# Patient Record
Sex: MALE | Race: WHITE | NOT HISPANIC OR LATINO | Employment: FULL TIME | URBAN - METROPOLITAN AREA
[De-identification: names, ages, dates, MRNs, and addresses within clinical notes are randomized per-mention and may not be internally consistent; named-entity substitution may affect disease eponyms.]

---

## 2017-02-08 ENCOUNTER — PATIENT MESSAGE (OUTPATIENT)
Dept: FAMILY MEDICINE | Facility: CLINIC | Age: 67
End: 2017-02-08

## 2017-02-08 DIAGNOSIS — Z23 NEED FOR PNEUMOCOCCAL VACCINATION: Primary | ICD-10-CM

## 2017-02-08 RX ORDER — SILDENAFIL 100 MG/1
100 TABLET, FILM COATED ORAL DAILY PRN
Qty: 5 TABLET | Refills: 2 | Status: SHIPPED | OUTPATIENT
Start: 2017-02-08 | End: 2017-09-25

## 2017-04-20 ENCOUNTER — OFFICE VISIT (OUTPATIENT)
Dept: PHYSICAL MEDICINE AND REHAB | Facility: CLINIC | Age: 67
End: 2017-04-20
Payer: MEDICARE

## 2017-04-20 VITALS
HEIGHT: 68 IN | WEIGHT: 210 LBS | SYSTOLIC BLOOD PRESSURE: 140 MMHG | DIASTOLIC BLOOD PRESSURE: 85 MMHG | BODY MASS INDEX: 31.83 KG/M2 | HEART RATE: 73 BPM

## 2017-04-20 DIAGNOSIS — M16.11 PRIMARY OSTEOARTHRITIS OF RIGHT HIP: ICD-10-CM

## 2017-04-20 DIAGNOSIS — M25.551 RIGHT HIP PAIN: Primary | ICD-10-CM

## 2017-04-20 PROCEDURE — 99499 UNLISTED E&M SERVICE: CPT | Mod: S$GLB,,, | Performed by: PHYSICAL MEDICINE & REHABILITATION

## 2017-04-20 PROCEDURE — 99999 PR PBB SHADOW E&M-EST. PATIENT-LVL II: CPT | Mod: PBBFAC,,, | Performed by: PHYSICAL MEDICINE & REHABILITATION

## 2017-04-20 PROCEDURE — 0232T NJX PLATELET PLASMA: CPT | Mod: CSM,S$GLB,, | Performed by: PHYSICAL MEDICINE & REHABILITATION

## 2017-04-20 RX ORDER — TRAMADOL HYDROCHLORIDE 50 MG/1
50 TABLET ORAL EVERY 6 HOURS PRN
Qty: 30 TABLET | Refills: 1 | Status: SHIPPED | OUTPATIENT
Start: 2017-04-20 | End: 2017-04-30

## 2017-04-21 NOTE — PROGRESS NOTES
[facility]    CHIEF COMPLAINT:   Chief Complaint   Patient presents with    Hip Pain     PRP injection to right hip     HISTORY OF PRESENT ILLNESS: Farhan Stallworth is a 67 y.o. male who presents to me in follow-up for his right hip pain.  He is undergone 2 prior injections of corticosteroid as well as right hip arthroscopy with mesenchymal stem cell injection back on 7/8/2016.  He reports definite reduction in his symptoms over the several months following this procedure.  Unfortunately, he has noted increased pain over the last few months.  He rates the pain as a 3 on a scale of 1-10 but may rise significant only with increased activity.  He locates the pain in the same spot deep within the anterior right groin region.    Review of Systems   Constitutional: Negative for fever.   HENT: Negative for drooling.    Eyes: Negative for discharge.   Respiratory: Negative for choking.    Cardiovascular: Negative for chest pain.   Genitourinary: Negative for flank pain.   Skin: Negative for wound.   Allergic/Immunologic: Negative for immunocompromised state.   Neurological: Negative for tremors and syncope.   Psychiatric/Behavioral: Negative for behavioral problems.     Past Medical History:   Past Medical History:   Diagnosis Date    Arthritis     hip    Hearing difficulty     Low serum testosterone level     Lumbar disc disease     REYES (obstructive sleep apnea)     has CPAP, does not use       Past Surgical History:   Past Surgical History:   Procedure Laterality Date    CARPAL TUNNEL RELEASE      2005, bilateral    CLAVICLE SURGERY      EPIDURAL BLOCK INJECTION  2010    KNEE ARTHROSCOPY W/ MENISCAL REPAIR      left    LUMBAR FUSION      2010    TONSILLECTOMY         Family History:   Family History   Problem Relation Age of Onset    Cancer Father     Cancer Sister     Heart disease Neg Hx     Diabetes Neg Hx        Medications:   Current Outpatient Prescriptions on File Prior to Visit  "  Medication Sig Dispense Refill    celecoxib (CELEBREX) 200 MG capsule Take 200 mg by mouth.      diphenhydramine-acetaminophen (TYLENOL PM)  mg Tab Take 1 tablet by mouth nightly as needed.      fish oil-omega-3 fatty acids 300-1,000 mg capsule Take 2 g by mouth once daily.      MULTIVITS-MINERALS/FA/LYCOPENE (MEN'S DAILY MULTIVIT-MINERAL ORAL) Take 1 tablet by mouth.      nutritional supplements Liqd Take by mouth. 1 capsule , has multiple ingredients      sildenafil (VIAGRA) 100 MG tablet Take 1 tablet (100 mg total) by mouth daily as needed for Erectile Dysfunction. 5 tablet 2    zolpidem (AMBIEN) 5 MG Tab Take one or two QHS as needed for sleep. 30 tablet 3     No current facility-administered medications on file prior to visit.      Allergies: Review of patient's allergies indicates:  No Known Allergies    Social History:   Social History     Social History    Marital status:      Spouse name: N/A    Number of children: N/A    Years of education: N/A     Social History Main Topics    Smoking status: Never Smoker    Smokeless tobacco: Never Used    Alcohol use 0.0 oz/week     0 Standard drinks or equivalent per week      Comment: 1-2 week    Drug use: No    Sexual activity: Not Asked     Other Topics Concern    None     Social History Narrative     Farhan is a retired  for [employer].  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    PHYSICAL EXAMINATION:   General    Vitals:    04/20/17 1531   BP: (!) 140/85   Pulse: 73   Weight: 95.3 kg (210 lb)   Height: 5' 8" (1.727 m)     Constitutional: Oriented to person, place, and time. No apparent distress. Appears well-developed and well-nourished.  Pleasant.  HENT:   Head: Normocephalic and atraumatic.   Eyes: Right eye exhibits no discharge. Left eye exhibits no discharge. No scleral icterus.   Pulmonary/Chest: Effort normal. No respiratory distress.   Abdominal: There is no guarding.   Neurological: " Alert and oriented to person, place, and time.   Psychiatric: Behavior is normal.   Right Hip Exam     Tenderness   The patient is experiencing tenderness in the anterior.    Range of Motion   Flexion: 120   Internal Rotation: 15 (Elicits pain in the anterior groin)   External Rotation: 60   Abduction: 40   Adduction: 25     Muscle Strength   Abduction: 5/5   Adduction: 5/5   Flexion: 5/5     Tests   TORRIE: negative    Other   Erythema: absent  Scars: absent  Sensation: normal  Pulse: present      Left Hip Exam   Left hip exam is normal.    Tenderness   The patient is experiencing no tenderness.         Range of Motion   Flexion: normal   Internal Rotation: normal   External Rotation: normal   Abduction: normal   Adduction: normal     Muscle Strength   Abduction: 5/5   Adduction: 5/5   Flexion: 5/5     Tests   TORRIE: negative    Other   Erythema: absent  Scars: absent  Sensation: normal  Pulse: present        INSPECTION: There is no swelling, ecchymoses, erythema or gross deformity.    STRENGTH: Manual muscle testing reveals 5/5 strength throughout the bilateral lower extremities.  LIGAMENTOUS LAXITY AND STABILITY: Negative TORRIE test. No pain with SI joint compression.  Positive log roll.  GAIT/DYNAMIC: Patient's gait is preserved.    Imaging  X-ray of the right hip from 4/5/2016: Advanced degenerative change and/or CPPD of the hips, right greater than left    2.  Possible right rectus femoris calcific tendinitis and possible right gluteus medius calcific tendinitis    3.  Degenerative change of the symphysis pubis    4.  Ossific density along the inferior margin of the right acetabulum, possibly a loose body or an age-indeterminate cortical avulsion fracture.     MRI of the right hip from 5/16/2016: Degenerative change including subchondral cyst right femoral head and right acetabulum, there also appears to be some labral fraying but no overt tearing.  The loose body seen on x-ray is not apparent.  There is  significant articular cartilage loss.    Data Reviewed: X-ray, MRI    Supportive Actions: Independent visualization of images or test specimens    ASSESSMENT:   1. Right hip pain    2. Primary osteoarthritis of right hip      PLAN:     1.  Mr. Stallworth did appear to have a positive response to the prior hip arthroscopy combined with intra-articular placement of mesenchymal stem cell, however unfortunately the effects have been temporary.  We discussed the likelihood of future total hip arthroplasty.  We also discussed the role of platelet rich plasma injections as an option that is less invasive than stem cell, but may give him a longer more quality pain reduction compared to corticosteroid.  He wishes to try this PRP injection into the right hip before seriously pursuing hip replacement.  See procedure note below.    2.  I recommended that he avoid aerobic or lower extremity exercise for the next 1 week.  He may golf.  We also discussed the avoidance of NSAIDs for the next 2 weeks.    3.  I prescribed tramadol 50 mg to take to 6 hours when necessary pain.  Otherwise, he may take Tylenol when necessary.    4.  Return to clinic when necessary.    Procedure: Platelet Rich Plasma Injection to the right hip     During the preprocedure period, the patient again reviewed the procedure, consent was obtained after we discussed the risks benefits and alternatives to this procedure. The patient was prepped with the usual sterile fasion and 60 ml of whole blood was withdrawn and harvested from the patient using a standard technique via antecubital access of the right arm. This blood was processed on location in an [employer] system processing machine and approximately 3 cc of PRP was prepared. 2cc of PPP was added to produce a total of 5cc. Upon completion of the blood component processing, the patient's plasma coagulant concentrate was brought into the treatment room. The patient was placed in a supine position. The  patient was prepped in the usual sterile manner. Local anesthesia was provided by ethyl chloride spray to the area in question prior to the procedure. Under ultrasound guidance, see images, the PRP was injected into the right femoral acetabular joint in an in plane distal to proximal approach using a 22-gauge 3.5 inch spinal needle. The injectate was observed to course up the femoral head neck junction, confirming intra-articular placement.    The patient tolerated the procedure well and there were no complications. A sterile dressing was applied to the area. The patient left the exam room and office in satifactory condition.      Plan: The patient was given instructions for general self care. The procedure took aproximately 40 minutes to complete including the harvesting and creation of the plasma coagulant. The patient will return for a follow up visit in 4 weeks to assess the treated area and overall health. We asked that he return prior to their scheduled visit if they note any skin color changes, excessive bleeding etc. he voiced understanding.      The above note was completed, in part, with the aid of Dragon dictation software/hardware. Translation errors may be present.

## 2017-07-18 ENCOUNTER — PATIENT MESSAGE (OUTPATIENT)
Dept: FAMILY MEDICINE | Facility: CLINIC | Age: 67
End: 2017-07-18

## 2017-09-11 ENCOUNTER — PATIENT OUTREACH (OUTPATIENT)
Dept: ADMINISTRATIVE | Facility: HOSPITAL | Age: 67
End: 2017-09-11

## 2017-09-22 ENCOUNTER — PATIENT MESSAGE (OUTPATIENT)
Dept: ADMINISTRATIVE | Facility: HOSPITAL | Age: 67
End: 2017-09-22

## 2017-09-25 ENCOUNTER — OFFICE VISIT (OUTPATIENT)
Dept: FAMILY MEDICINE | Facility: CLINIC | Age: 67
End: 2017-09-25
Payer: MEDICARE

## 2017-09-25 VITALS
TEMPERATURE: 98 F | DIASTOLIC BLOOD PRESSURE: 70 MMHG | BODY MASS INDEX: 32.46 KG/M2 | HEART RATE: 64 BPM | SYSTOLIC BLOOD PRESSURE: 132 MMHG | HEIGHT: 69 IN | WEIGHT: 219.13 LBS

## 2017-09-25 DIAGNOSIS — M48.061 LUMBAR SPINAL STENOSIS: ICD-10-CM

## 2017-09-25 DIAGNOSIS — M16.11 PRIMARY OSTEOARTHRITIS OF RIGHT HIP: Primary | ICD-10-CM

## 2017-09-25 DIAGNOSIS — G47.33 OSA (OBSTRUCTIVE SLEEP APNEA): ICD-10-CM

## 2017-09-25 PROCEDURE — 99213 OFFICE O/P EST LOW 20 MIN: CPT | Mod: S$GLB,,, | Performed by: FAMILY MEDICINE

## 2017-09-25 PROCEDURE — G0008 ADMIN INFLUENZA VIRUS VAC: HCPCS | Mod: S$GLB,,, | Performed by: FAMILY MEDICINE

## 2017-09-25 PROCEDURE — 99999 PR PBB SHADOW E&M-EST. PATIENT-LVL III: CPT | Mod: PBBFAC,,, | Performed by: FAMILY MEDICINE

## 2017-09-25 PROCEDURE — 90662 IIV NO PRSV INCREASED AG IM: CPT | Mod: S$GLB,,, | Performed by: FAMILY MEDICINE

## 2017-09-25 PROCEDURE — 3008F BODY MASS INDEX DOCD: CPT | Mod: S$GLB,,, | Performed by: FAMILY MEDICINE

## 2017-09-25 PROCEDURE — 1125F AMNT PAIN NOTED PAIN PRSNT: CPT | Mod: S$GLB,,, | Performed by: FAMILY MEDICINE

## 2017-09-25 PROCEDURE — 1159F MED LIST DOCD IN RCRD: CPT | Mod: S$GLB,,, | Performed by: FAMILY MEDICINE

## 2017-09-25 RX ORDER — SILDENAFIL CITRATE 20 MG/1
TABLET ORAL
Qty: 30 TABLET | Refills: 2 | Status: SHIPPED | OUTPATIENT
Start: 2017-09-25 | End: 2018-02-19

## 2017-09-25 NOTE — PROGRESS NOTES
Subjective:     THIS DOCUMENT WAS MADE IN PART WITH [employer] SOFTWARE.  OCCASIONALLY THIS SOFTWARE WILL MISINTERPRET WORDS OR PHRASES.     Patient ID: Farhan Stallworth is a 67 y.o. male.    Chief Complaint: Pre-op Exam (10/23 [facility] [place] with Dr. Nolberto Faria Right knee replacement)    HPI   Preop, surgery 10/23 at [facility] in , Dr. Nolberto Faria.    This is a 67-year-old male with osteoarthritis of his hip that has been worsening and failed all previous treatments leading to the need for hip replacement.  He has had no recent illnesses.  No infections.  He has had no worrisome cardiac or pulmonary symptoms.  There has been no chest pain, no shortness of breath, no syncope.  He reports no previous complications with any anesthesia or surgeries.    Active Ambulatory Problems     Diagnosis Date Noted    Low serum testosterone level 11/15/2012    REYES (obstructive sleep apnea) 11/15/2012    Osteoarthritis of right hip 07/08/2016    Lumbar spinal stenosis 12/20/2016    Lumbar degenerative disc disease 12/20/2016    Primary insomnia 12/28/2016     Resolved Ambulatory Problems     Diagnosis Date Noted    No Resolved Ambulatory Problems     Past Medical History:   Diagnosis Date    Arthritis     Hearing difficulty     Low serum testosterone level     Lumbar disc disease     REYES (obstructive sleep apnea)      Current Outpatient Prescriptions on File Prior to Visit   Medication Sig Dispense Refill    celecoxib (CELEBREX) 200 MG capsule Take 200 mg by mouth 2 (two) times daily.       fish oil-omega-3 fatty acids 300-1,000 mg capsule Take 2 g by mouth once daily.      MULTIVITS-MINERALS/FA/LYCOPENE (MEN'S DAILY MULTIVIT-MINERAL ORAL) Take 1 tablet by mouth.      diphenhydramine-acetaminophen (TYLENOL PM)  mg Tab Take 1 tablet by mouth nightly as needed.      zolpidem (AMBIEN) 5 MG Tab Take one or two QHS as needed for sleep. 30 tablet 3    [DISCONTINUED] nutritional supplements Liqd Take by  mouth. 1 capsule , has multiple ingredients      [DISCONTINUED] sildenafil (VIAGRA) 100 MG tablet Take 1 tablet (100 mg total) by mouth daily as needed for Erectile Dysfunction. 5 tablet 2     No current facility-administered medications on file prior to visit.      Review of patient's allergies indicates:  No Known Allergies  Social History   Substance Use Topics    Smoking status: Never Smoker    Smokeless tobacco: Never Used    Alcohol use 0.0 oz/week      Comment: 1-2 week     Family History   Problem Relation Age of Onset    Cancer Father     Cancer Sister     Heart disease Neg Hx     Diabetes Neg Hx          Review of Systems   Constitutional: Negative for activity change and unexpected weight change.   HENT: Positive for hearing loss. Negative for rhinorrhea and trouble swallowing.    Eyes: Negative for discharge and visual disturbance.   Respiratory: Negative for chest tightness and wheezing.    Cardiovascular: Negative for chest pain and palpitations.   Gastrointestinal: Negative for blood in stool, constipation, diarrhea and vomiting.   Endocrine: Negative for polydipsia and polyuria.   Genitourinary: Negative for difficulty urinating, hematuria and urgency.   Musculoskeletal: Positive for arthralgias. Negative for joint swelling and neck pain.   Neurological: Negative for weakness and headaches.   Psychiatric/Behavioral: Negative for confusion and dysphoric mood.       Objective:      Physical Exam   Constitutional: He is oriented to person, place, and time. He appears well-developed and well-nourished. No distress.   HENT:   Head: Normocephalic and atraumatic.   Right Ear: External ear normal.   Left Ear: External ear normal.   Nose: Nose normal.   Mouth/Throat: Oropharynx is clear and moist. No oropharyngeal exudate.   Eyes: Conjunctivae and EOM are normal. Pupils are equal, round, and reactive to light. Right eye exhibits no discharge. Left eye exhibits no discharge. No scleral icterus.   Neck:  "Normal range of motion. Neck supple. No JVD present. No thyromegaly present.   Cardiovascular: Normal rate, regular rhythm and normal heart sounds.  Exam reveals no gallop and no friction rub.    No murmur heard.  Pulmonary/Chest: Effort normal and breath sounds normal. No respiratory distress. He has no wheezes. He has no rales.   Abdominal: Soft. Bowel sounds are normal. He exhibits no distension and no mass. There is no tenderness. There is no guarding.   Lymphadenopathy:     He has no cervical adenopathy.   Neurological: He is alert and oriented to person, place, and time. No cranial nerve deficit. Coordination normal.   Skin: Skin is warm and dry. He is not diaphoretic.   Psychiatric: He has a normal mood and affect. His behavior is normal.   Vitals reviewed.      Vitals:    09/25/17 1501   BP: 132/70   Pulse: 64   Temp: 97.9 °F (36.6 °C)   Weight: 99.4 kg (219 lb 2.2 oz)   Height: 5' 9" (1.753 m)       Assessment:       1. Primary osteoarthritis of right hip    2. Lumbar spinal stenosis    3. REYES (obstructive sleep apnea)        Plan:       Farhan was seen today for pre-op exam.    Diagnoses and all orders for this visit:    Primary osteoarthritis of right hip  He has no contraindications for surgery.  He appears low risk for complication.  He does have a reported history of obstructive sleep apnea but never tolerant of CPAP.  Though in recent years he reports less snoring and no obvious apneic spells.  Would recommend at least 1 night in the hospital after surgery to monitor for any changes otherwise proceed as planned.    Lumbar spinal stenosis  This has been stable.  Although he does take Celebrex regularly for this and the hip arthritis.    REYES (obstructive sleep apnea)  As above    Other orders  -     sildenafil (REVATIO) 20 mg Tab; Take 3-5  Daily prn ED  -     Influenza - High Dose (65+) (PF) (IM)           No nsaids, no aspirin, no fishoil 7 days prior to surgery, he acknowledges understanding.  He may " take Tylenol if needed.    He also informs me that preop has been scheduled at the hospital so I'm assuming this includes lab EKG and any other test felt necessary by the surgery team.  I have asked him to make certain I get a copy sooner rather than later in case there are any issues that come up before surgery.    A copy will be sent to Dr. Nolberto Faria

## 2017-09-25 NOTE — Clinical Note
Please fax or send a copy of this note to Dr. Nolberto Faria, orthopedics in [place].  Patient did not arrive with any papers or fax number.
Patient/Caregiver provided printed discharge information.

## 2017-09-26 ENCOUNTER — TELEPHONE (OUTPATIENT)
Dept: FAMILY MEDICINE | Facility: CLINIC | Age: 67
End: 2017-09-26

## 2017-09-26 NOTE — TELEPHONE ENCOUNTER
----- Message from Asad Perez MD sent at 9/25/2017  3:49 PM CDT -----  Please fax or send a copy of this note to Dr. Nolberto Faria, orthopedics in [place].  Patient did not arrive with any papers or fax number.

## 2017-12-07 ENCOUNTER — PATIENT MESSAGE (OUTPATIENT)
Dept: FAMILY MEDICINE | Facility: CLINIC | Age: 67
End: 2017-12-07

## 2017-12-07 DIAGNOSIS — L60.0 INGROWN TOENAIL: Primary | ICD-10-CM

## 2017-12-07 NOTE — TELEPHONE ENCOUNTER
Pt has an ingrown toe nail and would like advise on how to take care of it. Please review and advise. Thank you!

## 2017-12-08 NOTE — TELEPHONE ENCOUNTER
Tried to reach pt. No answer, left msg to call back.    [facility]

## 2017-12-08 NOTE — TELEPHONE ENCOUNTER
See my chart message regarding an ingrown toenail. I entered a referral to the dietary. It is very painful and swollen then he may need an antibiotic. But I did not send this in as of yet.

## 2017-12-08 NOTE — TELEPHONE ENCOUNTER
Spoke w/ pt. Informed pt about need for appt. Pt verbalized understanding.     aptp made for 12/20/17

## 2017-12-20 ENCOUNTER — OFFICE VISIT (OUTPATIENT)
Dept: PODIATRY | Facility: CLINIC | Age: 67
End: 2017-12-20
Payer: MEDICARE

## 2017-12-20 VITALS — BODY MASS INDEX: 32.36 KG/M2 | HEIGHT: 69 IN | WEIGHT: 218.5 LBS

## 2017-12-20 DIAGNOSIS — L60.0 INGROWN NAIL OF GREAT TOE OF RIGHT FOOT: Primary | ICD-10-CM

## 2017-12-20 PROCEDURE — 99999 PR PBB SHADOW E&M-EST. PATIENT-LVL III: CPT | Mod: PBBFAC,,, | Performed by: PODIATRIST

## 2017-12-20 PROCEDURE — 99202 OFFICE O/P NEW SF 15 MIN: CPT | Mod: S$GLB,,, | Performed by: PODIATRIST

## 2017-12-20 RX ORDER — ENOXAPARIN SODIUM 100 MG/ML
INJECTION SUBCUTANEOUS
COMMUNITY
Start: 2017-10-23 | End: 2018-02-19

## 2017-12-20 RX ORDER — TRAMADOL HYDROCHLORIDE 50 MG/1
TABLET ORAL
COMMUNITY
Start: 2017-10-23 | End: 2018-02-19

## 2017-12-20 RX ORDER — MELOXICAM 7.5 MG/1
7.5 TABLET ORAL
COMMUNITY
Start: 2017-10-24 | End: 2018-02-19

## 2017-12-20 RX ORDER — ENOXAPARIN SODIUM 100 MG/ML
40 INJECTION SUBCUTANEOUS
COMMUNITY
Start: 2017-10-24 | End: 2017-12-20

## 2017-12-20 RX ORDER — AMOXICILLIN 500 MG/1
CAPSULE ORAL
COMMUNITY
Start: 2017-12-19 | End: 2017-12-20

## 2017-12-20 RX ORDER — HYDROCODONE BITARTRATE AND ACETAMINOPHEN 5; 325 MG/1; MG/1
TABLET ORAL
COMMUNITY
Start: 2017-10-23 | End: 2018-02-19

## 2017-12-20 RX ORDER — CLINDAMYCIN HYDROCHLORIDE 300 MG/1
CAPSULE ORAL
COMMUNITY
Start: 2017-11-20 | End: 2018-02-19 | Stop reason: ALTCHOICE

## 2017-12-20 RX ORDER — HYDROCODONE BITARTRATE AND ACETAMINOPHEN 7.5; 325 MG/1; MG/1
TABLET ORAL
COMMUNITY
Start: 2017-11-20 | End: 2018-02-19

## 2017-12-20 RX ORDER — CELECOXIB 200 MG/1
200 CAPSULE ORAL
COMMUNITY
End: 2017-12-20 | Stop reason: SDUPTHER

## 2017-12-20 RX ORDER — PNEUMOCOCCAL 13-VALENT CONJUGATE VACCINE 2.2; 2.2; 2.2; 2.2; 2.2; 4.4; 2.2; 2.2; 2.2; 2.2; 2.2; 2.2; 2.2 UG/.5ML; UG/.5ML; UG/.5ML; UG/.5ML; UG/.5ML; UG/.5ML; UG/.5ML; UG/.5ML; UG/.5ML; UG/.5ML; UG/.5ML; UG/.5ML; UG/.5ML
INJECTION, SUSPENSION INTRAMUSCULAR
COMMUNITY
Start: 2017-09-30 | End: 2018-02-19

## 2017-12-20 RX ORDER — GABAPENTIN 300 MG/1
300 CAPSULE ORAL
COMMUNITY
Start: 2017-10-24 | End: 2018-02-19

## 2017-12-20 NOTE — LETTER
December 21, 2017      Asad Perez MD  [address] [facility] [address] [place] [address]           [facility]  [address] [facility] [address] [place] [address]  Phone: [phone]          Patient: Farhan Stallworth   MR Number: [id number]   YOB: 1950   Date of Visit: 12/20/2017       Dear Dr. Asad Perez:    Thank you for referring Farhan Stallworth to me for evaluation. Attached you will find relevant portions of my assessment and plan of care.    If you have questions, please do not hesitate to call me. I look forward to following Farhan Stallworth along with you.    Sincerely,    Momo Javier, TALHA    Enclosure  CC:  No Recipients    If you would like to receive this communication electronically, please contact [email or web address] or [phone] to request more information on [facility] access.    For providers and/or their staff who would like to refer a patient to [facility], please contact us through our one-stop-shop provider referral line, [facility] , at [phone].    If you feel you have received this communication in error or would no longer like to receive these types of communications, please e-mail [email or web address]

## 2017-12-21 NOTE — PROGRESS NOTES
Subjective:      Patient ID: Farhan Stallworth is a 67 y.o. male.    Chief Complaint: Ingrown Toenail (c/o ingrown great toenail right foot) and Other Misc (Dr. Perez 9/25/2017)    Farhan is a 67 y.o. male who presents to the clinic complaining of painful ingrown toenail on the right foot great toe. He has had a problem with this for over a month, however had a hip replacement a couple months ago and was told to hold off on having any procedures to remove it. He was on PO antibiotics and has been soaking it. He feels the area has improved significantly over the last week. Denies f/c/n/v. There used to be redness and drainage but this has mostly resolved. No other pedal complaints. Never had an ingrown like this before.    Review of Systems   Constitution: Negative for chills and fever.   Cardiovascular: Negative for claudication and leg swelling.   Respiratory: Negative for shortness of breath.    Skin: Positive for nail changes. Negative for itching and rash.   Musculoskeletal: Positive for arthritis. Negative for muscle cramps, muscle weakness and myalgias.   Gastrointestinal: Negative for nausea and vomiting.   Neurological: Negative for focal weakness, loss of balance, numbness and paresthesias.           Objective:      Physical Exam   Constitutional: He is oriented to person, place, and time. He appears well-developed and well-nourished. No distress.   Cardiovascular:   Pulses:       Dorsalis pedis pulses are 2+ on the right side, and 2+ on the left side.        Posterior tibial pulses are 2+ on the right side, and 2+ on the left side.   < 3 sec capillary refill time to toes 1-5 bilateral. Toes and feet are warm to touch proximally with normal distal cooling b/l. There is some hair growth on the feet and toes b/l. There is mild edema b/l. No spider veins or varicosities present b/l.      Musculoskeletal:   Equinus noted b/l ankles with < 10 deg DF noted. MMT 5/5 in DF/PF/Inv/Ev resistance with no  reproduction of pain in any direction. Passive range of motion of ankle and pedal joints is painless b/l.     Neurological: He is alert and oriented to person, place, and time. He has normal strength. He displays no atrophy and no tremor. No sensory deficit. He exhibits normal muscle tone.   Negative tinel sign bilateral.   Skin: Skin is warm, dry and intact. No abrasion, no bruising, no burn, no ecchymosis, no laceration, no lesion, no petechiae and no rash noted. He is not diaphoretic. No cyanosis or erythema. No pallor. Nails show no clubbing.   Skin temperature, texture and turgor within normal limits.    Lateral hallux nail margin of right foot with ingrown nail plate. Minimal surrounding erythema and minimal edema is noted there is no granuloma formation noted. No malodor. Slight discomfort to palpation to the nail border.     Psychiatric: He has a normal mood and affect. His behavior is normal.             Assessment:       Encounter Diagnosis   Name Primary?    Ingrown nail of great toe of right foot Yes         Plan:       Farhan was seen today for ingrown toenail and other misc.    Diagnoses and all orders for this visit:    Ingrown nail of great toe of right foot      I counseled the patient on his conditions, their implications and medical management.    Discussed surgical versus conservative treatment, as it has been getting better over the last week or so he elects for conservative debridement of the nail.    Utilizing sterile toenail clippers I aggressively debrided the offending nail border approximately 3 mm from its edge and carried the nail plate incision down at an angle in order to wedge out the offending cryptotic portion of the nail plate. The offending border was then removed in toto.  No blood was drawn. Patient tolerated the procedure well and related significant relief.    Discussed that if the pain returns, or if there is increased erythema, drainage or other soi he should return right  away for a more aggressive avulsion of that portion of the nail.    Return PRN    Momo Javier DPM

## 2018-02-19 ENCOUNTER — OFFICE VISIT (OUTPATIENT)
Dept: PODIATRY | Facility: CLINIC | Age: 68
End: 2018-02-19
Payer: MEDICARE

## 2018-02-19 VITALS — HEIGHT: 69 IN | BODY MASS INDEX: 32.67 KG/M2 | WEIGHT: 220.56 LBS

## 2018-02-19 DIAGNOSIS — L60.0 INGROWN LEFT GREATER TOENAIL: Primary | ICD-10-CM

## 2018-02-19 PROCEDURE — 1125F AMNT PAIN NOTED PAIN PRSNT: CPT | Mod: S$GLB,,, | Performed by: PODIATRIST

## 2018-02-19 PROCEDURE — 1159F MED LIST DOCD IN RCRD: CPT | Mod: S$GLB,,, | Performed by: PODIATRIST

## 2018-02-19 PROCEDURE — 99213 OFFICE O/P EST LOW 20 MIN: CPT | Mod: 25,S$GLB,, | Performed by: PODIATRIST

## 2018-02-19 PROCEDURE — 11730 AVULSION NAIL PLATE SIMPLE 1: CPT | Mod: TA,S$GLB,, | Performed by: PODIATRIST

## 2018-02-19 PROCEDURE — 99999 PR PBB SHADOW E&M-EST. PATIENT-LVL III: CPT | Mod: PBBFAC,,, | Performed by: PODIATRIST

## 2018-02-19 PROCEDURE — 3008F BODY MASS INDEX DOCD: CPT | Mod: S$GLB,,, | Performed by: PODIATRIST

## 2018-02-19 RX ORDER — CEPHALEXIN 500 MG/1
500 CAPSULE ORAL 4 TIMES DAILY
Qty: 28 CAPSULE | Refills: 0 | Status: SHIPPED | OUTPATIENT
Start: 2018-02-19 | End: 2018-02-26

## 2018-02-19 NOTE — PROCEDURES
Nail Removal  Date/Time: 2/19/2018 11:35 AM  Performed by: VEL LEE  Authorized by: VEL LEE       Location:  Left foot  Location detail:  Left big toe  Anesthesia:  Digital block  Local anesthetic: lidocaine 1% without epinephrine  Anesthetic total (ml):  5  Preparation:  Skin prepped with alcohol and skin prepped with Betadine    Amount removed:  Partial  Nail removed location: medial.  Wedge excision of skin of nail fold: No    Nail bed sutured?: No    Nail matrix removed:  None  Dressing: gauze neosporin and coban.  Patient tolerance:  Patient tolerated the procedure well with no immediate complications

## 2018-02-19 NOTE — PATIENT INSTRUCTIONS
"AFTER TOENAIL PROCEDURE INSTRUCTIONS    1. Leave bandage intact until you shower (12-24 hours). If the bandage sticks as you try to remove it, soak it in warm water until it lifts off.    2. Dry foot completely after showering, and apply a small amount of triple antibiotic ointment (Neosporin works fine!) and a fabric or cloth bandaid ("plastic" bandaids tend to lift off with ointment use).  Wear open-toed shoes as needed for comfort.     3. Take Advil or Tylenol as needed for pain.     4. Your toe may drain for the next few days. Normal drainage is yellow-to-pink, and clear, much like the fluid in a blister. Watch for redness spreading up your toe into your foot, white thick drainage (pus), pain unrelieved by medication, or nausea/vomiting/fever/chills. These are signs of infection. Please call the clinic or visit your doctor.        "

## 2018-02-23 ENCOUNTER — PATIENT MESSAGE (OUTPATIENT)
Dept: PODIATRY | Facility: CLINIC | Age: 68
End: 2018-02-23

## 2018-02-23 NOTE — PROGRESS NOTES
Subjective:      Patient ID: Farhan Stallworth is a 67 y.o. male.    Chief Complaint: Ingrown Toenail (Left great) and Other Misc (PCP:  Dr Perez 9/25/17)    Farhan is a 67 y.o. male who presents to the clinic complaining of painful ingrown toenail on the left foot great toe. He has had a problem with this before and had it debrided which helped for a few weeks, however it has now returned and is bothering him again. Painful with pressure and with shoe wear. Denies f/c/n/v. No other pedal complaints.    Review of Systems   Constitution: Negative for chills and fever.   Cardiovascular: Negative for claudication and leg swelling.   Respiratory: Negative for shortness of breath.    Skin: Positive for nail changes. Negative for itching and rash.   Musculoskeletal: Positive for arthritis. Negative for muscle cramps, muscle weakness and myalgias.   Gastrointestinal: Negative for nausea and vomiting.   Neurological: Negative for focal weakness, loss of balance, numbness and paresthesias.           Objective:      Physical Exam   Constitutional: He is oriented to person, place, and time. He appears well-developed and well-nourished. No distress.   Cardiovascular:   Pulses:       Dorsalis pedis pulses are 2+ on the right side, and 2+ on the left side.        Posterior tibial pulses are 2+ on the right side, and 2+ on the left side.   < 3 sec capillary refill time to toes 1-5 bilateral. Toes and feet are warm to touch proximally with normal distal cooling b/l. There is some hair growth on the feet and toes b/l. There is mild edema b/l. No spider veins or varicosities present b/l.      Musculoskeletal:   Equinus noted b/l ankles with < 10 deg DF noted. MMT 5/5 in DF/PF/Inv/Ev resistance with no reproduction of pain in any direction. Passive range of motion of ankle and pedal joints is painless b/l.     Neurological: He is alert and oriented to person, place, and time. He has normal strength. He displays no atrophy and no  tremor. No sensory deficit. He exhibits normal muscle tone.   Negative tinel sign bilateral.   Skin: Skin is warm, dry and intact. No abrasion, no bruising, no burn, no ecchymosis, no laceration, no lesion, no petechiae and no rash noted. He is not diaphoretic. No cyanosis or erythema. No pallor. Nails show no clubbing.   Skin temperature, texture and turgor within normal limits.    Lateral hallux nail margin of left foot with ingrown nail plate. Minimal surrounding erythema and minimal edema is noted there is no granuloma formation noted. No malodor. Slight discomfort to palpation to the nail border.     Psychiatric: He has a normal mood and affect. His behavior is normal.             Assessment:       Encounter Diagnosis   Name Primary?    Ingrown left greater toenail Yes         Plan:       Farhan was seen today for ingrown toenail and other misc.    Diagnoses and all orders for this visit:    Ingrown left greater toenail  -     Nail Removal    Other orders  -     cephALEXin (KEFLEX) 500 MG capsule; Take 1 capsule (500 mg total) by mouth 4 (four) times daily.      I counseled the patient on his conditions, their implications and medical management.    Discussed the options of slant back vs permanent removal of offending corners of nail. Patient opted for non permanent removal. All alternatives, risks and complications were discussed in detail with patient regarding phenol matrixectomy. Patient elected for this procedure on the medial border of the left great toe. Informed consent was discussed in detail and signed/witnessed. Procedure note below:    Return 10 days post op    Momo Javier DPM

## 2018-05-07 ENCOUNTER — TELEPHONE (OUTPATIENT)
Dept: AUDIOLOGY | Facility: CLINIC | Age: 68
End: 2018-05-07

## 2018-05-07 NOTE — TELEPHONE ENCOUNTER
Spoke with pt regarding the  warranty on his hearing aids. Informed him that he is eligible for renewal but he declined the renewal. Informed his that if his hearing aids need repair, there would be a charge for the repair since the aids are now out of warranty. Pt acknowledged the  warranty and will call the clinic PRN.

## 2018-05-17 ENCOUNTER — PATIENT MESSAGE (OUTPATIENT)
Dept: FAMILY MEDICINE | Facility: CLINIC | Age: 68
End: 2018-05-17

## 2018-05-17 DIAGNOSIS — S46.209A INJURY OF TENDON OF BICEPS: Primary | ICD-10-CM

## 2018-05-18 NOTE — TELEPHONE ENCOUNTER
See my chart message.  I entered a referral to Orthopedics at his request.  Please help him schedule next week when he returns to [facility].

## 2018-05-21 ENCOUNTER — HOSPITAL ENCOUNTER (OUTPATIENT)
Dept: RADIOLOGY | Facility: HOSPITAL | Age: 68
Discharge: HOME | End: 2018-05-21
Attending: ORTHOPAEDIC SURGERY
Payer: MEDICARE

## 2018-05-21 ENCOUNTER — OFFICE VISIT (OUTPATIENT)
Dept: ORTHOPEDICS | Facility: CLINIC | Age: 68
End: 2018-05-21
Payer: MEDICARE

## 2018-05-21 VITALS — HEIGHT: 69 IN | WEIGHT: 220 LBS | BODY MASS INDEX: 32.58 KG/M2

## 2018-05-21 DIAGNOSIS — M79.602 LEFT ARM PAIN: ICD-10-CM

## 2018-05-21 DIAGNOSIS — M79.602 LEFT ARM PAIN: Primary | ICD-10-CM

## 2018-05-21 DIAGNOSIS — S49.92XA INJURY OF LEFT UPPER ARM, INITIAL ENCOUNTER: Primary | ICD-10-CM

## 2018-05-21 DIAGNOSIS — M65.341 TRIGGER RING FINGER OF RIGHT HAND: ICD-10-CM

## 2018-05-21 PROCEDURE — 99204 OFFICE O/P NEW MOD 45 MIN: CPT | Mod: 25,S$GLB,, | Performed by: ORTHOPAEDIC SURGERY

## 2018-05-21 PROCEDURE — 73060 X-RAY EXAM OF HUMERUS: CPT | Mod: 26,LT,, | Performed by: RADIOLOGY

## 2018-05-21 PROCEDURE — 20550 NJX 1 TENDON SHEATH/LIGAMENT: CPT | Mod: F8,S$GLB,, | Performed by: ORTHOPAEDIC SURGERY

## 2018-05-21 PROCEDURE — 73060 X-RAY EXAM OF HUMERUS: CPT | Mod: TC,PO,LT

## 2018-05-21 PROCEDURE — 99999 PR PBB SHADOW E&M-EST. PATIENT-LVL II: CPT | Mod: PBBFAC,,, | Performed by: ORTHOPAEDIC SURGERY

## 2018-05-21 NOTE — PROCEDURES
Tendon Sheath  Date/Time: 5/21/2018 11:04 AM  Performed by: DEMETRIA BA  Authorized by: DEMETRIA BA     Location:  Ring finger  Site:  R ring MCP  Medications:  20 mg triamcinolone hexacetonide 20 mg/mL

## 2018-05-21 NOTE — LETTER
May 21, 2018      Asad Perez MD  [address] [facility] [address] [place] [address]           [facility]  [address] [facility] [address] [place] [address]  Phone: [phone]          Patient: Farhan Stallworth   MR Number: [id number]   YOB: 1950   Date of Visit: 5/21/2018       Dear Dr. Asad Perez:    Thank you for referring Farhan Stallworth to me for evaluation. Attached you will find relevant portions of my assessment and plan of care.    If you have questions, please do not hesitate to call me. I look forward to following Farhan Stallworth along with you.    Sincerely,    Germain Bourgeois MD    Enclosure  CC:  No Recipients    If you would like to receive this communication electronically, please contact [email or web address] or [phone] to request more information on [facility] Link access.    For providers and/or their staff who would like to refer a patient to [facility], please contact us through our one-stop-shop provider referral line, [facility], at [phone].    If you feel you have received this communication in error or would no longer like to receive these types of communications, please e-mail [email or web address]

## 2018-05-21 NOTE — PROGRESS NOTES
A 68 years old complaining of locking, catching, and triggering of his right   fourth finger.   He has had this now for several months' time.  Also had an   injury to his biceps about nine days ago, felt a pop with retraction of the   muscle at the shoulder area.  It is not really painful for him.    PHYSICAL EXAMINATION:  Today shows a lana deformity about the shoulder.  Skin   is intact.  Compartments are soft.  Hand is functioning well.  No deficits.  He   does have locking, catching, and triggering at right fourth finger.    Shoulder x-rays show old surgery at the AC joint.  He has a scar to match that.    ASSESSMENT:   1.  Biceps rupture proximally.  Plan symptomatic care, reassurance.  2.  Right fourth trigger finger.  Plan Kenalog injection.  Follow up as needed.      PBB/HN  dd: 05/21/2018 11:03:16 (CDT)  td: 05/22/2018 01:50:44 (CDT)  Doc ID   #[id number]  Job ID #[id number]    CC:     Further History  Aching pain  Worse with activity  Relieved with rest  No other associated symptoms  No other radiation    Further Exam  Alert and oriented  Pleasant  Contralateral limb has appropriate range of motion for age and condition  Contralateral limb has appropriate strength for age and condition  Contralateral limb has appropriate stability  for age and condition  No adenopathy  Pulses are appropriate for current condition  Skin is intact        Chief Complaint    Chief Complaint   Patient presents with    Left Upper Arm - Injury, Pain       HPI  Farhan Stallworth is a 68 y.o.  male who presents with       Past Medical History  Past Medical History:   Diagnosis Date    Arthritis     hip    Hearing difficulty     Low serum testosterone level     Lumbar disc disease     REYES (obstructive sleep apnea)     has CPAP, does not use       Past Surgical History  Past Surgical History:   Procedure Laterality Date    CARPAL TUNNEL RELEASE      2005, bilateral    CLAVICLE SURGERY      EPIDURAL BLOCK INJECTION  2010    HIP  SURGERY      JOINT REPLACEMENT      KNEE ARTHROSCOPY W/ MENISCAL REPAIR      left    LUMBAR FUSION      2010    TONSILLECTOMY         Medications  Current Outpatient Prescriptions   Medication Sig    celecoxib (CELEBREX) 200 MG capsule Take 200 mg by mouth 2 (two) times daily.     fish oil-omega-3 fatty acids 300-1,000 mg capsule Take 2 g by mouth once daily.    MULTIVITS-MINERALS/FA/LYCOPENE (MEN'S DAILY MULTIVIT-MINERAL ORAL) Take 1 tablet by mouth.     No current facility-administered medications for this visit.        Allergies  Review of patient's allergies indicates:  No Known Allergies    Family History  Family History   Problem Relation Age of Onset    Cancer Father     Cancer Sister     Heart disease Neg Hx     Diabetes Neg Hx        Social History  Social History     Social History    Marital status:      Spouse name: N/A    Number of children: N/A    Years of education: N/A     Occupational History    Not on file.     Social History Main Topics    Smoking status: Never Smoker    Smokeless tobacco: Never Used    Alcohol use 0.0 oz/week      Comment: 1-2 week    Drug use: No    Sexual activity: Not on file     Other Topics Concern    Not on file     Social History Narrative    No narrative on file               Review of Systems     Constitutional: Negative    HENT: Negative  Eyes: Negative  Respiratory: Negative  Cardiovascular: Negative  Musculoskeletal: HPI  Skin: Negative  Neurological: Negative  Hematological: Negative  Endocrine: Negative                 Physical Exam    There were no vitals filed for this visit.  Body mass index is 32.49 kg/m².  Physical Examination:     General appearance -  well appearing, and in no distress  Mental status - awake  Neck - supple  Chest -  symmetric air entry  Heart - normal rate   Abdomen - soft      Assessment     1. Injury of left upper arm, initial encounter    2. Trigger ring finger of right hand          Plan

## 2018-11-27 ENCOUNTER — OFFICE VISIT (OUTPATIENT)
Dept: PODIATRY | Facility: CLINIC | Age: 68
End: 2018-11-27
Payer: MEDICARE

## 2018-11-27 VITALS
WEIGHT: 224 LBS | BODY MASS INDEX: 33.18 KG/M2 | SYSTOLIC BLOOD PRESSURE: 146 MMHG | HEIGHT: 69 IN | HEART RATE: 86 BPM | DIASTOLIC BLOOD PRESSURE: 100 MMHG

## 2018-11-27 DIAGNOSIS — L60.0 INGROWN NAIL OF GREAT TOE OF LEFT FOOT: Primary | ICD-10-CM

## 2018-11-27 PROCEDURE — 11750 EXCISION NAIL&NAIL MATRIX: CPT | Mod: TA,S$GLB,, | Performed by: PODIATRIST

## 2018-11-27 PROCEDURE — 99999 PR PBB SHADOW E&M-EST. PATIENT-LVL III: CPT | Mod: PBBFAC,,, | Performed by: PODIATRIST

## 2018-11-27 PROCEDURE — 99213 OFFICE O/P EST LOW 20 MIN: CPT | Mod: 25,S$GLB,, | Performed by: PODIATRIST

## 2018-11-27 PROCEDURE — 1101F PT FALLS ASSESS-DOCD LE1/YR: CPT | Mod: CPTII,S$GLB,, | Performed by: PODIATRIST

## 2018-11-27 NOTE — PROGRESS NOTES
Subjective:      Patient ID: Farhan Stallworth is a 68 y.o. male.    Chief Complaint: Ingrown Toenail (Left great toe, PCP--09/25/2017)    Farhan is a 68 y.o. male who presents to the clinic complaining of painful ingrown toenail on the left foot great toe. He has had a problem with this before and had it removed non permanently which helped for several months, however it has now returned and is bothering him again over the last couple of weeks. Painful with pressure and with shoe wear. Denies f/c/n/v. No other pedal complaints. Would like to now discuss more permanent procedure to remove the ingrown nail.    Review of Systems   Constitution: Negative for chills and fever.   Cardiovascular: Negative for claudication and leg swelling.   Respiratory: Negative for shortness of breath.    Skin: Positive for nail changes. Negative for itching and rash.   Musculoskeletal: Positive for arthritis. Negative for muscle cramps, muscle weakness and myalgias.   Gastrointestinal: Negative for nausea and vomiting.   Neurological: Negative for focal weakness, loss of balance, numbness and paresthesias.           Objective:      Physical Exam   Constitutional: He is oriented to person, place, and time. He appears well-developed and well-nourished. No distress.   Cardiovascular:   Pulses:       Dorsalis pedis pulses are 2+ on the right side, and 2+ on the left side.        Posterior tibial pulses are 2+ on the right side, and 2+ on the left side.   < 3 sec capillary refill time to toes 1-5 bilateral. Toes and feet are warm to touch proximally with normal distal cooling b/l. There is some hair growth on the feet and toes b/l. There is mild edema b/l. No spider veins or varicosities present b/l.      Musculoskeletal:   Equinus noted b/l ankles with < 10 deg DF noted. MMT 5/5 in DF/PF/Inv/Ev resistance with no reproduction of pain in any direction. Passive range of motion of ankle and pedal joints is painless b/l.      Neurological: He is alert and oriented to person, place, and time. He has normal strength. He displays no atrophy and no tremor. No sensory deficit. He exhibits normal muscle tone.   Negative tinel sign bilateral.   Skin: Skin is warm, dry and intact. No abrasion, no bruising, no burn, no ecchymosis, no laceration, no lesion, no petechiae and no rash noted. He is not diaphoretic. No cyanosis or erythema. No pallor. Nails show no clubbing.   Skin temperature, texture and turgor within normal limits.    Medial hallux nail margin of left foot with ingrown nail plate. Minimal surrounding erythema and minimal edema is noted there is no granuloma formation noted. No malodor. Slight discomfort to palpation to the nail border.     Psychiatric: He has a normal mood and affect. His behavior is normal.             Assessment:       Encounter Diagnosis   Name Primary?    Ingrown nail of great toe of left foot Yes         Plan:       Farhan was seen today for ingrown toenail.    Diagnoses and all orders for this visit:    Ingrown nail of great toe of left foot  -     Nail Removal      I counseled the patient on his conditions, their implications and medical management.    Discussed the options of slant back vs permanent removal of offending corners of nail. Patient opted for permanent removal. All alternatives, risks and complications were discussed in detail with patient regarding phenol matrixectomy. Patient elected for this procedure on the medial border of the left great toe. Informed consent was discussed in detail and signed/witnessed. Procedure note attached:    Written post op instructions dispensed    Return 10 days post op    Momo Javier DPM

## 2018-11-27 NOTE — PROCEDURES
Nail Removal  Date/Time: 11/27/2018 8:43 AM  Performed by: Momo Javier DPM  Authorized by: Momo Javier DPM     Consent Done?:  Yes (Written)    Location:  Left foot  Location detail:  Left big toe  Anesthesia:  Digital block  Local anesthetic: lidocaine 1% without epinephrine  Anesthetic total (ml):  5  Preparation:  Skin prepped with alcohol and skin prepped with Betadine    Amount removed:  Partial  Nail removed location: medial.  Wedge excision of skin of nail fold: No    Nail bed sutured?: No    Nail matrix removed:  Partial  Dressing: neosporin, gauze and coban.  Patient tolerance:  Patient tolerated the procedure well with no immediate complications

## 2018-12-02 ENCOUNTER — PATIENT MESSAGE (OUTPATIENT)
Dept: FAMILY MEDICINE | Facility: CLINIC | Age: 68
End: 2018-12-02

## 2018-12-03 ENCOUNTER — OFFICE VISIT (OUTPATIENT)
Dept: FAMILY MEDICINE | Facility: CLINIC | Age: 68
End: 2018-12-03
Payer: MEDICARE

## 2018-12-03 ENCOUNTER — LAB VISIT (OUTPATIENT)
Dept: LAB | Facility: HOSPITAL | Age: 68
End: 2018-12-03
Attending: INTERNAL MEDICINE
Payer: MEDICARE

## 2018-12-03 ENCOUNTER — OFFICE VISIT (OUTPATIENT)
Dept: PODIATRY | Facility: CLINIC | Age: 68
End: 2018-12-03
Payer: MEDICARE

## 2018-12-03 ENCOUNTER — OFFICE VISIT (OUTPATIENT)
Dept: CARDIOLOGY | Facility: CLINIC | Age: 68
End: 2018-12-03
Payer: MEDICARE

## 2018-12-03 VITALS
HEIGHT: 69 IN | WEIGHT: 224.44 LBS | SYSTOLIC BLOOD PRESSURE: 142 MMHG | BODY MASS INDEX: 33.24 KG/M2 | HEART RATE: 108 BPM | DIASTOLIC BLOOD PRESSURE: 90 MMHG

## 2018-12-03 VITALS
HEIGHT: 69 IN | DIASTOLIC BLOOD PRESSURE: 95 MMHG | BODY MASS INDEX: 33.18 KG/M2 | SYSTOLIC BLOOD PRESSURE: 143 MMHG | HEART RATE: 105 BPM | WEIGHT: 224 LBS

## 2018-12-03 DIAGNOSIS — R01.1 UNDIAGNOSED CARDIAC MURMURS: ICD-10-CM

## 2018-12-03 DIAGNOSIS — I48.91 ATRIAL FIBRILLATION, UNSPECIFIED TYPE: Primary | ICD-10-CM

## 2018-12-03 DIAGNOSIS — I48.20 CHRONIC ATRIAL FIBRILLATION: ICD-10-CM

## 2018-12-03 DIAGNOSIS — E66.9 OBESITY, CLASS I, BMI 30-34.9: ICD-10-CM

## 2018-12-03 DIAGNOSIS — G47.33 OBSTRUCTIVE SLEEP APNEA SYNDROME: ICD-10-CM

## 2018-12-03 DIAGNOSIS — I49.9 IRREGULAR HEART BEAT: Primary | ICD-10-CM

## 2018-12-03 DIAGNOSIS — I48.91 NEW ONSET ATRIAL FIBRILLATION: Primary | ICD-10-CM

## 2018-12-03 DIAGNOSIS — R03.0 ELEVATED BLOOD PRESSURE READING: ICD-10-CM

## 2018-12-03 DIAGNOSIS — I48.91 ATRIAL FIBRILLATION: ICD-10-CM

## 2018-12-03 DIAGNOSIS — I48.91 ATRIAL FIBRILLATION, UNSPECIFIED TYPE: ICD-10-CM

## 2018-12-03 DIAGNOSIS — I48.91 NEW ONSET ATRIAL FIBRILLATION: ICD-10-CM

## 2018-12-03 DIAGNOSIS — Z98.890 STATUS POST NAIL SURGERY: Primary | ICD-10-CM

## 2018-12-03 PROBLEM — E66.811 OBESITY, CLASS I, BMI 30-34.9: Status: ACTIVE | Noted: 2018-12-03

## 2018-12-03 LAB
ALBUMIN SERPL BCP-MCNC: 4 G/DL
ALP SERPL-CCNC: 83 U/L
ALT SERPL W/O P-5'-P-CCNC: 30 U/L
ANION GAP SERPL CALC-SCNC: 7 MMOL/L
AST SERPL-CCNC: 31 U/L
BASOPHILS # BLD AUTO: 0.01 K/UL
BASOPHILS NFR BLD: 0.2 %
BILIRUB SERPL-MCNC: 0.9 MG/DL
BUN SERPL-MCNC: 15 MG/DL
CALCIUM SERPL-MCNC: 9.7 MG/DL
CHLORIDE SERPL-SCNC: 106 MMOL/L
CO2 SERPL-SCNC: 28 MMOL/L
CREAT SERPL-MCNC: 1.1 MG/DL
DIFFERENTIAL METHOD: NORMAL
EOSINOPHIL # BLD AUTO: 0.1 K/UL
EOSINOPHIL NFR BLD: 0.9 %
ERYTHROCYTE [DISTWIDTH] IN BLOOD BY AUTOMATED COUNT: 13.2 %
EST. GFR  (AFRICAN AMERICAN): >60 ML/MIN/1.73 M^2
EST. GFR  (NON AFRICAN AMERICAN): >60 ML/MIN/1.73 M^2
GLUCOSE SERPL-MCNC: 98 MG/DL
HCT VFR BLD AUTO: 49.2 %
HGB BLD-MCNC: 16.7 G/DL
IMM GRANULOCYTES # BLD AUTO: 0.02 K/UL
IMM GRANULOCYTES NFR BLD AUTO: 0.4 %
LYMPHOCYTES # BLD AUTO: 1.1 K/UL
LYMPHOCYTES NFR BLD: 19 %
MCH RBC QN AUTO: 30.8 PG
MCHC RBC AUTO-ENTMCNC: 33.9 G/DL
MCV RBC AUTO: 91 FL
MONOCYTES # BLD AUTO: 0.7 K/UL
MONOCYTES NFR BLD: 11.5 %
NEUTROPHILS # BLD AUTO: 3.9 K/UL
NEUTROPHILS NFR BLD: 68 %
NRBC BLD-RTO: 0 /100 WBC
PLATELET # BLD AUTO: 181 K/UL
PMV BLD AUTO: 10.4 FL
POTASSIUM SERPL-SCNC: 4.7 MMOL/L
PROT SERPL-MCNC: 7.2 G/DL
RBC # BLD AUTO: 5.42 M/UL
SODIUM SERPL-SCNC: 141 MMOL/L
TSH SERPL DL<=0.005 MIU/L-ACNC: 1.11 UIU/ML
WBC # BLD AUTO: 5.67 K/UL

## 2018-12-03 PROCEDURE — 84443 ASSAY THYROID STIM HORMONE: CPT

## 2018-12-03 PROCEDURE — 99999 PR PBB SHADOW E&M-EST. PATIENT-LVL III: CPT | Mod: PBBFAC,,, | Performed by: FAMILY MEDICINE

## 2018-12-03 PROCEDURE — 99999 PR PBB SHADOW E&M-EST. PATIENT-LVL III: CPT | Mod: PBBFAC,,, | Performed by: INTERNAL MEDICINE

## 2018-12-03 PROCEDURE — 1101F PT FALLS ASSESS-DOCD LE1/YR: CPT | Mod: CPTII,S$GLB,, | Performed by: INTERNAL MEDICINE

## 2018-12-03 PROCEDURE — 85025 COMPLETE CBC W/AUTO DIFF WBC: CPT

## 2018-12-03 PROCEDURE — 1101F PT FALLS ASSESS-DOCD LE1/YR: CPT | Mod: CPTII,S$GLB,, | Performed by: FAMILY MEDICINE

## 2018-12-03 PROCEDURE — 36415 COLL VENOUS BLD VENIPUNCTURE: CPT | Mod: PO

## 2018-12-03 PROCEDURE — 99214 OFFICE O/P EST MOD 30 MIN: CPT | Mod: S$GLB,,, | Performed by: FAMILY MEDICINE

## 2018-12-03 PROCEDURE — 80053 COMPREHEN METABOLIC PANEL: CPT

## 2018-12-03 PROCEDURE — 99999 PR PBB SHADOW E&M-EST. PATIENT-LVL III: CPT | Mod: PBBFAC,,, | Performed by: PODIATRIST

## 2018-12-03 PROCEDURE — 99024 POSTOP FOLLOW-UP VISIT: CPT | Mod: S$GLB,,, | Performed by: PODIATRIST

## 2018-12-03 PROCEDURE — 93005 ELECTROCARDIOGRAM TRACING: CPT | Mod: S$GLB,,, | Performed by: FAMILY MEDICINE

## 2018-12-03 PROCEDURE — 93010 ELECTROCARDIOGRAM REPORT: CPT | Mod: S$GLB,,, | Performed by: INTERNAL MEDICINE

## 2018-12-03 PROCEDURE — 99204 OFFICE O/P NEW MOD 45 MIN: CPT | Mod: S$GLB,,, | Performed by: INTERNAL MEDICINE

## 2018-12-03 RX ORDER — GLUCOSAMINE/CHONDRO SU A 500-400 MG
1 TABLET ORAL DAILY
COMMUNITY

## 2018-12-03 RX ORDER — METOPROLOL SUCCINATE 25 MG/1
25 TABLET, EXTENDED RELEASE ORAL DAILY
Qty: 30 TABLET | Refills: 1 | Status: SHIPPED | OUTPATIENT
Start: 2018-12-03 | End: 2019-01-28 | Stop reason: SDUPTHER

## 2018-12-03 NOTE — PROGRESS NOTES
THIS DOCUMENT WAS MADE IN PART WITH VOICE RECOGNITION SOFTWARE.  OCCASIONALLY THIS SOFTWARE WILL MISINTERPRET WORDS OR PHRASES.      Farhan DESAI Basim  1950    Farhan was seen today for irregular heart beat.    Diagnoses and all orders for this visit:    Irregular heart beat  -     EKG 12-lead  -     Ambulatory referral to Cardiology    Atrial fibrillation, unspecified type  -     Ambulatory referral to Cardiology     New onset atrial fibrillation.  Essentially asymptomatic.  It appears that this began sometime in the last few weeks.  He will see Cardiology this afternoon.  I did educate him on this condition both short-term and long-term concerns.    Subjective     Chief Complaint   Patient presents with    Irregular Heart Beat     hum[employer] well check on saturday first noticed irregular HR, missed beats and elevated HR        HPI  Called urgently today because he stated over the weekend he had an in-house Medicare physical and was told he had an irregular and rapid heartbeat.  He did not go to the emergency room.  He called us this morning.    He has been feeling well.  Though he has noticed his resting heart rate is slightly higher than normal on his apple watch.  He also has noticed an increase in his peak heart rate with exercise, typically it is about 160 but recently has been 180.  No real drop often exercise tolerance though.    No chest pain.  No shortness of breath.  No palpitations.  No syncope.    HPI elements addressed above in the assessment and plan including problems, diagnosis, stability/instability,  improving/worsening, and chronicity will not be duplicated in this section. Any important additional HPI topics will be discussed here if needed.    Active Ambulatory Problems     Diagnosis Date Noted    Low serum testosterone level 11/15/2012    REYES (obstructive sleep apnea) 11/15/2012    Osteoarthritis of right hip 07/08/2016    Lumbar spinal stenosis 12/20/2016    Lumbar degenerative disc  "disease 12/20/2016    Primary insomnia 12/28/2016     Resolved Ambulatory Problems     Diagnosis Date Noted    No Resolved Ambulatory Problems     Past Medical History:   Diagnosis Date    Arthritis     Hearing difficulty     Low serum testosterone level     Lumbar disc disease     REYES (obstructive sleep apnea)          Review of Systems   Constitutional: Negative for fatigue, fever and unexpected weight change.   HENT: Negative for congestion, sinus pressure, trouble swallowing and voice change.    Eyes: Negative for visual disturbance.   Respiratory: Negative for cough, chest tightness and shortness of breath.    Cardiovascular: Negative for chest pain, palpitations and leg swelling.   Gastrointestinal: Negative for abdominal pain, blood in stool, constipation, diarrhea, nausea and vomiting.   Genitourinary: Negative for dysuria, frequency and hematuria.   Musculoskeletal: Negative for arthralgias, myalgias and neck pain.   Skin: Negative for rash and wound.   Neurological: Negative for dizziness, syncope and light-headedness.   Psychiatric/Behavioral: Negative.        Objective     Physical Exam   Constitutional: He is oriented to person, place, and time. He appears well-developed and well-nourished. No distress.   HENT:   Head: Normocephalic and atraumatic.   Eyes: No scleral icterus.   Neck: Normal range of motion. Neck supple.   Cardiovascular: Normal rate and normal heart sounds. An irregular rhythm present. Exam reveals no gallop.   No murmur heard.  Pulmonary/Chest: Effort normal. No respiratory distress.   Neurological: He is alert and oriented to person, place, and time. He has normal reflexes.   Skin: Skin is warm and dry. He is not diaphoretic.   Psychiatric: He has a normal mood and affect. His behavior is normal.   Vitals reviewed.    Vitals:    12/03/18 1007   BP: (!) 142/90   Pulse: 108   Weight: 101.8 kg (224 lb 6.9 oz)   Height: 5' 9" (1.753 m)     EKG Afib, vent rate of 88  MOST RECENT " LABS IN OUR ELECTRONIC MEDICAL RECORD:     Results for orders placed or performed in visit on 11/15/16   Comprehensive metabolic panel   Result Value Ref Range    Sodium 137 136 - 145 mmol/L    Potassium 4.2 3.5 - 5.1 mmol/L    Chloride 106 95 - 110 mmol/L    CO2 25 23 - 29 mmol/L    Glucose 104 70 - 110 mg/dL    BUN, Bld 18 8 - 23 mg/dL    Creatinine 1.0 0.5 - 1.4 mg/dL    Calcium 9.0 8.7 - 10.5 mg/dL    Total Protein 7.3 6.0 - 8.4 g/dL    Albumin 4.0 3.5 - 5.2 g/dL    Total Bilirubin 0.6 0.1 - 1.0 mg/dL    Alkaline Phosphatase 96 55 - 135 U/L    AST 26 10 - 40 U/L    ALT 22 10 - 44 U/L    Anion Gap 6 (L) 8 - 16 mmol/L    eGFR if African American >60.0 >60 mL/min/1.73 m^2    eGFR if non African American >60.0 >60 mL/min/1.73 m^2   Lipid panel   Result Value Ref Range    Cholesterol 163 120 - 199 mg/dL    Triglycerides 81 30 - 150 mg/dL    HDL 42 40 - 75 mg/dL    LDL Cholesterol 104.8 63.0 - 159.0 mg/dL    HDL/Chol Ratio 25.8 20.0 - 50.0 %    Total Cholesterol/HDL Ratio 3.9 2.0 - 5.0    Non-HDL Cholesterol 121 mg/dL   PSA, Screening   Result Value Ref Range    PSA, SCREEN 0.52 0.00 - 4.00 ng/mL   Testosterone   Result Value Ref Range    Testosterone, Total 515 195.0 - 1138.0 ng/dL   Testosterone, free   Result Value Ref Range    Testosterone, Free 6.7 pg/mL

## 2018-12-03 NOTE — PROGRESS NOTES
Subjective:    Patient ID:  Farhan Stallworth is a 68 y.o. male who presents for Atrial Fibrillation        Palpitations    This is a new (NOT FELT) problem. The current episode started more than 1 month ago. Associated symptoms include an irregular heartbeat. Pertinent negatives include no anxiety, chest pain, coughing (COLD), diaphoresis, dizziness, fever, malaise/fatigue (SLIGHT), near-syncope, numbness, shortness of breath, syncope, vomiting or weakness.   Hypertension   Pertinent negatives include no blurred vision, chest pain, malaise/fatigue (SLIGHT), orthopnea, palpitations (NOT FELT), PND or shortness of breath. Past treatments include lifestyle changes.     NEW PATIENT EVALUATION, FOUND IN TO BE IN A FIB, NOTED IRREGULAR HEART BEAT ON FIT-BIT FOR A MONTH, THEN RAS KERNS, NOTED IRREGULAR HEART BEAT, SAW PCP TODAY, A FIB, BP LOG OK, SLIGHTLY ELEVATED LATELY, RECENTLY RE-STARTED USING CPAP, ACTIVE IN GENERAL, SLIGHT FATIGUE LATELY,HR UP  ON WATCH WITH EXERCISE,  SEE ROS    Past Medical History:   Diagnosis Date    Arthritis     hip    Hearing difficulty     Low serum testosterone level     Lumbar disc disease     REYES (obstructive sleep apnea)     has CPAP, does not use     Past Surgical History:   Procedure Laterality Date    ARTHROSCOPY-HIP with debridement & bone marrow with stem cell - viscosupplementation with Eudexxa Right 7/8/2016    [facility]    BIOPSY-BONE MARROW Right 7/8/2016    [facility]    CARPAL TUNNEL RELEASE      2005, bilateral    CLAVICLE SURGERY      DEBRIDEMENT Right 7/8/2016    [facility]    EPIDURAL BLOCK INJECTION  2010    HIP SURGERY      JOINT REPLACEMENT      KNEE ARTHROSCOPY W/ MENISCAL REPAIR      left    LUMBAR FUSION      2010    TONSILLECTOMY       Family History   Problem Relation Age of Onset    Cancer Father     Cancer Sister     Heart disease  Neg Hx     Diabetes Neg Hx      Social History     Socioeconomic History    Marital status:      Spouse name: None    Number of children: None    Years of education: None    Highest education level: None   Social Needs    Financial resource strain: None    Food insecurity - worry: None    Food insecurity - inability: None    Transportation needs - medical: None    Transportation needs - non-medical: None   Occupational History    None   Tobacco Use    Smoking status: Never Smoker    Smokeless tobacco: Never Used   Substance and Sexual Activity    Alcohol use: Yes     Alcohol/week: 0.0 oz     Comment: 1-2 week    Drug use: No    Sexual activity: None   Other Topics Concern    None   Social History Narrative    None       Review of patient's allergies indicates:  No Known Allergies    Current Outpatient Medications:     glucosamine-chondroitin 500-400 mg tablet, Take 1 tablet by mouth 3 (three) times daily., Disp: , Rfl:     MULTIVITS-MINERALS/FA/LYCOPENE (MEN'S DAILY MULTIVIT-MINERAL ORAL), Take 1 tablet by mouth., Disp: , Rfl:     apixaban (ELIQUIS) 5 mg Tab, Take 1 tablet (5 mg total) by mouth 2 (two) times daily., Disp: 60 tablet, Rfl: 1    metoprolol succinate (TOPROL-XL) 25 MG 24 hr tablet, Take 1 tablet (25 mg total) by mouth once daily., Disp: 30 tablet, Rfl: 1    Review of Systems   Constitution: Negative for chills, diaphoresis, fever, weakness, malaise/fatigue (SLIGHT) and night sweats.   HENT: Negative for congestion, hearing loss and nosebleeds.    Eyes: Negative for blurred vision, double vision and visual disturbance.   Cardiovascular: Positive for irregular heartbeat. Negative for chest pain, claudication, cyanosis, dyspnea on exertion, leg swelling, near-syncope, orthopnea, palpitations (NOT FELT), paroxysmal nocturnal dyspnea and syncope.   Respiratory: Negative for cough (COLD), hemoptysis, shortness of breath and wheezing.    Endocrine: Negative for cold intolerance,  "heat intolerance and polyuria.   Hematologic/Lymphatic: Negative for adenopathy. Does not bruise/bleed easily.   Skin: Negative for color change, itching and rash.   Musculoskeletal: Negative for back pain (CHRONIC), falls and stiffness.   Gastrointestinal: Negative for abdominal pain, change in bowel habit, dysphagia, heartburn, hematemesis, jaundice, melena and vomiting.   Genitourinary: Negative for dysuria, flank pain and frequency.   Neurological: Negative for brief paralysis, dizziness, focal weakness, light-headedness, loss of balance, numbness, paresthesias, seizures, sensory change and tremors.   Psychiatric/Behavioral: Negative for altered mental status, depression, memory loss and substance abuse. The patient is not nervous/anxious.    Allergic/Immunologic: Negative for hives and persistent infections.        Objective:      Vitals:    12/03/18 1348   BP: 136/88   Pulse: 73   Weight: 102.3 kg (225 lb 8.5 oz)   Height: 5' 9" (1.753 m)   PainSc: 0-No pain     Body mass index is 33.31 kg/m².    Physical Exam   Constitutional: He is oriented to person, place, and time. He appears well-developed and well-nourished. He is active.   OVERWEIGHT   HENT:   Head: Normocephalic and atraumatic.   Mouth/Throat: Oropharynx is clear and moist and mucous membranes are normal.   Eyes: Conjunctivae and EOM are normal. Pupils are equal, round, and reactive to light.   Neck: Normal range of motion. Neck supple. Normal carotid pulses, no hepatojugular reflux and no JVD present. Carotid bruit is not present. No tracheal deviation, no edema and no erythema present. No thyromegaly present.   Cardiovascular: Normal rate and regular rhythm.  No extrasystoles are present. PMI is not displaced. Exam reveals no gallop, no distant heart sounds, no friction rub and no midsystolic click.   Murmur heard.   Systolic murmur is present with a grade of 1/6 at the lower left sternal border.  Pulses:       Carotid pulses are 2+ on the right " side, and 2+ on the left side.       Radial pulses are 2+ on the right side, and 2+ on the left side.        Femoral pulses are 2+ on the right side, and 2+ on the left side.       Popliteal pulses are 2+ on the right side, and 2+ on the left side.        Dorsalis pedis pulses are 2+ on the right side, and 2+ on the left side.        Posterior tibial pulses are 2+ on the right side, and 2+ on the left side.   Pulmonary/Chest: Effort normal and breath sounds normal. No accessory muscle usage. No tachypnea and no bradypnea. No respiratory distress.   Abdominal: Soft. Bowel sounds are normal. He exhibits no distension and no mass. There is no hepatosplenomegaly. There is no tenderness. There is no CVA tenderness.   Musculoskeletal: Normal range of motion. He exhibits no edema or deformity.   R HIP, AND LOWER BACK SCARS   Lymphadenopathy:     He has no cervical adenopathy.   Neurological: He is alert and oriented to person, place, and time. He has normal strength. He displays no tremor. No cranial nerve deficit. He exhibits normal muscle tone. Coordination normal.   Skin: Skin is warm and dry. No cyanosis or erythema. No pallor.   Psychiatric: He has a normal mood and affect. His speech is normal and behavior is normal. Judgment and thought content normal.               ..    Chemistry        Component Value Date/Time     12/03/2018 1442    K 4.7 12/03/2018 1442     12/03/2018 1442    CO2 28 12/03/2018 1442    BUN 15 12/03/2018 1442    CREATININE 1.1 12/03/2018 1442    GLU 98 12/03/2018 1442        Component Value Date/Time    CALCIUM 9.7 12/03/2018 1442    ALKPHOS 83 12/03/2018 1442    AST 31 12/03/2018 1442    ALT 30 12/03/2018 1442    BILITOT 0.9 12/03/2018 1442    ESTGFRAFRICA >60.0 12/03/2018 1442    EGFRNONAA >60.0 12/03/2018 1442            ..  Lab Results   Component Value Date    CHOL 163 11/15/2016    CHOL 167 11/13/2015    CHOL 168 12/15/2014     Lab Results   Component Value Date    HDL 42  11/15/2016    HDL 39 (L) 11/13/2015    HDL 41 12/15/2014     Lab Results   Component Value Date    LDLCALC 104.8 11/15/2016    LDLCALC 106.8 11/13/2015    LDLCALC 112.4 12/15/2014     Lab Results   Component Value Date    TRIG 81 11/15/2016    TRIG 106 11/13/2015    TRIG 73 12/15/2014     Lab Results   Component Value Date    CHOLHDL 25.8 11/15/2016    CHOLHDL 23.4 11/13/2015    CHOLHDL 24.4 12/15/2014     ..  Lab Results   Component Value Date    WBC 5.67 12/03/2018    HGB 16.7 12/03/2018    HCT 49.2 12/03/2018    MCV 91 12/03/2018     12/03/2018       Test(s) Reviewed  I have reviewed the following in detail:  [] Stress test   [] Angiography   [] Echocardiogram   [] Labs   [x] Other:       Assessment:         ICD-10-CM ICD-9-CM   1. New onset atrial fibrillation I48.91 427.31   2. Elevated blood pressure reading R03.0 796.2   3. Undiagnosed cardiac murmurs R01.1 785.2   4. Obstructive sleep apnea syndrome G47.33 327.23   5. Obesity, Class I, BMI 30-34.9 E66.9 278.00     Problem List Items Addressed This Visit        Cardiac/Vascular    New onset atrial fibrillation - Primary    Relevant Orders    Comprehensive metabolic panel (Completed)    TSH (Completed)    CBC auto differential (Completed)    Transthoracic echo (TTE) complete (Cupid Only)    Elevated blood pressure reading    Relevant Orders    Comprehensive metabolic panel (Completed)    TSH (Completed)    CBC auto differential (Completed)    Undiagnosed cardiac murmurs    Relevant Orders    Comprehensive metabolic panel (Completed)    TSH (Completed)    CBC auto differential (Completed)    Transthoracic echo (TTE) complete (Cupid Only)       Endocrine    Obesity, Class I, BMI 30-34.9       Other    Obstructive sleep apnea syndrome    Overview     Not on CPAP, intolerant         Relevant Orders    Comprehensive metabolic panel (Completed)    TSH (Completed)    CBC auto differential (Completed)           Plan:     EKG A FIB, HR IN 70'S, DC FISH OIL,  CELEBREX, START TOPROL, ELIQUIS, DAILY CPAP,ECHO, THEN  RICHARD /CARDIOVERSION,ALL OTHER CV CLINICALLY STABLE, NO ANGINA, NO HF, NO TIA, ASSESS  CLINICAL ARRHYTHMIA,CONTINUE CURRENT MEDS, EDUCATION, DIET, EXERCISE, WEIGHT LOSS      New onset atrial fibrillation  -     Comprehensive metabolic panel; Future; Expected date: 12/03/2018  -     TSH; Future; Expected date: 12/03/2018  -     CBC auto differential; Future; Expected date: 12/03/2018  -     Transthoracic echo (TTE) complete (Cupid Only); Future    Elevated blood pressure reading  -     Comprehensive metabolic panel; Future; Expected date: 12/03/2018  -     TSH; Future; Expected date: 12/03/2018  -     CBC auto differential; Future; Expected date: 12/03/2018    Undiagnosed cardiac murmurs  -     Comprehensive metabolic panel; Future; Expected date: 12/03/2018  -     TSH; Future; Expected date: 12/03/2018  -     CBC auto differential; Future; Expected date: 12/03/2018  -     Transthoracic echo (TTE) complete (Cupid Only); Future    Obstructive sleep apnea syndrome  -     Comprehensive metabolic panel; Future; Expected date: 12/03/2018  -     TSH; Future; Expected date: 12/03/2018  -     CBC auto differential; Future; Expected date: 12/03/2018    Obesity, Class I, BMI 30-34.9    Other orders  -     metoprolol succinate (TOPROL-XL) 25 MG 24 hr tablet; Take 1 tablet (25 mg total) by mouth once daily.  Dispense: 30 tablet; Refill: 1  -     apixaban (ELIQUIS) 5 mg Tab; Take 1 tablet (5 mg total) by mouth 2 (two) times daily.  Dispense: 60 tablet; Refill: 1    RTC Low level/low impact aerobic exercise 5x's/wk. Heart healthy diet and risk factor modification.    See labs and med orders.    Aerobic exercise 5x's/wk. Heart healthy diet and risk factor modification.    See labs and med orders.

## 2018-12-03 NOTE — PROGRESS NOTES
Subjective:      Patient ID: Farhan Stallworth is a 68 y.o. male.    Chief Complaint: Post-op Evaluation (1 week post phenol tx left great toe, PCP--09/25/2017)    Farhan is a 68 y.o. male who presents to the clinic complaining of painful ingrown toenail on the left foot great toe. He has had a problem with this before and had it removed non permanently which helped for several months, however it has now returned and is bothering him again over the last couple of weeks. Painful with pressure and with shoe wear. Denies f/c/n/v. No other pedal complaints. Would like to now discuss more permanent procedure to remove the ingrown nail.    12/3/18: S/p 1 week left great toe ingrown nail procedure with phenol matrixectomy.  Patient reports that he is having no pain to the area and doing well. No new pedal complaints.     Review of Systems   Constitution: Negative for chills and fever.   Cardiovascular: Negative for claudication and leg swelling.   Respiratory: Negative for shortness of breath.    Skin: Positive for nail changes. Negative for itching and rash.   Musculoskeletal: Positive for arthritis. Negative for muscle cramps, muscle weakness and myalgias.   Gastrointestinal: Negative for nausea and vomiting.   Neurological: Negative for focal weakness, loss of balance, numbness and paresthesias.           Objective:      Physical Exam   Constitutional: He is oriented to person, place, and time. He appears well-developed and well-nourished. No distress.   Cardiovascular:   Pulses:       Dorsalis pedis pulses are 2+ on the right side, and 2+ on the left side.        Posterior tibial pulses are 2+ on the right side, and 2+ on the left side.   < 3 sec capillary refill time to toes 1-5 bilateral. Toes and feet are warm to touch proximally with normal distal cooling b/l. There is some hair growth on the feet and toes b/l. There is mild edema b/l. No spider veins or varicosities present b/l.      Musculoskeletal:    Equinus noted b/l ankles with < 10 deg DF noted. MMT 5/5 in DF/PF/Inv/Ev resistance with no reproduction of pain in any direction. Passive range of motion of ankle and pedal joints is painless b/l.     Neurological: He is alert and oriented to person, place, and time. He has normal strength. He displays no atrophy and no tremor. No sensory deficit. He exhibits normal muscle tone.   Negative tinel sign bilateral.   Skin: Skin is warm, dry and intact. No abrasion, no bruising, no burn, no ecchymosis, no laceration, no lesion, no petechiae and no rash noted. He is not diaphoretic. No cyanosis or erythema. No pallor. Nails show no clubbing.   Skin temperature, texture and turgor within normal limits.    Medial hallux nail margin of left foot with ingrown nail plate removed. No erythema and minimal edema is noted there is no granuloma formation noted. No malodor or drainage. Slight discomfort to palpation to the nail border.     Psychiatric: He has a normal mood and affect. His behavior is normal.             Assessment:       Encounter Diagnosis   Name Primary?    Status post nail surgery Yes         Plan:       Farhan was seen today for post-op evaluation.    Diagnoses and all orders for this visit:    Status post nail surgery      I counseled the patient on his conditions, their implications and medical management.    Continue to cover with a band aid for another week.     Return PRN with any problems going forward    Momo Javier DPM

## 2018-12-04 ENCOUNTER — TELEPHONE (OUTPATIENT)
Dept: CARDIOLOGY | Facility: CLINIC | Age: 68
End: 2018-12-04

## 2018-12-04 VITALS
WEIGHT: 225.5 LBS | SYSTOLIC BLOOD PRESSURE: 136 MMHG | DIASTOLIC BLOOD PRESSURE: 88 MMHG | BODY MASS INDEX: 33.4 KG/M2 | HEIGHT: 69 IN | HEART RATE: 73 BPM

## 2018-12-04 NOTE — TELEPHONE ENCOUNTER
----- Message from Aracelis Menchaca sent at 12/4/2018  8:36 AM CST -----  Type: Needs Medical Advice    Who Called:  Patient    Best Call Back Number:[phone] (home)     Additional Information: Patient would like to reschedule an procedure

## 2018-12-04 NOTE — TELEPHONE ENCOUNTER
Spoke to pt. Asked to rescheduled echo appointment to this Thursday. Echo appt. Rescheduled pt. Verbally accepted appt. Pt. Also asked what time is his RICHARD procedure on 01-. Informed pt. As per procedure appointment in echo. It is for 10 a.m. And the [facility] pre-op department will call the day before. Pt. Verbalized understanding.

## 2018-12-06 ENCOUNTER — CLINICAL SUPPORT (OUTPATIENT)
Dept: CARDIOLOGY | Facility: CLINIC | Age: 68
End: 2018-12-06
Attending: INTERNAL MEDICINE
Payer: MEDICARE

## 2018-12-06 VITALS
SYSTOLIC BLOOD PRESSURE: 130 MMHG | DIASTOLIC BLOOD PRESSURE: 80 MMHG | BODY MASS INDEX: 33.4 KG/M2 | HEART RATE: 86 BPM | HEIGHT: 69 IN | WEIGHT: 225.5 LBS

## 2018-12-06 DIAGNOSIS — I48.91 NEW ONSET ATRIAL FIBRILLATION: ICD-10-CM

## 2018-12-06 DIAGNOSIS — I48.91 NEW ONSET A-FIB: Primary | ICD-10-CM

## 2018-12-06 DIAGNOSIS — R01.1 UNDIAGNOSED CARDIAC MURMURS: ICD-10-CM

## 2018-12-06 LAB
ASCENDING AORTA: 3.5 CM
AV INDEX (PROSTH): 0.92
AV MEAN GRADIENT: 1.8 MMHG
AV PEAK GRADIENT: 3.17 MMHG
BSA FOR ECHO PROCEDURE: 2.23 M2
CV ECHO LV RWT: 0.58 CM
DOP CALC AO PEAK VEL: 0.89 M/S
DOP CALC AO VTI: 14.55 CM
DOP CALCLVOT PEAK VEL VTI: 13.33 CM
ECHO LV POSTERIOR WALL: 1.19 CM (ref 0.6–1.1)
FRACTIONAL SHORTENING: 18 % (ref 28–44)
INTERVENTRICULAR SEPTUM: 1.16 CM (ref 0.6–1.1)
IVRT: 0.09 MSEC
LA MAJOR: 6.28 CM
LA MINOR: 5.28 CM
LA WIDTH: 3.27 CM
LEFT ATRIUM SIZE: 3.99 CM
LEFT ATRIUM VOLUME INDEX: 29.3 ML/M2
LEFT ATRIUM VOLUME: 63.62 CM3
LEFT INTERNAL DIMENSION IN SYSTOLE: 3.35 CM (ref 2.1–4)
LEFT VENTRICLE DIASTOLIC VOLUME INDEX: 33.8 ML/M2
LEFT VENTRICLE DIASTOLIC VOLUME: 73.48 ML
LEFT VENTRICLE MASS INDEX: 76 G/M2
LEFT VENTRICLE SYSTOLIC VOLUME INDEX: 21 ML/M2
LEFT VENTRICLE SYSTOLIC VOLUME: 45.64 ML
LEFT VENTRICULAR INTERNAL DIMENSION IN DIASTOLE: 4.08 CM (ref 3.5–6)
LEFT VENTRICULAR MASS: 165.28 G
PISA TR MAX VEL: 2.12 M/S
RA MAJOR: 6.31 CM
RA PRESSURE: 8 MMHG
RA WIDTH: 4.04 CM
RIGHT VENTRICULAR END-DIASTOLIC DIMENSION: 3.63 CM
SINUS: 2.9 CM
STJ: 2.82 CM
TR MAX PG: 17.98 MMHG
TRICUSPID ANNULAR PLANE SYSTOLIC EXCURSION: 2.04 CM
TV REST PULMONARY ARTERY PRESSURE: 25.98 MMHG

## 2018-12-06 PROCEDURE — 93306 TTE W/DOPPLER COMPLETE: CPT | Mod: S$GLB,,, | Performed by: INTERNAL MEDICINE

## 2018-12-06 PROCEDURE — 99999 PR PBB SHADOW E&M-EST. PATIENT-LVL II: CPT | Mod: PBBFAC,,,

## 2018-12-07 ENCOUNTER — TELEPHONE (OUTPATIENT)
Dept: CARDIOLOGY | Facility: CLINIC | Age: 68
End: 2018-12-07

## 2019-01-03 PROBLEM — I48.91 ATRIAL FIBRILLATION: Status: ACTIVE | Noted: 2019-01-03

## 2019-01-04 ENCOUNTER — TELEPHONE (OUTPATIENT)
Dept: CARDIOLOGY | Facility: CLINIC | Age: 69
End: 2019-01-04

## 2019-01-07 ENCOUNTER — PATIENT MESSAGE (OUTPATIENT)
Dept: CARDIOLOGY | Facility: CLINIC | Age: 69
End: 2019-01-07

## 2019-01-11 ENCOUNTER — PATIENT MESSAGE (OUTPATIENT)
Dept: CARDIOLOGY | Facility: CLINIC | Age: 69
End: 2019-01-11

## 2019-01-14 ENCOUNTER — TELEPHONE (OUTPATIENT)
Dept: CARDIOLOGY | Facility: CLINIC | Age: 69
End: 2019-01-14

## 2019-01-14 ENCOUNTER — PATIENT MESSAGE (OUTPATIENT)
Dept: ADMINISTRATIVE | Facility: OTHER | Age: 69
End: 2019-01-14

## 2019-01-14 ENCOUNTER — PATIENT MESSAGE (OUTPATIENT)
Dept: CARDIOLOGY | Facility: CLINIC | Age: 69
End: 2019-01-14

## 2019-01-14 NOTE — TELEPHONE ENCOUNTER
----- Message from Hayley Bang sent at 1/14/2019 12:37 PM CST -----  Contact: [place]/Dr Doyle's Office  [place] called to inform nurse that she never received pt's clearance for Blood Thinner.  Please fax clearance to both fax numbers provided.  Fax #[phone] or   Please call to advise  Call back   Thanks

## 2019-01-28 ENCOUNTER — PATIENT MESSAGE (OUTPATIENT)
Dept: FAMILY MEDICINE | Facility: CLINIC | Age: 69
End: 2019-01-28

## 2019-01-28 ENCOUNTER — OFFICE VISIT (OUTPATIENT)
Dept: CARDIOLOGY | Facility: CLINIC | Age: 69
End: 2019-01-28
Payer: MEDICARE

## 2019-01-28 ENCOUNTER — TELEPHONE (OUTPATIENT)
Dept: CARDIOLOGY | Facility: CLINIC | Age: 69
End: 2019-01-28

## 2019-01-28 VITALS
BODY MASS INDEX: 32.98 KG/M2 | SYSTOLIC BLOOD PRESSURE: 132 MMHG | HEART RATE: 62 BPM | HEIGHT: 69 IN | WEIGHT: 222.69 LBS | DIASTOLIC BLOOD PRESSURE: 78 MMHG

## 2019-01-28 DIAGNOSIS — Z79.01 LONG TERM (CURRENT) USE OF ANTICOAGULANTS: ICD-10-CM

## 2019-01-28 DIAGNOSIS — Z00.00 ROUTINE CHECK-UP: ICD-10-CM

## 2019-01-28 DIAGNOSIS — E66.9 OBESITY, CLASS I, BMI 30-34.9: ICD-10-CM

## 2019-01-28 DIAGNOSIS — I48.0 PAF (PAROXYSMAL ATRIAL FIBRILLATION): Primary | ICD-10-CM

## 2019-01-28 DIAGNOSIS — I10 ESSENTIAL HYPERTENSION: ICD-10-CM

## 2019-01-28 DIAGNOSIS — G47.33 OBSTRUCTIVE SLEEP APNEA SYNDROME: ICD-10-CM

## 2019-01-28 DIAGNOSIS — I34.0 NON-RHEUMATIC MITRAL REGURGITATION: ICD-10-CM

## 2019-01-28 PROCEDURE — 3078F PR MOST RECENT DIASTOLIC BLOOD PRESSURE < 80 MM HG: ICD-10-PCS | Mod: CPTII,S$GLB,, | Performed by: INTERNAL MEDICINE

## 2019-01-28 PROCEDURE — 3078F DIAST BP <80 MM HG: CPT | Mod: CPTII,S$GLB,, | Performed by: INTERNAL MEDICINE

## 2019-01-28 PROCEDURE — 99214 PR OFFICE/OUTPT VISIT, EST, LEVL IV, 30-39 MIN: ICD-10-PCS | Mod: S$GLB,,, | Performed by: INTERNAL MEDICINE

## 2019-01-28 PROCEDURE — 93000 EKG 12-LEAD: ICD-10-PCS | Mod: S$GLB,,, | Performed by: INTERNAL MEDICINE

## 2019-01-28 PROCEDURE — 93000 ELECTROCARDIOGRAM COMPLETE: CPT | Mod: S$GLB,,, | Performed by: INTERNAL MEDICINE

## 2019-01-28 PROCEDURE — 3075F PR MOST RECENT SYSTOLIC BLOOD PRESS GE 130-139MM HG: ICD-10-PCS | Mod: CPTII,S$GLB,, | Performed by: INTERNAL MEDICINE

## 2019-01-28 PROCEDURE — 1101F PT FALLS ASSESS-DOCD LE1/YR: CPT | Mod: CPTII,S$GLB,, | Performed by: INTERNAL MEDICINE

## 2019-01-28 PROCEDURE — 99214 OFFICE O/P EST MOD 30 MIN: CPT | Mod: S$GLB,,, | Performed by: INTERNAL MEDICINE

## 2019-01-28 PROCEDURE — 99999 PR PBB SHADOW E&M-EST. PATIENT-LVL III: CPT | Mod: PBBFAC,,, | Performed by: INTERNAL MEDICINE

## 2019-01-28 PROCEDURE — 99999 PR PBB SHADOW E&M-EST. PATIENT-LVL III: ICD-10-PCS | Mod: PBBFAC,,, | Performed by: INTERNAL MEDICINE

## 2019-01-28 PROCEDURE — 3075F SYST BP GE 130 - 139MM HG: CPT | Mod: CPTII,S$GLB,, | Performed by: INTERNAL MEDICINE

## 2019-01-28 PROCEDURE — 1101F PR PT FALLS ASSESS DOC 0-1 FALLS W/OUT INJ PAST YR: ICD-10-PCS | Mod: CPTII,S$GLB,, | Performed by: INTERNAL MEDICINE

## 2019-01-28 RX ORDER — METOPROLOL SUCCINATE 25 MG/1
25 TABLET, EXTENDED RELEASE ORAL DAILY
Qty: 90 TABLET | Refills: 1 | Status: SHIPPED | OUTPATIENT
Start: 2019-01-28 | End: 2019-01-28 | Stop reason: SDUPTHER

## 2019-01-28 RX ORDER — METOPROLOL SUCCINATE 25 MG/1
25 TABLET, EXTENDED RELEASE ORAL DAILY
Qty: 30 TABLET | Refills: 1 | Status: SHIPPED | OUTPATIENT
Start: 2019-01-28 | End: 2019-01-28 | Stop reason: SDUPTHER

## 2019-01-28 RX ORDER — METOPROLOL SUCCINATE 25 MG/1
25 TABLET, EXTENDED RELEASE ORAL DAILY
Qty: 30 TABLET | Refills: 1 | Status: SHIPPED | OUTPATIENT
Start: 2019-01-28 | End: 2019-07-01 | Stop reason: SDUPTHER

## 2019-01-28 NOTE — PROGRESS NOTES
Subjective:    Patient ID:  Farhan Stallworth is a 68 y.o. male who presents for Atrial Fibrillation (Conversion)        HPI    S/P RICHARD AND CARDIOVERSION, MILD/ MOD MR, EF 50%, DID WELL, USES C-PAP , BENEFITS FROM IT, NEEDS NEW MACHINE,OVERALL FEELS BETTER, SEE ROS  Past Medical History:   Diagnosis Date    Arthritis     hip    Atrial fibrillation     Hearing difficulty     Low serum testosterone level     Lumbar disc disease     REYES (obstructive sleep apnea)     has CPAP, does not use     Past Surgical History:   Procedure Laterality Date    ARTHROSCOPY-HIP with debridement & bone marrow with stem cell - viscosupplementation with Eudexxa Right 7/8/2016    Performed by [facility]el [facility] at [facility]    BIOPSY-BONE MARROW Right 7/8/2016    [facility] at [facility]    CARDIOVERSION N/A 1/3/2019    [facility]    CARPAL TUNNEL RELEASE      2005, bilateral    CLAVICLE SURGERY      DEBRIDEMENT Right 7/8/2016    [facility] at [facility]    ECHOCARDIOGRAM,TRANSESOPHAGEAL N/A 1/3/2019    [facility] at [facility]    EPIDURAL BLOCK INJECTION  2010    HIP SURGERY      JOINT REPLACEMENT Right 11/2017    hip    KNEE ARTHROSCOPY W/ MENISCAL REPAIR      left    LUMBAR FUSION      2010    TONSILLECTOMY       Family History   Problem Relation Age of Onset    Cancer Father         lung    Cancer Sister         leukemia    No Known Problems Mother     Heart disease Neg Hx     Diabetes Neg Hx      Social History     Socioeconomic History    Marital status:      Spouse name: None    Number of children: None    Years of education: None    Highest education level: None   Social Needs    Financial resource strain: None    Food insecurity - worry: None    Food insecurity - inability: None    Transportation needs - medical: None    Transportation needs - non-medical: None   Occupational History    None    Tobacco Use    Smoking status: Never Smoker    Smokeless tobacco: Never Used   Substance and Sexual Activity    Alcohol use: Yes     Alcohol/week: 0.0 oz     Comment: 1-2 week    Drug use: No    Sexual activity: None   Other Topics Concern    None   Social History Narrative    None       Review of patient's allergies indicates:  No Known Allergies    Current Outpatient Medications:     apixaban (ELIQUIS) 5 mg Tab, Take 1 tablet (5 mg total) by mouth 2 (two) times daily., Disp: 60 tablet, Rfl: 1    glucosamine-chondroitin 500-400 mg tablet, Take 1 tablet by mouth 2 (two) times daily. Triflex, Disp: , Rfl:     metoprolol succinate (TOPROL-XL) 25 MG 24 hr tablet, Take 1 tablet (25 mg total) by mouth once daily., Disp: 30 tablet, Rfl: 1    MULTIVITS-MINERALS/FA/LYCOPENE (MEN'S DAILY MULTIVIT-MINERAL ORAL), Take 1 tablet by mouth once daily. , Disp: , Rfl:     Review of Systems   Constitution: Negative for chills, diaphoresis, fever, weakness, malaise/fatigue and night sweats.   HENT: Negative for congestion and nosebleeds.    Eyes: Negative for blurred vision and visual disturbance.   Cardiovascular: Negative for chest pain, claudication, cyanosis, dyspnea on exertion, irregular heartbeat, leg swelling, near-syncope, orthopnea, palpitations (NOT FELT), paroxysmal nocturnal dyspnea and syncope.   Respiratory: Negative for cough (COLD), hemoptysis, shortness of breath and wheezing.    Endocrine: Negative for cold intolerance, heat intolerance and polyuria.   Hematologic/Lymphatic: Negative for adenopathy. Does not bruise/bleed easily.   Skin: Negative for color change, itching and rash.   Musculoskeletal: Negative for back pain (CHRONIC) and falls.   Gastrointestinal: Negative for abdominal pain, change in bowel habit, dysphagia, hematemesis, jaundice, melena and vomiting.   Genitourinary: Negative for dysuria, flank pain and frequency.   Neurological: Negative for brief paralysis, dizziness, focal weakness,  "light-headedness, loss of balance, numbness, paresthesias and tremors.   Psychiatric/Behavioral: Negative for altered mental status, depression and memory loss. The patient is not nervous/anxious (SOME).    Allergic/Immunologic: Negative for hives and persistent infections.        Objective:      Vitals:    01/28/19 1433   BP: 132/78   Pulse: 62   Weight: 101 kg (222 lb 10.6 oz)   Height: 5' 9" (1.753 m)   PainSc:   3     Body mass index is 32.88 kg/m².    Physical Exam   Constitutional: He is oriented to person, place, and time. He appears well-developed and well-nourished. He is active.   OVERWEIGHT   HENT:   Head: Normocephalic and atraumatic.   Mouth/Throat: Oropharynx is clear and moist and mucous membranes are normal.   Eyes: Conjunctivae and EOM are normal. Pupils are equal, round, and reactive to light.   Neck: Normal range of motion. Neck supple. Normal carotid pulses, no hepatojugular reflux and no JVD present. Carotid bruit is not present. No edema and no erythema present. No thyromegaly present.   Cardiovascular: Regular rhythm.  No extrasystoles are present. Bradycardia present. PMI is not displaced. Exam reveals no gallop, no distant heart sounds, no friction rub and no midsystolic click.   Murmur heard.   Systolic murmur is present with a grade of 1/6 at the lower left sternal border.  Pulses:       Carotid pulses are 2+ on the right side, and 2+ on the left side.       Radial pulses are 2+ on the right side, and 2+ on the left side.        Femoral pulses are 2+ on the right side, and 2+ on the left side.       Dorsalis pedis pulses are 2+ on the right side, and 2+ on the left side.        Posterior tibial pulses are 2+ on the right side, and 2+ on the left side.   Pulmonary/Chest: Effort normal and breath sounds normal. No accessory muscle usage. No tachypnea and no bradypnea. No respiratory distress.   Abdominal: Soft. Bowel sounds are normal. He exhibits no distension and no mass. There is no " hepatosplenomegaly. There is no tenderness. There is no CVA tenderness.   Musculoskeletal: Normal range of motion. He exhibits no edema or deformity.   R HIP, AND LOWER BACK SCARS   Lymphadenopathy:     He has no cervical adenopathy.   Neurological: He is alert and oriented to person, place, and time. He has normal strength. He displays no tremor. No cranial nerve deficit. Coordination normal.   Skin: Skin is warm and dry. No cyanosis or erythema. No pallor.   Psychiatric: He has a normal mood and affect. His speech is normal and behavior is normal.               ..    Chemistry        Component Value Date/Time     12/03/2018 1442    K 4.7 12/03/2018 1442     12/03/2018 1442    CO2 28 12/03/2018 1442    BUN 15 12/03/2018 1442    CREATININE 1.1 12/03/2018 1442    GLU 98 12/03/2018 1442        Component Value Date/Time    CALCIUM 9.7 12/03/2018 1442    ALKPHOS 83 12/03/2018 1442    AST 31 12/03/2018 1442    ALT 30 12/03/2018 1442    BILITOT 0.9 12/03/2018 1442    ESTGFRAFRICA >60.0 12/03/2018 1442    EGFRNONAA >60.0 12/03/2018 1442            ..  Lab Results   Component Value Date    CHOL 163 11/15/2016    CHOL 167 11/13/2015    CHOL 168 12/15/2014     Lab Results   Component Value Date    HDL 42 11/15/2016    HDL 39 (L) 11/13/2015    HDL 41 12/15/2014     Lab Results   Component Value Date    LDLCALC 104.8 11/15/2016    LDLCALC 106.8 11/13/2015    LDLCALC 112.4 12/15/2014     Lab Results   Component Value Date    TRIG 81 11/15/2016    TRIG 106 11/13/2015    TRIG 73 12/15/2014     Lab Results   Component Value Date    CHOLHDL 25.8 11/15/2016    CHOLHDL 23.4 11/13/2015    CHOLHDL 24.4 12/15/2014     ..  Lab Results   Component Value Date    WBC 5.67 12/03/2018    HGB 16.7 12/03/2018    HCT 49.2 12/03/2018    MCV 91 12/03/2018     12/03/2018       Test(s) Reviewed  I have reviewed the following in detail:  [] Stress test   [] Angiography   [] Echocardiogram   [] Labs   [] Other:       Assessment:          ICD-10-CM ICD-9-CM   1. PAF (paroxysmal atrial fibrillation) I48.0 427.31   2. Non-rheumatic mitral regurgitation I34.0 424.0   3. Obesity, Class I, BMI 30-34.9 E66.9 278.00   4. Essential hypertension I10 401.9   5. Long term (current) use of anticoagulants Z79.01 V58.61   6. Obstructive sleep apnea syndrome G47.33 327.23     Problem List Items Addressed This Visit        Cardiac/Vascular    PAF (paroxysmal atrial fibrillation) - Primary    Relevant Orders    Comprehensive metabolic panel    Non-rheumatic mitral regurgitation    Relevant Orders    Comprehensive metabolic panel    Essential hypertension    Relevant Orders    Comprehensive metabolic panel       Hematology    Long term (current) use of anticoagulants    Relevant Orders    Comprehensive metabolic panel    Hemoglobin       Endocrine    Obesity, Class I, BMI 30-34.9    Relevant Orders    Comprehensive metabolic panel       Other    Obstructive sleep apnea syndrome    Overview     Not on CPAP, intolerant                Plan:     EKG SB, LAE, ALL CV CLINICALLY STABLE, NO ANGINA, NO HF, NO TIA, NO CLINICAL ARRHYTHMIA,CONTINUE CURRENT MEDS, EDUCATION, DIET, EXERCISE,WEIGHT LOSS, DAILY C-PAP,  RTC IN 6 MO WITH  LABS      PAF (paroxysmal atrial fibrillation)  -     Comprehensive metabolic panel; Future; Expected date: 01/28/2019    Non-rheumatic mitral regurgitation  -     Comprehensive metabolic panel; Future; Expected date: 01/28/2019    Obesity, Class I, BMI 30-34.9  -     Comprehensive metabolic panel; Future; Expected date: 01/28/2019    Essential hypertension  -     Comprehensive metabolic panel; Future; Expected date: 01/28/2019    Long term (current) use of anticoagulants  -     Comprehensive metabolic panel; Future; Expected date: 01/28/2019  -     Hemoglobin; Future; Expected date: 01/28/2019    Obstructive sleep apnea syndrome    Other orders  -     Discontinue: metoprolol succinate (TOPROL-XL) 25 MG 24 hr tablet; Take 1 tablet (25 mg total) by  mouth once daily.  Dispense: 90 tablet; Refill: 1  -     Discontinue: apixaban (ELIQUIS) 5 mg Tab; Take 1 tablet (5 mg total) by mouth 2 (two) times daily.  Dispense: 180 tablet; Refill: 1  -     Discontinue: metoprolol succinate (TOPROL-XL) 25 MG 24 hr tablet; Take 1 tablet (25 mg total) by mouth once daily.  Dispense: 30 tablet; Refill: 1  -     Discontinue: apixaban (ELIQUIS) 5 mg Tab; Take 1 tablet (5 mg total) by mouth 2 (two) times daily.  Dispense: 60 tablet; Refill: 1  -     metoprolol succinate (TOPROL-XL) 25 MG 24 hr tablet; Take 1 tablet (25 mg total) by mouth once daily.  Dispense: 30 tablet; Refill: 1  -     apixaban (ELIQUIS) 5 mg Tab; Take 1 tablet (5 mg total) by mouth 2 (two) times daily.  Dispense: 60 tablet; Refill: 1    RTC Low level/low impact aerobic exercise 5x's/wk. Heart healthy diet and risk factor modification.    See labs and med orders.    Aerobic exercise 5x's/wk. Heart healthy diet and risk factor modification.    See labs and med orders.

## 2019-01-29 ENCOUNTER — PATIENT MESSAGE (OUTPATIENT)
Dept: FAMILY MEDICINE | Facility: CLINIC | Age: 69
End: 2019-01-29

## 2019-01-29 NOTE — TELEPHONE ENCOUNTER
See my chart message.  If we have the original sleep study results and orders, I will be happy to sign off on orders for a new prescription for CPAP.

## 2019-01-30 ENCOUNTER — PATIENT MESSAGE (OUTPATIENT)
Dept: FAMILY MEDICINE | Facility: CLINIC | Age: 69
End: 2019-01-30

## 2019-01-30 NOTE — TELEPHONE ENCOUNTER
----- Message from Lalo Mosqueda sent at 1/30/2019 10:31 AM CST -----  Type: Needs Medical Advice    Who Called:  Patient    Best Call Back Number: [phone]  Additional Information:  Caller states that he would like a callback from [facility] regarding a C-pap machine

## 2019-01-31 DIAGNOSIS — Z00.00 ROUTINE CHECK-UP: Primary | ICD-10-CM

## 2019-02-04 DIAGNOSIS — I48.91 NEW ONSET ATRIAL FIBRILLATION: Primary | ICD-10-CM

## 2019-02-06 ENCOUNTER — PATIENT MESSAGE (OUTPATIENT)
Dept: CARDIOLOGY | Facility: CLINIC | Age: 69
End: 2019-02-06

## 2019-03-19 ENCOUNTER — PATIENT MESSAGE (OUTPATIENT)
Dept: CARDIOLOGY | Facility: CLINIC | Age: 69
End: 2019-03-19

## 2019-03-21 ENCOUNTER — OFFICE VISIT (OUTPATIENT)
Dept: CARDIOLOGY | Facility: CLINIC | Age: 69
End: 2019-03-21
Payer: MEDICARE

## 2019-03-21 ENCOUNTER — HOSPITAL ENCOUNTER (OUTPATIENT)
Dept: RADIOLOGY | Facility: HOSPITAL | Age: 69
Discharge: HOME | End: 2019-03-21
Attending: INTERNAL MEDICINE
Payer: MEDICARE

## 2019-03-21 VITALS
WEIGHT: 223.13 LBS | DIASTOLIC BLOOD PRESSURE: 76 MMHG | HEIGHT: 69 IN | BODY MASS INDEX: 33.05 KG/M2 | HEART RATE: 64 BPM | SYSTOLIC BLOOD PRESSURE: 138 MMHG

## 2019-03-21 DIAGNOSIS — E78.00 HYPERCHOLESTEREMIA: ICD-10-CM

## 2019-03-21 DIAGNOSIS — R07.2 PRECORDIAL PAIN: Primary | ICD-10-CM

## 2019-03-21 DIAGNOSIS — G47.33 OBSTRUCTIVE SLEEP APNEA SYNDROME: ICD-10-CM

## 2019-03-21 DIAGNOSIS — R07.9 ACUTE CHEST PAIN: ICD-10-CM

## 2019-03-21 DIAGNOSIS — I10 ESSENTIAL HYPERTENSION: ICD-10-CM

## 2019-03-21 DIAGNOSIS — E66.9 OBESITY, CLASS I, BMI 30-34.9: ICD-10-CM

## 2019-03-21 DIAGNOSIS — I48.0 PAF (PAROXYSMAL ATRIAL FIBRILLATION): ICD-10-CM

## 2019-03-21 PROCEDURE — 75571 CT HRT W/O DYE W/CA TEST: CPT | Mod: 26,,, | Performed by: RADIOLOGY

## 2019-03-21 PROCEDURE — 75571 CT CALCIUM SCORING CARDIAC: ICD-10-PCS | Mod: 26,,, | Performed by: RADIOLOGY

## 2019-03-21 PROCEDURE — 3075F SYST BP GE 130 - 139MM HG: CPT | Mod: CPTII,S$GLB,, | Performed by: INTERNAL MEDICINE

## 2019-03-21 PROCEDURE — 3078F DIAST BP <80 MM HG: CPT | Mod: CPTII,S$GLB,, | Performed by: INTERNAL MEDICINE

## 2019-03-21 PROCEDURE — 93000 EKG 12-LEAD: ICD-10-PCS | Mod: S$GLB,,, | Performed by: INTERNAL MEDICINE

## 2019-03-21 PROCEDURE — 99999 PR PBB SHADOW E&M-EST. PATIENT-LVL IV: CPT | Mod: PBBFAC,,, | Performed by: INTERNAL MEDICINE

## 2019-03-21 PROCEDURE — 75571 CT HRT W/O DYE W/CA TEST: CPT | Mod: TC,PO

## 2019-03-21 PROCEDURE — 3075F PR MOST RECENT SYSTOLIC BLOOD PRESS GE 130-139MM HG: ICD-10-PCS | Mod: CPTII,S$GLB,, | Performed by: INTERNAL MEDICINE

## 2019-03-21 PROCEDURE — 1101F PR PT FALLS ASSESS DOC 0-1 FALLS W/OUT INJ PAST YR: ICD-10-PCS | Mod: CPTII,S$GLB,, | Performed by: INTERNAL MEDICINE

## 2019-03-21 PROCEDURE — 1101F PT FALLS ASSESS-DOCD LE1/YR: CPT | Mod: CPTII,S$GLB,, | Performed by: INTERNAL MEDICINE

## 2019-03-21 PROCEDURE — 99999 PR PBB SHADOW E&M-EST. PATIENT-LVL IV: ICD-10-PCS | Mod: PBBFAC,,, | Performed by: INTERNAL MEDICINE

## 2019-03-21 PROCEDURE — 99214 PR OFFICE/OUTPT VISIT, EST, LEVL IV, 30-39 MIN: ICD-10-PCS | Mod: S$GLB,,, | Performed by: INTERNAL MEDICINE

## 2019-03-21 PROCEDURE — 3078F PR MOST RECENT DIASTOLIC BLOOD PRESSURE < 80 MM HG: ICD-10-PCS | Mod: CPTII,S$GLB,, | Performed by: INTERNAL MEDICINE

## 2019-03-21 PROCEDURE — 93000 ELECTROCARDIOGRAM COMPLETE: CPT | Mod: S$GLB,,, | Performed by: INTERNAL MEDICINE

## 2019-03-21 PROCEDURE — 99214 OFFICE O/P EST MOD 30 MIN: CPT | Mod: S$GLB,,, | Performed by: INTERNAL MEDICINE

## 2019-03-21 RX ORDER — NITROGLYCERIN 0.4 MG/1
0.4 TABLET SUBLINGUAL EVERY 5 MIN PRN
Qty: 25 TABLET | Refills: 0 | Status: SHIPPED | OUTPATIENT
Start: 2019-03-21 | End: 2019-04-02 | Stop reason: SDUPTHER

## 2019-03-21 NOTE — PROGRESS NOTES
Subjective:    Patient ID:  Farhan Stallworth is a 68 y.o. male who presents for Atrial Fibrillation and Chest Pain        Chest Pain    This is a new problem. The current episode started 1 to 4 weeks ago. The problem occurs intermittently. The problem has been waxing and waning. The pain is present in the substernal region. The pain is at a severity of 6/10. The quality of the pain is described as dull. Radiates to: TEETH ONCE. Pertinent negatives include no abdominal pain, back pain (CHRONIC), claudication, cough (COLD), diaphoresis, dizziness, fever, hemoptysis, irregular heartbeat, malaise/fatigue, near-syncope, numbness, orthopnea, palpitations (NOT FELT), PND, shortness of breath, syncope, vomiting or weakness. The pain is aggravated by nothing. He has tried rest for the symptoms. The treatment provided moderate relief.     REQUESTED OV, HAS BEEN HAVING HEART BURN SENSATION, THEN HAD AN EPISODE WITH CP, MILD SWEATING, NO RADIATION, VS STABLE, NO TACHYCARDIA , ??/ WENT INTO [employer], ON WATCH, PVC'S NOTED ON WATCH STRIP, AFTER A SKIING TRIP AT [employer] > 92920 FEET , TOOK TUMS.SEE ROS    Past Medical History:   Diagnosis Date    Arthritis     hip    Atrial fibrillation     Hearing difficulty     Low serum testosterone level     Lumbar disc disease     REYES (obstructive sleep apnea)     has CPAP, does not use     Past Surgical History:   Procedure Laterality Date    ARTHROSCOPY-HIP with debridement & bone marrow with stem cell - viscosupplementation with Eudexxa Right 7/8/2016    Performed by [facility]el MD at [facility] OR    BIOPSY-BONE MARROW Right 7/8/2016    Performed by [facility] at [facility]    CARDIOVERSION N/A 1/3/2019    [facility]    CARPAL TUNNEL RELEASE      2005, bilateral    CLAVICLE SURGERY      DEBRIDEMENT Right 7/8/2016    [facility]formed by [facility] at [facility]    ECHOCARDIOGRAM,TRANSESOPHAGEAL N/A 1/3/2019    Performed by Nanci  [facility]KAYLA [facility] at [facility]    EPIDURAL BLOCK INJECTION  2010    HIP SURGERY      JOINT REPLACEMENT Right 11/2017    hip    KNEE ARTHROSCOPY W/ MENISCAL REPAIR      left    LUMBAR FUSION      2010    TONSILLECTOMY       Family History   Problem Relation Age of Onset    Cancer Father         lung    Cancer Sister         leukemia    No Known Problems Mother     Heart disease Neg Hx     Diabetes Neg Hx      Social History     Socioeconomic History    Marital status:      Spouse name: None    Number of children: None    Years of education: None    Highest education level: None   Social Needs    Financial resource strain: None    Food insecurity - worry: None    Food insecurity - inability: None    Transportation needs - medical: None    Transportation needs - non-medical: None   Occupational History    None   Tobacco Use    Smoking status: Never Smoker    Smokeless tobacco: Never Used   Substance and Sexual Activity    Alcohol use: Yes     Alcohol/week: 0.0 oz     Comment: 1-2 week    Drug use: No    Sexual activity: None   Other Topics Concern    None   Social History Narrative    None       Review of patient's allergies indicates:  No Known Allergies    Current Outpatient Medications:     apixaban (ELIQUIS) 5 mg Tab, Take 1 tablet (5 mg total) by mouth 2 (two) times daily., Disp: 180 tablet, Rfl: 1    glucosamine-chondroitin 500-400 mg tablet, Take 1 tablet by mouth 2 (two) times daily. Triflex, Disp: , Rfl:     metoprolol succinate (TOPROL-XL) 25 MG 24 hr tablet, Take 1 tablet (25 mg total) by mouth once daily., Disp: 30 tablet, Rfl: 1    MULTIVITS-MINERALS/FA/LYCOPENE (MEN'S DAILY MULTIVIT-MINERAL ORAL), Take 1 tablet by mouth once daily. , Disp: , Rfl:     nitroGLYCERIN (NITROSTAT) 0.4 MG SL tablet, Place 1 tablet (0.4 mg total) under the tongue every 5 (five) minutes as needed., Disp: 25 tablet, Rfl: 0    Review of Systems   Constitution: Negative for chills, diaphoresis,  "fever, weakness, malaise/fatigue and night sweats.   HENT: Negative for congestion and nosebleeds.    Eyes: Negative for blurred vision and visual disturbance.   Cardiovascular: Positive for chest pain. Negative for claudication, cyanosis, dyspnea on exertion, irregular heartbeat, leg swelling, near-syncope, orthopnea, palpitations (NOT FELT), paroxysmal nocturnal dyspnea and syncope.   Respiratory: Negative for cough (COLD), hemoptysis, shortness of breath and wheezing.    Endocrine: Negative for cold intolerance, heat intolerance and polyuria.   Hematologic/Lymphatic: Negative for adenopathy. Does not bruise/bleed easily.   Skin: Negative for color change, itching and rash.   Musculoskeletal: Negative for back pain (CHRONIC) and falls.   Gastrointestinal: Negative for abdominal pain, change in bowel habit, dysphagia, hematemesis, jaundice, melena and vomiting.   Genitourinary: Negative for dysuria, flank pain and frequency.   Neurological: Negative for brief paralysis, dizziness, focal weakness, light-headedness, loss of balance, numbness, paresthesias and tremors.   Psychiatric/Behavioral: Negative for altered mental status, depression and memory loss. The patient is not nervous/anxious (SOME).    Allergic/Immunologic: Negative for environmental allergies and persistent infections.        Objective:      Vitals:    03/21/19 1415   BP: 138/76   Pulse: 64   Weight: 101.2 kg (223 lb 1.7 oz)   Height: 5' 9" (1.753 m)   PainSc: 0-No pain     Body mass index is 32.95 kg/m².    Physical Exam   Constitutional: He is oriented to person, place, and time. He appears well-developed and well-nourished. He is active.   OVERWEIGHT   HENT:   Head: Normocephalic and atraumatic.   Mouth/Throat: Oropharynx is clear and moist and mucous membranes are normal.   Eyes: Conjunctivae and EOM are normal. Pupils are equal, round, and reactive to light.   Neck: Normal range of motion. Neck supple. Normal carotid pulses, no hepatojugular " reflux and no JVD present. Carotid bruit is not present. No edema and no erythema present. No thyromegaly present.   Cardiovascular: Regular rhythm.  No extrasystoles are present. Bradycardia present. PMI is not displaced. Exam reveals no gallop, no distant heart sounds, no friction rub and no midsystolic click.   Murmur heard.   Systolic murmur is present with a grade of 1/6 at the lower left sternal border.  Pulses:       Carotid pulses are 2+ on the right side, and 2+ on the left side.       Radial pulses are 2+ on the right side, and 2+ on the left side.        Femoral pulses are 2+ on the right side, and 2+ on the left side.       Dorsalis pedis pulses are 2+ on the right side, and 2+ on the left side.        Posterior tibial pulses are 2+ on the right side, and 2+ on the left side.   Pulmonary/Chest: Effort normal and breath sounds normal. No accessory muscle usage. No tachypnea and no bradypnea. No respiratory distress.   Abdominal: Soft. Bowel sounds are normal. He exhibits no distension and no mass. There is no hepatosplenomegaly. There is no CVA tenderness.   Musculoskeletal: Normal range of motion. He exhibits no edema or deformity.   R HIP, AND LOWER BACK SCARS   Lymphadenopathy:     He has no cervical adenopathy.   Neurological: He is alert and oriented to person, place, and time. He has normal strength. He displays no tremor. No cranial nerve deficit.   Skin: Skin is warm and dry. No cyanosis or erythema. No pallor.   Psychiatric: He has a normal mood and affect. His speech is normal and behavior is normal.               ..    Chemistry        Component Value Date/Time     12/03/2018 1442    K 4.7 12/03/2018 1442     12/03/2018 1442    CO2 28 12/03/2018 1442    BUN 15 12/03/2018 1442    CREATININE 1.1 12/03/2018 1442    GLU 98 12/03/2018 1442        Component Value Date/Time    CALCIUM 9.7 12/03/2018 1442    ALKPHOS 83 12/03/2018 1442    AST 31 12/03/2018 1442    ALT 30 12/03/2018 1442     BILITOT 0.9 12/03/2018 1442    ESTGFRAFRICA >60.0 12/03/2018 1442    EGFRNONAA >60.0 12/03/2018 1442            ..  Lab Results   Component Value Date    CHOL 163 11/15/2016    CHOL 167 11/13/2015    CHOL 168 12/15/2014     Lab Results   Component Value Date    HDL 42 11/15/2016    HDL 39 (L) 11/13/2015    HDL 41 12/15/2014     Lab Results   Component Value Date    LDLCALC 104.8 11/15/2016    LDLCALC 106.8 11/13/2015    LDLCALC 112.4 12/15/2014     Lab Results   Component Value Date    TRIG 81 11/15/2016    TRIG 106 11/13/2015    TRIG 73 12/15/2014     Lab Results   Component Value Date    CHOLHDL 25.8 11/15/2016    CHOLHDL 23.4 11/13/2015    CHOLHDL 24.4 12/15/2014     ..  Lab Results   Component Value Date    WBC 5.67 12/03/2018    HGB 16.7 12/03/2018    HCT 49.2 12/03/2018    MCV 91 12/03/2018     12/03/2018       Test(s) Reviewed  I have reviewed the following in detail:  [] Stress test   [] Angiography   [] Echocardiogram   [] Labs   [x] Other:       Assessment:         ICD-10-CM ICD-9-CM   1. Precordial pain R07.2 786.51   2. PAF (paroxysmal atrial fibrillation) I48.0 427.31   3. Essential hypertension I10 401.9   4. Obesity, Class I, BMI 30-34.9 E66.9 278.00   5. Acute chest pain R07.9 786.50   6. Obstructive sleep apnea syndrome G47.33 327.23   7. Hypercholesteremia E78.00 272.0     Problem List Items Addressed This Visit        Cardiac/Vascular    PAF (paroxysmal atrial fibrillation)    Relevant Orders    EKG 12-lead    Stress test with myocardial perfusion (Cupid Only)    Essential hypertension    Hypercholesteremia    Relevant Orders    Lipid panel       Endocrine    Obesity, Class I, BMI 30-34.9       Other    Obstructive sleep apnea syndrome    Overview     Not on CPAP, intolerant         Precordial pain - Primary    Relevant Orders    EKG 12-lead    Stress test with myocardial perfusion (Cupid Only)    Acute chest pain    Relevant Orders    CT Cardiac Scoring (Completed)           Plan:     EKG  SR, ANTERIOR T CHANGES, SLIGHTLY MORE THAN PREVIOUS EKG,PRN SL NTG, NUCLEAR STRESS SOON, CA SCORE, ALL OTHER CV CLINICALLY STABLE,  NO HF, NO TIA, STABLE CLINICAL ARRHYTHMIA,CONTINUE CURRENT MEDS, EDUCATION, DIET, EXERCISE, WEIGHT LOSS, DISCUSSED PLAN WITH PT AND WIFE      Precordial pain  -     EKG 12-lead  -     Stress test with myocardial perfusion (Cupid Only); Future    PAF (paroxysmal atrial fibrillation)  -     EKG 12-lead  -     Stress test with myocardial perfusion (Cupid Only); Future    Essential hypertension    Obesity, Class I, BMI 30-34.9    Acute chest pain  -     CT Cardiac Scoring; Future; Expected date: 03/21/2019    Obstructive sleep apnea syndrome    Hypercholesteremia  -     Lipid panel; Future; Expected date: 03/21/2019    Other orders  -     nitroGLYCERIN (NITROSTAT) 0.4 MG SL tablet; Place 1 tablet (0.4 mg total) under the tongue every 5 (five) minutes as needed.  Dispense: 25 tablet; Refill: 0    RTC Low level/low impact aerobic exercise 5x's/wk. Heart healthy diet and risk factor modification.    See labs and med orders.    Aerobic exercise 5x's/wk. Heart healthy diet and risk factor modification.    See labs and med orders.

## 2019-03-25 ENCOUNTER — PATIENT MESSAGE (OUTPATIENT)
Dept: CARDIOLOGY | Facility: CLINIC | Age: 69
End: 2019-03-25

## 2019-03-25 ENCOUNTER — TELEPHONE (OUTPATIENT)
Dept: CARDIOLOGY | Facility: CLINIC | Age: 69
End: 2019-03-25

## 2019-03-26 ENCOUNTER — LAB VISIT (OUTPATIENT)
Dept: LAB | Facility: HOSPITAL | Age: 69
End: 2019-03-26
Attending: INTERNAL MEDICINE
Payer: MEDICARE

## 2019-03-26 DIAGNOSIS — E78.00 HYPERCHOLESTEREMIA: ICD-10-CM

## 2019-03-26 LAB
CHOLEST SERPL-MCNC: 153 MG/DL (ref 120–199)
CHOLEST/HDLC SERPL: 3.9 {RATIO} (ref 2–5)
HDLC SERPL-MCNC: 39 MG/DL (ref 40–75)
HDLC SERPL: 25.5 % (ref 20–50)
LDLC SERPL CALC-MCNC: 100.2 MG/DL (ref 63–159)
NONHDLC SERPL-MCNC: 114 MG/DL
TRIGL SERPL-MCNC: 69 MG/DL (ref 30–150)

## 2019-03-26 PROCEDURE — 80061 LIPID PANEL: CPT

## 2019-03-26 PROCEDURE — 36415 COLL VENOUS BLD VENIPUNCTURE: CPT | Mod: PO

## 2019-03-31 ENCOUNTER — PATIENT MESSAGE (OUTPATIENT)
Dept: CARDIOLOGY | Facility: CLINIC | Age: 69
End: 2019-03-31

## 2019-04-01 ENCOUNTER — PATIENT MESSAGE (OUTPATIENT)
Dept: FAMILY MEDICINE | Facility: CLINIC | Age: 69
End: 2019-04-01

## 2019-04-02 ENCOUNTER — OFFICE VISIT (OUTPATIENT)
Dept: FAMILY MEDICINE | Facility: CLINIC | Age: 69
End: 2019-04-02
Payer: MEDICARE

## 2019-04-02 ENCOUNTER — TELEPHONE (OUTPATIENT)
Dept: FAMILY MEDICINE | Facility: CLINIC | Age: 69
End: 2019-04-02

## 2019-04-02 ENCOUNTER — LAB VISIT (OUTPATIENT)
Dept: LAB | Facility: HOSPITAL | Age: 69
End: 2019-04-02
Payer: MEDICARE

## 2019-04-02 ENCOUNTER — PATIENT MESSAGE (OUTPATIENT)
Dept: FAMILY MEDICINE | Facility: CLINIC | Age: 69
End: 2019-04-02

## 2019-04-02 ENCOUNTER — PATIENT MESSAGE (OUTPATIENT)
Dept: CARDIOLOGY | Facility: CLINIC | Age: 69
End: 2019-04-02

## 2019-04-02 VITALS
DIASTOLIC BLOOD PRESSURE: 72 MMHG | OXYGEN SATURATION: 98 % | TEMPERATURE: 98 F | SYSTOLIC BLOOD PRESSURE: 122 MMHG | HEIGHT: 69 IN | BODY MASS INDEX: 32.73 KG/M2 | HEART RATE: 57 BPM | WEIGHT: 221 LBS

## 2019-04-02 DIAGNOSIS — R10.13 EPIGASTRIC ABDOMINAL PAIN: Primary | ICD-10-CM

## 2019-04-02 DIAGNOSIS — G47.33 OBSTRUCTIVE SLEEP APNEA SYNDROME: ICD-10-CM

## 2019-04-02 DIAGNOSIS — K21.9 GASTROESOPHAGEAL REFLUX DISEASE, ESOPHAGITIS PRESENCE NOT SPECIFIED: ICD-10-CM

## 2019-04-02 DIAGNOSIS — R10.13 EPIGASTRIC ABDOMINAL PAIN: ICD-10-CM

## 2019-04-02 DIAGNOSIS — M25.50 POLYARTHRALGIA: ICD-10-CM

## 2019-04-02 LAB
ALBUMIN SERPL BCP-MCNC: 4.1 G/DL (ref 3.5–5.2)
ALP SERPL-CCNC: 85 U/L (ref 55–135)
ALT SERPL W/O P-5'-P-CCNC: 31 U/L (ref 10–44)
ANION GAP SERPL CALC-SCNC: 7 MMOL/L (ref 8–16)
AST SERPL-CCNC: 30 U/L (ref 10–40)
BASOPHILS # BLD AUTO: 0.02 K/UL (ref 0–0.2)
BASOPHILS NFR BLD: 0.4 % (ref 0–1.9)
BILIRUB SERPL-MCNC: 0.8 MG/DL (ref 0.1–1)
BUN SERPL-MCNC: 21 MG/DL (ref 8–23)
CALCIUM SERPL-MCNC: 9.6 MG/DL (ref 8.7–10.5)
CHLORIDE SERPL-SCNC: 106 MMOL/L (ref 95–110)
CO2 SERPL-SCNC: 26 MMOL/L (ref 23–29)
CREAT SERPL-MCNC: 1 MG/DL (ref 0.5–1.4)
CRP SERPL-MCNC: 4.3 MG/L (ref 0–8.2)
DIFFERENTIAL METHOD: ABNORMAL
EOSINOPHIL # BLD AUTO: 0 K/UL (ref 0–0.5)
EOSINOPHIL NFR BLD: 0.7 % (ref 0–8)
ERYTHROCYTE [DISTWIDTH] IN BLOOD BY AUTOMATED COUNT: 13.9 % (ref 11.5–14.5)
ERYTHROCYTE [SEDIMENTATION RATE] IN BLOOD BY WESTERGREN METHOD: 8 MM/HR (ref 0–23)
EST. GFR  (AFRICAN AMERICAN): >60 ML/MIN/1.73 M^2
EST. GFR  (NON AFRICAN AMERICAN): >60 ML/MIN/1.73 M^2
GLUCOSE SERPL-MCNC: 100 MG/DL (ref 70–110)
HCT VFR BLD AUTO: 45.7 % (ref 40–54)
HGB BLD-MCNC: 14.9 G/DL (ref 14–18)
IMM GRANULOCYTES # BLD AUTO: 0.03 K/UL (ref 0–0.04)
IMM GRANULOCYTES NFR BLD AUTO: 0.6 % (ref 0–0.5)
LIPASE SERPL-CCNC: 18 U/L (ref 4–60)
LYMPHOCYTES # BLD AUTO: 0.9 K/UL (ref 1–4.8)
LYMPHOCYTES NFR BLD: 16.7 % (ref 18–48)
MCH RBC QN AUTO: 30.6 PG (ref 27–31)
MCHC RBC AUTO-ENTMCNC: 32.6 G/DL (ref 32–36)
MCV RBC AUTO: 94 FL (ref 82–98)
MONOCYTES # BLD AUTO: 0.6 K/UL (ref 0.3–1)
MONOCYTES NFR BLD: 10.6 % (ref 4–15)
NEUTROPHILS # BLD AUTO: 3.8 K/UL (ref 1.8–7.7)
NEUTROPHILS NFR BLD: 71 % (ref 38–73)
NRBC BLD-RTO: 0 /100 WBC
PLATELET # BLD AUTO: 182 K/UL (ref 150–350)
PMV BLD AUTO: 10.6 FL (ref 9.2–12.9)
POTASSIUM SERPL-SCNC: 4.2 MMOL/L (ref 3.5–5.1)
PROT SERPL-MCNC: 7.5 G/DL (ref 6–8.4)
RBC # BLD AUTO: 4.87 M/UL (ref 4.6–6.2)
RHEUMATOID FACT SERPL-ACNC: <10 IU/ML (ref 0–15)
SODIUM SERPL-SCNC: 139 MMOL/L (ref 136–145)
WBC # BLD AUTO: 5.39 K/UL (ref 3.9–12.7)

## 2019-04-02 PROCEDURE — 86038 ANTINUCLEAR ANTIBODIES: CPT

## 2019-04-02 PROCEDURE — 3074F SYST BP LT 130 MM HG: CPT | Mod: CPTII,S$GLB,, | Performed by: NURSE PRACTITIONER

## 2019-04-02 PROCEDURE — 86140 C-REACTIVE PROTEIN: CPT

## 2019-04-02 PROCEDURE — 36415 COLL VENOUS BLD VENIPUNCTURE: CPT | Mod: PN

## 2019-04-02 PROCEDURE — 85025 COMPLETE CBC W/AUTO DIFF WBC: CPT

## 2019-04-02 PROCEDURE — 1101F PT FALLS ASSESS-DOCD LE1/YR: CPT | Mod: CPTII,S$GLB,, | Performed by: NURSE PRACTITIONER

## 2019-04-02 PROCEDURE — 3078F DIAST BP <80 MM HG: CPT | Mod: CPTII,S$GLB,, | Performed by: NURSE PRACTITIONER

## 2019-04-02 PROCEDURE — 99215 PR OFFICE/OUTPT VISIT, EST, LEVL V, 40-54 MIN: ICD-10-PCS | Mod: S$GLB,,, | Performed by: NURSE PRACTITIONER

## 2019-04-02 PROCEDURE — 3074F PR MOST RECENT SYSTOLIC BLOOD PRESSURE < 130 MM HG: ICD-10-PCS | Mod: CPTII,S$GLB,, | Performed by: NURSE PRACTITIONER

## 2019-04-02 PROCEDURE — 83690 ASSAY OF LIPASE: CPT

## 2019-04-02 PROCEDURE — 85652 RBC SED RATE AUTOMATED: CPT

## 2019-04-02 PROCEDURE — 99215 OFFICE O/P EST HI 40 MIN: CPT | Mod: S$GLB,,, | Performed by: NURSE PRACTITIONER

## 2019-04-02 PROCEDURE — 86677 HELICOBACTER PYLORI ANTIBODY: CPT

## 2019-04-02 PROCEDURE — 1101F PR PT FALLS ASSESS DOC 0-1 FALLS W/OUT INJ PAST YR: ICD-10-PCS | Mod: CPTII,S$GLB,, | Performed by: NURSE PRACTITIONER

## 2019-04-02 PROCEDURE — 99999 PR PBB SHADOW E&M-EST. PATIENT-LVL IV: ICD-10-PCS | Mod: PBBFAC,,, | Performed by: NURSE PRACTITIONER

## 2019-04-02 PROCEDURE — 3078F PR MOST RECENT DIASTOLIC BLOOD PRESSURE < 80 MM HG: ICD-10-PCS | Mod: CPTII,S$GLB,, | Performed by: NURSE PRACTITIONER

## 2019-04-02 PROCEDURE — 86431 RHEUMATOID FACTOR QUANT: CPT

## 2019-04-02 PROCEDURE — 99999 PR PBB SHADOW E&M-EST. PATIENT-LVL IV: CPT | Mod: PBBFAC,,, | Performed by: NURSE PRACTITIONER

## 2019-04-02 PROCEDURE — 80053 COMPREHEN METABOLIC PANEL: CPT

## 2019-04-02 RX ORDER — PANTOPRAZOLE SODIUM 40 MG/1
40 TABLET, DELAYED RELEASE ORAL DAILY
Qty: 30 TABLET | Refills: 0 | Status: ON HOLD | OUTPATIENT
Start: 2019-04-02 | End: 2019-04-09

## 2019-04-02 RX ORDER — NITROGLYCERIN 0.4 MG/1
0.4 TABLET SUBLINGUAL EVERY 5 MIN PRN
Qty: 25 TABLET | Refills: 0 | Status: SHIPPED | OUTPATIENT
Start: 2019-04-02 | End: 2019-11-12

## 2019-04-02 NOTE — PROGRESS NOTES
This dictation has been generated using Modal Fluency Dictation some phonetic errors may occur. Please contact author for clarification if needed.     Problem List Items Addressed This Visit     Obstructive sleep apnea syndrome    Overview     CPAP tolerated with nasal pillow. [employer] for equipment.         Current Assessment & Plan     Face to Face eval: The pt needs and is currently using CPAP with benefit.  The nasal pillow has been helpful.  He needs replacement of machine due to day-to-day usage. Pt states, the machine is over 10 years old.  Faxed orders [phone]         Relevant Orders    CPAP/BIPAP SUPPLIES    CPAP FOR HOME USE      Other Visit Diagnoses     Epigastric abdominal pain    -  Primary    Relevant Orders    CBC auto differential (Completed)    Lipase (Completed)    Comprehensive metabolic panel (Completed)    H. PYLORI ANTIBODY, IGG (Completed)    H. pylori antigen, stool (Completed)    Gastroesophageal reflux disease, esophagitis presence not specified        Polyarthralgia        Relevant Orders    CBC auto differential (Completed)    Lipase (Completed)    Comprehensive metabolic panel (Completed)    Sedimentation rate (Completed)    C-reactive protein (Completed)    Rheumatoid factor (Completed)    MIMI Screen w/Reflex (Completed)        Orders Placed This Encounter    CPAP/BIPAP SUPPLIES    CPAP FOR HOME USE    CBC auto differential    Lipase    Comprehensive metabolic panel    Sedimentation rate    C-reactive protein    Rheumatoid factor    MIMI Screen w/Reflex    H. PYLORI ANTIBODY, IGG    H. pylori antigen, stool     Epigastric abdominal pain GERD symptoms.  Start Protonix as above.  Check labs as above and evaluate white blood cell count and platelet count.  I will review the liver enzymes sugar and lipase.  Rule out any pancreatic involvement are hepatic involvement.  Rule out infectious or inflammatory process.  Also check H pylori.  Patient does need to  evaluate symptoms warrant home to determine association with meals.  Arthritic changes.  Rheumatoid factor MIMI and inflammation marker workup.  Labs from 2012 were normal  Sleep apnea using a CPAP see documentation above.  Needs prescription for new machine.  14 cm H2o    Follow up in about 2 weeks (around 4/16/2019).    ________________________________________________________________  ________________________________________________________________      Chief Complaint   Patient presents with    Gastroesophageal Reflux     History of present illness  This 69 y.o. presents today for complaint of multiple complaints.  Patient had made appointment for complaint of epigastric abdominal discomfort however wanted to discuss other issues.  Patient notes epigastric abdominal discomfort.  He has had some reflux symptoms and burning in the chest.  He did see Cardiology and as a stress test for tomorrow.  Patient does note some stool changes.  He describes stool as sticky.  He denies any black stools or diarrhea.  Denies any constipation.  He has note increase in his flatulence.  Patient denies any blood or mucus in the stool.  Patient notes that abdominal pain has a burning quality and is present frequently in the morning time.  It can persist throughout the day.  Seems to be unrelated to eating however after further fall eating makes to the symptoms somewhat.  He tried Tums with no improvement.  He also tried his nitroglycerin without any improvement.  Patient notes history of sleep apnea and prior CPAP machine failure.  Recently got the nasal pillow and notes improvement and better sleep.  He is currently using the CPAP been benefitting from use since he has started using the nasal pillow.  He needs replacement of the machine as azole machine is no longer working properly.  He needs replacement due to dated day usage.  He wants to get his supplies at [address] telephone [phone].  Patient notes arthritis.   He is unsure of his family history his father  when he was young.  Reportedly father received a medication that had given him cancer.  His father was an orphan.  He notes that his mother did not have any arthritis problems.  Patient notes multiple joints with arthritis.  He has had large joint pains and hand pains.  Patient notes arthritic changes to the fingers.  He has had some joint deformities.  He notes some knee pain. He has taking glucosamine and chondroitin.  Patient notes stiffness for about the 1st 30 min of the morning.  He was taking some other medications however due to cardiovascular history stop using those.  Celebrex was 1 the medications he was utilizing with control of symptoms and he notes that this could be associated with the gastric issues.  Cardiology had discontinued the Celebrex utilization earlier after diagnosis with cardiovascular problems and starting blood thinner.  He had new onset of atrial fibrillation.    Answers for HPI/ROS submitted by the patient on 2019   Abdominal pain  Chronicity: new  Onset: 1 to 4 weeks ago  Onset quality: undetermined  Frequency: constantly  Episode duration: 3 weeks  Pain location: epigastric region  Pain - numeric: 3/10  Pain quality: burning, cramping, dull  Radiates to: chest  anorexia: No  arthralgias: Yes  belching: No  constipation: No  diarrhea: No  dysuria: No  fever: No  flatus: Yes  frequency: No  hematochezia: No  hematuria: No  melena: No  myalgias: No  nausea: No  weight loss: No  vomiting: No  Aggravated by: being still, certain positions  Relieved by: nothing  Diagnostic workup: CT scan  Pain severity: mild  Treatments tried: nothing  abdominal surgery: No  colon cancer: No  Crohn's disease: No  gallstones: No  GERD: No  irritable bowel syndrome: No  kidney stones: No  pancreatitis: No  PUD: No  ulcerative colitis: No  UTI: No    Past medical social and family history reviewed.    Past Medical History:   Diagnosis Date    Arthritis      hip    Atrial fibrillation     Hearing difficulty     Low serum testosterone level     Lumbar disc disease     REYES (obstructive sleep apnea)     has CPAP, better relief of symptoms with nasal pillow.        Past Surgical History:   Procedure Laterality Date    ANGIOGRAM, CORONARY ARTERY N/A 4/9/2019    [facility]    ARTHROSCOPY-HIP with debridement & bone marrow with stem cell - viscosupplementation with Eudexxa Right 7/8/2016    Per[facility] by [facility]el [facility] at [facility] OR    BIOPSY-BONE MARROW Right 7/8/2016    [facility] at [facility]    CARDIOVERSION N/A 1/3/2019    [facility]    CARPAL TUNNEL RELEASE      2005, bilateral    CLAVICLE SURGERY      DEBRIDEMENT Right 7/8/2016    [facility] at [facility]    ECHOCARDIOGRAM,TRANSESOPHAGEAL N/A 1/3/2019    [facility]    EPIDURAL BLOCK INJECTION  2010    HIP SURGERY      JOINT REPLACEMENT Right 11/2017    hip    KNEE ARTHROSCOPY W/ MENISCAL REPAIR      left    Left heart cath Left 4/9/2019    [facility] at [facility]    LUMBAR FUSION      2010    Percutaneous coronary intervention N/A 4/9/2019    [facility] at [facility]    Stent, Drug Eluting, Single Vessel, Coronary  4/9/2019    [facility] at [facility]    TONSILLECTOMY      Ultrasound-coronary N/A 4/9/2019    Performed [facility] at [facility]       Family History   Problem Relation Age of Onset    Cancer Father         lung    Cancer Sister         leukemia    No Known Problems Mother     Heart disease Neg Hx     Diabetes Neg Hx        Social History     Socioeconomic History    Marital status:      Spouse name: Not on file    Number of children: Not on file    Years of education: Not on file    Highest education level: Not on file   Occupational History    Not on  file   Social Needs    Financial resource strain: Not hard at all    Food insecurity:     Worry: Never true     Inability: Never true    Transportation needs:     Medical: No     Non-medical: No   Tobacco Use    Smoking status: Never Smoker    Smokeless tobacco: Never Used   Substance and Sexual Activity    Alcohol use: Not Currently     Alcohol/week: 0.0 oz     Frequency: 2-4 times a month     Drinks per session: 1 or 2     Binge frequency: Never    Drug use: No    Sexual activity: Not on file   Lifestyle    Physical activity:     Days per week: 7 days     Minutes per session: 60 min    Stress: Only a little   Relationships    Social connections:     Talks on phone: More than three times a week     Gets together: Three times a week     Attends Holiness service: Not on file     Active member of club or organization: Yes     Attends meetings of clubs or organizations: More than 4 times per year     Relationship status:    Other Topics Concern    Not on file   Social History Narrative    Not on file       Current Outpatient Medications   Medication Sig Dispense Refill    apixaban (ELIQUIS) 5 mg Tab Take 1 tablet (5 mg total) by mouth 2 (two) times daily. 180 tablet 1    glucosamine-chondroitin 500-400 mg tablet Take 1 tablet by mouth 2 (two) times daily. Triflex      metoprolol succinate (TOPROL-XL) 25 MG 24 hr tablet Take 1 tablet (25 mg total) by mouth once daily. 30 tablet 1    MULTIVITS-MINERALS/FA/LYCOPENE (MEN'S DAILY MULTIVIT-MINERAL ORAL) Take 1 tablet by mouth once daily.       aspirin (ECOTRIN) 81 MG EC tablet Take 1 tablet (81 mg total) by mouth once daily.  0    clopidogrel (PLAVIX) 75 mg tablet Take 1 tablet (75 mg total) by mouth once daily. 30 tablet 1    nitroGLYCERIN (NITROSTAT) 0.4 MG SL tablet Place 1 tablet (0.4 mg total) under the tongue every 5 (five) minutes as needed. 25 tablet 0    nitroGLYCERIN 0.1 mg/hr TD PT24 (NITRODUR) 0.1 mg/hr PT24 Place 1 patch onto the  skin once daily. 30 patch 1    rosuvastatin (CRESTOR) 10 MG tablet Take 1 tablet (10 mg total) by mouth once daily. 30 tablet 1     Current Facility-Administered Medications   Medication Dose Route Frequency Provider Last Rate Last Dose    sodium chloride 0.9% flush 10 mL  10 mL Intravenous PRN Nanci Ahuja MD           Review of patient's allergies indicates:  No Known Allergies    Physical examination  Vitals Reviewed  Gen. Well-dressed well-nourished   Skin warm dry and intact.  No rashes noted.  HEENT.  TM intact bilateral with normal light reflex.  No mastoid tenderness during percussion.  Nares patent bilateral.  Pharynx is unremarkable.  No maxillary or frontal sinus tenderness when percussed.    Neck is supple without adenopathy  Chest.  Respirations are even unlabored.  Lungs are clear to auscultation.  Cardiac regular rate and rhythm.  No chest wall adenopathy noted.  Neuro. Awake alert oriented x4.  Normal judgment and cognition noted.  Extremities no clubbing cyanosis or edema noted. Extensive arthritic changes to the finger with have edema nodules noted.  He does have right middle finger joint deformity in the with malalignment of the right middle finger at the PIP joint.  He also has right ring finger problems but notes old fracture.  He does have localized swelling but no erythematous changes or localized warmth noted.    Call or return to clinic prn if these symptoms worsen or fail to improve as anticipated.

## 2019-04-02 NOTE — ASSESSMENT & PLAN NOTE
Face to Face eval: The pt needs and is currently using CPAP with benefit.  The nasal pillow has been helpful.  He needs replacement of machine due to day-to-day usage. Pt states, the machine is over 10 years old.  Faxed orders [phone]

## 2019-04-02 NOTE — TELEPHONE ENCOUNTER
Left message for pt on both his home and cell phone. Nahun Brandon NP is starting a referral for pt to receive a new CPAP but needs the settings with the measurements of water that is used in the machine. I will continue to try pt also will send message to his portal.

## 2019-04-03 ENCOUNTER — CLINICAL SUPPORT (OUTPATIENT)
Dept: CARDIOLOGY | Facility: CLINIC | Age: 69
End: 2019-04-03
Attending: INTERNAL MEDICINE
Payer: MEDICARE

## 2019-04-03 ENCOUNTER — PATIENT MESSAGE (OUTPATIENT)
Dept: FAMILY MEDICINE | Facility: CLINIC | Age: 69
End: 2019-04-03

## 2019-04-03 ENCOUNTER — TELEPHONE (OUTPATIENT)
Dept: CARDIOLOGY | Facility: CLINIC | Age: 69
End: 2019-04-03

## 2019-04-03 ENCOUNTER — PATIENT MESSAGE (OUTPATIENT)
Dept: CARDIOLOGY | Facility: CLINIC | Age: 69
End: 2019-04-03

## 2019-04-03 ENCOUNTER — HOSPITAL ENCOUNTER (OUTPATIENT)
Dept: RADIOLOGY | Facility: HOSPITAL | Age: 69
Discharge: HOME | End: 2019-04-03
Attending: INTERNAL MEDICINE
Payer: MEDICARE

## 2019-04-03 VITALS — HEIGHT: 69 IN | BODY MASS INDEX: 32.73 KG/M2 | WEIGHT: 221 LBS

## 2019-04-03 DIAGNOSIS — I48.0 PAF (PAROXYSMAL ATRIAL FIBRILLATION): ICD-10-CM

## 2019-04-03 DIAGNOSIS — R07.2 PRECORDIAL PAIN: ICD-10-CM

## 2019-04-03 LAB
ANA SER QL IF: NORMAL
CV STRESS BASE HR: 46 BPM
DIASTOLIC BLOOD PRESSURE: 76 MMHG
H PYLORI IGG SERPL QL IA: NEGATIVE
NUC REST EJECTION FRACTION: 46
OHS CV CPX 1 MINUTE RECOVERY HEART RATE: 97 BPM
OHS CV CPX 85 PERCENT MAX PREDICTED HEART RATE MALE: 130
OHS CV CPX ESTIMATED METS: 10
OHS CV CPX MAX PREDICTED HEART RATE: 153
OHS CV CPX PATIENT IS FEMALE: 0
OHS CV CPX PATIENT IS MALE: 1
OHS CV CPX PEAK DIASTOLIC BLOOD PRESSURE: 94 MMHG
OHS CV CPX PEAK HEAR RATE: 137 BPM
OHS CV CPX PEAK RATE PRESSURE PRODUCT: NORMAL
OHS CV CPX PEAK SYSTOLIC BLOOD PRESSURE: 158 MMHG
OHS CV CPX PERCENT MAX PREDICTED HEART RATE ACHIEVED: 90
OHS CV CPX RATE PRESSURE PRODUCT PRESENTING: 5980
STRESS ECHO POST EXERCISE DUR MIN: 6 MIN
STRESS ECHO POST EXERCISE DUR SEC: 1
SYSTOLIC BLOOD PRESSURE: 130 MMHG

## 2019-04-03 PROCEDURE — 78452 STRESS TEST WITH MYOCARDIAL PERFUSION (CUPID ONLY): ICD-10-PCS | Mod: 26,,, | Performed by: INTERNAL MEDICINE

## 2019-04-03 PROCEDURE — A9502 TC99M TETROFOSMIN: HCPCS | Mod: PO

## 2019-04-03 PROCEDURE — 93015 CV STRESS TEST SUPVJ I&R: CPT | Mod: ,,, | Performed by: INTERNAL MEDICINE

## 2019-04-03 PROCEDURE — 93015 STRESS TEST WITH MYOCARDIAL PERFUSION (CUPID ONLY): ICD-10-PCS | Mod: ,,, | Performed by: INTERNAL MEDICINE

## 2019-04-03 PROCEDURE — 99999 PR PBB SHADOW E&M-EST. PATIENT-LVL I: CPT | Mod: PBBFAC,,,

## 2019-04-03 PROCEDURE — 99999 PR PBB SHADOW E&M-EST. PATIENT-LVL I: ICD-10-PCS | Mod: PBBFAC,,,

## 2019-04-03 PROCEDURE — 78452 HT MUSCLE IMAGE SPECT MULT: CPT | Mod: 26,,, | Performed by: INTERNAL MEDICINE

## 2019-04-03 NOTE — TELEPHONE ENCOUNTER
Orders do not come up in a mychart encounter. This is a computer limitation. I have entered them in last visit note. You can find them there.

## 2019-04-04 ENCOUNTER — OFFICE VISIT (OUTPATIENT)
Dept: CARDIOLOGY | Facility: CLINIC | Age: 69
End: 2019-04-04
Payer: MEDICARE

## 2019-04-04 ENCOUNTER — TELEPHONE (OUTPATIENT)
Dept: CARDIOLOGY | Facility: CLINIC | Age: 69
End: 2019-04-04

## 2019-04-04 ENCOUNTER — PATIENT MESSAGE (OUTPATIENT)
Dept: CARDIOLOGY | Facility: CLINIC | Age: 69
End: 2019-04-04

## 2019-04-04 VITALS
SYSTOLIC BLOOD PRESSURE: 124 MMHG | BODY MASS INDEX: 32.69 KG/M2 | HEIGHT: 69 IN | DIASTOLIC BLOOD PRESSURE: 69 MMHG | HEART RATE: 50 BPM | WEIGHT: 220.69 LBS

## 2019-04-04 DIAGNOSIS — I48.0 PAF (PAROXYSMAL ATRIAL FIBRILLATION): ICD-10-CM

## 2019-04-04 DIAGNOSIS — E66.9 OBESITY, CLASS I, BMI 30-34.9: ICD-10-CM

## 2019-04-04 DIAGNOSIS — R07.2 PRECORDIAL PAIN: ICD-10-CM

## 2019-04-04 DIAGNOSIS — E78.5 DYSLIPIDEMIA (HIGH LDL; LOW HDL): ICD-10-CM

## 2019-04-04 DIAGNOSIS — Z79.01 LONG TERM (CURRENT) USE OF ANTICOAGULANTS: ICD-10-CM

## 2019-04-04 DIAGNOSIS — R94.39 ABNORMAL NUCLEAR STRESS TEST: Primary | ICD-10-CM

## 2019-04-04 PROCEDURE — 3074F PR MOST RECENT SYSTOLIC BLOOD PRESSURE < 130 MM HG: ICD-10-PCS | Mod: CPTII,S$GLB,, | Performed by: INTERNAL MEDICINE

## 2019-04-04 PROCEDURE — 3078F PR MOST RECENT DIASTOLIC BLOOD PRESSURE < 80 MM HG: ICD-10-PCS | Mod: CPTII,S$GLB,, | Performed by: INTERNAL MEDICINE

## 2019-04-04 PROCEDURE — 3074F SYST BP LT 130 MM HG: CPT | Mod: CPTII,S$GLB,, | Performed by: INTERNAL MEDICINE

## 2019-04-04 PROCEDURE — 99214 PR OFFICE/OUTPT VISIT, EST, LEVL IV, 30-39 MIN: ICD-10-PCS | Mod: S$GLB,,, | Performed by: INTERNAL MEDICINE

## 2019-04-04 PROCEDURE — 99999 PR PBB SHADOW E&M-EST. PATIENT-LVL III: CPT | Mod: PBBFAC,,, | Performed by: INTERNAL MEDICINE

## 2019-04-04 PROCEDURE — 1101F PT FALLS ASSESS-DOCD LE1/YR: CPT | Mod: CPTII,S$GLB,, | Performed by: INTERNAL MEDICINE

## 2019-04-04 PROCEDURE — 3078F DIAST BP <80 MM HG: CPT | Mod: CPTII,S$GLB,, | Performed by: INTERNAL MEDICINE

## 2019-04-04 PROCEDURE — 1101F PR PT FALLS ASSESS DOC 0-1 FALLS W/OUT INJ PAST YR: ICD-10-PCS | Mod: CPTII,S$GLB,, | Performed by: INTERNAL MEDICINE

## 2019-04-04 PROCEDURE — 99214 OFFICE O/P EST MOD 30 MIN: CPT | Mod: S$GLB,,, | Performed by: INTERNAL MEDICINE

## 2019-04-04 PROCEDURE — 99999 PR PBB SHADOW E&M-EST. PATIENT-LVL III: ICD-10-PCS | Mod: PBBFAC,,, | Performed by: INTERNAL MEDICINE

## 2019-04-04 RX ORDER — CLOPIDOGREL BISULFATE 75 MG/1
75 TABLET ORAL DAILY
Qty: 30 TABLET | Refills: 1 | Status: SHIPPED | OUTPATIENT
Start: 2019-04-04 | End: 2019-04-29 | Stop reason: SDUPTHER

## 2019-04-04 RX ORDER — SODIUM CHLORIDE 0.9 % (FLUSH) 0.9 %
10 SYRINGE (ML) INJECTION
Status: DISCONTINUED | OUTPATIENT
Start: 2019-04-04 | End: 2020-04-06

## 2019-04-04 RX ORDER — SODIUM CHLORIDE 9 MG/ML
INJECTION, SOLUTION INTRAVENOUS ONCE
Status: CANCELLED | OUTPATIENT
Start: 2019-04-04 | End: 2019-04-04

## 2019-04-04 RX ORDER — ASPIRIN 81 MG/1
81 TABLET ORAL DAILY
Refills: 0 | COMMUNITY
Start: 2019-04-04 | End: 2019-11-12

## 2019-04-04 RX ORDER — ROSUVASTATIN CALCIUM 10 MG/1
10 TABLET, COATED ORAL DAILY
Qty: 30 TABLET | Refills: 1 | Status: SHIPPED | OUTPATIENT
Start: 2019-04-04 | End: 2019-04-29 | Stop reason: SDUPTHER

## 2019-04-04 RX ORDER — ASPIRIN 81 MG/1
81 TABLET ORAL DAILY
COMMUNITY
End: 2019-04-05 | Stop reason: SDUPTHER

## 2019-04-04 RX ORDER — NITROGLYCERIN 20 MG/1
1 PATCH TRANSDERMAL DAILY
Qty: 30 PATCH | Refills: 1 | Status: SHIPPED | OUTPATIENT
Start: 2019-04-04 | End: 2019-05-02 | Stop reason: SDUPTHER

## 2019-04-04 NOTE — PROGRESS NOTES
Subjective:    Patient ID:  Farhan Stallworth is a 69 y.o. male who presents for Chest Pain (Stress)        HPI    MARKEDLY ABNORMAL STRESS, SX RELIEVED WITH NTG AND PROTONIX, REPORTS HEART BURN SX,SAME AT THE END OF STRESS TEST/EXERCISE, SEE ROS  Past Medical History:   Diagnosis Date    Arthritis     hip    Atrial fibrillation     Hearing difficulty     Low serum testosterone level     Lumbar disc disease     REYES (obstructive sleep apnea)     has CPAP, better relief of symptoms with nasal pillow.      Past Surgical History:   Procedure Laterality Date    ARTHROSCOPY-HIP with debridement & bone marrow with stem cell - viscosupplementation with Eudexxa Right 7/8/2016    [facility]    BIOPSY-BONE MARROW Right 7/8/2016    [facility] at [facility]    CARDIOVERSION N/A 1/3/2019    [facility]    CARPAL TUNNEL RELEASE      2005, bilateral    CLAVICLE SURGERY      DEBRIDEMENT Right 7/8/2016    [facility] at [facility]    ECHOCARDIOGRAM,TRANSESOPHAGEAL N/A 1/3/2019    [facility] at [facility]    EPIDURAL BLOCK INJECTION  2010    HIP SURGERY      JOINT REPLACEMENT Right 11/2017    hip    KNEE ARTHROSCOPY W/ MENISCAL REPAIR      left    LUMBAR FUSION      2010    TONSILLECTOMY       Family History   Problem Relation Age of Onset    Cancer Father         lung    Cancer Sister         leukemia    No Known Problems Mother     Heart disease Neg Hx     Diabetes Neg Hx      Social History     Socioeconomic History    Marital status:      Spouse name: Not on file    Number of children: Not on file    Years of education: Not on file    Highest education level: Not on file   Occupational History    Not on file   Social Needs    Financial resource strain: Not hard at all    Food insecurity:     Worry: Never true     Inability: Never true    Transportation needs:      Medical: No     Non-medical: No   Tobacco Use    Smoking status: Never Smoker    Smokeless tobacco: Never Used   Substance and Sexual Activity    Alcohol use: Yes     Alcohol/week: 0.0 oz     Frequency: 2-4 times a month     Drinks per session: 1 or 2     Binge frequency: Never     Comment: 1-2 week    Drug use: No    Sexual activity: Not on file   Lifestyle    Physical activity:     Days per week: 7 days     Minutes per session: 60 min    Stress: Only a little   Relationships    Social connections:     Talks on phone: More than three times a week     Gets together: Three times a week     Attends Faith service: Not on file     Active member of club or organization: Yes     Attends meetings of clubs or organizations: More than 4 times per year     Relationship status:    Other Topics Concern    Not on file   Social History Narrative    Not on file       Review of patient's allergies indicates:  No Known Allergies    Current Outpatient Medications:     apixaban (ELIQUIS) 5 mg Tab, Take 1 tablet (5 mg total) by mouth 2 (two) times daily., Disp: 180 tablet, Rfl: 1    glucosamine-chondroitin 500-400 mg tablet, Take 1 tablet by mouth 2 (two) times daily. Triflex, Disp: , Rfl:     metoprolol succinate (TOPROL-XL) 25 MG 24 hr tablet, Take 1 tablet (25 mg total) by mouth once daily., Disp: 30 tablet, Rfl: 1    MULTIVITS-MINERALS/FA/LYCOPENE (MEN'S DAILY MULTIVIT-MINERAL ORAL), Take 1 tablet by mouth once daily. , Disp: , Rfl:     nitroGLYCERIN (NITROSTAT) 0.4 MG SL tablet, Place 1 tablet (0.4 mg total) under the tongue every 5 (five) minutes as needed., Disp: 25 tablet, Rfl: 0    pantoprazole (PROTONIX) 40 MG tablet, Take 1 tablet (40 mg total) by mouth once daily., Disp: 30 tablet, Rfl: 0    aspirin (ECOTRIN) 81 MG EC tablet, Take 1 tablet (81 mg total) by mouth once daily., Disp: , Rfl: 0    clopidogrel (PLAVIX) 75 mg tablet, Take 1 tablet (75 mg total) by mouth once daily., Disp: 30 tablet,  "Rfl: 1    nitroGLYCERIN 0.1 mg/hr TD PT24 (NITRODUR) 0.1 mg/hr PT24, Place 1 patch onto the skin once daily., Disp: 30 patch, Rfl: 1    rosuvastatin (CRESTOR) 10 MG tablet, Take 1 tablet (10 mg total) by mouth once daily., Disp: 30 tablet, Rfl: 1    Current Facility-Administered Medications:     sodium chloride 0.9% flush 10 mL, 10 mL, Intravenous, PRN, Nanci Ahuja MD    Review of Systems   Constitution: Negative for chills, diaphoresis, fever, malaise/fatigue and night sweats.   HENT: Negative for congestion and nosebleeds.    Eyes: Negative for blurred vision and visual disturbance.   Cardiovascular: Positive for chest pain. Negative for claudication, cyanosis, dyspnea on exertion (MILD), irregular heartbeat, leg swelling, near-syncope, orthopnea, palpitations (NOT FELT), paroxysmal nocturnal dyspnea and syncope.   Respiratory: Negative for cough, hemoptysis, shortness of breath and wheezing.    Endocrine: Negative for cold intolerance, heat intolerance and polyuria.   Hematologic/Lymphatic: Negative for adenopathy. Does not bruise/bleed easily.   Skin: Negative for color change, itching and rash.   Musculoskeletal: Negative for back pain (CHRONIC) and falls.   Gastrointestinal: Negative for abdominal pain, change in bowel habit, dysphagia, hematemesis, jaundice, melena and vomiting.   Genitourinary: Negative for dysuria, flank pain and frequency.   Neurological: Negative for brief paralysis, dizziness, focal weakness, light-headedness, loss of balance, numbness, paresthesias, tremors and weakness.   Psychiatric/Behavioral: Negative for altered mental status, depression and memory loss. The patient is not nervous/anxious (SOME).    Allergic/Immunologic: Negative for environmental allergies and persistent infections.        Objective:      Vitals:    04/04/19 1058   BP: 124/69   Pulse: (!) 50   Weight: 100.1 kg (220 lb 10.9 oz)   Height: 5' 9" (1.753 m)   PainSc:   2     Body mass index is 32.59 " kg/m².    Physical Exam   Constitutional: He is oriented to person, place, and time. He appears well-developed and well-nourished. He is active.   OVERWEIGHT   HENT:   Head: Normocephalic and atraumatic.   Mouth/Throat: Oropharynx is clear and moist and mucous membranes are normal.   Eyes: Pupils are equal, round, and reactive to light. Conjunctivae and EOM are normal.   Neck: Normal range of motion. Neck supple. Normal carotid pulses, no hepatojugular reflux and no JVD present. Carotid bruit is not present. No edema and no erythema present. No thyromegaly present.   Cardiovascular: Regular rhythm.  No extrasystoles are present. Bradycardia present. PMI is not displaced. Exam reveals no gallop, no distant heart sounds, no friction rub and no midsystolic click.   Murmur heard.   Systolic murmur is present with a grade of 1/6 at the lower left sternal border.  Pulses:       Carotid pulses are 2+ on the right side, and 2+ on the left side.       Radial pulses are 2+ on the right side, and 2+ on the left side.        Femoral pulses are 2+ on the right side, and 2+ on the left side.       Dorsalis pedis pulses are 2+ on the right side, and 2+ on the left side.        Posterior tibial pulses are 2+ on the right side, and 2+ on the left side.   Pulmonary/Chest: Effort normal and breath sounds normal. No accessory muscle usage. No tachypnea and no bradypnea. No respiratory distress.   Abdominal: Soft. Bowel sounds are normal. He exhibits no distension and no mass. There is no hepatosplenomegaly. There is no CVA tenderness.   Musculoskeletal: Normal range of motion. He exhibits no edema or deformity.   R HIP, AND LOWER BACK SCARS   Lymphadenopathy:     He has no cervical adenopathy.   Neurological: He is alert and oriented to person, place, and time. He has normal strength. He displays no tremor. No cranial nerve deficit.   Skin: Skin is warm and dry. No cyanosis or erythema. No pallor.   Psychiatric: He has a normal mood  and affect. His speech is normal and behavior is normal.               ..    Chemistry        Component Value Date/Time     04/02/2019 0936    K 4.2 04/02/2019 0936     04/02/2019 0936    CO2 26 04/02/2019 0936    BUN 21 04/02/2019 0936    CREATININE 1.0 04/02/2019 0936     04/02/2019 0936        Component Value Date/Time    CALCIUM 9.6 04/02/2019 0936    ALKPHOS 85 04/02/2019 0936    AST 30 04/02/2019 0936    ALT 31 04/02/2019 0936    BILITOT 0.8 04/02/2019 0936    ESTGFRAFRICA >60.0 04/02/2019 0936    EGFRNONAA >60.0 04/02/2019 0936            ..  Lab Results   Component Value Date    CHOL 153 03/26/2019    CHOL 163 11/15/2016    CHOL 167 11/13/2015     Lab Results   Component Value Date    HDL 39 (L) 03/26/2019    HDL 42 11/15/2016    HDL 39 (L) 11/13/2015     Lab Results   Component Value Date    LDLCALC 100.2 03/26/2019    LDLCALC 104.8 11/15/2016    LDLCALC 106.8 11/13/2015     Lab Results   Component Value Date    TRIG 69 03/26/2019    TRIG 81 11/15/2016    TRIG 106 11/13/2015     Lab Results   Component Value Date    CHOLHDL 25.5 03/26/2019    CHOLHDL 25.8 11/15/2016    CHOLHDL 23.4 11/13/2015     ..  Lab Results   Component Value Date    WBC 5.39 04/02/2019    HGB 14.9 04/02/2019    HCT 45.7 04/02/2019    MCV 94 04/02/2019     04/02/2019       Test(s) Reviewed  I have reviewed the following in detail:  [x] Stress test   [] Angiography   [] Echocardiogram   [x] Labs   [] Other:       Assessment:         ICD-10-CM ICD-9-CM   1. Abnormal nuclear stress test R94.39 794.39   2. Precordial pain R07.2 786.51   3. Dyslipidemia (high LDL; low HDL) E78.5 272.4   4. PAF (paroxysmal atrial fibrillation) I48.0 427.31   5. Long term (current) use of anticoagulants Z79.01 V58.61     Problem List Items Addressed This Visit        Cardiac/Vascular    PAF (paroxysmal atrial fibrillation)    Abnormal nuclear stress test - Primary    Relevant Orders    Case Request-Cath Lab: Left heart cath  (Completed)    Full code    Dyslipidemia (high LDL; low HDL)       Hematology    Long term (current) use of anticoagulants       Other    Precordial pain    Relevant Orders    Full code           Plan:     SX OF ANGINA, ADD ASA, NITRODUR, CRESTOR AND PLAVIX, [facility] EARLY NEXT WEEK, ER IF ANY SX, HOLD ELIQUIS AFTER AM Sunday, LIMIT ACTIVITY, RISKS/ ALTERNATIVES EXPLAINED, CONSENT, DISCUSSED PLAN WITH PT AND WIFE, NO TIA, NO CLINICAL ARRHYTHMIA, NO CHF, WEIGHT LOSS      Abnormal nuclear stress test  -     Case Request-Cath Lab: Left heart cath; Standing  -     Full code; Standing    Precordial pain  -     Full code; Standing    Dyslipidemia (high LDL; low HDL)    PAF (paroxysmal atrial fibrillation)    Long term (current) use of anticoagulants    Other orders  -     sodium chloride 0.9% flush 10 mL  -     nitroGLYCERIN 0.1 mg/hr TD PT24 (NITRODUR) 0.1 mg/hr PT24; Place 1 patch onto the skin once daily.  Dispense: 30 patch; Refill: 1  -     aspirin (ECOTRIN) 81 MG EC tablet; Take 1 tablet (81 mg total) by mouth once daily.; Refill: 0  -     rosuvastatin (CRESTOR) 10 MG tablet; Take 1 tablet (10 mg total) by mouth once daily.  Dispense: 30 tablet; Refill: 1  -     clopidogrel (PLAVIX) 75 mg tablet; Take 1 tablet (75 mg total) by mouth once daily.  Dispense: 30 tablet; Refill: 1    RTC Low level/low impact aerobic exercise 5x's/wk. Heart healthy diet and risk factor modification.    See labs and med orders.    Aerobic exercise 5x's/wk. Heart healthy diet and risk factor modification.    See labs and med orders.

## 2019-04-04 NOTE — TELEPHONE ENCOUNTER
----- Message from Azalea Bowens sent at 4/3/2019  4:22 PM CDT -----  Contact: Patient  Type:  Patient Returning Call    Who Called:  Patient  Who Left Message for Patient:  Dr. Ahuja  Does the patient know what this is regarding?:  yes  Best Call Back Number:  [phone]    Additional Information:  Requesting a call back to confirm additional issues. Thank you!

## 2019-04-04 NOTE — PATIENT INSTRUCTIONS
Angiogram    Arrive for procedure at: [facility] on 4/9 for your 7am procedure.    You will receive a phone call from [facility] Pre-Op Department with further instructions prior to your scheduled procedure.    Notify the nurse if you are ALLERGIC TO IODINE.    FASTING: You MAY NOT have anything to eat or drink AFTER MIDNIGHT the day before your procedure. If your procedure is scheduled in the afternoon, you may have a LIGHT BREAKFAST 6-8 hours prior to your procedure.  For example: Two slices of toast; black coffee or black tea.    MEDICATIONS: You may take your regular morning medications with water. If there are any medications that you should not take, you will be instructed to hold them for that morning.    ? CARDIOLOGY PRE-PROCEDURE MEDICATION ORDERS:  ** Please hold any medications that are checked below:    HOLD   # OF DAYS TO HOLD  ? Coumadin   Consult with Coumadin Clinic   ? Xarelto    _DAY BEFORE & DAY OF_  ? Pradaxa  _ DAY BEFORE & DAY OF _  ? Eliquis   _ DAY BEFORE & DAY OF _  ? Metformin    Day before procedure & morning of procedure  ? Short acting insulin   Morning of procedure    CONTINUE the Following Medications   ? Plavix      ? Effient     ? Aspirin    WHAT TO EXPECT:    How long will the procedure take?  The procedure will take an average of 1 - 2 hours to perform.  After the procedure, you will need to lay flat for around 4 - 6 hours to minimize bleeding from the puncture site. If the wrist is accessed you will need to keep your arm still as instructed by the nurse.    When can I go home?  You may be able to be discharged home that same afternoon if there were no complications.  If you have one of the following: balloon; stent; pacemaker or defibrillator procedures, you may spend one night for observation.  Your doctor will determine your discharge based upon your progress.  The results of your procedure will be discussed with you before you are discharged.  Any further testing  or procedures will be scheduled for you either before you leave or you will be instructed to call for a future appointment.      TRANSPORTATION:  PLEASE ARRANGE TO HAVE SOMEONE DRIVE YOU HOME FOLLOWING YOUR PROCEDURE, YOU WILL NOT BE ALLOWED TO DRIVE.

## 2019-04-06 ENCOUNTER — PATIENT MESSAGE (OUTPATIENT)
Dept: FAMILY MEDICINE | Facility: CLINIC | Age: 69
End: 2019-04-06

## 2019-04-08 ENCOUNTER — PATIENT MESSAGE (OUTPATIENT)
Dept: FAMILY MEDICINE | Facility: CLINIC | Age: 69
End: 2019-04-08

## 2019-04-09 ENCOUNTER — PATIENT MESSAGE (OUTPATIENT)
Dept: FAMILY MEDICINE | Facility: CLINIC | Age: 69
End: 2019-04-09

## 2019-04-10 ENCOUNTER — PATIENT MESSAGE (OUTPATIENT)
Dept: FAMILY MEDICINE | Facility: CLINIC | Age: 69
End: 2019-04-10

## 2019-04-13 ENCOUNTER — PATIENT MESSAGE (OUTPATIENT)
Dept: FAMILY MEDICINE | Facility: CLINIC | Age: 69
End: 2019-04-13

## 2019-04-14 ENCOUNTER — PATIENT MESSAGE (OUTPATIENT)
Dept: CARDIOLOGY | Facility: CLINIC | Age: 69
End: 2019-04-14

## 2019-04-15 ENCOUNTER — TELEPHONE (OUTPATIENT)
Dept: CARDIOLOGY | Facility: CLINIC | Age: 69
End: 2019-04-15

## 2019-04-17 ENCOUNTER — PATIENT MESSAGE (OUTPATIENT)
Dept: CARDIOLOGY | Facility: CLINIC | Age: 69
End: 2019-04-17

## 2019-04-18 ENCOUNTER — TELEPHONE (OUTPATIENT)
Dept: CARDIOLOGY | Facility: CLINIC | Age: 69
End: 2019-04-18

## 2019-04-25 ENCOUNTER — TELEPHONE (OUTPATIENT)
Dept: FAMILY MEDICINE | Facility: CLINIC | Age: 69
End: 2019-04-25

## 2019-04-25 NOTE — TELEPHONE ENCOUNTER
----- Message from Leila Sanabria sent at 4/25/2019  8:07 AM CDT -----  Contact: Camille davila/ [facility]  Type: Needs Medical Advice    Who Called:  Summer  Best Call Back Number: [phone]  Additional Information: Pls call Summer regarding a fax that was sent over twice. Fax is a physician's order. Pls call to adv

## 2019-04-29 ENCOUNTER — OFFICE VISIT (OUTPATIENT)
Dept: CARDIOLOGY | Facility: CLINIC | Age: 69
End: 2019-04-29
Payer: MEDICARE

## 2019-04-29 VITALS
SYSTOLIC BLOOD PRESSURE: 134 MMHG | WEIGHT: 220.69 LBS | HEIGHT: 69 IN | DIASTOLIC BLOOD PRESSURE: 80 MMHG | HEART RATE: 57 BPM | BODY MASS INDEX: 32.69 KG/M2

## 2019-04-29 DIAGNOSIS — I25.10 CORONARY ARTERY DISEASE INVOLVING NATIVE CORONARY ARTERY OF NATIVE HEART WITHOUT ANGINA PECTORIS: Primary | ICD-10-CM

## 2019-04-29 DIAGNOSIS — I10 ESSENTIAL HYPERTENSION: ICD-10-CM

## 2019-04-29 DIAGNOSIS — E66.9 OBESITY, CLASS I, BMI 30-34.9: ICD-10-CM

## 2019-04-29 DIAGNOSIS — Z79.02 LONG TERM (CURRENT) USE OF ANTITHROMBOTICS/ANTIPLATELETS: ICD-10-CM

## 2019-04-29 DIAGNOSIS — I48.0 PAF (PAROXYSMAL ATRIAL FIBRILLATION): ICD-10-CM

## 2019-04-29 DIAGNOSIS — Z95.5 STENTED CORONARY ARTERY: ICD-10-CM

## 2019-04-29 PROCEDURE — 3079F PR MOST RECENT DIASTOLIC BLOOD PRESSURE 80-89 MM HG: ICD-10-PCS | Mod: CPTII,S$GLB,, | Performed by: INTERNAL MEDICINE

## 2019-04-29 PROCEDURE — 1101F PT FALLS ASSESS-DOCD LE1/YR: CPT | Mod: CPTII,S$GLB,, | Performed by: INTERNAL MEDICINE

## 2019-04-29 PROCEDURE — 3075F PR MOST RECENT SYSTOLIC BLOOD PRESS GE 130-139MM HG: ICD-10-PCS | Mod: CPTII,S$GLB,, | Performed by: INTERNAL MEDICINE

## 2019-04-29 PROCEDURE — 99214 PR OFFICE/OUTPT VISIT, EST, LEVL IV, 30-39 MIN: ICD-10-PCS | Mod: S$GLB,,, | Performed by: INTERNAL MEDICINE

## 2019-04-29 PROCEDURE — 93000 ELECTROCARDIOGRAM COMPLETE: CPT | Mod: S$GLB,,, | Performed by: INTERNAL MEDICINE

## 2019-04-29 PROCEDURE — 3075F SYST BP GE 130 - 139MM HG: CPT | Mod: CPTII,S$GLB,, | Performed by: INTERNAL MEDICINE

## 2019-04-29 PROCEDURE — 3079F DIAST BP 80-89 MM HG: CPT | Mod: CPTII,S$GLB,, | Performed by: INTERNAL MEDICINE

## 2019-04-29 PROCEDURE — 99999 PR PBB SHADOW E&M-EST. PATIENT-LVL III: ICD-10-PCS | Mod: PBBFAC,,, | Performed by: INTERNAL MEDICINE

## 2019-04-29 PROCEDURE — 1101F PR PT FALLS ASSESS DOC 0-1 FALLS W/OUT INJ PAST YR: ICD-10-PCS | Mod: CPTII,S$GLB,, | Performed by: INTERNAL MEDICINE

## 2019-04-29 PROCEDURE — 99214 OFFICE O/P EST MOD 30 MIN: CPT | Mod: S$GLB,,, | Performed by: INTERNAL MEDICINE

## 2019-04-29 PROCEDURE — 99999 PR PBB SHADOW E&M-EST. PATIENT-LVL III: CPT | Mod: PBBFAC,,, | Performed by: INTERNAL MEDICINE

## 2019-04-29 PROCEDURE — 93000 EKG 12-LEAD: ICD-10-PCS | Mod: S$GLB,,, | Performed by: INTERNAL MEDICINE

## 2019-04-29 RX ORDER — ROSUVASTATIN CALCIUM 10 MG/1
10 TABLET, COATED ORAL DAILY
Qty: 30 TABLET | Refills: 0 | Status: SHIPPED | OUTPATIENT
Start: 2019-04-29 | End: 2019-04-29 | Stop reason: SDUPTHER

## 2019-04-29 RX ORDER — CLOPIDOGREL BISULFATE 75 MG/1
75 TABLET ORAL DAILY
Qty: 90 TABLET | Refills: 1 | Status: SHIPPED | OUTPATIENT
Start: 2019-04-29 | End: 2019-08-02 | Stop reason: SDUPTHER

## 2019-04-29 RX ORDER — CLOPIDOGREL BISULFATE 75 MG/1
75 TABLET ORAL DAILY
Qty: 30 TABLET | Refills: 0 | Status: SHIPPED | OUTPATIENT
Start: 2019-04-29 | End: 2019-04-29 | Stop reason: SDUPTHER

## 2019-04-29 RX ORDER — ROSUVASTATIN CALCIUM 10 MG/1
10 TABLET, COATED ORAL DAILY
Qty: 90 TABLET | Refills: 1 | Status: SHIPPED | OUTPATIENT
Start: 2019-04-29 | End: 2019-04-29 | Stop reason: SDUPTHER

## 2019-04-29 RX ORDER — ROSUVASTATIN CALCIUM 10 MG/1
10 TABLET, COATED ORAL DAILY
Qty: 90 TABLET | Refills: 1 | Status: SHIPPED | OUTPATIENT
Start: 2019-04-29 | End: 2019-05-13 | Stop reason: ALTCHOICE

## 2019-04-29 RX ORDER — CLOPIDOGREL BISULFATE 75 MG/1
75 TABLET ORAL DAILY
Qty: 90 TABLET | Refills: 1 | Status: SHIPPED | OUTPATIENT
Start: 2019-04-29 | End: 2019-04-29 | Stop reason: SDUPTHER

## 2019-04-29 NOTE — PROGRESS NOTES
Subjective:    Patient ID:  Farhan Stallworth is a 69 y.o. male who presents for Coronary Artery Disease ([facility] with stents); Hyperlipidemia; Hypertension; and Atrial Fibrillation        HPI  S/P LHC, SEVERE LAD DISEASE, HAD PCI/ KAEL, HAD TOOTH ABCESS, TX WITH ABX, HAS BEEN FEELING BETTER, NO CP, NO SOB, USES CPAP DAILY AND BENEFITS FROM IT, SEE ROS    Past Medical History:   Diagnosis Date    Arthritis     hip    Atrial fibrillation     Hearing difficulty     Low serum testosterone level     Lumbar disc disease     REYES (obstructive sleep apnea)     has CPAP, better relief of symptoms with nasal pillow.      Past Surgical History:   Procedure Laterality Date    ANGIOGRAM, CORONARY ARTERY N/A 4/9/2019    Performed by [facility] at [facility]    ARTHROSCOPY-HIP with debridement & bone marrow with stem cell - viscosupplementation with Eudexxa Right 7/8/2016    [facility]    BIOPSY-BONE MARROW Right 7/8/2016    [facility]    CARDIOVERSION N/A 1/3/2019    [facility]    CARPAL TUNNEL RELEASE      2005, bilateral    CLAVICLE SURGERY      DEBRIDEMENT Right 7/8/2016    [facility]    ECHOCARDIOGRAM,TRANSESOPHAGEAL N/A 1/3/2019    [facility]    EPIDURAL BLOCK INJECTION  2010    HIP SURGERY      JOINT REPLACEMENT Right 11/2017    hip    KNEE ARTHROSCOPY W/ MENISCAL REPAIR      left    Left heart cath Left 4/9/2019    Per[facility] at [facility]    LUMBAR FUSION      2010    Percutaneous coronary intervention N/A 4/9/2019    Per[facility] at [facility]    Stent, Drug Eluting, Single Vessel, Coronary  4/9/2019    Per[facility] at [facility]    TONSILLECTOMY      Ultrasound-coronary N/A 4/9/2019    Performed by [facility] at [facility]     Family History   Problem Relation Age of  Onset    Cancer Father         lung    Cancer Sister         leukemia    No Known Problems Mother     Heart disease Neg Hx     Diabetes Neg Hx      Social History     Socioeconomic History    Marital status:      Spouse name: Not on file    Number of children: Not on file    Years of education: Not on file    Highest education level: Not on file   Occupational History    Not on file   Social Needs    Financial resource strain: Not hard at all    Food insecurity:     Worry: Never true     Inability: Never true    Transportation needs:     Medical: No     Non-medical: No   Tobacco Use    Smoking status: Never Smoker    Smokeless tobacco: Never Used   Substance and Sexual Activity    Alcohol use: Not Currently     Alcohol/week: 0.0 oz     Frequency: 2-4 times a month     Drinks per session: 1 or 2     Binge frequency: Never    Drug use: No    Sexual activity: Not on file   Lifestyle    Physical activity:     Days per week: 7 days     Minutes per session: 60 min    Stress: Only a little   Relationships    Social connections:     Talks on phone: More than three times a week     Gets together: Three times a week     Attends Anabaptism service: Not on file     Active member of club or organization: Yes     Attends meetings of clubs or organizations: More than 4 times per year     Relationship status:    Other Topics Concern    Not on file   Social History Narrative    Not on file       Review of patient's allergies indicates:  No Known Allergies    Current Outpatient Medications:     apixaban (ELIQUIS) 5 mg Tab, Take 1 tablet (5 mg total) by mouth 2 (two) times daily., Disp: 180 tablet, Rfl: 1    aspirin (ECOTRIN) 81 MG EC tablet, Take 1 tablet (81 mg total) by mouth once daily., Disp: , Rfl: 0    clopidogrel (PLAVIX) 75 mg tablet, Take 1 tablet (75 mg total) by mouth once daily., Disp: 90 tablet, Rfl: 1    glucosamine-chondroitin 500-400 mg tablet, Take 1 tablet by mouth 2 (two)  times daily. Triflex, Disp: , Rfl:     metoprolol succinate (TOPROL-XL) 25 MG 24 hr tablet, Take 1 tablet (25 mg total) by mouth once daily., Disp: 30 tablet, Rfl: 1    MULTIVITS-MINERALS/FA/LYCOPENE (MEN'S DAILY MULTIVIT-MINERAL ORAL), Take 1 tablet by mouth once daily. , Disp: , Rfl:     nitroGLYCERIN (NITROSTAT) 0.4 MG SL tablet, Place 1 tablet (0.4 mg total) under the tongue every 5 (five) minutes as needed., Disp: 25 tablet, Rfl: 0    nitroGLYCERIN 0.1 mg/hr TD PT24 (NITRODUR) 0.1 mg/hr PT24, Place 1 patch onto the skin once daily., Disp: 30 patch, Rfl: 1    rosuvastatin (CRESTOR) 10 MG tablet, Take 1 tablet (10 mg total) by mouth once daily., Disp: 90 tablet, Rfl: 1    Current Facility-Administered Medications:     sodium chloride 0.9% flush 10 mL, 10 mL, Intravenous, PRN, Nanci Ahuja MD    Review of Systems   Constitution: Negative for chills, diaphoresis, fever, malaise/fatigue and night sweats.   HENT: Negative for congestion and nosebleeds.    Eyes: Negative for blurred vision and visual disturbance.   Cardiovascular: Negative for chest pain, claudication, cyanosis, dyspnea on exertion (MILD), irregular heartbeat, leg swelling, near-syncope, orthopnea, palpitations (NOT FELT), paroxysmal nocturnal dyspnea and syncope.   Respiratory: Negative for cough, hemoptysis, shortness of breath and wheezing.    Endocrine: Negative for cold intolerance, heat intolerance and polyuria.   Hematologic/Lymphatic: Negative for adenopathy. Does not bruise/bleed easily (SOME).   Skin: Negative for color change, itching and rash.   Musculoskeletal: Negative for back pain (CHRONIC) and falls.   Gastrointestinal: Negative for abdominal pain, change in bowel habit, dysphagia, hematemesis, jaundice, melena and vomiting.   Genitourinary: Negative for dysuria, flank pain and frequency.   Neurological: Negative for brief paralysis, dizziness, focal weakness, light-headedness, loss of balance, numbness, tremors and weakness.  "  Psychiatric/Behavioral: Negative for altered mental status, depression and memory loss. The patient is not nervous/anxious.    Allergic/Immunologic: Negative for environmental allergies and persistent infections.        Objective:      Vitals:    04/29/19 1306   BP: 134/80   Pulse: (!) 57   Weight: 100.1 kg (220 lb 10.9 oz)   Height: 5' 9" (1.753 m)   PainSc:   2     Body mass index is 32.59 kg/m².    Physical Exam   Constitutional: He is oriented to person, place, and time. He appears well-developed and well-nourished. He is active.   OVERWEIGHT   HENT:   Head: Normocephalic and atraumatic.   Mouth/Throat: Oropharynx is clear and moist and mucous membranes are normal.   Eyes: Pupils are equal, round, and reactive to light. Conjunctivae and EOM are normal.   Neck: Normal range of motion. Neck supple. Normal carotid pulses, no hepatojugular reflux and no JVD present. Carotid bruit is not present. No edema and no erythema present. No thyromegaly present.   Cardiovascular: Regular rhythm.  No extrasystoles are present. Bradycardia present. PMI is not displaced. Exam reveals no gallop, no distant heart sounds, no friction rub and no midsystolic click.   Murmur heard.   Systolic murmur is present with a grade of 1/6 at the lower left sternal border.  Pulses:       Carotid pulses are 2+ on the right side, and 2+ on the left side.       Radial pulses are 2+ on the right side, and 2+ on the left side.        Femoral pulses are 2+ on the right side, and 2+ on the left side.       Dorsalis pedis pulses are 2+ on the right side, and 2+ on the left side.        Posterior tibial pulses are 2+ on the right side, and 2+ on the left side.   Pulmonary/Chest: Effort normal and breath sounds normal. No accessory muscle usage. No tachypnea and no bradypnea. No respiratory distress.   Abdominal: Soft. Bowel sounds are normal. He exhibits no distension and no mass. There is no hepatosplenomegaly. There is no CVA tenderness. "   Musculoskeletal: Normal range of motion. He exhibits no edema or deformity.   R HIP, AND LOWER BACK SCARS   Lymphadenopathy:     He has no cervical adenopathy.   Neurological: He is alert and oriented to person, place, and time. He has normal strength. He displays no tremor. No cranial nerve deficit.   Skin: Skin is warm and dry. No cyanosis or erythema. No pallor.   Psychiatric: He has a normal mood and affect. His speech is normal and behavior is normal.               ..    Chemistry        Component Value Date/Time     04/02/2019 0936    K 4.2 04/02/2019 0936     04/02/2019 0936    CO2 26 04/02/2019 0936    BUN 21 04/02/2019 0936    CREATININE 1.0 04/02/2019 0936     04/02/2019 0936        Component Value Date/Time    CALCIUM 9.6 04/02/2019 0936    ALKPHOS 85 04/02/2019 0936    AST 30 04/02/2019 0936    ALT 31 04/02/2019 0936    BILITOT 0.8 04/02/2019 0936    ESTGFRAFRICA >60.0 04/02/2019 0936    EGFRNONAA >60.0 04/02/2019 0936            ..  Lab Results   Component Value Date    CHOL 153 03/26/2019    CHOL 163 11/15/2016    CHOL 167 11/13/2015     Lab Results   Component Value Date    HDL 39 (L) 03/26/2019    HDL 42 11/15/2016    HDL 39 (L) 11/13/2015     Lab Results   Component Value Date    LDLCALC 100.2 03/26/2019    LDLCALC 104.8 11/15/2016    LDLCALC 106.8 11/13/2015     Lab Results   Component Value Date    TRIG 69 03/26/2019    TRIG 81 11/15/2016    TRIG 106 11/13/2015     Lab Results   Component Value Date    CHOLHDL 25.5 03/26/2019    CHOLHDL 25.8 11/15/2016    CHOLHDL 23.4 11/13/2015     ..  Lab Results   Component Value Date    WBC 5.39 04/02/2019    HGB 14.9 04/02/2019    HCT 45.7 04/02/2019    MCV 94 04/02/2019     04/02/2019       Test(s) Reviewed  I have reviewed the following in detail:  [] Stress test   [x] Angiography   [] Echocardiogram   [] Labs   [x] Other:       Assessment:         ICD-10-CM ICD-9-CM   1. Coronary artery disease involving native coronary artery  of native heart without angina pectoris I25.10 414.01   2. Long term (current) use of antithrombotics/antiplatelets Z79.02 V58.63   3. Essential hypertension I10 401.9   4. PAF (paroxysmal atrial fibrillation) I48.0 427.31   5. Obesity, Class I, BMI 30-34.9 E66.9 278.00   6. Stented coronary artery Z95.5 V45.82     Problem List Items Addressed This Visit        Cardiac/Vascular    PAF (paroxysmal atrial fibrillation)    Essential hypertension    Abnormal nuclear stress test - Primary    Stented coronary artery       Hematology    Long term (current) use of antithrombotics/antiplatelets       Endocrine    Obesity, Class I, BMI 30-34.9           Plan:         EKG SB AT 56, LAE, DECREASE ELIQUIS TO 2.5 BID, OK FOR  DENTAL PROCEDURE , ALL CV CLINICALLY STABLE, NO ANGINA, NO HF, NO TIA, NO CLINICAL ARRHYTHMIA,CONTINUE CURRENT MEDS, EDUCATION, DIET, EXERCISE, WEIGHT LOSS  Coronary artery disease involving native coronary artery of native heart without angina pectoris  -     SCHEDULED EKG 12-LEAD (to Muse); Future    Long term (current) use of antithrombotics/antiplatelets    Essential hypertension    PAF (paroxysmal atrial fibrillation)    Obesity, Class I, BMI 30-34.9    Stented coronary artery    Other orders  -     Discontinue: clopidogrel (PLAVIX) 75 mg tablet; Take 1 tablet (75 mg total) by mouth once daily.  Dispense: 30 tablet; Refill: 0  -     Discontinue: clopidogrel (PLAVIX) 75 mg tablet; Take 1 tablet (75 mg total) by mouth once daily.  Dispense: 90 tablet; Refill: 1  -     Discontinue: rosuvastatin (CRESTOR) 10 MG tablet; Take 1 tablet (10 mg total) by mouth once daily.  Dispense: 90 tablet; Refill: 1  -     Discontinue: rosuvastatin (CRESTOR) 10 MG tablet; Take 1 tablet (10 mg total) by mouth once daily.  Dispense: 30 tablet; Refill: 0  -     clopidogrel (PLAVIX) 75 mg tablet; Take 1 tablet (75 mg total) by mouth once daily.  Dispense: 90 tablet; Refill: 1  -     rosuvastatin (CRESTOR) 10 MG tablet; Take 1  tablet (10 mg total) by mouth once daily.  Dispense: 90 tablet; Refill: 1    RTC Low level/low impact aerobic exercise 5x's/wk. Heart healthy diet and risk factor modification.    See labs and med orders.    Aerobic exercise 5x's/wk. Heart healthy diet and risk factor modification.    See labs and med orders.

## 2019-05-02 ENCOUNTER — OFFICE VISIT (OUTPATIENT)
Dept: PHYSICAL MEDICINE AND REHAB | Facility: CLINIC | Age: 69
End: 2019-05-02
Payer: MEDICARE

## 2019-05-02 VITALS — BODY MASS INDEX: 32.58 KG/M2 | HEIGHT: 69 IN | WEIGHT: 220 LBS

## 2019-05-02 DIAGNOSIS — R07.9 ACUTE CHEST PAIN: Primary | ICD-10-CM

## 2019-05-02 DIAGNOSIS — M25.562 ACUTE PAIN OF LEFT KNEE: ICD-10-CM

## 2019-05-02 DIAGNOSIS — S76.312A LEFT HAMSTRING STRAIN, INITIAL ENCOUNTER: Primary | ICD-10-CM

## 2019-05-02 PROCEDURE — 99213 OFFICE O/P EST LOW 20 MIN: CPT | Mod: S$GLB,,, | Performed by: PHYSICAL MEDICINE & REHABILITATION

## 2019-05-02 PROCEDURE — 99999 PR PBB SHADOW E&M-EST. PATIENT-LVL III: CPT | Mod: PBBFAC,,, | Performed by: PHYSICAL MEDICINE & REHABILITATION

## 2019-05-02 PROCEDURE — 1101F PR PT FALLS ASSESS DOC 0-1 FALLS W/OUT INJ PAST YR: ICD-10-PCS | Mod: CPTII,S$GLB,, | Performed by: PHYSICAL MEDICINE & REHABILITATION

## 2019-05-02 PROCEDURE — 1101F PT FALLS ASSESS-DOCD LE1/YR: CPT | Mod: CPTII,S$GLB,, | Performed by: PHYSICAL MEDICINE & REHABILITATION

## 2019-05-02 PROCEDURE — 99213 PR OFFICE/OUTPT VISIT, EST, LEVL III, 20-29 MIN: ICD-10-PCS | Mod: S$GLB,,, | Performed by: PHYSICAL MEDICINE & REHABILITATION

## 2019-05-02 PROCEDURE — 99999 PR PBB SHADOW E&M-EST. PATIENT-LVL III: ICD-10-PCS | Mod: PBBFAC,,, | Performed by: PHYSICAL MEDICINE & REHABILITATION

## 2019-05-02 RX ORDER — AMOXICILLIN 500 MG/1
TABLET, FILM COATED ORAL
Refills: 0 | COMMUNITY
Start: 2019-04-29 | End: 2019-08-02

## 2019-05-02 RX ORDER — NITROGLYCERIN 20 MG/1
1 PATCH TRANSDERMAL DAILY
Qty: 30 PATCH | Refills: 1 | Status: SHIPPED | OUTPATIENT
Start: 2019-05-02 | End: 2019-08-02

## 2019-05-02 NOTE — PROGRESS NOTES
[facility]    CHIEF COMPLAINT:   Chief Complaint   Patient presents with    Hip Pain     left hamstring pain     HISTORY OF PRESENT ILLNESS: Farhan Stallworth is a 69 y.o. male who presents to me in follow up for evaluation of new hamstring pain.    LCV was 4/20/2017 and at that time he received a PRP injection of the right hip. He is now s/p R JEFFERY in 11/2017, but does note that he has been having some issues with the hip.    His primary complaint today is left hamstring pain began about 3 weeks ago in the absence of trauma. He subsequently felt a knot at the back/inside part of the thigh. Pain worsened initially then has improved some with heat and ice. He takes tylenol PRN with some relief. Never had similar to this in the past. Denies symptoms in the right leg. He describes pain along the medial hamstring insertion. Pain today is 2/10. Pain is worse with prolonged sitting and transfers. He recently couldn't finish a round of golf due to pain. He does 40-60 minutes of aerobic activity daily and is still able to do some lower extremity resistance exercises in the gym.     He also has medial left knee pain that he feels is recurrent MCL injury. He has never had knee injections. Has not had PT for either issue. Denies popping, clicking and grinding of the knee. Some swelling. No pain in the right knee.    He also has bilateral hand pain in the palmar surface. He feels he has decreased  strength. Denies numbness or tingling in the fingers, denies pain waking him up at night. He had bilateral carpal tunnel releases about 6 years ago. He is left handed. He has had the right fourth digit injected for trigger finger. This finger still ocassionally locks on him. He denies neck pain radiating to BUE.    Review of Systems   Constitutional: Negative for fever.   HENT: Negative for drooling.    Eyes: Negative for discharge.   Respiratory: Negative for choking.    Cardiovascular: Negative for chest  pain.   Genitourinary: Negative for flank pain.   Skin: Negative for wound.   Allergic/Immunologic: Negative for immunocompromised state.   Neurological: Negative for tremors and syncope.   Psychiatric/Behavioral: Negative for behavioral problems.     Past Medical History:   Past Medical History:   Diagnosis Date    Arthritis     hip    Atrial fibrillation     Hearing difficulty     Low serum testosterone level     Lumbar disc disease     REYES (obstructive sleep apnea)     has CPAP, better relief of symptoms with nasal pillow.        Past Surgical History:   Past Surgical History:   Procedure Laterality Date    ANGIOGRAM, CORONARY ARTERY N/A 4/9/2019    Performed by [facility]huja [facility] at [facility]    ARTHROSCOPY-HIP with debridement & bone marrow with stem cell - viscosupplementation with Eudexxa Right 7/8/2016    [facility]    BIOPSY-BONE MARROW Right 7/8/2016    [facility]    CARDIOVERSION N/A 1/3/2019    [facility]    CARPAL TUNNEL RELEASE      2005, bilateral    CLAVICLE SURGERY      DEBRIDEMENT Right 7/8/2016    [facility]    ECHOCARDIOGRAM,TRANSESOPHAGEAL N/A 1/3/2019    [facility]    EPIDURAL BLOCK INJECTION  2010    HIP SURGERY      JOINT REPLACEMENT Right 11/2017    hip    KNEE ARTHROSCOPY W/ MENISCAL REPAIR      left    Left heart cath Left 4/9/2019    [facility] at [facility]    LUMBAR FUSION      2010    Percutaneous coronary intervention N/A 4/9/2019    Per[facility] at [facility]    Stent, Drug Eluting, Single Vessel, Coronary  4/9/2019    Per[facility] at [facility]    TONSILLECTOMY      Ultrasound-coronary N/A 4/9/2019    Performed by [facility]huja [facility] at [facility]       Family History:   Family History   Problem Relation Age of Onset    Cancer Father          lung    Cancer Sister         leukemia    No Known Problems Mother     Heart disease Neg Hx     Diabetes Neg Hx        Medications:   Current Outpatient Medications on File Prior to Visit   Medication Sig Dispense Refill    amoxicillin (AMOXIL) 500 MG Tab TK 1 TABLET PO Q 8 HOURS UNTIL GONE  0    apixaban (ELIQUIS) 5 mg Tab Take 1 tablet (5 mg total) by mouth 2 (two) times daily. 180 tablet 1    aspirin (ECOTRIN) 81 MG EC tablet Take 1 tablet (81 mg total) by mouth once daily.  0    clopidogrel (PLAVIX) 75 mg tablet Take 1 tablet (75 mg total) by mouth once daily. 90 tablet 1    glucosamine-chondroitin 500-400 mg tablet Take 1 tablet by mouth 2 (two) times daily. Triflex      metoprolol succinate (TOPROL-XL) 25 MG 24 hr tablet Take 1 tablet (25 mg total) by mouth once daily. 30 tablet 1    MULTIVITS-MINERALS/FA/LYCOPENE (MEN'S DAILY MULTIVIT-MINERAL ORAL) Take 1 tablet by mouth once daily.       nitroGLYCERIN (NITROSTAT) 0.4 MG SL tablet Place 1 tablet (0.4 mg total) under the tongue every 5 (five) minutes as needed. 25 tablet 0    nitroGLYCERIN 0.1 mg/hr TD PT24 (NITRODUR) 0.1 mg/hr PT24 Place 1 patch onto the skin once daily. 30 patch 1    rosuvastatin (CRESTOR) 10 MG tablet Take 1 tablet (10 mg total) by mouth once daily. 90 tablet 1     Current Facility-Administered Medications on File Prior to Visit   Medication Dose Route Frequency Provider Last Rate Last Dose    sodium chloride 0.9% flush 10 mL  10 mL Intravenous PRN Nanci Ahuja MD           Allergies: Review of patient's allergies indicates:  No Known Allergies    Social History:   Social History     Socioeconomic History    Marital status:      Spouse name: Not on file    Number of children: Not on file    Years of education: Not on file    Highest education level: Not on file   Occupational History    Not on file   Social Needs    Financial resource strain: Not hard at all    Food insecurity:     Worry: Never true      "Inability: Never true    Transportation needs:     Medical: No     Non-medical: No   Tobacco Use    Smoking status: Never Smoker    Smokeless tobacco: Never Used   Substance and Sexual Activity    Alcohol use: Not Currently     Alcohol/week: 0.0 oz     Frequency: 2-4 times a month     Drinks per session: 1 or 2     Binge frequency: Never    Drug use: No    Sexual activity: Not on file   Lifestyle    Physical activity:     Days per week: 7 days     Minutes per session: 60 min    Stress: Only a little   Relationships    Social connections:     Talks on phone: More than three times a week     Gets together: Three times a week     Attends [employer] service: Not on file     Active member of club or organization: Yes     Attends meetings of clubs or organizations: More than 4 times per year     Relationship status:    Other Topics Concern    Not on file   Social History Narrative    Not on file     PHYSICAL EXAMINATION:   General    Vitals:    05/02/19 1358   Weight: 99.8 kg (220 lb)   Height: 5' 9" (1.753 m)     Constitutional: Oriented to person, place, and time. No apparent distress. Appears well-developed and well-nourished. Pleasant.  HENT:   Head: Normocephalic and atraumatic.   Eyes: Right eye exhibits no discharge. Left eye exhibits no discharge. No scleral icterus.   Pulmonary/Chest: Effort normal. No respiratory distress.   Abdominal: There is no guarding.   Neurological: Alert and oriented to person, place, and time.   Psychiatric: Behavior is normal.   Right Knee Exam     Muscle Strength   The patient has normal right knee strength.    Tenderness   The patient is experiencing no tenderness.     Range of Motion   Extension: 0   Flexion: 130     Tests   Ramana:  Medial - negative Lateral - negative  Varus: negative Valgus: negative  Lachman:  Anterior - negative      Patellar apprehension: negative    Other   Erythema: absent  Scars: absent  Sensation: normal  Pulse: present  Swelling: " none  Effusion: no effusion present      Left Knee Exam     Muscle Strength   The patient has normal left knee strength.    Tenderness   The patient is experiencing tenderness in the medial hamstring.    Range of Motion   Extension: 0   Flexion: 90 (limited by posteromedial hamstring pain)     Tests   Varus: negative Valgus: negative  Lachman:  Anterior - negative      Patellar apprehension: negative    Other   Erythema: absent  Scars: absent  Sensation: normal  Pulse: present  Swelling: none  Effusion: no effusion present    Comments:  Ramana's test limited by reduced knee flexion secondary to pain.  No pain with resisted knee flexion      Right Hand Exam     Tenderness   The patient is experiencing tenderness in the weinstein area.    Range of Motion   The patient has normal right wrist ROM.     Muscle Strength   The patient has normal right wrist strength.    Tests   Phalens Sign: negative  Tinel's sign (median nerve): negative    Other   Erythema: absent  Scars: present  Sensation: normal  Pulse: present    Comments:  Flexor tendon nodule at 4th digit      Left Hand Exam     Tenderness   The patient is experiencing tenderness in the weinstein area.     Range of Motion   The patient has normal left wrist ROM.    Muscle Strength   The patient has normal left wrist strength.    Tests   Phalens Sign: negative  Tinel's sign (median nerve): negative    Other   Erythema: absent  Scars: present  Sensation: normal  Pulse: present        INSPECTION: There is no swelling, ecchymoses, erythema or gross deformity of the knee/thigh.  GAIT/DYNAMIC: mildly antalgic with first few steps from seated position      ASSESSMENT:   1. Arthralgia of hip, unspecified laterality      PLAN:     1. Limited U/S examination of the left knee today in clinic was unrevealing for effusion, MCL injury, Baker's cyst, pes anserine bursitis, or evidence of tendinitis in the hamstring insertions or gastrocnemius origins.  We discussed that his location  of pain would typically indicate medial hamstring injury, however resisted flexion of the knee fails to elicit any pain.  There is the possibility that he may have some intra-articular pathology of the knee such as meniscal injury, however he has been engaging in several forms of lower extremity aerobic and resistance exercises without much limitation.  He also does not appear to have any intra-articular effusion.    2.  We discussed performing an MRI of the area, however given his improvement over the past few days, conservative observation is reasonable at this time. Patient instructed to contact clinic Monday. If pain persists or worsens, consider MRI of left knee and/or distal left thigh.    3. Discussed options of repeat trigger finger injections or percutaneous A1 pulley release under U/S guidance.     4. Patient to follow up by phone on Monday. RTC if symptoms persist or worsen.    The above note was completed, in part, with the aid of Dragon dictation software/hardware. Translation errors may be present.

## 2019-05-06 ENCOUNTER — PATIENT MESSAGE (OUTPATIENT)
Dept: PHYSICAL MEDICINE AND REHAB | Facility: CLINIC | Age: 69
End: 2019-05-06

## 2019-05-06 RX ORDER — NITROGLYCERIN 20 MG/1
1 PATCH TRANSDERMAL DAILY
Qty: 30 PATCH | Refills: 1 | Status: SHIPPED | OUTPATIENT
Start: 2019-05-06 | End: 2019-05-27 | Stop reason: CLARIF

## 2019-05-13 ENCOUNTER — PATIENT MESSAGE (OUTPATIENT)
Dept: CARDIOLOGY | Facility: CLINIC | Age: 69
End: 2019-05-13

## 2019-05-13 RX ORDER — ATORVASTATIN CALCIUM 20 MG/1
20 TABLET, FILM COATED ORAL DAILY
Qty: 30 TABLET | Refills: 0 | Status: SHIPPED | OUTPATIENT
Start: 2019-05-13 | End: 2019-05-28 | Stop reason: SDUPTHER

## 2019-05-13 RX ORDER — ATORVASTATIN CALCIUM 20 MG/1
20 TABLET, FILM COATED ORAL DAILY
Qty: 90 TABLET | Refills: 0 | Status: SHIPPED | OUTPATIENT
Start: 2019-05-13 | End: 2019-05-13

## 2019-05-13 NOTE — TELEPHONE ENCOUNTER
----- Message from Nanci Ahuja MD sent at 5/13/2019  1:06 PM CDT -----  Ok to cahange crestor to lipitor 20

## 2019-05-14 DIAGNOSIS — Z12.11 COLON CANCER SCREENING: ICD-10-CM

## 2019-05-20 ENCOUNTER — PATIENT MESSAGE (OUTPATIENT)
Dept: PHYSICAL MEDICINE AND REHAB | Facility: CLINIC | Age: 69
End: 2019-05-20

## 2019-05-20 DIAGNOSIS — G89.29 CHRONIC PAIN OF LEFT KNEE: Primary | ICD-10-CM

## 2019-05-20 DIAGNOSIS — M25.562 CHRONIC PAIN OF LEFT KNEE: Primary | ICD-10-CM

## 2019-05-24 ENCOUNTER — PATIENT MESSAGE (OUTPATIENT)
Dept: CARDIOLOGY | Facility: CLINIC | Age: 69
End: 2019-05-24

## 2019-05-27 ENCOUNTER — PATIENT MESSAGE (OUTPATIENT)
Dept: CARDIOLOGY | Facility: CLINIC | Age: 69
End: 2019-05-27

## 2019-05-27 DIAGNOSIS — R07.2 PRECORDIAL PAIN: Primary | ICD-10-CM

## 2019-05-27 RX ORDER — ISOSORBIDE MONONITRATE 30 MG/1
30 TABLET, EXTENDED RELEASE ORAL DAILY
Qty: 90 TABLET | Refills: 1 | Status: SHIPPED | OUTPATIENT
Start: 2019-05-27 | End: 2019-12-03 | Stop reason: SDUPTHER

## 2019-05-27 NOTE — TELEPHONE ENCOUNTER
Rx of Imdur 30mg #90    Pt would like for it to be sent to mail order pharmacy. Tried calling today and they were closed. He states he has enough nitro patches to last until he gets back in town.

## 2019-05-28 RX ORDER — ATORVASTATIN CALCIUM 20 MG/1
20 TABLET, FILM COATED ORAL DAILY
Qty: 90 TABLET | Refills: 0 | Status: SHIPPED | OUTPATIENT
Start: 2019-05-28 | End: 2019-06-05 | Stop reason: SDUPTHER

## 2019-05-29 ENCOUNTER — PATIENT MESSAGE (OUTPATIENT)
Dept: CARDIOLOGY | Facility: CLINIC | Age: 69
End: 2019-05-29

## 2019-06-04 ENCOUNTER — PATIENT MESSAGE (OUTPATIENT)
Dept: CARDIOLOGY | Facility: CLINIC | Age: 69
End: 2019-06-04

## 2019-06-04 ENCOUNTER — PATIENT MESSAGE (OUTPATIENT)
Dept: PHYSICAL MEDICINE AND REHAB | Facility: CLINIC | Age: 69
End: 2019-06-04

## 2019-06-05 RX ORDER — ATORVASTATIN CALCIUM 20 MG/1
20 TABLET, FILM COATED ORAL DAILY
Qty: 90 TABLET | Refills: 1 | Status: SHIPPED | OUTPATIENT
Start: 2019-06-05 | End: 2019-06-13 | Stop reason: SDUPTHER

## 2019-06-07 ENCOUNTER — PATIENT MESSAGE (OUTPATIENT)
Dept: PHYSICAL MEDICINE AND REHAB | Facility: CLINIC | Age: 69
End: 2019-06-07

## 2019-06-07 ENCOUNTER — PATIENT MESSAGE (OUTPATIENT)
Dept: CARDIOLOGY | Facility: CLINIC | Age: 69
End: 2019-06-07

## 2019-06-07 NOTE — TELEPHONE ENCOUNTER
Please advise: patient stated he had an MRI done last Monday and will be needing a TKA. He is wondering you opinion on how soon he can have this procedure, given his heart condition

## 2019-06-13 ENCOUNTER — PATIENT MESSAGE (OUTPATIENT)
Dept: CARDIOLOGY | Facility: CLINIC | Age: 69
End: 2019-06-13

## 2019-06-13 ENCOUNTER — OFFICE VISIT (OUTPATIENT)
Dept: PHYSICAL MEDICINE AND REHAB | Facility: CLINIC | Age: 69
End: 2019-06-13
Payer: MEDICARE

## 2019-06-13 VITALS
HEART RATE: 62 BPM | WEIGHT: 219.81 LBS | DIASTOLIC BLOOD PRESSURE: 63 MMHG | HEIGHT: 69 IN | SYSTOLIC BLOOD PRESSURE: 116 MMHG | BODY MASS INDEX: 32.56 KG/M2

## 2019-06-13 DIAGNOSIS — M17.12 PRIMARY OSTEOARTHRITIS OF LEFT KNEE: Primary | ICD-10-CM

## 2019-06-13 DIAGNOSIS — E78.5 DYSLIPIDEMIA (HIGH LDL; LOW HDL): Primary | ICD-10-CM

## 2019-06-13 PROCEDURE — 20611 DRAIN/INJ JOINT/BURSA W/US: CPT | Mod: LT,S$GLB,, | Performed by: PHYSICAL MEDICINE & REHABILITATION

## 2019-06-13 PROCEDURE — 20611 LARGE JOINT ASPIRATION/INJECTION: L KNEE: ICD-10-PCS | Mod: LT,S$GLB,, | Performed by: PHYSICAL MEDICINE & REHABILITATION

## 2019-06-13 PROCEDURE — 3078F PR MOST RECENT DIASTOLIC BLOOD PRESSURE < 80 MM HG: ICD-10-PCS | Mod: CPTII,S$GLB,, | Performed by: PHYSICAL MEDICINE & REHABILITATION

## 2019-06-13 PROCEDURE — 99212 OFFICE O/P EST SF 10 MIN: CPT | Mod: 25,S$GLB,, | Performed by: PHYSICAL MEDICINE & REHABILITATION

## 2019-06-13 PROCEDURE — 3074F PR MOST RECENT SYSTOLIC BLOOD PRESSURE < 130 MM HG: ICD-10-PCS | Mod: CPTII,S$GLB,, | Performed by: PHYSICAL MEDICINE & REHABILITATION

## 2019-06-13 PROCEDURE — 99999 PR PBB SHADOW E&M-EST. PATIENT-LVL III: CPT | Mod: PBBFAC,,, | Performed by: PHYSICAL MEDICINE & REHABILITATION

## 2019-06-13 PROCEDURE — 99999 PR PBB SHADOW E&M-EST. PATIENT-LVL III: ICD-10-PCS | Mod: PBBFAC,,, | Performed by: PHYSICAL MEDICINE & REHABILITATION

## 2019-06-13 PROCEDURE — 3078F DIAST BP <80 MM HG: CPT | Mod: CPTII,S$GLB,, | Performed by: PHYSICAL MEDICINE & REHABILITATION

## 2019-06-13 PROCEDURE — 1101F PR PT FALLS ASSESS DOC 0-1 FALLS W/OUT INJ PAST YR: ICD-10-PCS | Mod: CPTII,S$GLB,, | Performed by: PHYSICAL MEDICINE & REHABILITATION

## 2019-06-13 PROCEDURE — 99212 PR OFFICE/OUTPT VISIT, EST, LEVL II, 10-19 MIN: ICD-10-PCS | Mod: 25,S$GLB,, | Performed by: PHYSICAL MEDICINE & REHABILITATION

## 2019-06-13 PROCEDURE — 3074F SYST BP LT 130 MM HG: CPT | Mod: CPTII,S$GLB,, | Performed by: PHYSICAL MEDICINE & REHABILITATION

## 2019-06-13 PROCEDURE — 1101F PT FALLS ASSESS-DOCD LE1/YR: CPT | Mod: CPTII,S$GLB,, | Performed by: PHYSICAL MEDICINE & REHABILITATION

## 2019-06-13 RX ORDER — ACETAMINOPHEN 500 MG
TABLET ORAL
COMMUNITY
Start: 2019-05-04

## 2019-06-13 RX ORDER — ATORVASTATIN CALCIUM 20 MG/1
20 TABLET, FILM COATED ORAL DAILY
Qty: 14 TABLET | Refills: 1 | Status: SHIPPED | OUTPATIENT
Start: 2019-06-13 | End: 2019-06-25 | Stop reason: SDUPTHER

## 2019-06-13 RX ORDER — DEXAMETHASONE SODIUM PHOSPHATE 4 MG/ML
4 INJECTION, SOLUTION INTRA-ARTICULAR; INTRALESIONAL; INTRAMUSCULAR; INTRAVENOUS; SOFT TISSUE
Status: DISCONTINUED | OUTPATIENT
Start: 2019-06-13 | End: 2019-06-14 | Stop reason: HOSPADM

## 2019-06-13 RX ADMIN — DEXAMETHASONE SODIUM PHOSPHATE 4 MG: 4 INJECTION, SOLUTION INTRA-ARTICULAR; INTRALESIONAL; INTRAMUSCULAR; INTRAVENOUS; SOFT TISSUE at 10:06

## 2019-06-13 NOTE — TELEPHONE ENCOUNTER
I called the Pharmacy at [facility] to DC the Generic Crestor and add the new rx for Atorvastatin 20 mg. I called the patient to let him know this was the hold up. He wanted a 14 day supply sent to [facility]. I told him I would.

## 2019-06-14 ENCOUNTER — PATIENT MESSAGE (OUTPATIENT)
Dept: PHYSICAL MEDICINE AND REHAB | Facility: CLINIC | Age: 69
End: 2019-06-14

## 2019-06-14 DIAGNOSIS — M17.12 OSTEOARTHRITIS OF LEFT KNEE, UNSPECIFIED OSTEOARTHRITIS TYPE: Primary | ICD-10-CM

## 2019-06-14 NOTE — PROCEDURES
Large Joint Aspiration/Injection: L knee  Date/Time: 6/13/2019 10:10 PM  Performed by: Jayy Driscoll MD  Authorized by: Jayy Driscoll MD     Consent Done?:  Yes (Verbal)  Indications:  Pain  Procedure site marked: Yes    Timeout: Prior to procedure the correct patient, procedure, and site was verified      Location:  Knee  Site:  L knee  Prep: Patient was prepped and draped in usual sterile fashion    Ultrasonic Guidance for needle placement: Yes  Images are saved and documented.  Needle size:  18 G  Approach: Needle in plane, lateral to medial.  Medications:  4 mg dexamethasone 4 mg/mL  Aspirate amount (ml):  12  Aspirate:  Serous  Patient tolerance:  Patient tolerated the procedure well with no immediate complications    Additional Comments: Ultrasound guidance was used for correct needle placement, the images were saved will be uploaded to EMR.

## 2019-06-14 NOTE — PROGRESS NOTES
[facility]    CHIEF COMPLAINT:   Chief Complaint   Patient presents with    Knee Pain     HISTORY OF PRESENT ILLNESS: Farhan Stallworth is a 69 y.o. male who presents to me in follow up in regards to his left knee pain.  We recently obtained an MRI of the left knee which did show significant intra-articular pathology including osteoarthritis, meniscal tearing, and a PCL cyst.  He presents today to discuss these results and further treatment.  He notes that overall he has made some improvement over the past couple of weeks.  He feels left knee pain is overall reduce, but he continues to have limited range of motion past 90° of flexion of the left knee.  He feels there is some swelling about the knee.  He reports a fall a few days after his last clinic appointment injuring the left ankle.  He feels the left ankle is actually more painful than the knee.  He has pain at the ankle with both rest and ambulation.  He denies any significant swelling however of the left ankle.  He locates the pain to the lateral aspect of the left ankle/foot.    Review of Systems   Constitutional: Negative for fever.   HENT: Negative for drooling.    Eyes: Negative for discharge.   Respiratory: Negative for choking.    Cardiovascular: Negative for chest pain.   Genitourinary: Negative for flank pain.   Skin: Negative for wound.   Allergic/Immunologic: Negative for immunocompromised state.   Neurological: Negative for tremors and syncope.   Psychiatric/Behavioral: Negative for behavioral problems.     Past Medical History:   Past Medical History:   Diagnosis Date    Arthritis     hip    Atrial fibrillation     Hearing difficulty     Low serum testosterone level     Lumbar disc disease     REYES (obstructive sleep apnea)     has CPAP, better relief of symptoms with nasal pillow.        Past Surgical History:   Past Surgical History:   Procedure Laterality Date    ANGIOGRAM, CORONARY ARTERY N/A 4/9/2019    Performed by  [facility]ja [facility] at [facility]    ARTHROSCOPY-HIP with debridement & bone marrow with stem cell - viscosupplementation with Eudexxa Right 7/8/2016    Performed by [facility] at [facility] OR    BIOPSY-BONE MARROW Right 7/8/2016    [facility] at [facility] OR    CARDIOVERSION N/A 1/3/2019    [facility] at [facility]    CARPAL TUNNEL RELEASE      2005, bilateral    CLAVICLE SURGERY      DEBRIDEMENT Right 7/8/2016    Per[facility] at [facility] OR    ECHOCARDIOGRAM,TRANSESOPHAGEAL N/A 1/3/2019    [facility] at [facility]    EPIDURAL BLOCK INJECTION  2010    HIP SURGERY      JOINT REPLACEMENT Right 11/2017    hip    KNEE ARTHROSCOPY W/ MENISCAL REPAIR      left    Left heart cath Left 4/9/2019    Performed [facility] at [facility]    LUMBAR FUSION      2010    Percutaneous coronary intervention N/A 4/9/2019    Performed by [facility] at [facility]    Stent, Drug Eluting, Single Vessel, Coronary  4/9/2019    Performed by [facility] at [facility]    TONSILLECTOMY      Ultrasound-coronary N/A 4/9/2019    Performed by [facility]huja [facility] at [facility]       Family History:   Family History   Problem Relation Age of Onset    Cancer Father         lung    Cancer Sister         leukemia    No Known Problems Mother     Heart disease Neg Hx     Diabetes Neg Hx        Medications:   Current Outpatient Medications on File Prior to Visit   Medication Sig Dispense Refill    acetaminophen (TYLENOL) 500 MG tablet       apixaban (ELIQUIS) 5 mg Tab Take 1 tablet (5 mg total) by mouth 2 (two) times daily. 180 tablet 1    aspirin (ECOTRIN) 81 MG EC tablet Take 1 tablet (81 mg total) by mouth once daily.  0    clopidogrel (PLAVIX) 75 mg tablet Take 1 tablet (75 mg total) by mouth once daily. 90 tablet 1    glucosamine-chondroitin 500-400 mg tablet Take 1 tablet by mouth 2 (two) times daily. Triflex      isosorbide  mononitrate (IMDUR) 30 MG 24 hr tablet Take 1 tablet (30 mg total) by mouth once daily. 90 tablet 1    metoprolol succinate (TOPROL-XL) 25 MG 24 hr tablet Take 1 tablet (25 mg total) by mouth once daily. 30 tablet 1    MULTIVITS-MINERALS/FA/LYCOPENE (MEN'S DAILY MULTIVIT-MINERAL ORAL) Take 1 tablet by mouth once daily.       nitroGLYCERIN (NITROSTAT) 0.4 MG SL tablet Place 1 tablet (0.4 mg total) under the tongue every 5 (five) minutes as needed. 25 tablet 0    nitroGLYCERIN 0.1 mg/hr TD PT24 (NITRODUR) 0.1 mg/hr PT24 Place 1 patch onto the skin once daily. 30 patch 1    amoxicillin (AMOXIL) 500 MG Tab TK 1 TABLET PO Q 8 HOURS UNTIL GONE  0    [DISCONTINUED] atorvastatin (LIPITOR) 20 MG tablet Take 1 tablet (20 mg total) by mouth once daily. 90 tablet 1     Current Facility-Administered Medications on File Prior to Visit   Medication Dose Route Frequency Provider Last Rate Last Dose    sodium chloride 0.9% flush 10 mL  10 mL Intravenous PRN Nanci Ahuja MD           Allergies: Review of patient's allergies indicates:  No Known Allergies    Social History:   Social History     Socioeconomic History    Marital status:      Spouse name: Not on file    Number of children: Not on file    Years of education: Not on file    Highest education level: Not on file   Occupational History    Not on file   Social Needs    Financial resource strain: Not hard at all    Food insecurity:     Worry: Never true     Inability: Never true    Transportation needs:     Medical: No     Non-medical: No   Tobacco Use    Smoking status: Never Smoker    Smokeless tobacco: Never Used   Substance and Sexual Activity    Alcohol use: Not Currently     Alcohol/week: 0.0 oz     Frequency: 2-4 times a month     Drinks per session: 1 or 2     Binge frequency: Never    Drug use: No    Sexual activity: Not on file   Lifestyle    Physical activity:     Days per week: 7 days     Minutes per session: 60 min    Stress: Only a  "little   Relationships    Social connections:     Talks on phone: More than three times a week     Gets together: Three times a week     Attends Worship service: Not on file     Active member of club or organization: Yes     Attends meetings of clubs or organizations: More than 4 times per year     Relationship status:    Other Topics Concern    Not on file   Social History Narrative    Not on file     PHYSICAL EXAMINATION:   General    Vitals:    06/13/19 1329   BP: 116/63   Pulse: 62   Weight: 99.7 kg (219 lb 12.8 oz)   Height: 5' 9" (1.753 m)     Constitutional: Oriented to person, place, and time. No apparent distress. Appears well-developed and well-nourished. Pleasant.  HENT:   Head: Normocephalic and atraumatic.   Eyes: Right eye exhibits no discharge. Left eye exhibits no discharge. No scleral icterus.   Pulmonary/Chest: Effort normal. No respiratory distress.   Abdominal: There is no guarding.   Neurological: Alert and oriented to person, place, and time.   Psychiatric: Behavior is normal.   Left Ankle Exam     Tenderness   Left ankle tenderness location: Tender over the distal attachment sites of the peroneus longus and brevis tendons.   Swelling: none    Range of Motion   Dorsiflexion: 25   Plantar flexion: 40   Eversion: 25   Inversion: 45     Muscle Strength   Left ankle normal muscle strength: There is some pain with resisted eversion.  Dorsiflexion:  5/5   Plantar flexion:  5/5   Anterior tibial:  5/5   Peroneal muscle:  5/5    Other   Erythema: absent  Scars: absent  Sensation: normal  Pulse: present      Right Knee Exam     Muscle Strength   The patient has normal right knee strength.    Tenderness   The patient is experiencing no tenderness.     Range of Motion   Extension: 0   Flexion: 130     Tests   Ramana:  Medial - negative Lateral - negative  Varus: negative Valgus: negative  Lachman:  Anterior - negative      Patellar apprehension: negative    Other   Erythema: absent  Scars: " absent  Sensation: normal  Pulse: present  Swelling: none  Effusion: no effusion present      Left Knee Exam     Muscle Strength   The patient has normal left knee strength.    Tenderness   The patient is experiencing tenderness in the medial joint line.    Range of Motion   Extension: 0   Flexion: 90 (Terminal flexion limited by pain)     Tests   Ramana:  Medial - positive   Varus: negative Valgus: negative  Lachman:  Anterior - negative      Patellar apprehension: negative    Other   Erythema: absent  Scars: present  Sensation: normal  Pulse: present  Swelling: mild  Effusion: effusion present    Comments:  Ramana's test limited by reduced knee flexion secondary to pain.  No pain with resisted knee flexion      Right Hand Exam     Tenderness   The patient is experiencing tenderness in the weinstein area.    Range of Motion   The patient has normal right wrist ROM.     Muscle Strength   The patient has normal right wrist strength.    Tests   Phalens Sign: negative  Tinel's sign (median nerve): negative    Other   Erythema: absent  Scars: present  Sensation: normal  Pulse: present    Comments:  Flexor tendon nodule at 4th digit      Left Hand Exam     Tenderness   The patient is experiencing tenderness in the weinstein area.     Range of Motion   The patient has normal left wrist ROM.    Muscle Strength   The patient has normal left wrist strength.    Tests   Phalens Sign: negative  Tinel's sign (median nerve): negative    Other   Erythema: absent  Scars: present  Sensation: normal  Pulse: present        INSPECTION: There is no ecchymoses, erythema or gross deformity of the left knee/thigh.  GAIT/DYNAMIC:  Positive antalgic gait.    MRI of the left knee from 06/03/2019: 1. Complex tearing of the posterior horn and body of the medial meniscus with significant maceration and volume loss of the medial meniscal body.  2. Tricompartmental osteoarthritis which is most severe in the medial tibiofemoral compartment where  there is extensive full-thickness cartilage loss.  3. Moderate to large joint effusion.  Suspected ganglion cyst along the posterior aspect of the ACL and PCL.  4. Mild patellar tendinopathy.    ASSESSMENT:   1. Primary osteoarthritis of left knee      PLAN:     1.  His pain has evolved from last visit from the posterior/distal thigh area to more typical medial and anterior knee pain.  Diagnostic ultrasound exam today did reveal the presence of an intra-articular effusion.  We reviewed his MRI which showed several findings.  We discussed that it is not clear what structures causing the reduced knee flexion, either the PCL cyst or the meniscal tearing.  He does report a history of chronic left knee pain over the years.  He underwent prior arthroscopic surgery with microfracture years ago.  We discussed that it may be best to move straight to total knee arthroplasty considering level of osteoarthritis present.  The he recently had cardiac stents placed in April 2019.  He was told by his cardiologist that any other surgeries would increase the risk of adverse events for 6 months following stent placement.  He is open to knee replacement, however he apparently will not be able to get this until the fall.  As such, we discussed conservative treatments such as injection of hyaluronic acid in corticosteroid.  After discussion wished to proceed with ultrasound-guided aspiration of the left knee effusion and intra-articular injection of corticosteroid.  See separate procedure note.    2.  In terms of the left ankle, he appears to have strained the peroneal tendons.  Brief diagnostic ultrasound exam today failed to show any significant pathology of the lateral ankle/foot.  He reports overall he is improving, therefore we will monitor this for now.    3.  He is to contact our office in the next 2-3 weeks to update us on his progress.  If he does not improved from today's injection, could consider injection of hyaluronic  acid.    The above note was completed, in part, with the aid of Dragon dictation software/hardware. Translation errors may be present.

## 2019-06-16 ENCOUNTER — LAB VISIT (OUTPATIENT)
Dept: LAB | Facility: HOSPITAL | Age: 69
End: 2019-06-16
Attending: FAMILY MEDICINE
Payer: MEDICARE

## 2019-06-16 DIAGNOSIS — Z12.11 COLON CANCER SCREENING: ICD-10-CM

## 2019-06-16 PROCEDURE — 82274 ASSAY TEST FOR BLOOD FECAL: CPT

## 2019-06-19 LAB — HEMOCCULT STL QL IA: NEGATIVE

## 2019-06-24 ENCOUNTER — PATIENT MESSAGE (OUTPATIENT)
Dept: CARDIOLOGY | Facility: CLINIC | Age: 69
End: 2019-06-24

## 2019-06-24 DIAGNOSIS — E78.5 DYSLIPIDEMIA (HIGH LDL; LOW HDL): ICD-10-CM

## 2019-06-25 ENCOUNTER — PATIENT MESSAGE (OUTPATIENT)
Dept: CARDIOLOGY | Facility: CLINIC | Age: 69
End: 2019-06-25

## 2019-06-25 RX ORDER — ATORVASTATIN CALCIUM 20 MG/1
20 TABLET, FILM COATED ORAL DAILY
Qty: 90 TABLET | Refills: 1 | Status: SHIPPED | OUTPATIENT
Start: 2019-06-25 | End: 2019-11-12

## 2019-06-26 ENCOUNTER — PATIENT MESSAGE (OUTPATIENT)
Dept: PHYSICAL MEDICINE AND REHAB | Facility: CLINIC | Age: 69
End: 2019-06-26

## 2019-07-01 RX ORDER — METOPROLOL SUCCINATE 25 MG/1
TABLET, EXTENDED RELEASE ORAL
Qty: 90 TABLET | Refills: 1 | Status: SHIPPED | OUTPATIENT
Start: 2019-07-01 | End: 2020-01-13 | Stop reason: SDUPTHER

## 2019-07-11 ENCOUNTER — OFFICE VISIT (OUTPATIENT)
Dept: PHYSICAL MEDICINE AND REHAB | Facility: CLINIC | Age: 69
End: 2019-07-11
Payer: MEDICARE

## 2019-07-11 VITALS — BODY MASS INDEX: 32.44 KG/M2 | WEIGHT: 219 LBS | HEIGHT: 69 IN

## 2019-07-11 DIAGNOSIS — G89.29 CHRONIC PAIN OF LEFT KNEE: Primary | ICD-10-CM

## 2019-07-11 DIAGNOSIS — M17.12 PRIMARY OSTEOARTHRITIS OF LEFT KNEE: ICD-10-CM

## 2019-07-11 DIAGNOSIS — M25.562 CHRONIC PAIN OF LEFT KNEE: Primary | ICD-10-CM

## 2019-07-11 PROCEDURE — 20611 LARGE JOINT ASPIRATION/INJECTION: L KNEE: ICD-10-PCS | Mod: LT,S$GLB,, | Performed by: PHYSICAL MEDICINE & REHABILITATION

## 2019-07-11 PROCEDURE — 99211 OFF/OP EST MAY X REQ PHY/QHP: CPT | Mod: 25,S$GLB,, | Performed by: PHYSICAL MEDICINE & REHABILITATION

## 2019-07-11 PROCEDURE — 99999 PR PBB SHADOW E&M-EST. PATIENT-LVL III: ICD-10-PCS | Mod: PBBFAC,,, | Performed by: PHYSICAL MEDICINE & REHABILITATION

## 2019-07-11 PROCEDURE — 20611 DRAIN/INJ JOINT/BURSA W/US: CPT | Mod: LT,S$GLB,, | Performed by: PHYSICAL MEDICINE & REHABILITATION

## 2019-07-11 PROCEDURE — 99211 PR OFFICE/OUTPT VISIT, EST, LEVL I: ICD-10-PCS | Mod: 25,S$GLB,, | Performed by: PHYSICAL MEDICINE & REHABILITATION

## 2019-07-11 PROCEDURE — 99999 PR PBB SHADOW E&M-EST. PATIENT-LVL III: CPT | Mod: PBBFAC,,, | Performed by: PHYSICAL MEDICINE & REHABILITATION

## 2019-07-11 NOTE — PROGRESS NOTES
[facility]    CHIEF COMPLAINT:   Chief Complaint   Patient presents with    Injections     left knee euflexxa 1/3     HISTORY OF PRESENT ILLNESS: Farhan Stallworth is a 69 y.o. male who presents to me in follow up in regards to his left knee pain.  We have discussed performing injection of hyaluronic acid to the left knee presents today for the 1st in a series of 3 left knee Euflexxa injections.  He reports overall the knee is still very painful and limiting to his activities.  He rates his pain as a 2 on a scale of 1-10 but may arise significantly with increased use.  He has increased symptoms with prolonged standing or walking.  He does note mild swelling.  He denies any significant locking, clicking, or popping.  At his last visit we performed injection of corticosteroid which helped immensely.  He feels this is somewhat worn off but is still producing some pain relieving effects.  He still limited in terms of flexing the knee secondary to pain.      Review of Systems   Constitutional: Negative for fever.   HENT: Negative for drooling.    Eyes: Negative for discharge.   Respiratory: Negative for choking.    Cardiovascular: Negative for chest pain.   Genitourinary: Negative for flank pain.   Skin: Negative for wound.   Allergic/Immunologic: Negative for immunocompromised state.   Neurological: Negative for tremors and syncope.   Psychiatric/Behavioral: Negative for behavioral problems.     Past Medical History:   Past Medical History:   Diagnosis Date    Arthritis     hip    Atrial fibrillation     Hearing difficulty     Low serum testosterone level     Lumbar disc disease     REYES (obstructive sleep apnea)     has CPAP, better relief of symptoms with nasal pillow.        Past Surgical History:   Past Surgical History:   Procedure Laterality Date    ANGIOGRAM, CORONARY ARTERY N/A 4/9/2019    Performed [facility] CATH    ARTHROSCOPY-HIP with debridement & bone marrow  with stem cell - viscosupplementation with Eudexxa Right 7/8/2016    [facility]formed [facility]el [facility] at [facility] OR    BIOPSY-BONE MARROW Right 7/8/2016    [facility] at [facility]    CARDIOVERSION N/A 1/3/2019    [facility]    CARPAL TUNNEL RELEASE      2005, bilateral    CLAVICLE SURGERY      DEBRIDEMENT Right 7/8/2016    [facility] at [facility]    ECHOCARDIOGRAM,TRANSESOPHAGEAL N/A 1/3/2019    [facility]    EPIDURAL BLOCK INJECTION  2010    HIP SURGERY      JOINT REPLACEMENT Right 11/2017    hip    KNEE ARTHROSCOPY W/ MENISCAL REPAIR      left    Left heart cath Left 4/9/2019    [facility]    LUMBAR FUSION      2010    Percutaneous coronary intervention N/A 4/9/2019    Per[facility] at [facility]    Stent, Drug Eluting, Single Vessel, Coronary  4/9/2019    Per[facility] at [facility]    TONSILLECTOMY      Ultrasound-coronary N/A 4/9/2019    Performed by [facility]huja MD at [facility]       Family History:   Family History   Problem Relation Age of Onset    Cancer Father         lung    Cancer Sister         leukemia    No Known Problems Mother     Heart disease Neg Hx     Diabetes Neg Hx        Medications:   Current Outpatient Medications on File Prior to Visit   Medication Sig Dispense Refill    acetaminophen (TYLENOL) 500 MG tablet       amoxicillin (AMOXIL) 500 MG Tab TK 1 TABLET PO Q 8 HOURS UNTIL GONE  0    apixaban (ELIQUIS) 5 mg Tab Take 1 tablet (5 mg total) by mouth 2 (two) times daily. 180 tablet 1    aspirin (ECOTRIN) 81 MG EC tablet Take 1 tablet (81 mg total) by mouth once daily.  0    atorvastatin (LIPITOR) 20 MG tablet Take 1 tablet (20 mg total) by mouth once daily. 90 tablet 1    atorvastatin (LIPITOR) 20 MG tablet Take 1 tablet (20 mg total) by mouth once daily. 7 tablet 0    clopidogrel  (PLAVIX) 75 mg tablet Take 1 tablet (75 mg total) by mouth once daily. 90 tablet 1    glucosamine-chondroitin 500-400 mg tablet Take 1 tablet by mouth 2 (two) times daily. Triflex      isosorbide mononitrate (IMDUR) 30 MG 24 hr tablet Take 1 tablet (30 mg total) by mouth once daily. 90 tablet 1    metoprolol succinate (TOPROL-XL) 25 MG 24 hr tablet TAKE 1 TABLET EVERY DAY 90 tablet 1    MULTIVITS-MINERALS/FA/LYCOPENE (MEN'S DAILY MULTIVIT-MINERAL ORAL) Take 1 tablet by mouth once daily.       nitroGLYCERIN (NITROSTAT) 0.4 MG SL tablet Place 1 tablet (0.4 mg total) under the tongue every 5 (five) minutes as needed. 25 tablet 0    nitroGLYCERIN 0.1 mg/hr TD PT24 (NITRODUR) 0.1 mg/hr PT24 Place 1 patch onto the skin once daily. 30 patch 1     Current Facility-Administered Medications on File Prior to Visit   Medication Dose Route Frequency Provider Last Rate Last Dose    sodium chloride 0.9% flush 10 mL  10 mL Intravenous PRN Nanci Ahuja MD           Allergies: Review of patient's allergies indicates:  No Known Allergies    Social History:   Social History     Socioeconomic History    Marital status:      Spouse name: Not on file    Number of children: Not on file    Years of education: Not on file    Highest education level: Not on file   Occupational History    Not on file   Social Needs    Financial resource strain: Not hard at all    Food insecurity:     Worry: Never true     Inability: Never true    Transportation needs:     Medical: No     Non-medical: No   Tobacco Use    Smoking status: Never Smoker    Smokeless tobacco: Never Used   Substance and Sexual Activity    Alcohol use: Not Currently     Alcohol/week: 0.0 oz     Frequency: 2-4 times a month     Drinks per session: 1 or 2     Binge frequency: Never    Drug use: No    Sexual activity: Not on file   Lifestyle    Physical activity:     Days per week: 7 days     Minutes per session: 60 min    Stress: Only a little  "  Relationships    Social connections:     Talks on phone: More than three times a week     Gets together: Three times a week     Attends Islam service: Not on file     Active member of club or organization: Yes     Attends meetings of clubs or organizations: More than 4 times per year     Relationship status:    Other Topics Concern    Not on file   Social History Narrative    Not on file     PHYSICAL EXAMINATION:   General    Vitals:    07/11/19 1330   Weight: 99.3 kg (219 lb)   Height: 5' 9" (1.753 m)     Constitutional: Oriented to person, place, and time. No apparent distress. Pleasant.  HENT:   Head: Normocephalic and atraumatic.   Eyes: Right eye exhibits no discharge. Left eye exhibits no discharge. No scleral icterus.   Pulmonary/Chest: Effort normal. No respiratory distress.   Abdominal: There is no guarding.   Neurological: Alert and oriented to person, place, and time.   Psychiatric: Behavior is normal.   Right Knee Exam     Tenderness   The patient is experiencing no tenderness.     Range of Motion   Extension: 0   Flexion: 100     Tests   Varus: negative Valgus: negative  Lachman:  Anterior - negative    Posterior - negative  Patellar apprehension: negative    Other   Erythema: absent  Sensation: normal  Pulse: present  Swelling: none  Effusion: no effusion present        INSPECTION: There is no ecchymoses, erythema or gross deformity of the left knee.  GAIT/DYNAMIC:  Positive antalgic gait.    MRI of the left knee from 06/03/2019: 1. Complex tearing of the posterior horn and body of the medial meniscus with significant maceration and volume loss of the medial meniscal body.  2. Tricompartmental osteoarthritis which is most severe in the medial tibiofemoral compartment where there is extensive full-thickness cartilage loss.  3. Moderate to large joint effusion.  Suspected ganglion cyst along the posterior aspect of the ACL and PCL.  4. Mild patellar tendinopathy.    ASSESSMENT:   1. " Chronic pain of left knee    2. Primary osteoarthritis of left knee      PLAN:     1.  We proceeded today with ultrasound-guided injection of Euflexxa to the left knee, see separate procedure note.    2.  Encouraged him to avoid any strenuous lower extremity exercise for the next 2 days.  He may then resume low-impact activities such as cycling, elliptical machine, and as swimming.    3.  Return to clinic in 1 week for the 2nd in a series of 3 left knee Euflexxa injections.    The above note was completed, in part, with the aid of Dragon dictation software/hardware. Translation errors may be present.

## 2019-07-11 NOTE — PROCEDURES
Large Joint Aspiration/Injection: L knee  Date/Time: 7/11/2019 2:10 PM  Performed by: Jayy Driscoll MD  Authorized by: Jayy Driscoll MD     Consent Done?:  Yes (Verbal)  Indications:  Pain  Procedure site marked: Yes    Timeout: Prior to procedure the correct patient, procedure, and site was verified      Location:  Knee  Site:  L knee  Prep: Patient was prepped and draped in usual sterile fashion    Ultrasonic Guidance for needle placement: Yes  Images are saved and documented.  Needle size:  22 G  Approach: Needle in plane, lateral to medial.  Medications:  20 mg sodium hyaluronate (EUFLEXXA) 10 mg/mL(mw 2.4 -3.6 million)  Patient tolerance:  Patient tolerated the procedure well with no immediate complications    Additional Comments: Ultrasound guidance was used for correct needle placement, the images were saved will be uploaded to EMR.

## 2019-07-18 ENCOUNTER — TELEPHONE (OUTPATIENT)
Dept: FAMILY MEDICINE | Facility: CLINIC | Age: 69
End: 2019-07-18

## 2019-07-18 ENCOUNTER — OFFICE VISIT (OUTPATIENT)
Dept: PHYSICAL MEDICINE AND REHAB | Facility: CLINIC | Age: 69
End: 2019-07-18
Payer: MEDICARE

## 2019-07-18 DIAGNOSIS — G89.29 CHRONIC PAIN OF LEFT KNEE: Primary | ICD-10-CM

## 2019-07-18 DIAGNOSIS — M25.562 CHRONIC PAIN OF LEFT KNEE: Primary | ICD-10-CM

## 2019-07-18 DIAGNOSIS — M17.12 PRIMARY OSTEOARTHRITIS OF LEFT KNEE: ICD-10-CM

## 2019-07-18 PROCEDURE — 99999 PR PBB SHADOW E&M-EST. PATIENT-LVL II: ICD-10-PCS | Mod: PBBFAC,,, | Performed by: PHYSICAL MEDICINE & REHABILITATION

## 2019-07-18 PROCEDURE — 20611 LARGE JOINT ASPIRATION/INJECTION: L KNEE: ICD-10-PCS | Mod: LT,S$GLB,, | Performed by: PHYSICAL MEDICINE & REHABILITATION

## 2019-07-18 PROCEDURE — 99499 UNLISTED E&M SERVICE: CPT | Mod: S$GLB,,, | Performed by: PHYSICAL MEDICINE & REHABILITATION

## 2019-07-18 PROCEDURE — 20611 DRAIN/INJ JOINT/BURSA W/US: CPT | Mod: LT,S$GLB,, | Performed by: PHYSICAL MEDICINE & REHABILITATION

## 2019-07-18 PROCEDURE — 99999 PR PBB SHADOW E&M-EST. PATIENT-LVL II: CPT | Mod: PBBFAC,,, | Performed by: PHYSICAL MEDICINE & REHABILITATION

## 2019-07-18 PROCEDURE — 99499 NO LOS: ICD-10-PCS | Mod: S$GLB,,, | Performed by: PHYSICAL MEDICINE & REHABILITATION

## 2019-07-18 NOTE — PROCEDURES
Large Joint Aspiration/Injection: L knee  Date/Time: 7/18/2019 2:07 PM  Performed by: Jayy Driscoll MD  Authorized by: Jayy Driscoll MD     Consent Done?:  Yes (Verbal)  Indications:  Pain and joint swelling  Procedure site marked: Yes    Timeout: Prior to procedure the correct patient, procedure, and site was verified      Location:  Knee  Site:  L knee  Prep: Patient was prepped and draped in usual sterile fashion    Ultrasonic Guidance for needle placement: Yes  Images are saved and documented.  Needle size:  18 G  Approach: Needle in plane, lateral to medial.  Medications:  20 mg sodium hyaluronate (EUFLEXXA) 10 mg/mL(mw 2.4 -3.6 million)  Aspirate amount (ml):  11  Aspirate:  Serous  Patient tolerance:  Patient tolerated the procedure well with no immediate complications    Additional Comments: Ultrasound guidance was used for correct needle placement, the images were saved will be uploaded to EMR.

## 2019-07-18 NOTE — TELEPHONE ENCOUNTER
----- Message from Miki Chen sent at 7/17/2019  1:24 PM CDT -----  Contact: [facility]  Type: Needs Medical Advice    Who Called:  Lizet  [place] Call Back Number: [phone]  Additional Information: Caller has faxed orders over to the office and have yet to receive it. Please include NPI, sign, and date. Their fax number is [phone]. Thanks!

## 2019-07-18 NOTE — PROGRESS NOTES
[facility]    CHIEF COMPLAINT:   Chief Complaint   Patient presents with    Injections     euflexxa 2/3 to eft knee     HISTORY OF PRESENT ILLNESS: Farhan Stallworth is a 69 y.o. male who presents to me in follow up in regards to his left knee pain.  At his last visit we performed the 1st in a series of 3 left knee Euflexxa injections.  He reports no significant change in his condition since last visit.    Previous HPI  He reports overall the knee is still very painful and limiting to his activities.  He rates his pain as a 2 on a scale of 1-10 but may arise significantly with increased use.  He has increased symptoms with prolonged standing or walking.  He does note mild swelling.  He denies any significant locking, clicking, or popping.  At his last visit we performed injection of corticosteroid which helped immensely.  He feels this is somewhat worn off but is still producing some pain relieving effects.  He still limited in terms of flexing the knee secondary to pain.      Review of Systems   Constitutional: Negative for fever.   HENT: Negative for drooling.    Eyes: Negative for discharge.   Respiratory: Negative for choking.    Cardiovascular: Negative for chest pain.   Genitourinary: Negative for flank pain.   Skin: Negative for wound.   Allergic/Immunologic: Negative for immunocompromised state.   Neurological: Negative for tremors and syncope.   Psychiatric/Behavioral: Negative for behavioral problems.     Past Medical History:   Past Medical History:   Diagnosis Date    Arthritis     hip    Atrial fibrillation     Hearing difficulty     Low serum testosterone level     Lumbar disc disease     REYES (obstructive sleep apnea)     has CPAP, better relief of symptoms with nasal pillow.        Past Surgical History:   Past Surgical History:   Procedure Laterality Date    ANGIOGRAM, CORONARY ARTERY N/A 4/9/2019    [facility] [place] [facility]    ARTHROSCOPY-HIP with  debridement & bone marrow with stem cell - viscosupplementation with Eudexxa Right 7/8/2016    [facility]    BIOPSY-BONE MARROW Right 7/8/2016    [facility]    CARDIOVERSION N/A 1/3/2019    [facility]    CARPAL TUNNEL RELEASE      2005, bilateral    CLAVICLE SURGERY      DEBRIDEMENT Right 7/8/2016    [facility] OR    ECHOCARDIOGRAM,TRANSESOPHAGEAL N/A 1/3/2019    [facility]    EPIDURAL BLOCK INJECTION  2010    HIP SURGERY      JOINT REPLACEMENT Right 11/2017    hip    KNEE ARTHROSCOPY W/ MENISCAL REPAIR      left    Left heart cath Left 4/9/2019    [facility]    LUMBAR FUSION      2010    Percutaneous coronary intervention N/A 4/9/2019    [facility]    Stent, Drug Eluting, Single Vessel, Coronary  4/9/2019    [facility]    TONSILLECTOMY      Ultrasound-coronary N/A 4/9/2019    [facility] at [facility]       Family History:   Family History   Problem Relation Age of Onset    Cancer Father         lung    Cancer Sister         leukemia    No Known Problems Mother     Heart disease Neg Hx     Diabetes Neg Hx        Medications:   Current Outpatient Medications on File Prior to Visit   Medication Sig Dispense Refill    acetaminophen (TYLENOL) 500 MG tablet       amoxicillin (AMOXIL) 500 MG Tab TK 1 TABLET PO Q 8 HOURS UNTIL GONE  0    apixaban (ELIQUIS) 5 mg Tab Take 1 tablet (5 mg total) by mouth 2 (two) times daily. 180 tablet 1    aspirin (ECOTRIN) 81 MG EC tablet Take 1 tablet (81 mg total) by mouth once daily.  0    atorvastatin (LIPITOR) 20 MG tablet Take 1 tablet (20 mg total) by mouth once daily. 90 tablet 1    atorvastatin (LIPITOR) 20 MG tablet Take 1 tablet (20 mg total) by mouth once daily. 7  tablet 0    clopidogrel (PLAVIX) 75 mg tablet Take 1 tablet (75 mg total) by mouth once daily. 90 tablet 1    glucosamine-chondroitin 500-400 mg tablet Take 1 tablet by mouth 2 (two) times daily. Triflex      isosorbide mononitrate (IMDUR) 30 MG 24 hr tablet Take 1 tablet (30 mg total) by mouth once daily. 90 tablet 1    metoprolol succinate (TOPROL-XL) 25 MG 24 hr tablet TAKE 1 TABLET EVERY DAY 90 tablet 1    MULTIVITS-MINERALS/FA/LYCOPENE (MEN'S DAILY MULTIVIT-MINERAL ORAL) Take 1 tablet by mouth once daily.       nitroGLYCERIN (NITROSTAT) 0.4 MG SL tablet Place 1 tablet (0.4 mg total) under the tongue every 5 (five) minutes as needed. 25 tablet 0    nitroGLYCERIN 0.1 mg/hr TD PT24 (NITRODUR) 0.1 mg/hr PT24 Place 1 patch onto the skin once daily. 30 patch 1     Current Facility-Administered Medications on File Prior to Visit   Medication Dose Route Frequency Provider Last Rate Last Dose    sodium chloride 0.9% flush 10 mL  10 mL Intravenous PRN Nanci Ahuja MD           Allergies: Review of patient's allergies indicates:  No Known Allergies    Social History:   Social History     Socioeconomic History    Marital status:      Spouse name: Not on file    Number of children: Not on file    Years of education: Not on file    Highest education level: Not on file   Occupational History    Not on file   Social Needs    Financial resource strain: Not hard at all    Food insecurity:     Worry: Never true     Inability: Never true    Transportation needs:     Medical: No     Non-medical: No   Tobacco Use    Smoking status: Never Smoker    Smokeless tobacco: Never Used   Substance and Sexual Activity    Alcohol use: Not Currently     Alcohol/week: 0.0 oz     Frequency: 2-4 times a month     Drinks per session: 1 or 2     Binge frequency: Never    Drug use: No    Sexual activity: Not on file   Lifestyle    Physical activity:     Days per week: 7 days     Minutes per session: 60 min    Stress:  Only a little   Relationships    Social connections:     Talks on phone: More than three times a week     Gets together: Three times a week     Attends Protestant service: Not on file     Active member of club or organization: Yes     Attends meetings of clubs or organizations: More than 4 times per year     Relationship status:    Other Topics Concern    Not on file   Social History Narrative    Not on file     PHYSICAL EXAMINATION:   General  VSS  Constitutional: Oriented to person, place, and time. No apparent distress. Pleasant.  HENT:   Head: Normocephalic and atraumatic.   Eyes: Right eye exhibits no discharge. Left eye exhibits no discharge. No scleral icterus.   Pulmonary/Chest: Effort normal. No respiratory distress.   Abdominal: There is no guarding.   Neurological: Alert and oriented to person, place, and time.   Psychiatric: Behavior is normal.   Left Knee Exam     Tenderness   The patient is experiencing no tenderness.     Range of Motion   Extension: 0   Flexion: 80     Tests   Varus: negative Valgus: negative  Lachman:  Anterior - negative    Posterior - negative  Patellar apprehension: negative    Other   Erythema: absent  Sensation: normal  Pulse: present  Swelling: mild  Effusion: effusion present        INSPECTION: There is no ecchymoses, erythema or gross deformity of the left knee.  GAIT/DYNAMIC:  Positive antalgic gait.    MRI of the left knee from 06/03/2019: 1. Complex tearing of the posterior horn and body of the medial meniscus with significant maceration and volume loss of the medial meniscal body.  2. Tricompartmental osteoarthritis which is most severe in the medial tibiofemoral compartment where there is extensive full-thickness cartilage loss.  3. Moderate to large joint effusion.  Suspected ganglion cyst along the posterior aspect of the ACL and PCL.  4. Mild patellar tendinopathy.    ASSESSMENT:   1. Chronic pain of left knee    2. Primary osteoarthritis of left knee       PLAN:     1.  We proceeded today with ultrasound-guided injection of Euflexxa to the left knee, 2/3, see separate procedure note.    2.  Encouraged him to avoid any strenuous lower extremity exercise for the next 2 days.  He may then resume low-impact activities such as cycling, elliptical machine, and as swimming.    3.  Return to clinic in 1 week for the 3rd in a series of 3 left knee Euflexxa injections.    The above note was completed, in part, with the aid of Dragon dictation software/hardware. Translation errors may be present.

## 2019-07-24 ENCOUNTER — TELEPHONE (OUTPATIENT)
Dept: FAMILY MEDICINE | Facility: CLINIC | Age: 69
End: 2019-07-24

## 2019-07-24 NOTE — TELEPHONE ENCOUNTER
----- Message from Alexandr Tam sent at 7/24/2019 10:17 AM CDT -----  Contact: Juan Ramon with [employer]  Type: Needs Medical Advice    Who Called:  Juan Ramon with [employer]  Best Call Back Number: [phone] Fax: [phone]  Additional Information: Requesting medical records be resent for patient due to fax line issues that have been resolved. Please advise when sent

## 2019-07-24 NOTE — TELEPHONE ENCOUNTER
Spoke with Juan Ramon from "[place] [facility] and told him this patient is not Dr. López's. Patient see Dr. Lechuga, so number given to him to call Dr. Narayan office. Juan Ramon verbalized understanding

## 2019-07-25 ENCOUNTER — OFFICE VISIT (OUTPATIENT)
Dept: PHYSICAL MEDICINE AND REHAB | Facility: CLINIC | Age: 69
End: 2019-07-25
Payer: MEDICARE

## 2019-07-25 DIAGNOSIS — M17.12 PRIMARY OSTEOARTHRITIS OF LEFT KNEE: ICD-10-CM

## 2019-07-25 DIAGNOSIS — M25.562 CHRONIC PAIN OF LEFT KNEE: Primary | ICD-10-CM

## 2019-07-25 DIAGNOSIS — G89.29 CHRONIC PAIN OF LEFT KNEE: Primary | ICD-10-CM

## 2019-07-25 PROCEDURE — 20611 LARGE JOINT ASPIRATION/INJECTION: L KNEE: ICD-10-PCS | Mod: LT,S$GLB,, | Performed by: PHYSICAL MEDICINE & REHABILITATION

## 2019-07-25 PROCEDURE — 99999 PR PBB SHADOW E&M-EST. PATIENT-LVL II: CPT | Mod: PBBFAC,,, | Performed by: PHYSICAL MEDICINE & REHABILITATION

## 2019-07-25 PROCEDURE — 99499 NO LOS: ICD-10-PCS | Mod: S$GLB,,, | Performed by: PHYSICAL MEDICINE & REHABILITATION

## 2019-07-25 PROCEDURE — 99499 UNLISTED E&M SERVICE: CPT | Mod: S$GLB,,, | Performed by: PHYSICAL MEDICINE & REHABILITATION

## 2019-07-25 PROCEDURE — 20611 DRAIN/INJ JOINT/BURSA W/US: CPT | Mod: LT,S$GLB,, | Performed by: PHYSICAL MEDICINE & REHABILITATION

## 2019-07-25 PROCEDURE — 99999 PR PBB SHADOW E&M-EST. PATIENT-LVL II: ICD-10-PCS | Mod: PBBFAC,,, | Performed by: PHYSICAL MEDICINE & REHABILITATION

## 2019-07-25 NOTE — PROCEDURES
Large Joint Aspiration/Injection: L knee  Date/Time: 7/25/2019 3:29 PM  Performed by: Jayy Driscoll MD  Authorized by: Jayy Driscoll MD     Consent Done?:  Yes (Verbal)  Indications:  Pain  Procedure site marked: Yes    Timeout: Prior to procedure the correct patient, procedure, and site was verified      Location:  Knee  Site:  L knee  Prep: Patient was prepped and draped in usual sterile fashion    Ultrasonic Guidance for needle placement: Yes  Images are saved and documented.  Needle size:  18 G  Approach: Needle in plane, lateral to medial.  Medications:  20 mg sodium hyaluronate (EUFLEXXA) 10 mg/mL(mw 2.4 -3.6 million)  Aspirate amount (ml):  18  Aspirate:  Serous  Patient tolerance:  Patient tolerated the procedure well with no immediate complications    Additional Comments: Ultrasound guidance was used for correct needle placement, the images were saved will be uploaded to EMR.

## 2019-07-25 NOTE — PROGRESS NOTES
[facility]    CHIEF COMPLAINT:   Chief Complaint   Patient presents with    Injections     euflexxa 3/3 left knee     HISTORY OF PRESENT ILLNESS: Farhan Stallworth is a 69 y.o. male who presents to me in follow up in regards to his left knee pain.  At his last visit we performed the 2nd in a series of 3 left knee Euflexxa injections.  He reports no significant change in his condition since last visit.    Review of Systems   Constitutional: Negative for fever.   HENT: Negative for drooling.    Eyes: Negative for discharge.   Respiratory: Negative for choking.    Cardiovascular: Negative for chest pain.   Genitourinary: Negative for flank pain.   Skin: Negative for wound.   Allergic/Immunologic: Negative for immunocompromised state.   Neurological: Negative for tremors and syncope.   Psychiatric/Behavioral: Negative for behavioral problems.     Past Medical History:   Past Medical History:   Diagnosis Date    Arthritis     hip    Atrial fibrillation     Hearing difficulty     Low serum testosterone level     Lumbar disc disease     REYES (obstructive sleep apnea)     has CPAP, better relief of symptoms with nasal pillow.        Past Surgical History:   Past Surgical History:   Procedure Laterality Date    ANGIOGRAM, CORONARY ARTERY N/A 4/9/2019    Performed by [facility]i Ahuja MD at [facility] CATH    ARTHROSCOPY-HIP with debridement & bone marrow with stem cell - viscosupplementation with Eudexxa Right 7/8/2016    [facility] OR    BIOPSY-BONE MARROW Right 7/8/2016    [facility] at [facility] OR    CARDIOVERSION N/A 1/3/2019    [facility]    CARPAL TUNNEL RELEASE      2005, bilateral    CLAVICLE SURGERY      DEBRIDEMENT Right 7/8/2016    [facility] at [facility] OR    ECHOCARDIOGRAM,TRANSESOPHAGEAL N/A 1/3/2019    [facility] at [facility]    EPIDURAL BLOCK INJECTION  2010     HIP SURGERY      JOINT REPLACEMENT Right 11/2017    hip    KNEE ARTHROSCOPY W/ MENISCAL REPAIR      left    Left heart cath Left 4/9/2019    [facility]    LUMBAR FUSION      2010    Percutaneous coronary intervention N/A 4/9/2019    [facility]    Stent, Drug Eluting, Single Vessel, Coronary  4/9/2019    [facility]    TONSILLECTOMY      Ultrasound-coronary N/A 4/9/2019    Per[facility] at [facility]       Family History:   Family History   Problem Relation Age of Onset    Cancer Father         lung    Cancer Sister         leukemia    No Known Problems Mother     Heart disease Neg Hx     Diabetes Neg Hx        Medications:   Current Outpatient Medications on File Prior to Visit   Medication Sig Dispense Refill    acetaminophen (TYLENOL) 500 MG tablet       amoxicillin (AMOXIL) 500 MG Tab TK 1 TABLET PO Q 8 HOURS UNTIL GONE  0    apixaban (ELIQUIS) 5 mg Tab Take 1 tablet (5 mg total) by mouth 2 (two) times daily. 180 tablet 1    aspirin (ECOTRIN) 81 MG EC tablet Take 1 tablet (81 mg total) by mouth once daily.  0    atorvastatin (LIPITOR) 20 MG tablet Take 1 tablet (20 mg total) by mouth once daily. 90 tablet 1    atorvastatin (LIPITOR) 20 MG tablet Take 1 tablet (20 mg total) by mouth once daily. 7 tablet 0    clopidogrel (PLAVIX) 75 mg tablet Take 1 tablet (75 mg total) by mouth once daily. 90 tablet 1    glucosamine-chondroitin 500-400 mg tablet Take 1 tablet by mouth 2 (two) times daily. Triflex      isosorbide mononitrate (IMDUR) 30 MG 24 hr tablet Take 1 tablet (30 mg total) by mouth once daily. 90 tablet 1    metoprolol succinate (TOPROL-XL) 25 MG 24 hr tablet TAKE 1 TABLET EVERY DAY 90 tablet 1    MULTIVITS-MINERALS/FA/LYCOPENE (MEN'S DAILY MULTIVIT-MINERAL ORAL) Take 1 tablet by mouth once daily.       nitroGLYCERIN (NITROSTAT) 0.4 MG SL tablet Place 1 tablet (0.4 mg  total) under the tongue every 5 (five) minutes as needed. 25 tablet 0    nitroGLYCERIN 0.1 mg/hr TD PT24 (NITRODUR) 0.1 mg/hr PT24 Place 1 patch onto the skin once daily. 30 patch 1     Current Facility-Administered Medications on File Prior to Visit   Medication Dose Route Frequency Provider Last Rate Last Dose    sodium chloride 0.9% flush 10 mL  10 mL Intravenous PRN Nanci Ahuja MD           Allergies: Review of patient's allergies indicates:  No Known Allergies    Social History:   Social History     Socioeconomic History    Marital status:      Spouse name: Not on file    Number of children: Not on file    Years of education: Not on file    Highest education level: Not on file   Occupational History    Not on file   Social Needs    Financial resource strain: Not hard at all    Food insecurity:     Worry: Never true     Inability: Never true    Transportation needs:     Medical: No     Non-medical: No   Tobacco Use    Smoking status: Never Smoker    Smokeless tobacco: Never Used   Substance and Sexual Activity    Alcohol use: Not Currently     Alcohol/week: 0.0 oz     Frequency: 2-4 times a month     Drinks per session: 1 or 2     Binge frequency: Never    Drug use: No    Sexual activity: Not on file   Lifestyle    Physical activity:     Days per week: 7 days     Minutes per session: 60 min    Stress: Only a little   Relationships    Social connections:     Talks on phone: More than three times a week     Gets together: Three times a week     Attends Orthodox service: Not on file     Active member of club or organization: Yes     Attends meetings of clubs or organizations: More than 4 times per year     Relationship status:    Other Topics Concern    Not on file   Social History Narrative    Not on file     PHYSICAL EXAMINATION:   General  VSS  Constitutional: Oriented to person, place, and time. No apparent distress. Pleasant.  HENT:   Head: Normocephalic and atraumatic.    Eyes: Right eye exhibits no discharge. Left eye exhibits no discharge. No scleral icterus.   Pulmonary/Chest: Effort normal. No respiratory distress.   Abdominal: There is no guarding.   Neurological: Alert and oriented to person, place, and time.   Psychiatric: Behavior is normal.   Left Knee Exam     Tenderness   The patient is experiencing no tenderness.     Range of Motion   Extension: 0   Flexion: 80     Tests   Varus: negative Valgus: negative  Lachman:  Anterior - negative    Posterior - negative  Patellar apprehension: negative    Other   Erythema: absent  Sensation: normal  Pulse: present  Swelling: mild  Effusion: effusion present        INSPECTION: There is no ecchymoses, erythema or gross deformity of the left knee.  GAIT/DYNAMIC:  Positive antalgic gait.    MRI of the left knee from 06/03/2019: 1. Complex tearing of the posterior horn and body of the medial meniscus with significant maceration and volume loss of the medial meniscal body.  2. Tricompartmental osteoarthritis which is most severe in the medial tibiofemoral compartment where there is extensive full-thickness cartilage loss.  3. Moderate to large joint effusion.  Suspected ganglion cyst along the posterior aspect of the ACL and PCL.  4. Mild patellar tendinopathy.    ASSESSMENT:   1. Chronic pain of left knee    2. Primary osteoarthritis of left knee      PLAN:     1.  We proceeded today with ultrasound-guided injection of Euflexxa to the left knee, 3/3, see separate procedure note.    2.  He may resume low-impact activities such as cycling, elliptical machine, and as swimming.    3.  Return to clinic prn.    The above note was completed, in part, with the aid of Dragon dictation software/hardware. Translation errors may be present.

## 2019-07-26 ENCOUNTER — LAB VISIT (OUTPATIENT)
Dept: LAB | Facility: HOSPITAL | Age: 69
End: 2019-07-26
Attending: INTERNAL MEDICINE
Payer: MEDICARE

## 2019-07-26 DIAGNOSIS — I34.0 NON-RHEUMATIC MITRAL REGURGITATION: ICD-10-CM

## 2019-07-26 DIAGNOSIS — E66.9 OBESITY, CLASS I, BMI 30-34.9: ICD-10-CM

## 2019-07-26 DIAGNOSIS — Z79.01 LONG TERM (CURRENT) USE OF ANTICOAGULANTS: ICD-10-CM

## 2019-07-26 DIAGNOSIS — I10 ESSENTIAL HYPERTENSION: ICD-10-CM

## 2019-07-26 DIAGNOSIS — I48.0 PAF (PAROXYSMAL ATRIAL FIBRILLATION): ICD-10-CM

## 2019-07-26 LAB
ALBUMIN SERPL BCP-MCNC: 3.8 G/DL (ref 3.5–5.2)
ALP SERPL-CCNC: 87 U/L (ref 55–135)
ALT SERPL W/O P-5'-P-CCNC: 27 U/L (ref 10–44)
ANION GAP SERPL CALC-SCNC: 8 MMOL/L (ref 8–16)
AST SERPL-CCNC: 28 U/L (ref 10–40)
BILIRUB SERPL-MCNC: 0.5 MG/DL (ref 0.1–1)
BUN SERPL-MCNC: 18 MG/DL (ref 8–23)
CALCIUM SERPL-MCNC: 9.4 MG/DL (ref 8.7–10.5)
CHLORIDE SERPL-SCNC: 104 MMOL/L (ref 95–110)
CO2 SERPL-SCNC: 25 MMOL/L (ref 23–29)
CREAT SERPL-MCNC: 1.1 MG/DL (ref 0.5–1.4)
EST. GFR  (AFRICAN AMERICAN): >60 ML/MIN/1.73 M^2
EST. GFR  (NON AFRICAN AMERICAN): >60 ML/MIN/1.73 M^2
GLUCOSE SERPL-MCNC: 100 MG/DL (ref 70–110)
HGB BLD-MCNC: 14.2 G/DL (ref 14–18)
POTASSIUM SERPL-SCNC: 4.2 MMOL/L (ref 3.5–5.1)
PROT SERPL-MCNC: 7 G/DL (ref 6–8.4)
SODIUM SERPL-SCNC: 137 MMOL/L (ref 136–145)

## 2019-07-26 PROCEDURE — 36415 COLL VENOUS BLD VENIPUNCTURE: CPT | Mod: PO

## 2019-07-26 PROCEDURE — 85018 HEMOGLOBIN: CPT

## 2019-07-26 PROCEDURE — 80053 COMPREHEN METABOLIC PANEL: CPT

## 2019-07-28 ENCOUNTER — PATIENT MESSAGE (OUTPATIENT)
Dept: CARDIOLOGY | Facility: CLINIC | Age: 69
End: 2019-07-28

## 2019-07-29 ENCOUNTER — PATIENT MESSAGE (OUTPATIENT)
Dept: CARDIOLOGY | Facility: CLINIC | Age: 69
End: 2019-07-29

## 2019-07-29 DIAGNOSIS — E78.00 HYPERCHOLESTEREMIA: Primary | ICD-10-CM

## 2019-07-29 NOTE — TELEPHONE ENCOUNTER
Please advise: pt only had cmp and hemoglobin lab orders. Do you want a lipid panel or anything else before appt this friday?

## 2019-07-30 ENCOUNTER — LAB VISIT (OUTPATIENT)
Dept: LAB | Facility: HOSPITAL | Age: 69
End: 2019-07-30
Attending: INTERNAL MEDICINE
Payer: MEDICARE

## 2019-07-30 DIAGNOSIS — E78.00 HYPERCHOLESTEREMIA: ICD-10-CM

## 2019-07-30 LAB
CHOLEST SERPL-MCNC: 107 MG/DL (ref 120–199)
CHOLEST/HDLC SERPL: 2.8 {RATIO} (ref 2–5)
HDLC SERPL-MCNC: 38 MG/DL (ref 40–75)
HDLC SERPL: 35.5 % (ref 20–50)
LDLC SERPL CALC-MCNC: 54.4 MG/DL (ref 63–159)
NONHDLC SERPL-MCNC: 69 MG/DL
TRIGL SERPL-MCNC: 73 MG/DL (ref 30–150)

## 2019-07-30 PROCEDURE — 80061 LIPID PANEL: CPT

## 2019-07-30 PROCEDURE — 36415 COLL VENOUS BLD VENIPUNCTURE: CPT | Mod: PO

## 2019-08-02 ENCOUNTER — OFFICE VISIT (OUTPATIENT)
Dept: CARDIOLOGY | Facility: CLINIC | Age: 69
End: 2019-08-02
Payer: MEDICARE

## 2019-08-02 VITALS
BODY MASS INDEX: 33.68 KG/M2 | HEART RATE: 59 BPM | SYSTOLIC BLOOD PRESSURE: 132 MMHG | HEIGHT: 68 IN | DIASTOLIC BLOOD PRESSURE: 75 MMHG | WEIGHT: 222.25 LBS

## 2019-08-02 DIAGNOSIS — Z79.02 LONG TERM (CURRENT) USE OF ANTITHROMBOTICS/ANTIPLATELETS: ICD-10-CM

## 2019-08-02 DIAGNOSIS — Z95.5 STENTED CORONARY ARTERY: ICD-10-CM

## 2019-08-02 DIAGNOSIS — E78.5 DYSLIPIDEMIA (HIGH LDL; LOW HDL): ICD-10-CM

## 2019-08-02 DIAGNOSIS — I10 ESSENTIAL HYPERTENSION: ICD-10-CM

## 2019-08-02 DIAGNOSIS — Z79.01 LONG TERM (CURRENT) USE OF ANTICOAGULANTS: ICD-10-CM

## 2019-08-02 DIAGNOSIS — I48.0 PAF (PAROXYSMAL ATRIAL FIBRILLATION): Primary | ICD-10-CM

## 2019-08-02 DIAGNOSIS — E66.9 OBESITY, CLASS I, BMI 30-34.9: ICD-10-CM

## 2019-08-02 PROCEDURE — 3075F PR MOST RECENT SYSTOLIC BLOOD PRESS GE 130-139MM HG: ICD-10-PCS | Mod: CPTII,S$GLB,, | Performed by: INTERNAL MEDICINE

## 2019-08-02 PROCEDURE — 1101F PR PT FALLS ASSESS DOC 0-1 FALLS W/OUT INJ PAST YR: ICD-10-PCS | Mod: CPTII,S$GLB,, | Performed by: INTERNAL MEDICINE

## 2019-08-02 PROCEDURE — 99214 PR OFFICE/OUTPT VISIT, EST, LEVL IV, 30-39 MIN: ICD-10-PCS | Mod: S$GLB,,, | Performed by: INTERNAL MEDICINE

## 2019-08-02 PROCEDURE — 1101F PT FALLS ASSESS-DOCD LE1/YR: CPT | Mod: CPTII,S$GLB,, | Performed by: INTERNAL MEDICINE

## 2019-08-02 PROCEDURE — 3078F PR MOST RECENT DIASTOLIC BLOOD PRESSURE < 80 MM HG: ICD-10-PCS | Mod: CPTII,S$GLB,, | Performed by: INTERNAL MEDICINE

## 2019-08-02 PROCEDURE — 99999 PR PBB SHADOW E&M-EST. PATIENT-LVL III: ICD-10-PCS | Mod: PBBFAC,,, | Performed by: INTERNAL MEDICINE

## 2019-08-02 PROCEDURE — 3078F DIAST BP <80 MM HG: CPT | Mod: CPTII,S$GLB,, | Performed by: INTERNAL MEDICINE

## 2019-08-02 PROCEDURE — 99214 OFFICE O/P EST MOD 30 MIN: CPT | Mod: S$GLB,,, | Performed by: INTERNAL MEDICINE

## 2019-08-02 PROCEDURE — 99999 PR PBB SHADOW E&M-EST. PATIENT-LVL III: CPT | Mod: PBBFAC,,, | Performed by: INTERNAL MEDICINE

## 2019-08-02 PROCEDURE — 3075F SYST BP GE 130 - 139MM HG: CPT | Mod: CPTII,S$GLB,, | Performed by: INTERNAL MEDICINE

## 2019-08-02 RX ORDER — CLOPIDOGREL BISULFATE 75 MG/1
75 TABLET ORAL DAILY
Qty: 90 TABLET | Refills: 1 | Status: SHIPPED | OUTPATIENT
Start: 2019-08-02 | End: 2020-01-06

## 2019-08-02 NOTE — PROGRESS NOTES
Subjective:    Patient ID:  Farhan Stallworth is a 69 y.o. male who presents for Coronary Artery Disease (Labs); Hypertension; and Hyperlipidemia        HPI  DISCUSSED LABS AND GOALS, LD 54, HDL 38, CMP/HB OK, MIGHT NEED TOOTH EXTRACTION HOWEVER THE THIS IS NOT BOTHERING HIM AT THIS POINT AFTER HE GOT ANTIBIOTIC BEFORE, ALSO WILL NEED TKR DOWN THE LINE, SEE ROS    Past Medical History:   Diagnosis Date    Arthritis     hip    Atrial fibrillation     Hearing difficulty     Low serum testosterone level     Lumbar disc disease     REYES (obstructive sleep apnea)     has CPAP, better relief of symptoms with nasal pillow.      Past Surgical History:   Procedure Laterality Date    ANGIOGRAM, CORONARY ARTERY N/A 4/9/2019    Performed by [facility]huja [facility] at [facility]    ARTHROSCOPY-HIP with debridement & bone marrow with stem cell - viscosupplementation with Eudexxa Right 7/8/2016    Performed by [facility]o [facility]el [facility] at [facility]    BIOPSY-BONE MARROW Right 7/8/2016    [facility] at [facility]    CARDIOVERSION N/A 1/3/2019    [facility]    CARPAL TUNNEL RELEASE      2005, bilateral    CLAVICLE SURGERY      DEBRIDEMENT Right 7/8/2016    [facility] at [facility]    ECHOCARDIOGRAM,TRANSESOPHAGEAL N/A 1/3/2019    [facility] at [facility]    EPIDURAL BLOCK INJECTION  2010    HIP SURGERY      JOINT REPLACEMENT Right 11/2017    hip    KNEE ARTHROSCOPY W/ MENISCAL REPAIR      left    Left heart cath Left 4/9/2019    [facility] at [facility]    LUMBAR FUSION      2010    Percutaneous coronary intervention N/A 4/9/2019    [facility] at [facility]    Stent, Drug Eluting, Single Vessel, Coronary  4/9/2019    [facility] at [facility]    TONSILLECTOMY      Ultrasound-coronary N/A 4/9/2019    Performed by [facility]huja [facility] at [facility]     Family History   Problem  Relation Age of Onset    Cancer Father         lung    Cancer Sister         leukemia    No Known Problems Mother     Heart disease Neg Hx     Diabetes Neg Hx      Social History     Socioeconomic History    Marital status:      Spouse name: Not on file    Number of children: Not on file    Years of education: Not on file    Highest education level: Not on file   Occupational History    Not on file   Social Needs    Financial resource strain: Not hard at all    Food insecurity:     Worry: Never true     Inability: Never true    Transportation needs:     Medical: No     Non-medical: No   Tobacco Use    Smoking status: Never Smoker    Smokeless tobacco: Never Used   Substance and Sexual Activity    Alcohol use: Not Currently     Alcohol/week: 0.0 oz     Frequency: 2-4 times a month     Drinks per session: 1 or 2     Binge frequency: Never    Drug use: No    Sexual activity: Not on file   Lifestyle    Physical activity:     Days per week: 7 days     Minutes per session: 60 min    Stress: Only a little   Relationships    Social connections:     Talks on phone: More than three times a week     Gets together: Three times a week     Attends [employer] service: Not on file     Active member of club or organization: Yes     Attends meetings of clubs or organizations: More than 4 times per year     Relationship status:    Other Topics Concern    Not on file   Social History Narrative    Not on file       Review of patient's allergies indicates:  No Known Allergies    Current Outpatient Medications:     acetaminophen (TYLENOL) 500 MG tablet, , Disp: , Rfl:     apixaban (ELIQUIS) 5 mg Tab, Take 1 tablet (5 mg total) by mouth 2 (two) times daily. (Patient taking differently: Take 2.5 mg by mouth 2 (two) times daily. ), Disp: 180 tablet, Rfl: 1    aspirin (ECOTRIN) 81 MG EC tablet, Take 1 tablet (81 mg total) by mouth once daily., Disp: , Rfl: 0    atorvastatin (LIPITOR) 20 MG tablet, Take  1 tablet (20 mg total) by mouth once daily., Disp: 90 tablet, Rfl: 1    atorvastatin (LIPITOR) 20 MG tablet, Take 1 tablet (20 mg total) by mouth once daily., Disp: 7 tablet, Rfl: 0    clopidogrel (PLAVIX) 75 mg tablet, Take 1 tablet (75 mg total) by mouth once daily., Disp: 90 tablet, Rfl: 1    glucosamine-chondroitin 500-400 mg tablet, Take 1 tablet by mouth 2 (two) times daily. Triflex, Disp: , Rfl:     metoprolol succinate (TOPROL-XL) 25 MG 24 hr tablet, TAKE 1 TABLET EVERY DAY, Disp: 90 tablet, Rfl: 1    MULTIVITS-MINERALS/FA/LYCOPENE (MEN'S DAILY MULTIVIT-MINERAL ORAL), Take 1 tablet by mouth once daily. , Disp: , Rfl:     nitroGLYCERIN (NITROSTAT) 0.4 MG SL tablet, Place 1 tablet (0.4 mg total) under the tongue every 5 (five) minutes as needed., Disp: 25 tablet, Rfl: 0    isosorbide mononitrate (IMDUR) 30 MG 24 hr tablet, Take 1 tablet (30 mg total) by mouth once daily., Disp: 90 tablet, Rfl: 1    Current Facility-Administered Medications:     sodium chloride 0.9% flush 10 mL, 10 mL, Intravenous, PRN, Nanci Ahuja MD    Review of Systems   Constitution: Negative for chills, diaphoresis, fever, malaise/fatigue and night sweats.   HENT: Negative for congestion and nosebleeds.    Eyes: Negative for blurred vision and visual disturbance.   Cardiovascular: Negative for chest pain, claudication, cyanosis, dyspnea on exertion, irregular heartbeat, leg swelling, near-syncope, orthopnea, palpitations, paroxysmal nocturnal dyspnea and syncope.   Respiratory: Negative for cough, hemoptysis, shortness of breath and wheezing.    Endocrine: Negative for cold intolerance, heat intolerance and polyuria.   Hematologic/Lymphatic: Negative for adenopathy. Does not bruise/bleed easily (SOME).   Skin: Negative for color change, itching and rash.   Musculoskeletal: Negative for back pain (BETTER), falls and joint pain (KNEES).   Gastrointestinal: Negative for abdominal pain, change in bowel habit, dysphagia, hematemesis,  "jaundice, melena and vomiting.   Genitourinary: Negative for dysuria, flank pain and frequency.   Neurological: Negative for brief paralysis, dizziness, focal weakness, light-headedness, loss of balance, numbness, tremors and weakness.   Psychiatric/Behavioral: Negative for altered mental status, depression and memory loss. The patient is not nervous/anxious.    Allergic/Immunologic: Negative for environmental allergies and persistent infections.        Objective:      Vitals:    08/02/19 0827   BP: 132/75   Pulse: (!) 59   Weight: 100.8 kg (222 lb 3.6 oz)   Height: 5' 8" (1.727 m)   PainSc: 0-No pain     Body mass index is 33.79 kg/m².    Physical Exam   Constitutional: He is oriented to person, place, and time. He appears well-developed and well-nourished. He is active.   OVERWEIGHT   HENT:   Head: Normocephalic and atraumatic.   Mouth/Throat: Oropharynx is clear and moist and mucous membranes are normal.   Eyes: Pupils are equal, round, and reactive to light. Conjunctivae and EOM are normal.   Neck: Normal range of motion. Neck supple. Normal carotid pulses, no hepatojugular reflux and no JVD present. Carotid bruit is not present. No edema and no erythema present. No thyromegaly present.   Cardiovascular: Regular rhythm.  No extrasystoles are present. Bradycardia present. PMI is not displaced. Exam reveals no gallop, no distant heart sounds, no friction rub and no midsystolic click.   Murmur heard.   Systolic murmur is present with a grade of 1/6 at the lower left sternal border.  Pulses:       Carotid pulses are 2+ on the right side, and 2+ on the left side.       Radial pulses are 2+ on the right side, and 2+ on the left side.        Femoral pulses are 2+ on the right side, and 2+ on the left side.       Dorsalis pedis pulses are 2+ on the right side, and 2+ on the left side.        Posterior tibial pulses are 2+ on the right side, and 2+ on the left side.   Pulmonary/Chest: Effort normal and breath sounds " normal. No accessory muscle usage. No tachypnea and no bradypnea. No respiratory distress.   Abdominal: Soft. Bowel sounds are normal. He exhibits no distension and no mass. There is no hepatosplenomegaly. There is no CVA tenderness.   Musculoskeletal: Normal range of motion. He exhibits no edema or deformity.   R HIP, AND LOWER BACK SCARS   Lymphadenopathy:     He has no cervical adenopathy.   Neurological: He is alert and oriented to person, place, and time. He has normal strength. He displays no tremor. No cranial nerve deficit.   Skin: Skin is warm and dry. No cyanosis or erythema. No pallor.   Psychiatric: He has a normal mood and affect. His speech is normal and behavior is normal.               ..    Chemistry        Component Value Date/Time     07/26/2019 0755    K 4.2 07/26/2019 0755     07/26/2019 0755    CO2 25 07/26/2019 0755    BUN 18 07/26/2019 0755    CREATININE 1.1 07/26/2019 0755     07/26/2019 0755        Component Value Date/Time    CALCIUM 9.4 07/26/2019 0755    ALKPHOS 87 07/26/2019 0755    AST 28 07/26/2019 0755    ALT 27 07/26/2019 0755    BILITOT 0.5 07/26/2019 0755    ESTGFRAFRICA >60.0 07/26/2019 0755    EGFRNONAA >60.0 07/26/2019 0755            ..  Lab Results   Component Value Date    CHOL 107 (L) 07/30/2019    CHOL 153 03/26/2019    CHOL 163 11/15/2016     Lab Results   Component Value Date    HDL 38 (L) 07/30/2019    HDL 39 (L) 03/26/2019    HDL 42 11/15/2016     Lab Results   Component Value Date    LDLCALC 54.4 (L) 07/30/2019    LDLCALC 100.2 03/26/2019    LDLCALC 104.8 11/15/2016     Lab Results   Component Value Date    TRIG 73 07/30/2019    TRIG 69 03/26/2019    TRIG 81 11/15/2016     Lab Results   Component Value Date    CHOLHDL 35.5 07/30/2019    CHOLHDL 25.5 03/26/2019    CHOLHDL 25.8 11/15/2016     ..  Lab Results   Component Value Date    WBC 5.39 04/02/2019    HGB 14.2 07/26/2019    HCT 45.7 04/02/2019    MCV 94 04/02/2019     04/02/2019        Test(s) Reviewed  I have reviewed the following in detail:  [] Stress test   [] Angiography   [] Echocardiogram   [] Labs   [] Other:       Assessment:         ICD-10-CM ICD-9-CM   1. PAF (paroxysmal atrial fibrillation) I48.0 427.31   2. Long term (current) use of anticoagulants Z79.01 V58.61   3. Essential hypertension I10 401.9   4. Dyslipidemia (high LDL; low HDL) E78.5 272.4   5. Long term (current) use of antithrombotics/antiplatelets Z79.02 V58.63   6. Obesity, Class I, BMI 30-34.9 E66.9 278.00   7. Stented coronary artery Z95.5 V45.82     Problem List Items Addressed This Visit        Cardiac/Vascular    PAF (paroxysmal atrial fibrillation) - Primary    Essential hypertension    Dyslipidemia (high LDL; low HDL)    Stented coronary artery       Hematology    Long term (current) use of anticoagulants    Long term (current) use of antithrombotics/antiplatelets       Endocrine    Obesity, Class I, BMI 30-34.9           Plan:         DECREASE ASA TO QOD OR TWICE PER WEEK, WEIGHT LOSS, ALL CV CLINICALLY STABLE, NO ANGINA, NO HF, NO TIA, NO CLINICAL ARRHYTHMIA,CONTINUE CURRENT MEDS, EDUCATION, DIET, EXERCISE, ACCEPTABLE RISK AFTER 6 MO FROM PCI, IF SURGERY FOR THE KNEE IS RELATIVELY URGENT, HOWEVER THAT DOES NOT APPEAR TO BE THE CASE, DISCUSSED AT LENGTH WITH THE PATIENT AND HIS WIFE THAT THE RISK GOES DOWN AFTER 6 MONTHS HOWEVER DID BEST TO WAIT TILL AFTER 1 YEAR FROM PCI, RTC IN 6  MO WITH LABS  PAF (paroxysmal atrial fibrillation)    Long term (current) use of anticoagulants    Essential hypertension    Dyslipidemia (high LDL; low HDL)    Long term (current) use of antithrombotics/antiplatelets    Obesity, Class I, BMI 30-34.9    Stented coronary artery    RTC Low level/low impact aerobic exercise 5x's/wk. Heart healthy diet and risk factor modification.    See labs and med orders.    Aerobic exercise 5x's/wk. Heart healthy diet and risk factor modification.    See labs and med orders.

## 2019-08-20 ENCOUNTER — TELEPHONE (OUTPATIENT)
Dept: CARDIOLOGY | Facility: CLINIC | Age: 69
End: 2019-08-20

## 2019-08-20 NOTE — TELEPHONE ENCOUNTER
----- Message from Ebony Dawkins RT sent at 8/20/2019  2:40 PM CDT -----  Contact: N[facility]hat,,[phone] Dr. Mehul Noland,,[phone] Dr. Mehul Harris, requesting to pt's PTT and INR for the Sx clearance please fax to , thanks.

## 2019-09-09 ENCOUNTER — TELEPHONE (OUTPATIENT)
Dept: PHYSICAL MEDICINE AND REHAB | Facility: CLINIC | Age: 69
End: 2019-09-09

## 2019-09-09 NOTE — TELEPHONE ENCOUNTER
----- Message from Jayy Driscoll MD sent at 9/9/2019  8:09 AM CDT -----  Please help work him in next available. He has a neck issue, but he's VIP.

## 2019-09-16 ENCOUNTER — TELEPHONE (OUTPATIENT)
Dept: CARDIOLOGY | Facility: CLINIC | Age: 69
End: 2019-09-16

## 2019-09-16 NOTE — TELEPHONE ENCOUNTER
----- Message from Chloe Coats sent at 9/16/2019  9:11 AM CDT -----  Contact: Ms Bobo/   [facility] /[place] [phone] ext [phone]  Type:  Ms Bobo states patient still experiencing joint pain as side affect of taking medication  Atorvastatin    Name of Caller:Ms Bobo  When is the first available appointment?  Symptoms:  Best Call Back Number:[phone]  Ext   Additional Information:

## 2019-09-16 NOTE — TELEPHONE ENCOUNTER
Spoke to patient and advised to add co-q-10 to medication per Dr. Ahuja. Patient verbalized understanding.

## 2019-09-25 ENCOUNTER — OFFICE VISIT (OUTPATIENT)
Dept: PHYSICAL MEDICINE AND REHAB | Facility: CLINIC | Age: 69
End: 2019-09-25
Payer: MEDICARE

## 2019-09-25 VITALS
WEIGHT: 222.25 LBS | DIASTOLIC BLOOD PRESSURE: 74 MMHG | SYSTOLIC BLOOD PRESSURE: 132 MMHG | BODY MASS INDEX: 33.68 KG/M2 | HEIGHT: 68 IN | HEART RATE: 48 BPM

## 2019-09-25 DIAGNOSIS — G89.29 CHRONIC PAIN OF LEFT KNEE: Primary | ICD-10-CM

## 2019-09-25 DIAGNOSIS — M25.562 CHRONIC PAIN OF LEFT KNEE: Primary | ICD-10-CM

## 2019-09-25 DIAGNOSIS — M65.30 TRIGGER FINGER, UNSPECIFIED FINGER, UNSPECIFIED LATERALITY: ICD-10-CM

## 2019-09-25 DIAGNOSIS — M17.12 PRIMARY OSTEOARTHRITIS OF LEFT KNEE: ICD-10-CM

## 2019-09-25 PROCEDURE — 1101F PT FALLS ASSESS-DOCD LE1/YR: CPT | Mod: CPTII,S$GLB,, | Performed by: PHYSICAL MEDICINE & REHABILITATION

## 2019-09-25 PROCEDURE — 76942 ECHO GUIDE FOR BIOPSY: CPT | Mod: S$GLB,,, | Performed by: PHYSICAL MEDICINE & REHABILITATION

## 2019-09-25 PROCEDURE — 20550 NJX 1 TENDON SHEATH/LIGAMENT: CPT | Mod: 50,S$GLB,, | Performed by: PHYSICAL MEDICINE & REHABILITATION

## 2019-09-25 PROCEDURE — 99213 PR OFFICE/OUTPT VISIT, EST, LEVL III, 20-29 MIN: ICD-10-PCS | Mod: 25,S$GLB,, | Performed by: PHYSICAL MEDICINE & REHABILITATION

## 2019-09-25 PROCEDURE — 76942: ICD-10-PCS | Mod: S$GLB,,, | Performed by: PHYSICAL MEDICINE & REHABILITATION

## 2019-09-25 PROCEDURE — 99213 OFFICE O/P EST LOW 20 MIN: CPT | Mod: 25,S$GLB,, | Performed by: PHYSICAL MEDICINE & REHABILITATION

## 2019-09-25 PROCEDURE — 3078F PR MOST RECENT DIASTOLIC BLOOD PRESSURE < 80 MM HG: ICD-10-PCS | Mod: CPTII,S$GLB,, | Performed by: PHYSICAL MEDICINE & REHABILITATION

## 2019-09-25 PROCEDURE — 99999 PR PBB SHADOW E&M-EST. PATIENT-LVL III: CPT | Mod: PBBFAC,,, | Performed by: PHYSICAL MEDICINE & REHABILITATION

## 2019-09-25 PROCEDURE — 20550: ICD-10-PCS | Mod: 50,S$GLB,, | Performed by: PHYSICAL MEDICINE & REHABILITATION

## 2019-09-25 PROCEDURE — 3078F DIAST BP <80 MM HG: CPT | Mod: CPTII,S$GLB,, | Performed by: PHYSICAL MEDICINE & REHABILITATION

## 2019-09-25 PROCEDURE — 99999 PR PBB SHADOW E&M-EST. PATIENT-LVL III: ICD-10-PCS | Mod: PBBFAC,,, | Performed by: PHYSICAL MEDICINE & REHABILITATION

## 2019-09-25 PROCEDURE — 1101F PR PT FALLS ASSESS DOC 0-1 FALLS W/OUT INJ PAST YR: ICD-10-PCS | Mod: CPTII,S$GLB,, | Performed by: PHYSICAL MEDICINE & REHABILITATION

## 2019-09-25 PROCEDURE — 3075F SYST BP GE 130 - 139MM HG: CPT | Mod: CPTII,S$GLB,, | Performed by: PHYSICAL MEDICINE & REHABILITATION

## 2019-09-25 PROCEDURE — 3075F PR MOST RECENT SYSTOLIC BLOOD PRESS GE 130-139MM HG: ICD-10-PCS | Mod: CPTII,S$GLB,, | Performed by: PHYSICAL MEDICINE & REHABILITATION

## 2019-09-25 RX ORDER — BETAMETHASONE SODIUM PHOSPHATE AND BETAMETHASONE ACETATE 3; 3 MG/ML; MG/ML
3 INJECTION, SUSPENSION INTRA-ARTICULAR; INTRALESIONAL; INTRAMUSCULAR; SOFT TISSUE
Status: DISCONTINUED | OUTPATIENT
Start: 2019-09-25 | End: 2019-09-25 | Stop reason: HOSPADM

## 2019-09-25 RX ADMIN — BETAMETHASONE SODIUM PHOSPHATE AND BETAMETHASONE ACETATE 3 MG: 3; 3 INJECTION, SUSPENSION INTRA-ARTICULAR; INTRALESIONAL; INTRAMUSCULAR; SOFT TISSUE at 01:09

## 2019-09-25 NOTE — PROCEDURES
Tendon Sheath: L ring MCP, R ring MCP  Date/Time: 9/25/2019 1:00 PM  Performed by: Jayy Driscoll MD  Authorized by: Jayy Driscoll MD     Consent Done?:  Yes (Verbal)  Timeout: prior to procedure the correct patient, procedure, and site was verified    Indications:  Pain  Site marked: the procedure site was marked    Timeout: prior to procedure the correct patient, procedure, and site was verified    Location:  Ring finger  Site:  L ring MCP and R ring MCP  Prep: patient was prepped and draped in usual sterile fashion    Ultrasonic guidance for needle placement?: Yes    Needle size:  27 G  Approach:  Volar (Needle in plane, distal to proximal)  Medications:  3 mg betamethasone acetate-betamethasone sodium phosphate 6 mg/mL; 3 mg betamethasone acetate-betamethasone sodium phosphate 6 mg/mL  Patient tolerance:  Patient tolerated the procedure well with no immediate complications   Ultrasound guidance was used for correct needle placement, the images were saved will be uploaded to EMR.

## 2019-09-25 NOTE — PROGRESS NOTES
[facility]    CHIEF COMPLAINT:   Chief Complaint   Patient presents with    Follow-up     Neck pain follow up     HISTORY OF PRESENT ILLNESS: Farhan Stallworth is a 69 y.o. male who presents to me in follow up in regards to multiple musculoskeletal complaints.  I last saw him in July of this year we we completed a series of Euflexxa injections for the left knee.  He feels he has noticed overall reduction in his pain following those injections.  He is able to function well over the past several weeks but does note limited range of motion in terms of left knee flexion.  He notes difficulty with navigating stairs, having to swing his leg laterally to prevent full flexion.  However, he has been able to golf and enjoyed other weight training exercises over the past few weeks.  He denies any significant swelling of the left knee.  He feels he has good strength in the left knee.  He understands he will eventually need a knee replaced, but cannot do so until cardiology clearance in the spring.    He also reports the acute onset of left-sided neck pain about 3 weeks ago.  He was lifting some weights above his head and noticed a twinge of pain.  In the ensuing days he reported significant stiffness of the neck in terms of looking in all directions.  He denies any radiation of pain into the left upper extremity.  He denied any numbness or tingling of the left upper extremity.  He feels over the past week his pain is significantly improved.  He notes very minimal soreness at the present time.    He also reports pain over the palmar aspect of multiple fingers in both hands.  He feels the symptoms increase with heavy gripping as well as using his golf clubs.  He notes occasional triggering of the fingers.  He feels the symptoms are most prominent in the ring finger both hands.  He had prior injection of the right ring finger in May of 2018 which helped significantly.    Review of Systems   Constitutional:  Negative for fever.   HENT: Negative for drooling.    Eyes: Negative for discharge.   Respiratory: Negative for choking.    Cardiovascular: Negative for chest pain.   Genitourinary: Negative for flank pain.   Skin: Negative for wound.   Allergic/Immunologic: Negative for immunocompromised state.   Neurological: Negative for tremors and syncope.   Psychiatric/Behavioral: Negative for behavioral problems.     Past Medical History:   Past Medical History:   Diagnosis Date    Arthritis     hip    Atrial fibrillation     Hearing difficulty     Low serum testosterone level     Lumbar disc disease     REYES (obstructive sleep apnea)     has CPAP, better relief of symptoms with nasal pillow.        Past Surgical History:   Past Surgical History:   Procedure Laterality Date    CARDIOVERSION N/A 1/3/2019    Procedure: CARDIOVERSION;  Surgeon: Nanci Ahuja MD;  Location: [facility];  Service: Cardiology;  Laterality: N/A;    CARPAL TUNNEL RELEASE      2005, bilateral    CLAVICLE SURGERY      CORONARY ANGIOGRAPHY N/A 4/9/2019    Procedure: ANGIOGRAM, CORONARY ARTERY;  Surgeon: Nanci Ahuja MD;  Location: [facility] CATH;  Service: Cardiology;  Laterality: N/A;    EPIDURAL BLOCK INJECTION  2010    HIP SURGERY      JOINT REPLACEMENT Right 11/2017    hip    KNEE ARTHROSCOPY W/ MENISCAL REPAIR      left    LEFT HEART CATHETERIZATION Left 4/9/2019    Procedure: Left heart cath;  Surgeon: Nanci Ahuja MD;  Location: [facility] CATH;  Service: Cardiology;  Laterality: Left;    LUMBAR FUSION      2010    TONSILLECTOMY         Family History:   Family History   Problem Relation Age of Onset    Cancer Father         lung    Cancer Sister         leukemia    No Known Problems Mother     Heart disease Neg Hx     Diabetes Neg Hx        Medications:   Current Outpatient Medications on File Prior to Visit   Medication Sig Dispense Refill    acetaminophen (TYLENOL) 500 MG tablet       apixaban (ELIQUIS) 5 mg Tab Take 1 tablet (5  mg total) by mouth 2 (two) times daily. (Patient taking differently: Take 2.5 mg by mouth 2 (two) times daily. ) 180 tablet 1    aspirin (ECOTRIN) 81 MG EC tablet Take 1 tablet (81 mg total) by mouth once daily.  0    atorvastatin (LIPITOR) 20 MG tablet Take 1 tablet (20 mg total) by mouth once daily. 90 tablet 1    atorvastatin (LIPITOR) 20 MG tablet Take 1 tablet (20 mg total) by mouth once daily. 7 tablet 0    clopidogrel (PLAVIX) 75 mg tablet Take 1 tablet (75 mg total) by mouth once daily. 90 tablet 1    glucosamine-chondroitin 500-400 mg tablet Take 1 tablet by mouth 2 (two) times daily. Triflex      isosorbide mononitrate (IMDUR) 30 MG 24 hr tablet Take 1 tablet (30 mg total) by mouth once daily. 90 tablet 1    metoprolol succinate (TOPROL-XL) 25 MG 24 hr tablet TAKE 1 TABLET EVERY DAY 90 tablet 1    MULTIVITS-MINERALS/FA/LYCOPENE (MEN'S DAILY MULTIVIT-MINERAL ORAL) Take 1 tablet by mouth once daily.       nitroGLYCERIN (NITROSTAT) 0.4 MG SL tablet Place 1 tablet (0.4 mg total) under the tongue every 5 (five) minutes as needed. 25 tablet 0     Current Facility-Administered Medications on File Prior to Visit   Medication Dose Route Frequency Provider Last Rate Last Dose    sodium chloride 0.9% flush 10 mL  10 mL Intravenous PRN Nanci Ahuja MD           Allergies: Review of patient's allergies indicates:  No Known Allergies    Social History:   Social History     Socioeconomic History    Marital status:      Spouse name: Not on file    Number of children: Not on file    Years of education: Not on file    Highest education level: Not on file   Occupational History    Not on file   Social Needs    Financial resource strain: Not hard at all    Food insecurity:     Worry: Never true     Inability: Never true    Transportation needs:     Medical: No     Non-medical: No   Tobacco Use    Smoking status: Never Smoker    Smokeless tobacco: Never Used   Substance and Sexual Activity     "Alcohol use: Not Currently     Alcohol/week: 0.0 standard drinks     Frequency: 2-4 times a month     Drinks per session: 1 or 2     Binge frequency: Never    Drug use: No    Sexual activity: Not on file   Lifestyle    Physical activity:     Days per week: 7 days     Minutes per session: 60 min    Stress: Only a little   Relationships    Social connections:     Talks on phone: More than three times a week     Gets together: Three times a week     Attends Holiness service: Not on file     Active member of club or organization: Yes     Attends meetings of clubs or organizations: More than 4 times per year     Relationship status:    Other Topics Concern    Not on file   Social History Narrative    Not on file     PHYSICAL EXAMINATION:   General  /74   Pulse (!) 48   Ht 5' 8" (1.727 m)   Wt 100.8 kg (222 lb 3.6 oz)   BMI 33.79 kg/m²   Constitutional: Oriented to person, place, and time. No apparent distress. Pleasant.  HENT:   Head: Normocephalic and atraumatic.   Eyes: Right eye exhibits no discharge. Left eye exhibits no discharge. No scleral icterus.   Pulmonary/Chest: Effort normal. No respiratory distress.   Abdominal: There is no guarding.   Neurological: Alert and oriented to person, place, and time.   Psychiatric: Behavior is normal.   Left Knee Exam     Tenderness   The patient is experiencing no tenderness.     Range of Motion   Extension: 0   Flexion: 90     Tests   Varus: negative Valgus: negative  Lachman:  Anterior - negative    Posterior - negative  Patellar apprehension: negative    Other   Erythema: absent  Swelling: none      Right Hand Exam     Tenderness   Right hand tenderness location: Tender over the volar MCP.    Range of Motion   Wrist   Extension: 45   Flexion: 80   Pronation: normal   Supination: normal     Muscle Strength   Wrist extension: 5/5   Wrist flexion: 5/5   : 5/5     Other   Erythema: absent  Scars: absent  Sensation: normal  Pulse: present    Comments: "  No overt triggering      Left Hand Exam     Tenderness   Left hand tenderness location: Tender over the volar MCP.     Range of Motion   Wrist   Extension: 45   Flexion: 80   Pronation: normal   Supination: normal     Muscle Strength   Wrist extension: 5/5   Wrist flexion: 5/5   :  5/5     Other   Erythema: absent  Scars: absent  Sensation: normal  Pulse: present    Comments:  No overt triggering observed        INSPECTION: There is no ecchymoses, erythema or gross deformity of the hands.    MRI of the left knee from 06/03/2019: 1. Complex tearing of the posterior horn and body of the medial meniscus with significant maceration and volume loss of the medial meniscal body.  2. Tricompartmental osteoarthritis which is most severe in the medial tibiofemoral compartment where there is extensive full-thickness cartilage loss.  3. Moderate to large joint effusion.  Suspected ganglion cyst along the posterior aspect of the ACL and PCL.  4. Mild patellar tendinopathy.    ASSESSMENT:   1. Chronic pain of left knee    2. Primary osteoarthritis of left knee    3. Trigger finger, unspecified finger, unspecified laterality      PLAN:     1.  In terms of his left knee, he appears to be functioning well considering his significant intra-articular derangement.  He is plane to have knee arthroplasty in the spring once he is cleared by Cardiology.  In the meantime, he is interested in potential pain relieving modalities.  We discussed injection of corticosteroid versus genicular radiofrequency ablation.  Her after discussion he is more interested in the radiofrequency ablation.  He wishes to time this later in the year to perhaps provide better coverage for possible trips he may take this winter.    2.  In terms of his neck pain, this appeared to have been an acute cervical/trapezial strain.  He notes significant spontaneous improvement over the past few weeks.  No further treatment is necessary at this time.    3.  In terms of  his hands, his symptoms are suggestive of trigger finger, worse on the 4th digit of each hand.  He responded well to prior injection of corticosteroid last year.  He wishes to repeat this.  See separate procedure note for ultrasound-guided injection of corticosteroid to the 4th A1 pulley of both hands.  I encouraged him to avoid heavy lifting/gripping for the next several days.    4.  Return to clinic in the next 1-2 months for left knee genicular diagnostic block.    The above note was completed, in part, with the aid of Dragon dictation software/hardware. Translation errors may be present.

## 2019-10-07 ENCOUNTER — PATIENT MESSAGE (OUTPATIENT)
Dept: CARDIOLOGY | Facility: CLINIC | Age: 69
End: 2019-10-07

## 2019-10-08 ENCOUNTER — PATIENT MESSAGE (OUTPATIENT)
Dept: CARDIOLOGY | Facility: CLINIC | Age: 69
End: 2019-10-08

## 2019-10-09 ENCOUNTER — PATIENT MESSAGE (OUTPATIENT)
Dept: CARDIOLOGY | Facility: CLINIC | Age: 69
End: 2019-10-09

## 2019-10-09 NOTE — TELEPHONE ENCOUNTER
Request for Cardiac Clearance    Farhan Stallworth  is having an apicpectomy and needs clearance.     Please advise on any medication holds. Pt taking eliquis, asa, plavix    Please indicate if patient is cleared from a Cardiac standpoint.

## 2019-10-21 ENCOUNTER — PATIENT MESSAGE (OUTPATIENT)
Dept: CARDIOLOGY | Facility: CLINIC | Age: 69
End: 2019-10-21

## 2019-10-21 DIAGNOSIS — I48.91 NEW ONSET ATRIAL FIBRILLATION: ICD-10-CM

## 2019-11-06 ENCOUNTER — PATIENT MESSAGE (OUTPATIENT)
Dept: PHYSICAL MEDICINE AND REHAB | Facility: CLINIC | Age: 69
End: 2019-11-06

## 2019-11-12 ENCOUNTER — OFFICE VISIT (OUTPATIENT)
Dept: PHYSICAL MEDICINE AND REHAB | Facility: CLINIC | Age: 69
End: 2019-11-12
Payer: MEDICARE

## 2019-11-12 VITALS — BODY MASS INDEX: 33.65 KG/M2 | HEIGHT: 68 IN | WEIGHT: 222 LBS

## 2019-11-12 DIAGNOSIS — G89.29 CHRONIC PAIN OF LEFT KNEE: Primary | ICD-10-CM

## 2019-11-12 DIAGNOSIS — M17.12 PRIMARY OSTEOARTHRITIS OF LEFT KNEE: ICD-10-CM

## 2019-11-12 DIAGNOSIS — M65.30 TRIGGER FINGER, UNSPECIFIED FINGER, UNSPECIFIED LATERALITY: ICD-10-CM

## 2019-11-12 DIAGNOSIS — M25.562 CHRONIC PAIN OF LEFT KNEE: Primary | ICD-10-CM

## 2019-11-12 PROCEDURE — 64450 NJX AA&/STRD OTHER PN/BRANCH: CPT | Mod: LT,S$GLB,, | Performed by: PHYSICAL MEDICINE & REHABILITATION

## 2019-11-12 PROCEDURE — 1101F PR PT FALLS ASSESS DOC 0-1 FALLS W/OUT INJ PAST YR: ICD-10-PCS | Mod: CPTII,S$GLB,, | Performed by: PHYSICAL MEDICINE & REHABILITATION

## 2019-11-12 PROCEDURE — 99999 PR PBB SHADOW E&M-EST. PATIENT-LVL III: CPT | Mod: PBBFAC,,, | Performed by: PHYSICAL MEDICINE & REHABILITATION

## 2019-11-12 PROCEDURE — 20550 NJX 1 TENDON SHEATH/LIGAMENT: CPT | Mod: 50,59,S$GLB, | Performed by: PHYSICAL MEDICINE & REHABILITATION

## 2019-11-12 PROCEDURE — 99212 PR OFFICE/OUTPT VISIT, EST, LEVL II, 10-19 MIN: ICD-10-PCS | Mod: 25,S$GLB,, | Performed by: PHYSICAL MEDICINE & REHABILITATION

## 2019-11-12 PROCEDURE — 76942: ICD-10-PCS | Mod: S$GLB,,, | Performed by: PHYSICAL MEDICINE & REHABILITATION

## 2019-11-12 PROCEDURE — 20550: ICD-10-PCS | Mod: 50,59,S$GLB, | Performed by: PHYSICAL MEDICINE & REHABILITATION

## 2019-11-12 PROCEDURE — 1101F PT FALLS ASSESS-DOCD LE1/YR: CPT | Mod: CPTII,S$GLB,, | Performed by: PHYSICAL MEDICINE & REHABILITATION

## 2019-11-12 PROCEDURE — 76942 ECHO GUIDE FOR BIOPSY: CPT | Mod: S$GLB,,, | Performed by: PHYSICAL MEDICINE & REHABILITATION

## 2019-11-12 PROCEDURE — 99212 OFFICE O/P EST SF 10 MIN: CPT | Mod: 25,S$GLB,, | Performed by: PHYSICAL MEDICINE & REHABILITATION

## 2019-11-12 PROCEDURE — 64450 PR NERVE BLOCK INJ, ANES/STEROID, OTHER PERIPHERAL: ICD-10-PCS | Mod: LT,S$GLB,, | Performed by: PHYSICAL MEDICINE & REHABILITATION

## 2019-11-12 PROCEDURE — 99999 PR PBB SHADOW E&M-EST. PATIENT-LVL III: ICD-10-PCS | Mod: PBBFAC,,, | Performed by: PHYSICAL MEDICINE & REHABILITATION

## 2019-11-12 RX ORDER — ASPIRIN 81 MG/1
TABLET ORAL DAILY
COMMUNITY
Start: 2019-04-04 | End: 2022-07-05

## 2019-11-12 RX ORDER — NITROGLYCERIN 0.4 MG/1
TABLET SUBLINGUAL
COMMUNITY
Start: 2019-04-02 | End: 2020-07-27

## 2019-11-12 RX ORDER — BETAMETHASONE SODIUM PHOSPHATE AND BETAMETHASONE ACETATE 3; 3 MG/ML; MG/ML
3 INJECTION, SUSPENSION INTRA-ARTICULAR; INTRALESIONAL; INTRAMUSCULAR; SOFT TISSUE
Status: DISCONTINUED | OUTPATIENT
Start: 2019-11-12 | End: 2019-11-12 | Stop reason: HOSPADM

## 2019-11-12 RX ORDER — ROSUVASTATIN CALCIUM 10 MG/1
TABLET, COATED ORAL
Status: ON HOLD | COMMUNITY
Start: 2019-04-29 | End: 2019-12-06

## 2019-11-12 RX ORDER — EPINEPHRINE 0.22MG
100 AEROSOL WITH ADAPTER (ML) INHALATION DAILY
COMMUNITY
Start: 2019-11-04

## 2019-11-12 RX ADMIN — BETAMETHASONE SODIUM PHOSPHATE AND BETAMETHASONE ACETATE 3 MG: 3; 3 INJECTION, SUSPENSION INTRA-ARTICULAR; INTRALESIONAL; INTRAMUSCULAR; SOFT TISSUE at 09:11

## 2019-11-12 NOTE — PROGRESS NOTES
[facility]    CHIEF COMPLAINT:   Chief Complaint   Patient presents with    Knee Pain     left knee block     HISTORY OF PRESENT ILLNESS: Farhan Stallworth is a 69 y.o. male who presents to me in follow up in regards to his left knee pain.  At his last visit we discussed performing genicular diagnostic block, and if successful, proceeding with the radiofrequency ablation.  He presents today for the dx block.  He reports intermittent pain with the left knee.  He continues to have reduced knee flexion on the left.  Since last visit, he has consult it with an outside orthopedist specializing in knee arthroplasty.  He continues to plan to move forward with that surgery in the spring of 2020.  He is looking for some pain relief until then.    At his last visit we also performed trigger finger injections of the 4th digit of each hand.  He notes excellent reduction in his pain following those injections.  He is reporting increasing pain that is similar over the palmar aspect of the 2nd digit of both hands, however he denies any overt triggering.    Review of Systems   Constitutional: Negative for fever.   HENT: Negative for drooling.    Eyes: Negative for discharge.   Respiratory: Negative for choking.    Cardiovascular: Negative for chest pain.   Genitourinary: Negative for flank pain.   Skin: Negative for wound.   Allergic/Immunologic: Negative for immunocompromised state.   Neurological: Negative for tremors and syncope.   Psychiatric/Behavioral: Negative for behavioral problems.     Past Medical History:   Past Medical History:   Diagnosis Date    Arthritis     hip    Atrial fibrillation     Hearing difficulty     Low serum testosterone level     Lumbar disc disease     REYES (obstructive sleep apnea)     has CPAP, better relief of symptoms with nasal pillow.        Past Surgical History:   Past Surgical History:   Procedure Laterality Date    CARDIOVERSION N/A 1/3/2019    Procedure:  CARDIOVERSION;  Surgeon: Nanci Ahuja MD;  Location: [facility];  Service: Cardiology;  Laterality: N/A;    CARPAL TUNNEL RELEASE      2005, bilateral    CLAVICLE SURGERY      CORONARY ANGIOGRAPHY N/A 4/9/2019    Procedure: ANGIOGRAM, CORONARY ARTERY;  Surgeon: Nanci Ahuja MD;  Location: [facility] CATH;  Service: Cardiology;  Laterality: N/A;    EPIDURAL BLOCK INJECTION  2010    HIP SURGERY      JOINT REPLACEMENT Right 11/2017    hip    KNEE ARTHROSCOPY W/ MENISCAL REPAIR      left    LEFT HEART CATHETERIZATION Left 4/9/2019    Procedure: Left heart cath;  Surgeon: Nanci Ahuja MD;  Location: [facility] CATH;  Service: Cardiology;  Laterality: Left;    LUMBAR FUSION      2010    TONSILLECTOMY         Family History:   Family History   Problem Relation Age of Onset    Cancer Father         lung    Cancer Sister         leukemia    No Known Problems Mother     Heart disease Neg Hx     Diabetes Neg Hx        Medications:   Current Outpatient Medications on File Prior to Visit   Medication Sig Dispense Refill    acetaminophen (TYLENOL) 500 MG tablet       apixaban (ELIQUIS) 5 mg Tab Take 1 tablet (5 mg total) by mouth 2 (two) times daily. 10 tablet 0    aspirin (ECOTRIN) 81 MG EC tablet       atorvastatin (LIPITOR) 20 MG tablet Take 1 tablet (20 mg total) by mouth once daily. 7 tablet 0    clopidogrel (PLAVIX) 75 mg tablet Take 1 tablet (75 mg total) by mouth once daily. 90 tablet 1    coenzyme Q10 100 mg/5 mL Syrp       glucosamine-chondroitin 500-400 mg tablet Take 1 tablet by mouth 2 (two) times daily. Triflex      isosorbide mononitrate (IMDUR) 30 MG 24 hr tablet Take 1 tablet (30 mg total) by mouth once daily. 90 tablet 1    lidocain-me.salicyl-caps-menth 0.5-20-0.035-5 % PtMd       metoprolol succinate (TOPROL-XL) 25 MG 24 hr tablet TAKE 1 TABLET EVERY DAY 90 tablet 1    MULTIVITS-MINERALS/FA/LYCOPENE (MEN'S DAILY MULTIVIT-MINERAL ORAL) Take 1 tablet by mouth once daily.       nitroGLYCERIN  (NITROSTAT) 0.4 MG SL tablet PLACE 1 T UNT Q 5 MIN PRN      rosuvastatin (CRESTOR) 10 MG tablet       [DISCONTINUED] aspirin (ECOTRIN) 81 MG EC tablet Take 1 tablet (81 mg total) by mouth once daily.  0    [DISCONTINUED] atorvastatin (LIPITOR) 20 MG tablet Take 1 tablet (20 mg total) by mouth once daily. 90 tablet 1    [DISCONTINUED] nitroGLYCERIN (NITROSTAT) 0.4 MG SL tablet Place 1 tablet (0.4 mg total) under the tongue every 5 (five) minutes as needed. 25 tablet 0     Current Facility-Administered Medications on File Prior to Visit   Medication Dose Route Frequency Provider Last Rate Last Dose    sodium chloride 0.9% flush 10 mL  10 mL Intravenous PRN Nanci Ahuja MD           Allergies: Review of patient's allergies indicates:  No Known Allergies    Social History:   Social History     Socioeconomic History    Marital status:      Spouse name: Not on file    Number of children: Not on file    Years of education: Not on file    Highest education level: Not on file   Occupational History    Not on file   Social Needs    Financial resource strain: Not hard at all    Food insecurity:     Worry: Never true     Inability: Never true    Transportation needs:     Medical: No     Non-medical: No   Tobacco Use    Smoking status: Never Smoker    Smokeless tobacco: Never Used   Substance and Sexual Activity    Alcohol use: Not Currently     Alcohol/week: 0.0 standard drinks     Frequency: 2-4 times a month     Drinks per session: 1 or 2     Binge frequency: Never    Drug use: No    Sexual activity: Not on file   Lifestyle    Physical activity:     Days per week: 7 days     Minutes per session: 60 min    Stress: Only a little   Relationships    Social connections:     Talks on phone: More than three times a week     Gets together: Three times a week     Attends Zoroastrianism service: Not on file     Active member of club or organization: Yes     Attends meetings of clubs or organizations: More than  "4 times per year     Relationship status:    Other Topics Concern    Not on file   Social History Narrative    Not on file     PHYSICAL EXAMINATION:   General  Ht 5' 8" (1.727 m)   Wt 100.7 kg (222 lb)   BMI 33.75 kg/m²   Constitutional: Oriented to person, place, and time. No apparent distress. Pleasant.  HENT:   Head: Normocephalic and atraumatic.   Eyes: Right eye exhibits no discharge. Left eye exhibits no discharge. No scleral icterus.   Pulmonary/Chest: Effort normal. No respiratory distress.   Abdominal: There is no guarding.   Neurological: Alert and oriented to person, place, and time.   Psychiatric: Behavior is normal.   Left Knee Exam     Tenderness   The patient is experiencing no tenderness.     Range of Motion   Extension: 0   Left knee flexion: 95.     Tests   Varus: negative Valgus: negative  Lachman:  Anterior - negative    Posterior - negative  Patellar apprehension: negative    Other   Erythema: absent  Sensation: normal  Pulse: present  Swelling: mild  Effusion: effusion present      Right Hand Exam     Tenderness   Right hand tenderness location: Tender over the volar 2nd MCP.    Range of Motion   Wrist   Extension: 45   Flexion: 80   Pronation: normal   Supination: normal   Hand   MP Index: 90   PIP Index: 90   DIP Index: 90     Muscle Strength   Wrist extension: 5/5   Wrist flexion: 5/5   : 5/5     Other   Erythema: absent  Scars: absent  Sensation: normal  Pulse: present    Comments:  No overt triggering      Left Hand Exam     Tenderness   Left hand tenderness location: Tender over the volar 2nd MCP.     Range of Motion   Wrist   Extension: 45   Flexion: 80   Pronation: normal   Supination: normal   Hand   MP Index: 90   PIP Index: 90   DIP Index: 90     Muscle Strength   Wrist extension: 5/5   Wrist flexion: 5/5   :  5/5     Other   Erythema: absent  Scars: absent  Sensation: normal  Pulse: present    Comments:  No overt triggering observed        INSPECTION: There is no " ecchymoses, erythema or gross deformity of the hands or left knee.    MRI of the left knee from 06/03/2019: 1. Complex tearing of the posterior horn and body of the medial meniscus with significant maceration and volume loss of the medial meniscal body.  2. Tricompartmental osteoarthritis which is most severe in the medial tibiofemoral compartment where there is extensive full-thickness cartilage loss.  3. Moderate to large joint effusion.  Suspected ganglion cyst along the posterior aspect of the ACL and PCL.  4. Mild patellar tendinopathy.    ASSESSMENT:   1. Chronic pain of left knee    2. Primary osteoarthritis of left knee    3. Trigger finger, unspecified finger, unspecified laterality      PLAN:     1.  In terms of his left knee, he appears to be functioning well considering his significant intra-articular derangement.  He is plane to have knee arthroplasty in the spring once he is cleared by Cardiology.  In the meantime, he is interested in potential pain relieving modalities.  We discussed injection of corticosteroid versus genicular radiofrequency ablation.  After discussion he is more interested in the radiofrequency ablation.  He wishes to move for with this today.  See procedure note below.    2. In terms of his hands, his symptoms are suggestive of trigger finger, now of the 2nd digits.  He responded well to similar injections at the 4th digits in wishes to repeat these today on the 2nd digits.  See separate procedure note for ultrasound-guided injection of corticosteroid to the A1 pulley of both 2nd digits.    3.  We will plan to contact him by phone tomorrow to assess his response to today's diagnostic block procedure.    Procedure Note     Pre-op Diagnosis:  Chronic pain of the left knee     Post-op Diagnosis: Post-Op Diagnosis Codes:     * Chronic pain of the left knee     Procedure(s) (LRB):  1. Diagnostic block of the genicular branches to the left knee x 4 nerve branches:  A. Inferomedial  genicular nerve  B. Superolateral genicular nerve  C. Superomedial genicular nerve   D. Suprapatellar branch of the femoral nerve  2.  Ultrasound guidance.     Anesthesia: None     Procedure in Detail/Findings:     Description of Procedure:     A 69 y.o.  male  with chronic left knee pain presents for an elective genicular nerve branch block x4 for the left knee. We discussed risks, benefits, and alternatives. he has failed conservation care. We are going to see if a water-cooled radiofrequency ablation procedure to the genicular nerve branches would benefit his knee pain. Patient's verbal consent was obtained. he was brought to the clinic room, placed in supine position. left knee was prepped in the usual sterile fashion. Ultrasound was used to gage the target locations on the skin in the AP plane.  Color Doppler function was used to locate the genicular arteries, which run adjacent to each genicular nerve branch.  A 25-gauge needle was introduced down to the proximal medial tibial metaphysis under ultrasound guidance using an in plane, anterior to posterior approach.  The needle was advanced to approximately 50% depth of the tibia.  Here, 1 cc of 0.25% Marcaine was injected, targeting the inferomedial genicular nerve branch, and the needle was removed.  A similar needle was then inserted at the lateral aspect of the distal femur.  The needle was advanced to approximately 50% of the depth of the femur.  1 cc of 0.25% Marcaine was also injected in this location, targeting the superolateral genicular nerve branch.  A third needle was guided to the medial aspect of the distal femur. 1 cc of 0.25% Marcaine was injected in this location, targeting the superior medial genicular nerve branch. A fourth needle, was inserted in an in plane, lateral to medial approach to the location of the suprapatellar nerve branch, 3 fingerbreadths proximal to the superior pole of the patella.  The needle was advanced to the midline of the  femur using ultrasound guidance. 1 cc of 0.25% Marcaine was injected in this final location, targeting the suprapatellar branch off the femoral nerve. All 4 needles being placed near or around the genicular nerve origin prior to entering the left knee joint. The needles were then removed and a sterile dressing was applied.  A total of 4 genicular nerve branches were injected today.  There were no complications. Neurovascular exam was performed and revealed no injury. We discussed post-procedure protocol including pain diary recording and voiced understanding.      The above note was completed, in part, with the aid of Dragon dictation software/hardware. Translation errors may be present.

## 2019-11-12 NOTE — PROCEDURES
Tendon Sheath: R index MCP, L index MCP  Date/Time: 11/12/2019 9:00 AM  Performed by: Jayy Driscoll MD  Authorized by: Jayy Driscoll MD     Consent Done?:  Yes (Verbal)  Timeout: prior to procedure the correct patient, procedure, and site was verified    Indications:  Pain  Site marked: the procedure site was marked    Timeout: prior to procedure the correct patient, procedure, and site was verified    Location:  Index finger  Site:  R index MCP and L index MCP  Prep: patient was prepped and draped in usual sterile fashion    Ultrasonic guidance for needle placement?: Yes    Needle size:  27 G  Approach:  Volar (Needle in plane, distal to proximal)  Medications:  3 mg betamethasone acetate-betamethasone sodium phosphate 6 mg/mL; 3 mg betamethasone acetate-betamethasone sodium phosphate 6 mg/mL  Patient tolerance:  Patient tolerated the procedure well with no immediate complications   Ultrasound guidance was used for correct needle placement, the images were saved will be uploaded to EMR.

## 2019-11-13 ENCOUNTER — PATIENT MESSAGE (OUTPATIENT)
Dept: PHYSICAL MEDICINE AND REHAB | Facility: CLINIC | Age: 69
End: 2019-11-13

## 2019-11-13 ENCOUNTER — TELEPHONE (OUTPATIENT)
Dept: PHYSICAL MEDICINE AND REHAB | Facility: CLINIC | Age: 69
End: 2019-11-13

## 2019-11-13 DIAGNOSIS — G89.29 CHRONIC PAIN OF LEFT KNEE: Primary | ICD-10-CM

## 2019-11-13 DIAGNOSIS — M25.562 CHRONIC PAIN OF LEFT KNEE: Primary | ICD-10-CM

## 2019-11-13 RX ORDER — MIDAZOLAM HYDROCHLORIDE 1 MG/ML
2 INJECTION INTRAMUSCULAR; INTRAVENOUS ONCE AS NEEDED
Status: CANCELLED | OUTPATIENT
Start: 2019-11-13 | End: 2031-04-11

## 2019-11-13 RX ORDER — SODIUM CHLORIDE, SODIUM LACTATE, POTASSIUM CHLORIDE, CALCIUM CHLORIDE 600; 310; 30; 20 MG/100ML; MG/100ML; MG/100ML; MG/100ML
INJECTION, SOLUTION INTRAVENOUS CONTINUOUS
Status: CANCELLED | OUTPATIENT
Start: 2019-11-13

## 2019-11-13 RX ORDER — LIDOCAINE HYDROCHLORIDE 10 MG/ML
1 INJECTION, SOLUTION EPIDURAL; INFILTRATION; INTRACAUDAL; PERINEURAL ONCE
Status: CANCELLED | OUTPATIENT
Start: 2019-11-13 | End: 2019-11-13

## 2019-11-13 NOTE — TELEPHONE ENCOUNTER
----- Message from Solo Cabrera sent at 11/13/2019  4:28 PM CST -----  Contact: pt  Type:  Patient Returning Call    Who Called:  pt  Who Left Message for Patient:  Mary  Does the patient know what this is regarding?:  scheduling procedure  Best Call Back Number:  [phone]  Additional Information:

## 2019-11-13 NOTE — TELEPHONE ENCOUNTER
----- Message from Louann García sent at 11/13/2019  4:24 PM CST -----  Contact: Patient  Type:  Patient Returning Call    Who Called:  Patient  Who Left Message for Patient: Mary  Best Call Back Number:    Additional Information:  Call to pod, no answer.

## 2019-11-15 ENCOUNTER — TELEPHONE (OUTPATIENT)
Dept: PHYSICAL MEDICINE AND REHAB | Facility: CLINIC | Age: 69
End: 2019-11-15

## 2019-11-15 NOTE — TELEPHONE ENCOUNTER
----- Message from Ebony Logan sent at 11/15/2019  9:05 AM CST -----  Contact: self  Type:  Patient Returning Call    Who Called:  Patient   Who Left Message for Patient:  Mary   Does the patient know what this is regarding?:    Best Call Back Number:  [phone]    Additional Information:

## 2019-11-18 ENCOUNTER — TELEPHONE (OUTPATIENT)
Dept: PHYSICAL MEDICINE AND REHAB | Facility: CLINIC | Age: 69
End: 2019-11-18

## 2019-11-18 NOTE — TELEPHONE ENCOUNTER
Spoke with patient to review pre op questions. Also patient will come in next week to sign consent for procedure.

## 2019-11-19 ENCOUNTER — TELEPHONE (OUTPATIENT)
Dept: PHYSICAL MEDICINE AND REHAB | Facility: CLINIC | Age: 69
End: 2019-11-19

## 2019-11-19 ENCOUNTER — PATIENT MESSAGE (OUTPATIENT)
Dept: SURGERY | Facility: HOSPITAL | Age: 69
End: 2019-11-19

## 2019-11-19 ENCOUNTER — HOSPITAL ENCOUNTER (OUTPATIENT)
Dept: RADIOLOGY | Facility: HOSPITAL | Age: 69
Discharge: HOME | End: 2019-11-19
Attending: ORTHOPAEDIC SURGERY
Payer: MEDICARE

## 2019-11-19 DIAGNOSIS — M17.12 DEGENERATIVE ARTHRITIS OF LEFT KNEE: ICD-10-CM

## 2019-11-19 PROCEDURE — 73700 CT LOWER EXTREMITY W/O DYE: CPT | Mod: TC,PO,LT

## 2019-11-19 PROCEDURE — 73700 CT KNEE WITHOUT CONTRAST LEFT: ICD-10-PCS | Mod: 26,LT,, | Performed by: RADIOLOGY

## 2019-11-19 PROCEDURE — 73700 CT LOWER EXTREMITY W/O DYE: CPT | Mod: 26,LT,, | Performed by: RADIOLOGY

## 2019-11-19 NOTE — TELEPHONE ENCOUNTER
----- Message from Jannie Culp sent at 11/19/2019 11:35 AM CST -----  Contact: self  Type: Needs Medical Advice    Who Called:  Patient  Best Call Back Number: [phone] (home)   Additional Information: patient would like a nurse to call him about some paper work

## 2019-11-25 ENCOUNTER — PATIENT MESSAGE (OUTPATIENT)
Dept: SURGERY | Facility: HOSPITAL | Age: 69
End: 2019-11-25

## 2019-11-26 NOTE — TELEPHONE ENCOUNTER
L/m for patient to send days available and we can schedule an appointment in clinic for both fingers,

## 2019-11-27 ENCOUNTER — PATIENT MESSAGE (OUTPATIENT)
Dept: SURGERY | Facility: HOSPITAL | Age: 69
End: 2019-11-27

## 2019-11-28 ENCOUNTER — PATIENT MESSAGE (OUTPATIENT)
Dept: SURGERY | Facility: HOSPITAL | Age: 69
End: 2019-11-28

## 2019-12-03 ENCOUNTER — OFFICE VISIT (OUTPATIENT)
Dept: PHYSICAL MEDICINE AND REHAB | Facility: CLINIC | Age: 69
End: 2019-12-03
Payer: MEDICARE

## 2019-12-03 VITALS — HEIGHT: 68 IN | BODY MASS INDEX: 33.65 KG/M2 | WEIGHT: 222 LBS

## 2019-12-03 DIAGNOSIS — R07.2 PRECORDIAL PAIN: ICD-10-CM

## 2019-12-03 DIAGNOSIS — M65.342 ACQUIRED TRIGGER FINGER OF BOTH RING FINGERS: Primary | ICD-10-CM

## 2019-12-03 DIAGNOSIS — M65.341 ACQUIRED TRIGGER FINGER OF BOTH RING FINGERS: Primary | ICD-10-CM

## 2019-12-03 PROCEDURE — 1159F MED LIST DOCD IN RCRD: CPT | Mod: S$GLB,,, | Performed by: PHYSICAL MEDICINE & REHABILITATION

## 2019-12-03 PROCEDURE — 1101F PT FALLS ASSESS-DOCD LE1/YR: CPT | Mod: CPTII,S$GLB,, | Performed by: PHYSICAL MEDICINE & REHABILITATION

## 2019-12-03 PROCEDURE — 26055 INCISE FINGER TENDON SHEATH: CPT | Mod: F2,F7,S$GLB, | Performed by: PHYSICAL MEDICINE & REHABILITATION

## 2019-12-03 PROCEDURE — 1101F PR PT FALLS ASSESS DOC 0-1 FALLS W/OUT INJ PAST YR: ICD-10-PCS | Mod: CPTII,S$GLB,, | Performed by: PHYSICAL MEDICINE & REHABILITATION

## 2019-12-03 PROCEDURE — 99212 PR OFFICE/OUTPT VISIT, EST, LEVL II, 10-19 MIN: ICD-10-PCS | Mod: 25,S$GLB,, | Performed by: PHYSICAL MEDICINE & REHABILITATION

## 2019-12-03 PROCEDURE — 1125F AMNT PAIN NOTED PAIN PRSNT: CPT | Mod: S$GLB,,, | Performed by: PHYSICAL MEDICINE & REHABILITATION

## 2019-12-03 PROCEDURE — 99999 PR PBB SHADOW E&M-EST. PATIENT-LVL III: CPT | Mod: PBBFAC,,, | Performed by: PHYSICAL MEDICINE & REHABILITATION

## 2019-12-03 PROCEDURE — 76942 ECHO GUIDE FOR BIOPSY: CPT | Mod: 26,,, | Performed by: PHYSICAL MEDICINE & REHABILITATION

## 2019-12-03 PROCEDURE — 26055 PR INCISE FINGER TENDON SHEATH: ICD-10-PCS | Mod: F2,F7,S$GLB, | Performed by: PHYSICAL MEDICINE & REHABILITATION

## 2019-12-03 PROCEDURE — 99212 OFFICE O/P EST SF 10 MIN: CPT | Mod: 25,S$GLB,, | Performed by: PHYSICAL MEDICINE & REHABILITATION

## 2019-12-03 PROCEDURE — 1125F PR PAIN SEVERITY QUANTIFIED, PAIN PRESENT: ICD-10-PCS | Mod: S$GLB,,, | Performed by: PHYSICAL MEDICINE & REHABILITATION

## 2019-12-03 PROCEDURE — 76942 PR U/S GUIDANCE FOR NEEDLE GUIDANCE: ICD-10-PCS | Mod: 26,,, | Performed by: PHYSICAL MEDICINE & REHABILITATION

## 2019-12-03 PROCEDURE — 1159F PR MEDICATION LIST DOCUMENTED IN MEDICAL RECORD: ICD-10-PCS | Mod: S$GLB,,, | Performed by: PHYSICAL MEDICINE & REHABILITATION

## 2019-12-03 PROCEDURE — 99999 PR PBB SHADOW E&M-EST. PATIENT-LVL III: ICD-10-PCS | Mod: PBBFAC,,, | Performed by: PHYSICAL MEDICINE & REHABILITATION

## 2019-12-03 RX ORDER — ISOSORBIDE MONONITRATE 30 MG/1
30 TABLET, EXTENDED RELEASE ORAL DAILY
Qty: 90 TABLET | Refills: 0 | Status: SHIPPED | OUTPATIENT
Start: 2019-12-03 | End: 2020-02-11

## 2019-12-04 NOTE — H&P (VIEW-ONLY)
[facility]    CHIEF COMPLAINT:   Chief Complaint   Patient presents with    Hand Pain     bilateral injections     HISTORY OF PRESENT ILLNESS: Farhan Stallworth is a 69 y.o. male who presents to me in follow-up for evaluation and treatment for recurrence bilateral ring finger triggering.  I have seen in for trigger finger of the index and ring fingers in the past, providing injection of corticosteroid.  He feels these injections routinely provide relief temporarily but his symptoms seemed to reoccur.  He notes frequent triggering of the ring finger of both hands.  This is painful when it occurs.  He notes tenderness over the palmar aspect of the 4th digit on both sides.  He presents today for potential percutaneous trigger finger release.    Review of Systems   Constitutional: Negative for fever.   HENT: Negative for drooling.    Eyes: Negative for discharge.   Respiratory: Negative for choking.    Cardiovascular: Negative for chest pain.   Genitourinary: Negative for flank pain.   Skin: Negative for wound.   Allergic/Immunologic: Negative for immunocompromised state.   Neurological: Negative for tremors and syncope.   Psychiatric/Behavioral: Negative for behavioral problems.     Past Medical History:   Past Medical History:   Diagnosis Date    Arthritis     hip    Atrial fibrillation     Hearing difficulty     Low serum testosterone level     Lumbar disc disease     REYES (obstructive sleep apnea)     has CPAP, better relief of symptoms with nasal pillow.        Past Surgical History:   Past Surgical History:   Procedure Laterality Date    CARDIOVERSION N/A 1/3/2019    Procedure: CARDIOVERSION;  Surgeon: Nanci Ahuja MD;  [facility];  Service: Cardiology;  Laterality: N/A;    CARPAL TUNNEL RELEASE      2005, bilateral    CLAVICLE SURGERY      CORONARY ANGIOGRAPHY N/A 4/9/2019    Procedure: ANGIOGRAM, CORONARY ARTERY;  Surgeon: Nanci Ahuja MD;  Location: [facility];   Service: Cardiology;  Laterality: N/A;    EPIDURAL BLOCK INJECTION  2010    HIP SURGERY      JOINT REPLACEMENT Right 11/2017    hip    KNEE ARTHROSCOPY W/ MENISCAL REPAIR      left    LEFT HEART CATHETERIZATION Left 4/9/2019    Procedure: Left heart cath;  Surgeon: Nanci Ahuja MD;  [facility] C[facility];  Service: Cardiology;  Laterality: Left;    LUMBAR FUSION      2010    TONSILLECTOMY         Family History:   Family History   Problem Relation Age of Onset    Cancer Father         lung    Cancer Sister         leukemia    No Known Problems Mother     Heart disease Neg Hx     Diabetes Neg Hx        Medications:   Current Outpatient Medications on File Prior to Visit   Medication Sig Dispense Refill    acetaminophen (TYLENOL) 500 MG tablet       apixaban (ELIQUIS) 5 mg Tab Take 1 tablet (5 mg total) by mouth 2 (two) times daily. 10 tablet 0    aspirin (ECOTRIN) 81 MG EC tablet       atorvastatin (LIPITOR) 20 MG tablet Take 1 tablet (20 mg total) by mouth once daily. 7 tablet 0    clopidogrel (PLAVIX) 75 mg tablet Take 1 tablet (75 mg total) by mouth once daily. 90 tablet 1    coenzyme Q10 100 mg/5 mL Syrp       glucosamine-chondroitin 500-400 mg tablet Take 1 tablet by mouth 2 (two) times daily. Triflex      lidocain-me.salicyl-caps-menth 0.5-20-0.035-5 % PtMd       metoprolol succinate (TOPROL-XL) 25 MG 24 hr tablet TAKE 1 TABLET EVERY DAY 90 tablet 1    MULTIVITS-MINERALS/FA/LYCOPENE (MEN'S DAILY MULTIVIT-MINERAL ORAL) Take 1 tablet by mouth once daily.       nitroGLYCERIN (NITROSTAT) 0.4 MG SL tablet PLACE 1 T UNT Q 5 MIN PRN      rosuvastatin (CRESTOR) 10 MG tablet       [DISCONTINUED] isosorbide mononitrate (IMDUR) 30 MG 24 hr tablet Take 1 tablet (30 mg total) by mouth once daily. 90 tablet 1     Current Facility-Administered Medications on File Prior to Visit   Medication Dose Route Frequency Provider Last Rate Last Dose    sodium chloride 0.9% flush 10 mL  10 mL Intravenous PRN  "Nanci Ahuja MD           Allergies: Review of patient's allergies indicates:  No Known Allergies    Social History:   Social History     Socioeconomic History    Marital status:      Spouse name: Not on file    Number of children: Not on file    Years of education: Not on file    Highest education level: Not on file   Occupational History    Not on file   Social Needs    Financial resource strain: Not hard at all    Food insecurity:     Worry: Never true     Inability: Never true    Transportation needs:     Medical: No     Non-medical: No   Tobacco Use    Smoking status: Never Smoker    Smokeless tobacco: Never Used   Substance and Sexual Activity    Alcohol use: Not Currently     Alcohol/week: 0.0 standard drinks     Frequency: 2-4 times a month     Drinks per session: 1 or 2     Binge frequency: Never    Drug use: No    Sexual activity: Not on file   Lifestyle    Physical activity:     Days per week: 7 days     Minutes per session: 60 min    Stress: Only a little   Relationships    Social connections:     Talks on phone: More than three times a week     Gets together: Three times a week     Attends Jainism service: Not on file     Active member of club or organization: Yes     Attends meetings of clubs or organizations: More than 4 times per year     Relationship status:    Other Topics Concern    Not on file   Social History Narrative    Not on file     PHYSICAL EXAMINATION:   General    Vitals:    12/03/19 0825   Weight: 100.7 kg (222 lb)   Height: 5' 8" (1.727 m)     Constitutional: Oriented to person, place, and time. No apparent distress. Pleasant.  HENT:   Head: Normocephalic and atraumatic.   Eyes: Right eye exhibits no discharge. Left eye exhibits no discharge. No scleral icterus.   Pulmonary/Chest: Effort normal. No respiratory distress.   Abdominal: There is no guarding.   Neurological: Alert and oriented to person, place, and time.   Psychiatric: Behavior is normal. "   Right Hand Exam     Tenderness   Right hand tenderness location: Tender over the volar aspect of the 4th MCP.    Range of Motion   Wrist   Extension: 45   Flexion: 80   Pronation: normal   Supination: normal   Hand   MP Rin   PIP Rin   DIP Rin     Muscle Strength   Wrist extension: 5/5   Wrist flexion: 5/5   : 5/5     Other   Erythema: absent  Scars: absent  Sensation: normal  Pulse: present    Comments:  Palpable nodule just proximal to the 4th volar MCP      Left Hand Exam     Tenderness   Left hand tenderness location: Tender over the volar aspect of the 4th MCP.     Range of Motion   Wrist   Extension: 45   Flexion: 80   Pronation: normal   Supination: normal   Hand   MP Rin   PIP Rin   DIP Rin     Muscle Strength   Wrist extension: 5/5   Wrist flexion: 5/5   :  5/5     Other   Erythema: absent  Scars: absent  Sensation: normal  Pulse: present    Comments:  Palpable nodule just proximal to the 4th volar MCP        INSPECTION: There is no swelling, ecchymoses, erythema or gross deformity about the hands.    ASSESSMENT:   1. Acquired trigger finger of both ring fingers      PLAN:     1. Time was spent reviewing the above diagnosis in depth with Farhan bennett, including acute management and rehabilitation.     2.  He has signs and symptoms consistent with bilateral ring trigger fingers.  He has responded well to prior injections of corticosteroid, however effects of in temporary.  He wishes to proceed with more aggressive interventions in the hopes of permanent symptom resolution.  We discussed ultrasound-guided percutaneous trigger finger release.  We discussed that this procedures not guarantee complete are permanent symptom resolution over the need for further interventions.  We discussed potential risks such as infection.  We discussed that the hands may be sore for the next several days followed by gradual improvement.  He voiced understanding wished to proceed, see  separate procedure note.  I recommended he avoid lifting more than 15 lb with either hand over the next 1 week.  He should then gradually increase physical activity as tolerated.    3.  He plans to return in a few days for the genicular nerve radiofrequency ablation.  We will follow-up with him in the next several weeks in regards to both his knee and his hands.    Ultrasound-guided percutaneous right and left 4th A1 pulley release  Performed by: Jayy Driscoll MD  Authorized by: Jayy Driscoll MD      Consent Done?:  Yes (Verbal)  Indications:  Pain  Site marked: The procedure site was marked    Timeout: Prior to procedure the correct patient, procedure, and site was verified       Location: Right and left 4th A1 pulley.  Prep: Patient was prepped and draped in usual sterile fashion    Ultrasonic Guidance for needle placement: Yes  Images are saved and documented.  Needle size:  18 G (Filter needle)  Approach: Needle in plane, distal to proximal.  Medications:   1% lidocaine for local anesthesia  Patient tolerance:  Patient tolerated the procedure well with no immediate complications      Additional Comments: Ultrasound guidance was used for correct needle placement, the images were saved will be uploaded to EMR. The filter needle was guided under ultrasound guidance to the right and left 4th A1 pullies.  Care was taken not to advance the tip of the filter needle into the underlying finger flexor tendons.  The filter needle tip was used to fenestrate the A1 pulley.  This was done until a loss of resistance was felt.  The needle was then removed and the area was cleaned and bandage applied.    The above note was completed, in part, with the aid of Dragon dictation software/hardware. Translation errors may be present.

## 2019-12-04 NOTE — PROGRESS NOTES
[facility]    CHIEF COMPLAINT:   Chief Complaint   Patient presents with    Hand Pain     bilateral injections     HISTORY OF PRESENT ILLNESS: Farhan Stallworth is a 69 y.o. male who presents to me in follow-up for evaluation and treatment for recurrence bilateral ring finger triggering.  I have seen in for trigger finger of the index and ring fingers in the past, providing injection of corticosteroid.  He feels these injections routinely provide relief temporarily but his symptoms seemed to reoccur.  He notes frequent triggering of the ring finger of both hands.  This is painful when it occurs.  He notes tenderness over the palmar aspect of the 4th digit on both sides.  He presents today for potential percutaneous trigger finger release.    Review of Systems   Constitutional: Negative for fever.   HENT: Negative for drooling.    Eyes: Negative for discharge.   Respiratory: Negative for choking.    Cardiovascular: Negative for chest pain.   Genitourinary: Negative for flank pain.   Skin: Negative for wound.   Allergic/Immunologic: Negative for immunocompromised state.   Neurological: Negative for tremors and syncope.   Psychiatric/Behavioral: Negative for behavioral problems.     Past Medical History:   Past Medical History:   Diagnosis Date    Arthritis     hip    Atrial fibrillation     Hearing difficulty     Low serum testosterone level     Lumbar disc disease     REYES (obstructive sleep apnea)     has CPAP, better relief of symptoms with nasal pillow.        Past Surgical History:   Past Surgical History:   Procedure Laterality Date    CARDIOVERSION N/A 1/3/2019    Procedure: CARDIOVERSION;  Surgeon: Nanci Ahuja MD;  Location: [facility];  Service: Cardiology;  Laterality: N/A;    CARPAL TUNNEL RELEASE      2005, bilateral    CLAVICLE SURGERY      CORONARY ANGIOGRAPHY N/A 4/9/2019    Procedure: ANGIOGRAM, CORONARY ARTERY;  Surgeon: Nanci Ahuja MD;  Location: [facility];   Service: Cardiology;  Laterality: N/A;    EPIDURAL BLOCK INJECTION  2010    HIP SURGERY      JOINT REPLACEMENT Right 11/2017    hip    KNEE ARTHROSCOPY W/ MENISCAL REPAIR      left    LEFT HEART CATHETERIZATION Left 4/9/2019    Procedure: Left heart cath;  Surgeon: Nanci Ahuja MD;  Location[facility] C[facility];  Service: Cardiology;  Laterality: Left;    LUMBAR FUSION      2010    TONSILLECTOMY         Family History:   Family History   Problem Relation Age of Onset    Cancer Father         lung    Cancer Sister         leukemia    No Known Problems Mother     Heart disease Neg Hx     Diabetes Neg Hx        Medications:   Current Outpatient Medications on File Prior to Visit   Medication Sig Dispense Refill    acetaminophen (TYLENOL) 500 MG tablet       apixaban (ELIQUIS) 5 mg Tab Take 1 tablet (5 mg total) by mouth 2 (two) times daily. 10 tablet 0    aspirin (ECOTRIN) 81 MG EC tablet       atorvastatin (LIPITOR) 20 MG tablet Take 1 tablet (20 mg total) by mouth once daily. 7 tablet 0    clopidogrel (PLAVIX) 75 mg tablet Take 1 tablet (75 mg total) by mouth once daily. 90 tablet 1    coenzyme Q10 100 mg/5 mL Syrp       glucosamine-chondroitin 500-400 mg tablet Take 1 tablet by mouth 2 (two) times daily. Triflex      lidocain-me.salicyl-caps-menth 0.5-20-0.035-5 % PtMd       metoprolol succinate (TOPROL-XL) 25 MG 24 hr tablet TAKE 1 TABLET EVERY DAY 90 tablet 1    MULTIVITS-MINERALS/FA/LYCOPENE (MEN'S DAILY MULTIVIT-MINERAL ORAL) Take 1 tablet by mouth once daily.       nitroGLYCERIN (NITROSTAT) 0.4 MG SL tablet PLACE 1 T UNT Q 5 MIN PRN      rosuvastatin (CRESTOR) 10 MG tablet       [DISCONTINUED] isosorbide mononitrate (IMDUR) 30 MG 24 hr tablet Take 1 tablet (30 mg total) by mouth once daily. 90 tablet 1     Current Facility-Administered Medications on File Prior to Visit   Medication Dose Route Frequency Provider Last Rate Last Dose    sodium chloride 0.9% flush 10 mL  10 mL Intravenous PRN  "Nanci Ahuja MD           Allergies: Review of patient's allergies indicates:  No Known Allergies    Social History:   Social History     Socioeconomic History    Marital status:      Spouse name: Not on file    Number of children: Not on file    Years of education: Not on file    Highest education level: Not on file   Occupational History    Not on file   Social Needs    Financial resource strain: Not hard at all    Food insecurity:     Worry: Never true     Inability: Never true    Transportation needs:     Medical: No     Non-medical: No   Tobacco Use    Smoking status: Never Smoker    Smokeless tobacco: Never Used   Substance and Sexual Activity    Alcohol use: Not Currently     Alcohol/week: 0.0 standard drinks     Frequency: 2-4 times a month     Drinks per session: 1 or 2     Binge frequency: Never    Drug use: No    Sexual activity: Not on file   Lifestyle    Physical activity:     Days per week: 7 days     Minutes per session: 60 min    Stress: Only a little   Relationships    Social connections:     Talks on phone: More than three times a week     Gets together: Three times a week     Attends Restorationism service: Not on file     Active member of club or organization: Yes     Attends meetings of clubs or organizations: More than 4 times per year     Relationship status:    Other Topics Concern    Not on file   Social History Narrative    Not on file     PHYSICAL EXAMINATION:   General    Vitals:    12/03/19 0825   Weight: 100.7 kg (222 lb)   Height: 5' 8" (1.727 m)     Constitutional: Oriented to person, place, and time. No apparent distress. Pleasant.  HENT:   Head: Normocephalic and atraumatic.   Eyes: Right eye exhibits no discharge. Left eye exhibits no discharge. No scleral icterus.   Pulmonary/Chest: Effort normal. No respiratory distress.   Abdominal: There is no guarding.   Neurological: Alert and oriented to person, place, and time.   Psychiatric: Behavior is normal. "   Right Hand Exam     Tenderness   Right hand tenderness location: Tender over the volar aspect of the 4th MCP.    Range of Motion   Wrist   Extension: 45   Flexion: 80   Pronation: normal   Supination: normal   Hand   MP Rin   PIP Rin   DIP Rin     Muscle Strength   Wrist extension: 5/5   Wrist flexion: 5/5   : 5/5     Other   Erythema: absent  Scars: absent  Sensation: normal  Pulse: present    Comments:  Palpable nodule just proximal to the 4th volar MCP      Left Hand Exam     Tenderness   Left hand tenderness location: Tender over the volar aspect of the 4th MCP.     Range of Motion   Wrist   Extension: 45   Flexion: 80   Pronation: normal   Supination: normal   Hand   MP Rin   PIP Rin   DIP Rin     Muscle Strength   Wrist extension: 5/5   Wrist flexion: 5/5   :  5/5     Other   Erythema: absent  Scars: absent  Sensation: normal  Pulse: present    Comments:  Palpable nodule just proximal to the 4th volar MCP        INSPECTION: There is no swelling, ecchymoses, erythema or gross deformity about the hands.    ASSESSMENT:   1. Acquired trigger finger of both ring fingers      PLAN:     1. Time was spent reviewing the above diagnosis in depth with Farhan bennett, including acute management and rehabilitation.     2.  He has signs and symptoms consistent with bilateral ring trigger fingers.  He has responded well to prior injections of corticosteroid, however effects of in temporary.  He wishes to proceed with more aggressive interventions in the hopes of permanent symptom resolution.  We discussed ultrasound-guided percutaneous trigger finger release.  We discussed that this procedures not guarantee complete are permanent symptom resolution over the need for further interventions.  We discussed potential risks such as infection.  We discussed that the hands may be sore for the next several days followed by gradual improvement.  He voiced understanding wished to proceed, see  separate procedure note.  I recommended he avoid lifting more than 15 lb with either hand over the next 1 week.  He should then gradually increase physical activity as tolerated.    3.  He plans to return in a few days for the genicular nerve radiofrequency ablation.  We will follow-up with him in the next several weeks in regards to both his knee and his hands.    Ultrasound-guided percutaneous right and left 4th A1 pulley release  Performed by: Jayy Driscoll MD  Authorized by: Jayy Driscoll MD      Consent Done?:  Yes (Verbal)  Indications:  Pain  Site marked: The procedure site was marked    Timeout: Prior to procedure the correct patient, procedure, and site was verified       Location: Right and left 4th A1 pulley.  Prep: Patient was prepped and draped in usual sterile fashion    Ultrasonic Guidance for needle placement: Yes  Images are saved and documented.  Needle size:  18 G (Filter needle)  Approach: Needle in plane, distal to proximal.  Medications:   1% lidocaine for local anesthesia  Patient tolerance:  Patient tolerated the procedure well with no immediate complications      Additional Comments: Ultrasound guidance was used for correct needle placement, the images were saved will be uploaded to EMR. The filter needle was guided under ultrasound guidance to the right and left 4th A1 pullies.  Care was taken not to advance the tip of the filter needle into the underlying finger flexor tendons.  The filter needle tip was used to fenestrate the A1 pulley.  This was done until a loss of resistance was felt.  The needle was then removed and the area was cleaned and bandage applied.    The above note was completed, in part, with the aid of Dragon dictation software/hardware. Translation errors may be present.

## 2019-12-05 ENCOUNTER — PATIENT MESSAGE (OUTPATIENT)
Dept: CARDIOLOGY | Facility: CLINIC | Age: 69
End: 2019-12-05

## 2019-12-05 ENCOUNTER — TELEPHONE (OUTPATIENT)
Dept: CARDIOLOGY | Facility: CLINIC | Age: 69
End: 2019-12-05

## 2019-12-05 DIAGNOSIS — M25.562 LEFT KNEE PAIN: Primary | ICD-10-CM

## 2019-12-05 NOTE — TELEPHONE ENCOUNTER
----- Message from Hayley Bagn sent at 12/5/2019  4:21 PM CST -----  Contact: pt  ..Type: Needs Medical Advice    Who Called:  Pt  Best Call Back Number: .[phone]  Additional Information: Pt requesting to speak with a nurse regarding his procedure  Please call pt to discuss  Thanks

## 2019-12-06 ENCOUNTER — HOSPITAL ENCOUNTER (OUTPATIENT)
Dept: RADIOLOGY | Facility: HOSPITAL | Age: 69
Discharge: HOME | End: 2019-12-06
Attending: PHYSICAL MEDICINE & REHABILITATION | Admitting: PHYSICAL MEDICINE & REHABILITATION
Payer: MEDICARE

## 2019-12-06 ENCOUNTER — HOSPITAL ENCOUNTER (OUTPATIENT)
Facility: HOSPITAL | Age: 69
Discharge: HOME | End: 2019-12-06
Attending: PHYSICAL MEDICINE & REHABILITATION | Admitting: PHYSICAL MEDICINE & REHABILITATION
Payer: MEDICARE

## 2019-12-06 VITALS
RESPIRATION RATE: 16 BRPM | DIASTOLIC BLOOD PRESSURE: 67 MMHG | TEMPERATURE: 98 F | HEIGHT: 68 IN | WEIGHT: 210 LBS | BODY MASS INDEX: 31.83 KG/M2 | OXYGEN SATURATION: 98 % | SYSTOLIC BLOOD PRESSURE: 124 MMHG | HEART RATE: 66 BPM

## 2019-12-06 DIAGNOSIS — M25.562 CHRONIC PAIN OF LEFT KNEE: ICD-10-CM

## 2019-12-06 DIAGNOSIS — M25.562 LEFT KNEE PAIN: ICD-10-CM

## 2019-12-06 DIAGNOSIS — G89.29 CHRONIC PAIN OF LEFT KNEE: ICD-10-CM

## 2019-12-06 PROCEDURE — 64640 INJECTION TREATMENT OF NERVE: CPT | Mod: PO | Performed by: PHYSICAL MEDICINE & REHABILITATION

## 2019-12-06 PROCEDURE — 99152 PR MOD CONSCIOUS SEDATION, SAME PHYS, 5+ YRS, FIRST 15 MIN: ICD-10-PCS | Mod: ,,, | Performed by: PHYSICAL MEDICINE & REHABILITATION

## 2019-12-06 PROCEDURE — 63600175 PHARM REV CODE 636 W HCPCS: Mod: PO | Performed by: PHYSICAL MEDICINE & REHABILITATION

## 2019-12-06 PROCEDURE — 64640 INJECTION TREATMENT OF NERVE: CPT | Mod: LT,,, | Performed by: PHYSICAL MEDICINE & REHABILITATION

## 2019-12-06 PROCEDURE — 76000 FLUOROSCOPY <1 HR PHYS/QHP: CPT | Mod: TC,PO

## 2019-12-06 PROCEDURE — 25000003 PHARM REV CODE 250: Mod: PO | Performed by: PHYSICAL MEDICINE & REHABILITATION

## 2019-12-06 PROCEDURE — 99152 MOD SED SAME PHYS/QHP 5/>YRS: CPT | Mod: ,,, | Performed by: PHYSICAL MEDICINE & REHABILITATION

## 2019-12-06 PROCEDURE — 64640 PR DESTRUCT BY NEURO AGENT; OTHER PERIPH NERVE: ICD-10-PCS | Mod: LT,,, | Performed by: PHYSICAL MEDICINE & REHABILITATION

## 2019-12-06 RX ORDER — FENTANYL CITRATE 50 UG/ML
INJECTION, SOLUTION INTRAMUSCULAR; INTRAVENOUS
Status: DISCONTINUED | OUTPATIENT
Start: 2019-12-06 | End: 2019-12-06 | Stop reason: HOSPADM

## 2019-12-06 RX ORDER — MIDAZOLAM HYDROCHLORIDE 1 MG/ML
2 INJECTION INTRAMUSCULAR; INTRAVENOUS ONCE AS NEEDED
Status: COMPLETED | OUTPATIENT
Start: 2019-12-06 | End: 2019-12-06

## 2019-12-06 RX ORDER — LIDOCAINE HYDROCHLORIDE 10 MG/ML
INJECTION, SOLUTION EPIDURAL; INFILTRATION; INTRACAUDAL; PERINEURAL
Status: DISCONTINUED | OUTPATIENT
Start: 2019-12-06 | End: 2019-12-06 | Stop reason: HOSPADM

## 2019-12-06 RX ORDER — LIDOCAINE HYDROCHLORIDE 20 MG/ML
INJECTION, SOLUTION EPIDURAL; INFILTRATION; INTRACAUDAL; PERINEURAL
Status: DISCONTINUED | OUTPATIENT
Start: 2019-12-06 | End: 2019-12-06 | Stop reason: HOSPADM

## 2019-12-06 RX ORDER — LIDOCAINE HYDROCHLORIDE 10 MG/ML
1 INJECTION, SOLUTION EPIDURAL; INFILTRATION; INTRACAUDAL; PERINEURAL ONCE
Status: DISCONTINUED | OUTPATIENT
Start: 2019-12-06 | End: 2019-12-06 | Stop reason: HOSPADM

## 2019-12-06 RX ORDER — SODIUM CHLORIDE, SODIUM LACTATE, POTASSIUM CHLORIDE, CALCIUM CHLORIDE 600; 310; 30; 20 MG/100ML; MG/100ML; MG/100ML; MG/100ML
INJECTION, SOLUTION INTRAVENOUS CONTINUOUS
Status: DISCONTINUED | OUTPATIENT
Start: 2019-12-06 | End: 2019-12-06 | Stop reason: HOSPADM

## 2019-12-06 RX ADMIN — SODIUM CHLORIDE, SODIUM LACTATE, POTASSIUM CHLORIDE, AND CALCIUM CHLORIDE: .6; .31; .03; .02 INJECTION, SOLUTION INTRAVENOUS at 09:12

## 2019-12-06 NOTE — INTERVAL H&P NOTE
The patient has been examined and the H&P has been reviewed:    I concur with the findings and no changes have occurred since H&P was written. Malampatti score II, ASA 2    Anesthesia/Surgery risks, benefits and alternative options discussed and understood by patient/family.          There are no hospital problems to display for this patient.

## 2019-12-06 NOTE — OP NOTE
Operative Note       Surgery Date: 12/6/2019     Surgeon(s) and Role:     * Jayy Driscoll MD - Primary    Pre-op Diagnosis:  Chronic pain of left knee [M25.562, G89.29]    Post-op Diagnosis: Post-Op Diagnosis Codes:     * Chronic pain of left knee [M25.562, G89.29]    Procedure:  1. Left knee genicular nerve radiofrequency ablation x4 nerve branches.  A. Inferomedial genicular nerve  B. Superolateral genicular nerve  C. Superomedial genicular nerve   D. Suprapatellar branch of the femoral nerve  2.  Fluoroscopic guidance.    Anesthesia: RN IV Sedation    Description of Procedure:    Farhan Stallworth is a 69 y.o. male with chronic left knee pain who presents for an elective radiofrequency genicular nerve ablation of a total of 4 individual genicular branches of the left knee. He did get significant pain relief (>75%) from the diagnostic block of the genicular nerves and has elected to undergo the RF procedure in the hopes of longer term pain relief. We discussed risks, benefits, and alternatives. Patient's verbal and written consent was obtained. He was brought to the fluoro scopic suite, placed in supine position. The left knee was prepped and draped in the usual sterile fashion. 1% lidocaine was used to anesthetize the overlying subcutaneous cutaneous tissues at each of the 4 sites using a 27-gauge 1.5 inch needle on a 10 cc syringe. Using AP and lateral views, a 17-gauge introducer needle was guided under intermittent fluoroscopic imaging first to the distal metaphysis of the medial aspect of the femur. A similar needle was placed at the lateral aspect of the distal femur. A third introducer needle was guided to the proximal aspect of the tibial metaphysis medially. Finally, the fourth needle was introduced to the distal femur, two finger breadths proximal to the superior border of the patella. All 4 needles being placed near or around each genicular nerve origin prior to entering the knee joint. 1 cc of  2% lidocaine was then injected into the introducer needles and stylets were replaced. Next, the radio frequency probe was placed into the introducer needle and radiofrequency lesioning was performed at 84°C for 2 minutes, 30 seconds. Next, the probe was removed and placed in the second genicular location and radiofrequency lesioning was performed here. Finally, the third and fourth radio frequency lesioning was performed at the last two branches. The needles were then re-styletted and removed. A total of 4 nerve branches were lesioned today: Inferomedial genicular nerve, Superolateral genicular nerve, Superomedial genicular nerve, and Suprapatellar branch of the femoral nerve. Band-Aids were applied to the puncture sites after applying antibiotic ointment. The patient was transported to the postoperative recovery area in good condition. Approximately 20 minutes after the procedure, motor strength examination revealed no weakness. The patient reported no paresthesias in the affected extremity. Patient was discharged in good condition and will follow-up in 2 weeks in the office.     Estimated Blood Loss: Minimal    Attestation:  I performed the procedure.           Discharge Note    Admit Date: 12/6/2019    Attending Physician: Jayy Driscoll MD     Discharge Physician: Jayy Driscoll MD    Final Diagnosis: Post-Op Diagnosis Codes:     * Chronic pain of left knee [M25.562, G89.29]    Disposition: Home or Self Care, pt discharged in good condition and will f/u in clinic in 2 weeks.    Patient Instructions:   Current Discharge Medication List      CONTINUE these medications which have NOT CHANGED    Details   acetaminophen (TYLENOL) 500 MG tablet       apixaban (ELIQUIS) 5 mg Tab Take 1 tablet (5 mg total) by mouth 2 (two) times daily.  Qty: 10 tablet, Refills: 0    Associated Diagnoses: New onset atrial fibrillation      aspirin (ECOTRIN) 81 MG EC tablet       atorvastatin (LIPITOR) 20 MG tablet Take 1  tablet (20 mg total) by mouth once daily.  Qty: 7 tablet, Refills: 0      clopidogrel (PLAVIX) 75 mg tablet Take 1 tablet (75 mg total) by mouth once daily.  Qty: 90 tablet, Refills: 1      coenzyme Q10 100 mg/5 mL Syrp       glucosamine-chondroitin 500-400 mg tablet Take 1 tablet by mouth 2 (two) times daily. Triflex      isosorbide mononitrate (IMDUR) 30 MG 24 hr tablet TAKE 1 TABLET (30 MG TOTAL) BY MOUTH ONCE DAILY.  Qty: 90 tablet, Refills: 0    Associated Diagnoses: Precordial pain      metoprolol succinate (TOPROL-XL) 25 MG 24 hr tablet TAKE 1 TABLET EVERY DAY  Qty: 90 tablet, Refills: 1      MULTIVITS-MINERALS/FA/LYCOPENE (MEN'S DAILY MULTIVIT-MINERAL ORAL) Take 1 tablet by mouth once daily.       lidocain-me.salicyl-caps-menth 0.5-20-0.035-5 % PtMd       nitroGLYCERIN (NITROSTAT) 0.4 MG SL tablet PLACE 1 T UNT Q 5 MIN PRN         STOP taking these medications       rosuvastatin (CRESTOR) 10 MG tablet Comments:   Reason for Stopping:               Discharge Procedure Orders (must include Diet, Follow-up, Activity)   Discharge Procedure Orders (must include Diet, Follow-up, Activity)   Diet general     Ice to affected area     Call MD for:  temperature >100.4     Call MD for:  persistent nausea and vomiting     Call MD for:  severe uncontrolled pain     Call MD for:  difficulty breathing, headache or visual disturbances     Call MD for:  redness, tenderness, or signs of infection (pain, swelling, redness, odor or green/yellow discharge around incision site)     Call MD for:  hives     Call MD for:  persistent dizziness or light-headedness     Call MD for:  extreme fatigue     No dressing needed     Shower on day dressing removed (No bath)        Discharge Date: 12/06/2019

## 2019-12-06 NOTE — DISCHARGE INSTRUCTIONS
Recovery After Procedural Sedation (Adult)  You have been given medicine by vein to make you sleep during your surgery. This may have included both a pain medicine and sleeping medicine. Most of the effects have worn off. But you may still have some drowsiness for the next 6 to 8 hours.  Home care  Follow these guidelines when you get home:  · For the next 8 hours, you should be watched by a responsible adult. This person should make sure your condition is not getting worse.  · Don't drink any alcohol for the next 24 hours.  · Don't drive, operate dangerous machinery, or make important business or personal decisions during the next 24 hours.  Note: Your healthcare provider may tell you not to take any medicine by mouth for pain or sleep in the next 4 hours. These medicines may react with the medicines you were given in the hospital. This could cause a much stronger response than usual.  Follow-up care  Follow up with your healthcare provider if you are not alert and back to your usual level of activity within 12 hours.  When to seek medical advice  Call your healthcare provider right away if any of these occur:  · Drowsiness gets worse  · Weakness or dizziness gets worse  · Repeated vomiting  · You can't be awakened   Date Last Reviewed: 10/18/2016  © 6620-5526 [employer]. [address] [place] [address], [place] [address]. All rights reserved. This information is not intended as a substitute for professional medical care. Always follow your healthcare professional's instructions.            PAIN MANAGEMENT    Home care instructions   Apply ice pack to the injection site for 20 minute prior for the first 24 hours for soreness/discomfort at injection site   DO NOT USE HEAT FOR 24 HOURS   Keep site clean and dry for 24 hours, remove bandaid when desired   Do not drive until tomorrow  Take care when walking after a lumbar injection     STEROIDS OR RADIOFREQUENCY    May take 10-14 days for full effects  Avoid  strenuous exercises for 2 days            Resume Aspirin, Plavix, or Coumadin the day after the procedure unless other wise instructed  Resume home medication as prescribed today      CALL PHYSICIAN FOR:   Severe increase in your usual pain or appearance of new pain   Prolonged or increasing weakness or numbness in the legs or arms   Fever greater then 100 degrees F..   Drainage from the incision site, redness, active bleeding or increased swelling at the injection site   Headache that increases when your head is upright and decreases when you lie flat    FOR EMERGENCIES:   Go directly to Emergency Department for Shortness of breath, chest pain, or problems breathing

## 2019-12-06 NOTE — PLAN OF CARE
bandaids x 4 to left knee with minimal bleeding noted, spoke with Dr. Casarez and can continue blood thinners tomorrow.

## 2019-12-06 NOTE — PLAN OF CARE
Getting up to dress with spouse at side. Middle bandaid oozing.. 2x2 gauze with bandaid applied and one to left of kneecap applied and wrapped with coban, instructed to remove coban when arrive home. Verbalized understanding.

## 2019-12-16 RX ORDER — ATORVASTATIN CALCIUM 20 MG/1
TABLET, FILM COATED ORAL
Qty: 90 TABLET | Refills: 0 | Status: SHIPPED | OUTPATIENT
Start: 2019-12-16 | End: 2020-03-19

## 2019-12-19 ENCOUNTER — PATIENT MESSAGE (OUTPATIENT)
Dept: PHYSICAL MEDICINE AND REHAB | Facility: CLINIC | Age: 69
End: 2019-12-19

## 2019-12-19 RX ORDER — METHYLPREDNISOLONE 4 MG/1
TABLET ORAL
Qty: 1 PACKAGE | Refills: 0 | Status: SHIPPED | OUTPATIENT
Start: 2019-12-19 | End: 2020-01-09

## 2019-12-23 ENCOUNTER — PATIENT MESSAGE (OUTPATIENT)
Dept: PHYSICAL MEDICINE AND REHAB | Facility: CLINIC | Age: 69
End: 2019-12-23

## 2020-01-06 RX ORDER — CLOPIDOGREL BISULFATE 75 MG/1
TABLET ORAL
Qty: 90 TABLET | Refills: 1 | Status: SHIPPED | OUTPATIENT
Start: 2020-01-06 | End: 2020-03-30 | Stop reason: ALTCHOICE

## 2020-01-08 ENCOUNTER — PATIENT MESSAGE (OUTPATIENT)
Dept: FAMILY MEDICINE | Facility: CLINIC | Age: 70
End: 2020-01-08

## 2020-01-08 ENCOUNTER — TELEPHONE (OUTPATIENT)
Dept: CARDIOLOGY | Facility: CLINIC | Age: 70
End: 2020-01-08

## 2020-01-08 ENCOUNTER — PATIENT MESSAGE (OUTPATIENT)
Dept: CARDIOLOGY | Facility: CLINIC | Age: 70
End: 2020-01-08

## 2020-01-08 NOTE — TELEPHONE ENCOUNTER
Spoke to Maria De Jesus, she stated the patient was informed to call for an appt for pre-op clearance.

## 2020-01-08 NOTE — TELEPHONE ENCOUNTER
Request for Cardiac Clearance    Farhan Stallworth  is having left knee replacement on 1/30 and needs clearance.     Please advise on any medication holds. Pt taking plavix, asa, eliquis    Please indicate if patient is cleared from a Cardiac standpoint.

## 2020-01-08 NOTE — TELEPHONE ENCOUNTER
----- Message from Solo Cabrera sent at 1/8/2020 12:14 PM CST -----  Contact: Pina with [facility]  Type:  Sooner Apoointment Request    Caller is requesting a sooner appointment.  Caller declined first available appointment listed below.  Caller will not accept being placed on the waitlist and is requesting a message be sent to doctor.    Name of Caller:  Pina  When is the first available appointment?  02/06/2020   Additional Information:  Pt needs an appointment for surgical clearance. Please call Maria De Jesus the joint coordinator for [facility] to schedule appointment. Pt is out of town till 01/15/2020 and is in town until 01/21/2020.    Best Call Back Number:  [phone]

## 2020-01-09 ENCOUNTER — PATIENT MESSAGE (OUTPATIENT)
Dept: CARDIOLOGY | Facility: CLINIC | Age: 70
End: 2020-01-09

## 2020-01-10 ENCOUNTER — TELEPHONE (OUTPATIENT)
Dept: CARDIOLOGY | Facility: CLINIC | Age: 70
End: 2020-01-10

## 2020-01-10 ENCOUNTER — PATIENT MESSAGE (OUTPATIENT)
Dept: CARDIOLOGY | Facility: CLINIC | Age: 70
End: 2020-01-10

## 2020-01-10 NOTE — TELEPHONE ENCOUNTER
----- Message from Camila Reyes sent at 1/10/2020  8:45 AM CST -----  Who Called:  Maria De Jesus with [facility] Pre [facility]  Best Call Back Number: [phone]  Additional Information: requesting a call back from the nurse in regards to the patient

## 2020-01-13 RX ORDER — METOPROLOL SUCCINATE 25 MG/1
25 TABLET, EXTENDED RELEASE ORAL DAILY
Qty: 90 TABLET | Refills: 1 | Status: SHIPPED | OUTPATIENT
Start: 2020-01-13 | End: 2020-01-14 | Stop reason: SDUPTHER

## 2020-01-14 RX ORDER — METOPROLOL SUCCINATE 25 MG/1
25 TABLET, EXTENDED RELEASE ORAL DAILY
Qty: 90 TABLET | Refills: 1 | Status: SHIPPED | OUTPATIENT
Start: 2020-01-14 | End: 2020-02-07 | Stop reason: SDUPTHER

## 2020-01-16 ENCOUNTER — OFFICE VISIT (OUTPATIENT)
Dept: FAMILY MEDICINE | Facility: CLINIC | Age: 70
End: 2020-01-16
Payer: MEDICARE

## 2020-01-16 VITALS
DIASTOLIC BLOOD PRESSURE: 78 MMHG | TEMPERATURE: 98 F | SYSTOLIC BLOOD PRESSURE: 138 MMHG | RESPIRATION RATE: 16 BRPM | BODY MASS INDEX: 33.03 KG/M2 | WEIGHT: 217.94 LBS | HEART RATE: 50 BPM | HEIGHT: 68 IN

## 2020-01-16 DIAGNOSIS — Z01.810 PREOP CARDIOVASCULAR EXAM: Primary | ICD-10-CM

## 2020-01-16 DIAGNOSIS — Z95.5 STENTED CORONARY ARTERY: ICD-10-CM

## 2020-01-16 DIAGNOSIS — I10 ESSENTIAL HYPERTENSION: ICD-10-CM

## 2020-01-16 DIAGNOSIS — I48.91 ATRIAL FIBRILLATION, UNSPECIFIED TYPE: ICD-10-CM

## 2020-01-16 DIAGNOSIS — E78.5 DYSLIPIDEMIA (HIGH LDL; LOW HDL): ICD-10-CM

## 2020-01-16 DIAGNOSIS — R93.89 ABNORMAL CXR: ICD-10-CM

## 2020-01-16 PROCEDURE — 1125F PR PAIN SEVERITY QUANTIFIED, PAIN PRESENT: ICD-10-PCS | Mod: S$GLB,,, | Performed by: FAMILY MEDICINE

## 2020-01-16 PROCEDURE — 1101F PR PT FALLS ASSESS DOC 0-1 FALLS W/OUT INJ PAST YR: ICD-10-PCS | Mod: CPTII,S$GLB,, | Performed by: FAMILY MEDICINE

## 2020-01-16 PROCEDURE — 3075F PR MOST RECENT SYSTOLIC BLOOD PRESS GE 130-139MM HG: ICD-10-PCS | Mod: CPTII,S$GLB,, | Performed by: FAMILY MEDICINE

## 2020-01-16 PROCEDURE — 99999 PR PBB SHADOW E&M-EST. PATIENT-LVL IV: ICD-10-PCS | Mod: PBBFAC,,, | Performed by: FAMILY MEDICINE

## 2020-01-16 PROCEDURE — 1125F AMNT PAIN NOTED PAIN PRSNT: CPT | Mod: S$GLB,,, | Performed by: FAMILY MEDICINE

## 2020-01-16 PROCEDURE — 3075F SYST BP GE 130 - 139MM HG: CPT | Mod: CPTII,S$GLB,, | Performed by: FAMILY MEDICINE

## 2020-01-16 PROCEDURE — 3078F PR MOST RECENT DIASTOLIC BLOOD PRESSURE < 80 MM HG: ICD-10-PCS | Mod: CPTII,S$GLB,, | Performed by: FAMILY MEDICINE

## 2020-01-16 PROCEDURE — 1101F PT FALLS ASSESS-DOCD LE1/YR: CPT | Mod: CPTII,S$GLB,, | Performed by: FAMILY MEDICINE

## 2020-01-16 PROCEDURE — 1159F MED LIST DOCD IN RCRD: CPT | Mod: S$GLB,,, | Performed by: FAMILY MEDICINE

## 2020-01-16 PROCEDURE — 3078F DIAST BP <80 MM HG: CPT | Mod: CPTII,S$GLB,, | Performed by: FAMILY MEDICINE

## 2020-01-16 PROCEDURE — 99214 PR OFFICE/OUTPT VISIT, EST, LEVL IV, 30-39 MIN: ICD-10-PCS | Mod: S$GLB,,, | Performed by: FAMILY MEDICINE

## 2020-01-16 PROCEDURE — 1159F PR MEDICATION LIST DOCUMENTED IN MEDICAL RECORD: ICD-10-PCS | Mod: S$GLB,,, | Performed by: FAMILY MEDICINE

## 2020-01-16 PROCEDURE — 99999 PR PBB SHADOW E&M-EST. PATIENT-LVL IV: CPT | Mod: PBBFAC,,, | Performed by: FAMILY MEDICINE

## 2020-01-16 PROCEDURE — 99214 OFFICE O/P EST MOD 30 MIN: CPT | Mod: S$GLB,,, | Performed by: FAMILY MEDICINE

## 2020-01-16 NOTE — PROGRESS NOTES
THIS DOCUMENT WAS MADE IN PART WITH VOICE RECOGNITION SOFTWARE.  OCCASIONALLY THIS SOFTWARE WILL MISINTERPRET WORDS OR PHRASES.      Farhan DESAI Basim  1950    Farhan was seen today for pre-op exam.    Diagnoses and all orders for this visit:    Preop cardiovascular exam  -     X-Ray C the hest PA And Lateral; Future    I have reviewed the lab work ordered at [facility].  Everything there is within a satisfactory range to proceed with surgery.  The chest x-ray shows some blunting of the left costophrenic angle.  His exam today is normal except for some pain on the left chest wall.  He reports a contusion here last week during a skiing accident.  As a precaution we will repeat a chest x-ray.  I suspect this is some atelectasis.  We need to make certain there is no progression here.    If the chest x-ray is stable or better he may proceed with surgery as planned from my perspective.    Please note that any preop instructions regarding his coronary artery disease, atrial fibrillation, and anticoagulation must come directly from cardiology.    ADDENDUM:  DATE JANUARY 20, 2020  REPEAT CHEST X-RAY SUGGEST SOME SMALL ATELECTASIS VERSUS SCARRING IN THE LEFT LUNG BASE.  NOTHING APPEARS WORRISOME.  HE MAY PROCEED WITH SURGERY AS PLANNED.  ONCE AGAIN, ANY INSTRUCTIONS REGARDING HIS CARDIAC MEDICATION AND CONDITIONS SHOULD BE ADDRESSED BY HIS CARDIOLOGIST.    Abnormal CXR  -     X-Ray Chest PA And Lateral; Future    Essential hypertension  Stable    Dyslipidemia (high LDL; low HDL)  Stable    Stented coronary artery  Atrial fibrillation, unspecified type  As stated above, cardiology will issue recommendations regarding surgery and these conditions along with the Plavix and Eliquis    Repeat CXR Monday  If the chest x-ray is not clear he may have to  a copy of the previous images from the hospital in [place]      Subjective     Chief Complaint   Patient presents with    Pre-op Exam     pt reports  he is having sx on 01/30/2020 with Dr. Boateng, Left knee placement       HPI  Surgery , left TKR  Karl Boateng('), in [place]  preop labs and xray reviewed    Blunting left side  Reports fell skiing last week, contusion left chest      Active Ambulatory Problems     Diagnosis Date Noted    Low serum testosterone level 11/15/2012    Obstructive sleep apnea syndrome 11/15/2012    Osteoarthritis of right hip 07/08/2016    Lumbar spinal stenosis 12/20/2016    Lumbar degenerative disc disease 12/20/2016    Primary insomnia 12/28/2016    New onset atrial fibrillation 12/03/2018    Elevated blood pressure reading 12/03/2018    Undiagnosed cardiac murmurs 12/03/2018    Obesity, Class I, BMI 30-34.9 12/03/2018    PAF (paroxysmal atrial fibrillation) 01/03/2019    Non-rheumatic mitral regurgitation 01/28/2019    Essential hypertension 01/28/2019    Long term (current) use of anticoagulants 01/28/2019    Precordial pain 03/21/2019    Acute chest pain 03/21/2019    Hypercholesteremia 03/21/2019    Abnormal nuclear stress test 04/04/2019    Dyslipidemia (high LDL; low HDL) 04/04/2019    Long term (current) use of antithrombotics/antiplatelets 04/29/2019    Stented coronary artery 04/29/2019     Resolved Ambulatory Problems     Diagnosis Date Noted    No Resolved Ambulatory Problems     Past Medical History:   Diagnosis Date    Arthritis     Atrial fibrillation     Coronary artery disease     Hearing difficulty     Hypertension     Lumbar disc disease     REYES (obstructive sleep apnea)     REYES (obstructive sleep apnea)      Current Outpatient Medications on File Prior to Visit   Medication Sig Dispense Refill    acetaminophen (TYLENOL) 500 MG tablet       apixaban (ELIQUIS) 5 mg Tab Take 1 tablet (5 mg total) by mouth 2 (two) times daily. 10 tablet 0    aspirin (ECOTRIN) 81 MG EC tablet       atorvastatin (LIPITOR) 20 MG tablet TAKE 1 TABLET EVERY DAY 90 tablet 0    clopidogrel (PLAVIX) 75 mg tablet  TAKE 1 TABLET EVERY DAY 90 tablet 1    coenzyme Q10 100 mg/5 mL Syrp       glucosamine-chondroitin 500-400 mg tablet Take 1 tablet by mouth 2 (two) times daily. Triflex      isosorbide mononitrate (IMDUR) 30 MG 24 hr tablet TAKE 1 TABLET (30 MG TOTAL) BY MOUTH ONCE DAILY. 90 tablet 0    metoprolol succinate (TOPROL-XL) 25 MG 24 hr tablet Take 1 tablet (25 mg total) by mouth once daily. 90 tablet 1    MULTIVITS-MINERALS/FA/LYCOPENE (MEN'S DAILY MULTIVIT-MINERAL ORAL) Take 1 tablet by mouth once daily.       nitroGLYCERIN (NITROSTAT) 0.4 MG SL tablet PLACE 1 T UNT Q 5 MIN PRN      [DISCONTINUED] lidocain-me.salicyl-caps-menth 0.5-20-0.035-5 % PtMd        Current Facility-Administered Medications on File Prior to Visit   Medication Dose Route Frequency Provider Last Rate Last Dose    sodium chloride 0.9% flush 10 mL  10 mL Intravenous PRN Nanci Ahuja MD         Review of patient's allergies indicates:  No Known Allergies  Social History     Tobacco Use    Smoking status: Never Smoker    Smokeless tobacco: Never Used   Substance Use Topics    Alcohol use: Yes     Alcohol/week: 0.0 standard drinks     Frequency: 2-4 times a month     Drinks per session: 1 or 2     Binge frequency: Never     Comment: occasionally    Drug use: No     Family History   Problem Relation Age of Onset    Cancer Father         lung    Cancer Sister         leukemia    No Known Problems Mother     Heart disease Neg Hx     Diabetes Neg Hx          Review of Systems   Constitutional: Negative for activity change and unexpected weight change.   HENT: Negative for hearing loss, rhinorrhea and trouble swallowing.    Eyes: Negative for discharge and visual disturbance.   Respiratory: Negative for chest tightness and wheezing.    Cardiovascular: Negative for chest pain and palpitations.   Gastrointestinal: Negative for blood in stool, constipation, diarrhea and vomiting.   Genitourinary: Negative for difficulty urinating and  hematuria.   Musculoskeletal: Positive for arthralgias. Negative for neck pain.   Neurological: Negative for weakness and headaches.   Psychiatric/Behavioral: Negative for confusion and dysphoric mood.       Objective     Physical Exam   Constitutional: He is oriented to person, place, and time. He appears well-developed and well-nourished. No distress.   HENT:   Head: Normocephalic and atraumatic.   Right Ear: Tympanic membrane, external ear and ear canal normal.   Left Ear: Tympanic membrane, external ear and ear canal normal.   Nose: Nose normal.   Mouth/Throat: Oropharynx is clear and moist.   Eyes: Pupils are equal, round, and reactive to light. Conjunctivae and EOM are normal. Right eye exhibits no discharge. Left eye exhibits no discharge. No scleral icterus.   Neck: Normal range of motion. Neck supple. No JVD present. No tracheal deviation present. No thyromegaly present.   Cardiovascular: Normal rate, regular rhythm, normal heart sounds and intact distal pulses. Exam reveals no gallop and no friction rub.   No murmur heard.  Pulmonary/Chest: Effort normal and breath sounds normal. No stridor. No respiratory distress. He has no wheezes. He has no rales. He exhibits tenderness.       Abdominal: Soft. Bowel sounds are normal. He exhibits no distension and no mass. There is no tenderness. There is no rebound and no guarding.   Lymphadenopathy:     He has no cervical adenopathy.   Neurological: He is alert and oriented to person, place, and time. He has normal reflexes. No cranial nerve deficit. He exhibits normal muscle tone.   Skin: Skin is warm and dry. No rash noted. He is not diaphoretic.   Psychiatric: He has a normal mood and affect. His behavior is normal. Judgment and thought content normal.   Vitals reviewed.    Vitals:    01/16/20 1546   BP: 138/78   BP Location: Left arm   Patient Position: Sitting   BP Method: Medium (Manual)   Pulse: (!) 50   Resp: 16   Temp: 97.8 °F (36.6 °C)   TempSrc: Oral  "  Weight: 98.9 kg (217 lb 14.8 oz)   Height: 5' 8" (1.727 m)       MOST RECENT LABS IN OUR ELECTRONIC MEDICAL RECORD:     Results for orders placed or performed in visit on 07/30/19   Lipid panel   Result Value Ref Range    Cholesterol 107 (L) 120 - 199 mg/dL    Triglycerides 73 30 - 150 mg/dL    HDL 38 (L) 40 - 75 mg/dL    LDL Cholesterol 54.4 (L) 63.0 - 159.0 mg/dL    Hdl/Cholesterol Ratio 35.5 20.0 - 50.0 %    Total Cholesterol/HDL Ratio 2.8 2.0 - 5.0    Non-HDL Cholesterol 69 mg/dL         "

## 2020-01-20 ENCOUNTER — HOSPITAL ENCOUNTER (OUTPATIENT)
Dept: RADIOLOGY | Facility: HOSPITAL | Age: 70
Discharge: HOME | End: 2020-01-20
Attending: FAMILY MEDICINE
Payer: MEDICARE

## 2020-01-20 ENCOUNTER — PATIENT MESSAGE (OUTPATIENT)
Dept: CARDIOLOGY | Facility: CLINIC | Age: 70
End: 2020-01-20

## 2020-01-20 ENCOUNTER — PATIENT MESSAGE (OUTPATIENT)
Dept: FAMILY MEDICINE | Facility: CLINIC | Age: 70
End: 2020-01-20

## 2020-01-20 DIAGNOSIS — R93.89 ABNORMAL CXR: ICD-10-CM

## 2020-01-20 DIAGNOSIS — Z01.810 PREOP CARDIOVASCULAR EXAM: ICD-10-CM

## 2020-01-20 PROCEDURE — 71046 XR CHEST PA AND LATERAL: ICD-10-PCS | Mod: 26,,, | Performed by: RADIOLOGY

## 2020-01-20 PROCEDURE — 71046 X-RAY EXAM CHEST 2 VIEWS: CPT | Mod: 26,,, | Performed by: RADIOLOGY

## 2020-01-20 PROCEDURE — 71046 X-RAY EXAM CHEST 2 VIEWS: CPT | Mod: TC,FY,PO

## 2020-01-20 NOTE — TELEPHONE ENCOUNTER
Please advise: pt asking if he should use the same holding regimen for upcoming surgery: stop Plavix 7 days prior to surgery, stop Eliquis 3 days prior to surgery and baby aspirin 3 days prior to surgery

## 2020-02-07 ENCOUNTER — OFFICE VISIT (OUTPATIENT)
Dept: CARDIOLOGY | Facility: CLINIC | Age: 70
End: 2020-02-07
Payer: MEDICARE

## 2020-02-07 VITALS
WEIGHT: 216.5 LBS | HEART RATE: 97 BPM | HEIGHT: 68 IN | DIASTOLIC BLOOD PRESSURE: 75 MMHG | BODY MASS INDEX: 32.81 KG/M2 | SYSTOLIC BLOOD PRESSURE: 129 MMHG

## 2020-02-07 DIAGNOSIS — E78.00 HYPERCHOLESTEREMIA: ICD-10-CM

## 2020-02-07 DIAGNOSIS — I10 ESSENTIAL HYPERTENSION: ICD-10-CM

## 2020-02-07 DIAGNOSIS — E66.9 OBESITY, CLASS I, BMI 30-34.9: ICD-10-CM

## 2020-02-07 DIAGNOSIS — I25.10 CORONARY ARTERY DISEASE INVOLVING NATIVE CORONARY ARTERY OF NATIVE HEART WITHOUT ANGINA PECTORIS: Primary | ICD-10-CM

## 2020-02-07 DIAGNOSIS — Z79.01 LONG TERM (CURRENT) USE OF ANTICOAGULANTS: ICD-10-CM

## 2020-02-07 DIAGNOSIS — I48.0 PAF (PAROXYSMAL ATRIAL FIBRILLATION): ICD-10-CM

## 2020-02-07 PROCEDURE — 99999 PR PBB SHADOW E&M-EST. PATIENT-LVL IV: ICD-10-PCS | Mod: PBBFAC,,, | Performed by: INTERNAL MEDICINE

## 2020-02-07 PROCEDURE — 3078F PR MOST RECENT DIASTOLIC BLOOD PRESSURE < 80 MM HG: ICD-10-PCS | Mod: CPTII,S$GLB,, | Performed by: INTERNAL MEDICINE

## 2020-02-07 PROCEDURE — 99214 PR OFFICE/OUTPT VISIT, EST, LEVL IV, 30-39 MIN: ICD-10-PCS | Mod: S$GLB,,, | Performed by: INTERNAL MEDICINE

## 2020-02-07 PROCEDURE — 99214 OFFICE O/P EST MOD 30 MIN: CPT | Mod: S$GLB,,, | Performed by: INTERNAL MEDICINE

## 2020-02-07 PROCEDURE — 3074F PR MOST RECENT SYSTOLIC BLOOD PRESSURE < 130 MM HG: ICD-10-PCS | Mod: CPTII,S$GLB,, | Performed by: INTERNAL MEDICINE

## 2020-02-07 PROCEDURE — 3078F DIAST BP <80 MM HG: CPT | Mod: CPTII,S$GLB,, | Performed by: INTERNAL MEDICINE

## 2020-02-07 PROCEDURE — 1101F PT FALLS ASSESS-DOCD LE1/YR: CPT | Mod: CPTII,S$GLB,, | Performed by: INTERNAL MEDICINE

## 2020-02-07 PROCEDURE — 1125F AMNT PAIN NOTED PAIN PRSNT: CPT | Mod: S$GLB,,, | Performed by: INTERNAL MEDICINE

## 2020-02-07 PROCEDURE — 99999 PR PBB SHADOW E&M-EST. PATIENT-LVL IV: CPT | Mod: PBBFAC,,, | Performed by: INTERNAL MEDICINE

## 2020-02-07 PROCEDURE — 3074F SYST BP LT 130 MM HG: CPT | Mod: CPTII,S$GLB,, | Performed by: INTERNAL MEDICINE

## 2020-02-07 PROCEDURE — 1101F PR PT FALLS ASSESS DOC 0-1 FALLS W/OUT INJ PAST YR: ICD-10-PCS | Mod: CPTII,S$GLB,, | Performed by: INTERNAL MEDICINE

## 2020-02-07 PROCEDURE — 1159F MED LIST DOCD IN RCRD: CPT | Mod: S$GLB,,, | Performed by: INTERNAL MEDICINE

## 2020-02-07 PROCEDURE — 1125F PR PAIN SEVERITY QUANTIFIED, PAIN PRESENT: ICD-10-PCS | Mod: S$GLB,,, | Performed by: INTERNAL MEDICINE

## 2020-02-07 PROCEDURE — 1159F PR MEDICATION LIST DOCUMENTED IN MEDICAL RECORD: ICD-10-PCS | Mod: S$GLB,,, | Performed by: INTERNAL MEDICINE

## 2020-02-07 RX ORDER — OXYCODONE AND ACETAMINOPHEN 10; 325 MG/1; MG/1
1 TABLET ORAL EVERY 8 HOURS PRN
COMMUNITY
Start: 2020-01-31 | End: 2020-02-07

## 2020-02-07 RX ORDER — METOPROLOL SUCCINATE 25 MG/1
25 TABLET, EXTENDED RELEASE ORAL DAILY
Qty: 90 TABLET | Refills: 1 | Status: SHIPPED | OUTPATIENT
Start: 2020-02-07 | End: 2020-09-18

## 2020-02-08 ENCOUNTER — LAB VISIT (OUTPATIENT)
Dept: LAB | Facility: HOSPITAL | Age: 70
End: 2020-02-08
Attending: INTERNAL MEDICINE
Payer: MEDICARE

## 2020-02-08 DIAGNOSIS — I10 ESSENTIAL HYPERTENSION: ICD-10-CM

## 2020-02-08 DIAGNOSIS — I25.10 CORONARY ARTERY DISEASE INVOLVING NATIVE CORONARY ARTERY OF NATIVE HEART WITHOUT ANGINA PECTORIS: ICD-10-CM

## 2020-02-08 DIAGNOSIS — Z79.01 LONG TERM (CURRENT) USE OF ANTICOAGULANTS: ICD-10-CM

## 2020-02-08 DIAGNOSIS — I48.0 PAF (PAROXYSMAL ATRIAL FIBRILLATION): ICD-10-CM

## 2020-02-08 DIAGNOSIS — E78.00 HYPERCHOLESTEREMIA: ICD-10-CM

## 2020-02-08 LAB
ALBUMIN SERPL BCP-MCNC: 3.6 G/DL (ref 3.5–5.2)
ALP SERPL-CCNC: 95 U/L (ref 55–135)
ALT SERPL W/O P-5'-P-CCNC: 31 U/L (ref 10–44)
ANION GAP SERPL CALC-SCNC: 9 MMOL/L (ref 8–16)
AST SERPL-CCNC: 33 U/L (ref 10–40)
BILIRUB SERPL-MCNC: 1.9 MG/DL (ref 0.1–1)
BUN SERPL-MCNC: 20 MG/DL (ref 8–23)
CALCIUM SERPL-MCNC: 9.6 MG/DL (ref 8.7–10.5)
CHLORIDE SERPL-SCNC: 104 MMOL/L (ref 95–110)
CHOLEST SERPL-MCNC: 87 MG/DL (ref 120–199)
CHOLEST/HDLC SERPL: 3.1 {RATIO} (ref 2–5)
CO2 SERPL-SCNC: 26 MMOL/L (ref 23–29)
CREAT SERPL-MCNC: 1 MG/DL (ref 0.5–1.4)
EST. GFR  (AFRICAN AMERICAN): >60 ML/MIN/1.73 M^2
EST. GFR  (NON AFRICAN AMERICAN): >60 ML/MIN/1.73 M^2
GLUCOSE SERPL-MCNC: 113 MG/DL (ref 70–110)
HDLC SERPL-MCNC: 28 MG/DL (ref 40–75)
HDLC SERPL: 32.2 % (ref 20–50)
HGB BLD-MCNC: 9.1 G/DL (ref 14–18)
LDLC SERPL CALC-MCNC: 46.4 MG/DL (ref 63–159)
NONHDLC SERPL-MCNC: 59 MG/DL
POTASSIUM SERPL-SCNC: 4.2 MMOL/L (ref 3.5–5.1)
PROT SERPL-MCNC: 7.2 G/DL (ref 6–8.4)
SODIUM SERPL-SCNC: 139 MMOL/L (ref 136–145)
TRIGL SERPL-MCNC: 63 MG/DL (ref 30–150)

## 2020-02-08 PROCEDURE — 80061 LIPID PANEL: CPT

## 2020-02-08 PROCEDURE — 36415 COLL VENOUS BLD VENIPUNCTURE: CPT | Mod: PO

## 2020-02-08 PROCEDURE — 80053 COMPREHEN METABOLIC PANEL: CPT

## 2020-02-08 PROCEDURE — 85018 HEMOGLOBIN: CPT

## 2020-02-10 ENCOUNTER — PATIENT MESSAGE (OUTPATIENT)
Dept: CARDIOLOGY | Facility: CLINIC | Age: 70
End: 2020-02-10

## 2020-02-10 DIAGNOSIS — R07.2 PRECORDIAL PAIN: ICD-10-CM

## 2020-02-11 RX ORDER — ISOSORBIDE MONONITRATE 30 MG/1
30 TABLET, EXTENDED RELEASE ORAL DAILY
Qty: 90 TABLET | Refills: 0 | Status: SHIPPED | OUTPATIENT
Start: 2020-02-11 | End: 2020-06-09 | Stop reason: SDUPTHER

## 2020-03-03 ENCOUNTER — PATIENT MESSAGE (OUTPATIENT)
Dept: CARDIOLOGY | Facility: CLINIC | Age: 70
End: 2020-03-03

## 2020-03-03 ENCOUNTER — PATIENT MESSAGE (OUTPATIENT)
Dept: FAMILY MEDICINE | Facility: CLINIC | Age: 70
End: 2020-03-03

## 2020-03-03 DIAGNOSIS — D50.0 ANEMIA, BLOOD LOSS: Primary | ICD-10-CM

## 2020-03-03 NOTE — TELEPHONE ENCOUNTER
I do not see a current CBC in the system, but repeat CBC and iron studies in a few weeks, see me after

## 2020-03-03 NOTE — TELEPHONE ENCOUNTER
Please review and advise if pt should f/u w/ Dr Smith or would you like to order labs at this time?

## 2020-03-05 ENCOUNTER — PATIENT MESSAGE (OUTPATIENT)
Dept: CARDIOLOGY | Facility: CLINIC | Age: 70
End: 2020-03-05

## 2020-03-19 RX ORDER — ATORVASTATIN CALCIUM 20 MG/1
TABLET, FILM COATED ORAL
Qty: 90 TABLET | Refills: 0 | Status: SHIPPED | OUTPATIENT
Start: 2020-03-19 | End: 2020-06-22

## 2020-03-25 ENCOUNTER — TELEPHONE (OUTPATIENT)
Dept: CARDIOLOGY | Facility: CLINIC | Age: 70
End: 2020-03-25

## 2020-03-27 ENCOUNTER — PATIENT MESSAGE (OUTPATIENT)
Dept: CARDIOLOGY | Facility: CLINIC | Age: 70
End: 2020-03-27

## 2020-03-30 ENCOUNTER — PATIENT MESSAGE (OUTPATIENT)
Dept: CARDIOLOGY | Facility: CLINIC | Age: 70
End: 2020-03-30

## 2020-03-30 NOTE — TELEPHONE ENCOUNTER
Request for Cardiac Clearance    Farhan Stallworth  is having epidural injection and needs clearance.     Please advise on any medication holds. Pt taking asa, plavix, eliquis    Please indicate if patient is cleared from a Cardiac standpoint.

## 2020-04-06 ENCOUNTER — OFFICE VISIT (OUTPATIENT)
Dept: CARDIOLOGY | Facility: CLINIC | Age: 70
End: 2020-04-06
Payer: MEDICARE

## 2020-04-06 VITALS
SYSTOLIC BLOOD PRESSURE: 119 MMHG | HEART RATE: 60 BPM | DIASTOLIC BLOOD PRESSURE: 70 MMHG | BODY MASS INDEX: 31.07 KG/M2 | HEIGHT: 68 IN | WEIGHT: 205 LBS

## 2020-04-06 DIAGNOSIS — Z01.810 ENCOUNTER FOR PRE-OPERATIVE CARDIOVASCULAR CLEARANCE: ICD-10-CM

## 2020-04-06 DIAGNOSIS — I48.0 PAROXYSMAL ATRIAL FIBRILLATION: ICD-10-CM

## 2020-04-06 DIAGNOSIS — Z79.02 LONG TERM (CURRENT) USE OF ANTITHROMBOTICS/ANTIPLATELETS: ICD-10-CM

## 2020-04-06 DIAGNOSIS — E66.9 OBESITY, CLASS I, BMI 30-34.9: ICD-10-CM

## 2020-04-06 DIAGNOSIS — I25.10 CORONARY ARTERY DISEASE INVOLVING NATIVE CORONARY ARTERY OF NATIVE HEART WITHOUT ANGINA PECTORIS: Primary | ICD-10-CM

## 2020-04-06 PROCEDURE — 3074F SYST BP LT 130 MM HG: CPT | Mod: CPTII,95,, | Performed by: INTERNAL MEDICINE

## 2020-04-06 PROCEDURE — 99214 PR OFFICE/OUTPT VISIT, EST, LEVL IV, 30-39 MIN: ICD-10-PCS | Mod: 95,,, | Performed by: INTERNAL MEDICINE

## 2020-04-06 PROCEDURE — 1159F PR MEDICATION LIST DOCUMENTED IN MEDICAL RECORD: ICD-10-PCS | Mod: 95,,, | Performed by: INTERNAL MEDICINE

## 2020-04-06 PROCEDURE — 3078F PR MOST RECENT DIASTOLIC BLOOD PRESSURE < 80 MM HG: ICD-10-PCS | Mod: CPTII,95,, | Performed by: INTERNAL MEDICINE

## 2020-04-06 PROCEDURE — 3078F DIAST BP <80 MM HG: CPT | Mod: CPTII,95,, | Performed by: INTERNAL MEDICINE

## 2020-04-06 PROCEDURE — 1159F MED LIST DOCD IN RCRD: CPT | Mod: 95,,, | Performed by: INTERNAL MEDICINE

## 2020-04-06 PROCEDURE — 3074F PR MOST RECENT SYSTOLIC BLOOD PRESSURE < 130 MM HG: ICD-10-PCS | Mod: CPTII,95,, | Performed by: INTERNAL MEDICINE

## 2020-04-06 PROCEDURE — 1101F PT FALLS ASSESS-DOCD LE1/YR: CPT | Mod: CPTII,95,, | Performed by: INTERNAL MEDICINE

## 2020-04-06 PROCEDURE — 1101F PR PT FALLS ASSESS DOC 0-1 FALLS W/OUT INJ PAST YR: ICD-10-PCS | Mod: CPTII,95,, | Performed by: INTERNAL MEDICINE

## 2020-04-06 PROCEDURE — 99214 OFFICE O/P EST MOD 30 MIN: CPT | Mod: 95,,, | Performed by: INTERNAL MEDICINE

## 2020-04-06 RX ORDER — CLOPIDOGREL BISULFATE 75 MG/1
75 TABLET ORAL DAILY
COMMUNITY
End: 2020-04-06 | Stop reason: ALTCHOICE

## 2020-04-06 NOTE — PROGRESS NOTES
Subjective:    Patient ID:  Farhan Stallworth is a 70 y.o. male who presents for Coronary Artery Disease; Hypertension; and Hyperlipidemia        HPI  The patient location is:  home  The chief complaint leading to consultation is:  CF 80 follow-up, preop clearance, anticoagulation  Visit type: Virtual visit with synchronous audio and video  Total time spent with patient:  2230 min  Each patient to whom he or she provides medical services by telemedicine is:  (1) informed of the relationship between the physician and patient and the respective role of any other health care provider with respect to management of the patient; and (2) notified that he or she may decline to receive medical services by telemedicine and may withdraw from such care at any time.    Notes:   DISCUSSED LABS AND GOALS LDL 48, HEMOGLOBIN 9.1, THAT WAS SHORTLY AFTER HIS KNEE SURGERY DURING WHICH HE HAD SIGNIFICANT BLEEDING, STILL WITH BACK PAIN LESS KNEE PAIN LESS SWELLING, NEEDS EPIDURAL FOR THE PAIN, NO CHEST PAIN NO SHORTNESS OF BREATH NO TIA TYPE SYMPTOMS NO NEAR-SYNCOPE, SEE REVIEW OF SYSTEMS  Past Medical History:   Diagnosis Date    Arthritis     hip    Atrial fibrillation     Coronary artery disease     cardiac stents    Hearing difficulty     Hypertension     Low serum testosterone level     Lumbar disc disease     REYES (obstructive sleep apnea)     has CPAP, better relief of symptoms with nasal pillow.     REYES (obstructive sleep apnea)      Past Surgical History:   Procedure Laterality Date    CARDIOVERSION N/A 1/3/2019    Procedure: CARDIOVERSION;  Surgeon: Nanci Ahuja MD;  Location[facility];  Service: Cardiology;  Laterality: N/A;    CARPAL TUNNEL RELEASE      2005, bilateral    CLAVICLE SURGERY      CORONARY ANGIOGRAPHY N/A 4/9/2019    Procedure: ANGIOGRAM, CORONARY ARTERY;  Surgeon: Nanci Ahuja MD;  Location: [facility];  Service: Cardiology;  Laterality: N/A;    EPIDURAL BLOCK INJECTION  2010    HIP SURGERY       INTERNAL NEUROLYSIS USING OPERATING MICROSCOPE Left 12/6/2019    Procedure: Cooled radiofrequency ablation of the genicular nerve branches to the left knee;  Surgeon: Jayy Driscoll MD;  Location: [facility] OR;  Service: Orthopedics;  Laterality: Left;    JOINT REPLACEMENT Right 11/2017    hip    KNEE ARTHROSCOPY W/ MENISCAL REPAIR      left    LEFT HEART CATHETERIZATION Left 4/9/2019    Procedure: Left heart cath;  Surgeon: Nanci Ahuja MD;  Location: [facility] CATH;  Service: Cardiology;  Laterality: Left;    LUMBAR FUSION      2010    TONSILLECTOMY       Family History   Problem Relation Age of Onset    Cancer Father         lung    Cancer Sister         leukemia    No Known Problems Mother     Heart disease Neg Hx     Diabetes Neg Hx      Social History     Socioeconomic History    Marital status:      Spouse name: Not on file    Number of children: Not on file    Years of education: Not on file    Highest education level: Not on file   Occupational History    Not on file   Social Needs    Financial resource strain: Not hard at all    Food insecurity:     Worry: Never true     Inability: Never true    Transportation needs:     Medical: No     Non-medical: No   Tobacco Use    Smoking status: Never Smoker    Smokeless tobacco: Never Used   Substance and Sexual Activity    Alcohol use: Yes     Alcohol/week: 0.0 standard drinks     Frequency: 2-4 times a month     Drinks per session: 1 or 2     Binge frequency: Never     Comment: occasionally    Drug use: No    Sexual activity: Not on file   Lifestyle    Physical activity:     Days per week: 7 days     Minutes per session: 60 min    Stress: Only a little   Relationships    Social connections:     Talks on phone: More than three times a week     Gets together: Three times a week     Attends Yazdanism service: Not on file     Active member of club or organization: Yes     Attends meetings of clubs or organizations: More than 4 times  per year     Relationship status:    Other Topics Concern    Not on file   Social History Narrative    Not on file       Review of patient's allergies indicates:  No Known Allergies    Current Outpatient Medications:     acetaminophen (TYLENOL) 500 MG tablet, , Disp: , Rfl:     apixaban (ELIQUIS) 5 mg Tab, Take 1 tablet (5 mg total) by mouth 2 (two) times daily., Disp: 180 tablet, Rfl: 0    aspirin (ECOTRIN) 81 MG EC tablet, , Disp: , Rfl:     atorvastatin (LIPITOR) 20 MG tablet, TAKE 1 TABLET EVERY DAY, Disp: 90 tablet, Rfl: 0    coenzyme Q10 100 mg/5 mL Syrp, , Disp: , Rfl:     glucosamine-chondroitin 500-400 mg tablet, Take 1 tablet by mouth 2 (two) times daily. Triflex, Disp: , Rfl:     isosorbide mononitrate (IMDUR) 30 MG 24 hr tablet, TAKE 1 TABLET (30 MG TOTAL) BY MOUTH ONCE DAILY., Disp: 90 tablet, Rfl: 0    metoprolol succinate (TOPROL-XL) 25 MG 24 hr tablet, Take 1 tablet (25 mg total) by mouth once daily., Disp: 90 tablet, Rfl: 1    MULTIVITS-MINERALS/FA/LYCOPENE (MEN'S DAILY MULTIVIT-MINERAL ORAL), Take 1 tablet by mouth once daily. , Disp: , Rfl:     nitroGLYCERIN (NITROSTAT) 0.4 MG SL tablet, PLACE 1 T UNT Q 5 MIN PRN, Disp: , Rfl:   No current facility-administered medications for this visit.     Review of Systems   Constitution: Positive for weight loss (SOME). Negative for chills, diaphoresis, fever, malaise/fatigue and night sweats.   HENT: Negative for congestion and nosebleeds.    Eyes: Negative for blurred vision and visual disturbance.   Cardiovascular: Negative for chest pain, claudication, cyanosis, dyspnea on exertion, irregular heartbeat, leg swelling, near-syncope, orthopnea, palpitations, paroxysmal nocturnal dyspnea and syncope.   Respiratory: Negative for cough, hemoptysis, shortness of breath and wheezing.    Endocrine: Negative for cold intolerance, heat intolerance and polyuria.   Hematologic/Lymphatic: Negative for adenopathy. Does not bruise/bleed easily.   Skin:  "Negative for color change, itching and rash.   Musculoskeletal: Negative for back pain (SCIATICA), falls and joint pain (PT).   Gastrointestinal: Negative for abdominal pain, change in bowel habit, dysphagia, hematemesis, jaundice, melena and vomiting.   Genitourinary: Negative for dysuria, flank pain and frequency.   Neurological: Negative for brief paralysis, dizziness, focal weakness, light-headedness, loss of balance, numbness, tremors and weakness.   Psychiatric/Behavioral: Negative for altered mental status, depression and memory loss. The patient has insomnia.    Allergic/Immunologic: Negative for hives and persistent infections.        Objective:      Vitals:    04/06/20 0846   BP: 119/70   Pulse: 60   Weight: 93 kg (205 lb)   Height: 5' 8" (1.727 m)     Body mass index is 31.17 kg/m².    Physical Exam   Constitutional: He is oriented to person, place, and time. He appears well-developed and well-nourished.   OVERWEIGHT   HENT:   Head: Normocephalic and atraumatic.   Eyes: Conjunctivae and EOM are normal.   Neck: Neck supple.   Neck movement appears normal   Musculoskeletal:   No edema   Neurological: He is alert and oriented to person, place, and time.   Skin: No rash noted. No cyanosis.   Psychiatric: He has a normal mood and affect. His behavior is normal. Thought content normal.               ..    Chemistry        Component Value Date/Time     02/08/2020 0846    K 4.2 02/08/2020 0846     02/08/2020 0846    CO2 26 02/08/2020 0846    BUN 20 02/08/2020 0846    CREATININE 1.0 02/08/2020 0846     (H) 02/08/2020 0846        Component Value Date/Time    CALCIUM 9.6 02/08/2020 0846    ALKPHOS 95 02/08/2020 0846    AST 33 02/08/2020 0846    ALT 31 02/08/2020 0846    BILITOT 1.9 (H) 02/08/2020 0846    ESTGFRAFRICA >60.0 02/08/2020 0846    EGFRNONAA >60.0 02/08/2020 0846            ..  Lab Results   Component Value Date    CHOL 87 (L) 02/08/2020    CHOL 107 (L) 07/30/2019    CHOL 153 03/26/2019 "     Lab Results   Component Value Date    HDL 28 (L) 02/08/2020    HDL 38 (L) 07/30/2019    HDL 39 (L) 03/26/2019     Lab Results   Component Value Date    LDLCALC 46.4 (L) 02/08/2020    LDLCALC 54.4 (L) 07/30/2019    LDLCALC 100.2 03/26/2019     Lab Results   Component Value Date    TRIG 63 02/08/2020    TRIG 73 07/30/2019    TRIG 69 03/26/2019     Lab Results   Component Value Date    CHOLHDL 32.2 02/08/2020    CHOLHDL 35.5 07/30/2019    CHOLHDL 25.5 03/26/2019     ..  Lab Results   Component Value Date    WBC 5.39 04/02/2019    HGB 9.1 (L) 02/08/2020    HCT 45.7 04/02/2019    MCV 94 04/02/2019     04/02/2019       Test(s) Reviewed  I have reviewed the following in detail:  [] Stress test   [] Angiography   [] Echocardiogram   [x] Labs   [] Other:       Assessment:         ICD-10-CM ICD-9-CM   1. Coronary artery disease involving native coronary artery of native heart without angina pectoris I25.10 414.01   2. Long term (current) use of antithrombotics/antiplatelets Z79.02 V58.63   3. Encounter for pre-operative cardiovascular clearance Z01.810 V72.81   4. Paroxysmal atrial fibrillation I48.0 427.31   5. Obesity, Class I, BMI 30-34.9 E66.9 278.00     Problem List Items Addressed This Visit        Cardiac/Vascular    New onset atrial fibrillation    Relevant Medications    apixaban (ELIQUIS) 5 mg Tab    Encounter for pre-operative cardiovascular clearance    Relevant Orders    Comprehensive metabolic panel    Coronary artery disease involving native coronary artery of native heart without angina pectoris - Primary    Relevant Orders    Comprehensive metabolic panel       Endocrine    Obesity, Class I, BMI 30-34.9           Plan:     DC PLAVIX, SINCE HIS STENT WAS ABOUT A YEAR AGO ASA DAILY AND CONTINUE ELIQUIS, ACCEPTABLE CV RISK FOR EPIDURAL INJECTION HOLD ASPIRIN FOR ABOUT 5-7 DAYS AND HOLD ELIQUIS 2-3 DAYS PRIOR TO, HE STATES THAT HE WAS TOLD HE DID NOT HAVE TO,ALL CV CLINICALLY STABLE, NO ANGINA, NO HF,  NO TIA, NO CLINICAL ARRHYTHMIA,CONTINUE CURRENT MEDS, EDUCATION, DIET, EXERCISE, WEIGHT LOSS, RETURN TO CLINIC IN 6 MONTHS WITH LABS      Coronary artery disease involving native coronary artery of native heart without angina pectoris  -     Comprehensive metabolic panel; Future; Expected date: 10/06/2020    Long term (current) use of antithrombotics/antiplatelets  -     Comprehensive metabolic panel; Future; Expected date: 10/06/2020    Encounter for pre-operative cardiovascular clearance  -     Comprehensive metabolic panel; Future; Expected date: 10/06/2020    Paroxysmal atrial fibrillation  -     apixaban (ELIQUIS) 5 mg Tab; Take 1 tablet (5 mg total) by mouth 2 (two) times daily.  Dispense: 180 tablet; Refill: 0  -     Comprehensive metabolic panel; Future; Expected date: 10/06/2020    Obesity, Class I, BMI 30-34.9    RTC Low level/low impact aerobic exercise 5x's/wk. Heart healthy diet and risk factor modification.    See labs and med orders.    Aerobic exercise 5x's/wk. Heart healthy diet and risk factor modification.    See labs and med orders.

## 2020-05-06 ENCOUNTER — PATIENT MESSAGE (OUTPATIENT)
Dept: CARDIOLOGY | Facility: CLINIC | Age: 70
End: 2020-05-06

## 2020-05-06 ENCOUNTER — PATIENT MESSAGE (OUTPATIENT)
Dept: ADMINISTRATIVE | Facility: HOSPITAL | Age: 70
End: 2020-05-06

## 2020-05-07 ENCOUNTER — TELEPHONE (OUTPATIENT)
Dept: CARDIOLOGY | Facility: CLINIC | Age: 70
End: 2020-05-07

## 2020-05-07 NOTE — TELEPHONE ENCOUNTER
----- Message from Keith Dave sent at 5/7/2020  1:34 PM CDT -----  Contact: Mimi with [facility] office  Type: Needs Medical Advice  Who Called:  Mimi     [facility] Call Back Number: [phone]  Fax: [phone]  Additional Information: Mimi called about getting a surgery clearance. Pt is scheduled for left knee manipulation under anesthesia on 5.14.20. They do need this within 2-3 days please. Please call to advise

## 2020-05-07 NOTE — TELEPHONE ENCOUNTER
Request for Cardiac Clearance    Farhan Stallworth  is having left knee manipulation and needs clearance.     Please advise on any medication holds. Pt taking eliquis and asa.    Please indicate if patient is cleared from a Cardiac standpoint.

## 2020-05-11 ENCOUNTER — PATIENT MESSAGE (OUTPATIENT)
Dept: CARDIOLOGY | Facility: CLINIC | Age: 70
End: 2020-05-11

## 2020-05-11 ENCOUNTER — TELEPHONE (OUTPATIENT)
Dept: CARDIOLOGY | Facility: CLINIC | Age: 70
End: 2020-05-11

## 2020-05-11 NOTE — TELEPHONE ENCOUNTER
----- Message from Leila Sanabria sent at 5/11/2020  2:15 PM CDT -----  Contact: Mimi davila/ [facility] ofc  Type: Needs Medical Advice  Who Called:  Mimi      [facility] Call Back Number: [phone]  dditional Information: Pls have Maritza call Mimi regarding clearance letter. The letter  Has to have the 's signature

## 2020-06-16 ENCOUNTER — OFFICE VISIT (OUTPATIENT)
Dept: FAMILY MEDICINE | Facility: CLINIC | Age: 70
End: 2020-06-16
Payer: MEDICARE

## 2020-06-16 ENCOUNTER — LAB VISIT (OUTPATIENT)
Dept: LAB | Facility: HOSPITAL | Age: 70
End: 2020-06-16
Attending: FAMILY MEDICINE
Payer: MEDICARE

## 2020-06-16 VITALS
SYSTOLIC BLOOD PRESSURE: 134 MMHG | RESPIRATION RATE: 18 BRPM | WEIGHT: 208.56 LBS | BODY MASS INDEX: 31.61 KG/M2 | HEIGHT: 68 IN | DIASTOLIC BLOOD PRESSURE: 74 MMHG | TEMPERATURE: 100 F | HEART RATE: 62 BPM

## 2020-06-16 DIAGNOSIS — M54.9 BACK PAIN, UNSPECIFIED BACK LOCATION, UNSPECIFIED BACK PAIN LATERALITY, UNSPECIFIED CHRONICITY: ICD-10-CM

## 2020-06-16 DIAGNOSIS — E55.9 VITAMIN D DEFICIENCY, UNSPECIFIED: ICD-10-CM

## 2020-06-16 DIAGNOSIS — R25.2 MUSCLE CRAMPS: Primary | ICD-10-CM

## 2020-06-16 DIAGNOSIS — N52.9 ERECTILE DYSFUNCTION, UNSPECIFIED ERECTILE DYSFUNCTION TYPE: ICD-10-CM

## 2020-06-16 DIAGNOSIS — R25.2 MUSCLE CRAMPS: ICD-10-CM

## 2020-06-16 LAB
ALBUMIN SERPL BCP-MCNC: 4.1 G/DL (ref 3.5–5.2)
ALP SERPL-CCNC: 97 U/L (ref 55–135)
ALT SERPL W/O P-5'-P-CCNC: 25 U/L (ref 10–44)
ANION GAP SERPL CALC-SCNC: 9 MMOL/L (ref 8–16)
AST SERPL-CCNC: 27 U/L (ref 10–40)
BILIRUB SERPL-MCNC: 0.5 MG/DL (ref 0.1–1)
BILIRUB UR QL STRIP: NEGATIVE
BUN SERPL-MCNC: 19 MG/DL (ref 8–23)
CALCIUM SERPL-MCNC: 8.9 MG/DL (ref 8.7–10.5)
CHLORIDE SERPL-SCNC: 107 MMOL/L (ref 95–110)
CK SERPL-CCNC: 95 U/L (ref 20–200)
CLARITY UR REFRACT.AUTO: CLEAR
CO2 SERPL-SCNC: 24 MMOL/L (ref 23–29)
COLOR UR AUTO: YELLOW
CREAT SERPL-MCNC: 1 MG/DL (ref 0.5–1.4)
EST. GFR  (AFRICAN AMERICAN): >60 ML/MIN/1.73 M^2
EST. GFR  (NON AFRICAN AMERICAN): >60 ML/MIN/1.73 M^2
GLUCOSE SERPL-MCNC: 93 MG/DL (ref 70–110)
GLUCOSE UR QL STRIP: NEGATIVE
HGB UR QL STRIP: NEGATIVE
KETONES UR QL STRIP: NEGATIVE
LEUKOCYTE ESTERASE UR QL STRIP: NEGATIVE
MAGNESIUM SERPL-MCNC: 2 MG/DL (ref 1.6–2.6)
NITRITE UR QL STRIP: NEGATIVE
PH UR STRIP: 5 [PH] (ref 5–8)
POTASSIUM SERPL-SCNC: 4.1 MMOL/L (ref 3.5–5.1)
PROT SERPL-MCNC: 6.8 G/DL (ref 6–8.4)
PROT UR QL STRIP: NEGATIVE
SODIUM SERPL-SCNC: 140 MMOL/L (ref 136–145)
SP GR UR STRIP: 1.02 (ref 1–1.03)
URN SPEC COLLECT METH UR: NORMAL

## 2020-06-16 PROCEDURE — 1159F PR MEDICATION LIST DOCUMENTED IN MEDICAL RECORD: ICD-10-PCS | Mod: S$GLB,,, | Performed by: FAMILY MEDICINE

## 2020-06-16 PROCEDURE — 99999 PR PBB SHADOW E&M-EST. PATIENT-LVL V: CPT | Mod: PBBFAC,,, | Performed by: FAMILY MEDICINE

## 2020-06-16 PROCEDURE — 83735 ASSAY OF MAGNESIUM: CPT

## 2020-06-16 PROCEDURE — 1101F PT FALLS ASSESS-DOCD LE1/YR: CPT | Mod: CPTII,S$GLB,, | Performed by: FAMILY MEDICINE

## 2020-06-16 PROCEDURE — 3075F PR MOST RECENT SYSTOLIC BLOOD PRESS GE 130-139MM HG: ICD-10-PCS | Mod: CPTII,S$GLB,, | Performed by: FAMILY MEDICINE

## 2020-06-16 PROCEDURE — 3078F DIAST BP <80 MM HG: CPT | Mod: CPTII,S$GLB,, | Performed by: FAMILY MEDICINE

## 2020-06-16 PROCEDURE — 85025 COMPLETE CBC W/AUTO DIFF WBC: CPT

## 2020-06-16 PROCEDURE — 36415 COLL VENOUS BLD VENIPUNCTURE: CPT | Mod: PN

## 2020-06-16 PROCEDURE — 82550 ASSAY OF CK (CPK): CPT

## 2020-06-16 PROCEDURE — 1159F MED LIST DOCD IN RCRD: CPT | Mod: S$GLB,,, | Performed by: FAMILY MEDICINE

## 2020-06-16 PROCEDURE — 84443 ASSAY THYROID STIM HORMONE: CPT

## 2020-06-16 PROCEDURE — 99214 PR OFFICE/OUTPT VISIT, EST, LEVL IV, 30-39 MIN: ICD-10-PCS | Mod: S$GLB,,, | Performed by: FAMILY MEDICINE

## 2020-06-16 PROCEDURE — 99999 PR PBB SHADOW E&M-EST. PATIENT-LVL V: ICD-10-PCS | Mod: PBBFAC,,, | Performed by: FAMILY MEDICINE

## 2020-06-16 PROCEDURE — 82306 VITAMIN D 25 HYDROXY: CPT

## 2020-06-16 PROCEDURE — 1125F PR PAIN SEVERITY QUANTIFIED, PAIN PRESENT: ICD-10-PCS | Mod: S$GLB,,, | Performed by: FAMILY MEDICINE

## 2020-06-16 PROCEDURE — 3078F PR MOST RECENT DIASTOLIC BLOOD PRESSURE < 80 MM HG: ICD-10-PCS | Mod: CPTII,S$GLB,, | Performed by: FAMILY MEDICINE

## 2020-06-16 PROCEDURE — 3075F SYST BP GE 130 - 139MM HG: CPT | Mod: CPTII,S$GLB,, | Performed by: FAMILY MEDICINE

## 2020-06-16 PROCEDURE — 80053 COMPREHEN METABOLIC PANEL: CPT

## 2020-06-16 PROCEDURE — 1125F AMNT PAIN NOTED PAIN PRSNT: CPT | Mod: S$GLB,,, | Performed by: FAMILY MEDICINE

## 2020-06-16 PROCEDURE — 99214 OFFICE O/P EST MOD 30 MIN: CPT | Mod: S$GLB,,, | Performed by: FAMILY MEDICINE

## 2020-06-16 PROCEDURE — 81001 URINALYSIS AUTO W/SCOPE: CPT

## 2020-06-16 PROCEDURE — 1101F PR PT FALLS ASSESS DOC 0-1 FALLS W/OUT INJ PAST YR: ICD-10-PCS | Mod: CPTII,S$GLB,, | Performed by: FAMILY MEDICINE

## 2020-06-17 LAB
25(OH)D3+25(OH)D2 SERPL-MCNC: 33 NG/ML (ref 30–96)
BASOPHILS # BLD AUTO: 0.02 K/UL (ref 0–0.2)
BASOPHILS NFR BLD: 0.4 % (ref 0–1.9)
DIFFERENTIAL METHOD: ABNORMAL
EOSINOPHIL # BLD AUTO: 0 K/UL (ref 0–0.5)
EOSINOPHIL NFR BLD: 0.6 % (ref 0–8)
ERYTHROCYTE [DISTWIDTH] IN BLOOD BY AUTOMATED COUNT: 15.5 % (ref 11.5–14.5)
HCT VFR BLD AUTO: 41.8 % (ref 40–54)
HGB BLD-MCNC: 12.9 G/DL (ref 14–18)
IMM GRANULOCYTES # BLD AUTO: 0.01 K/UL (ref 0–0.04)
IMM GRANULOCYTES NFR BLD AUTO: 0.2 % (ref 0–0.5)
LYMPHOCYTES # BLD AUTO: 1 K/UL (ref 1–4.8)
LYMPHOCYTES NFR BLD: 19.3 % (ref 18–48)
MCH RBC QN AUTO: 27.7 PG (ref 27–31)
MCHC RBC AUTO-ENTMCNC: 30.9 G/DL (ref 32–36)
MCV RBC AUTO: 90 FL (ref 82–98)
MICROSCOPIC COMMENT: NORMAL
MONOCYTES # BLD AUTO: 0.5 K/UL (ref 0.3–1)
MONOCYTES NFR BLD: 10.6 % (ref 4–15)
NEUTROPHILS # BLD AUTO: 3.4 K/UL (ref 1.8–7.7)
NEUTROPHILS NFR BLD: 68.9 % (ref 38–73)
NRBC BLD-RTO: 0 /100 WBC
PLATELET # BLD AUTO: 184 K/UL (ref 150–350)
PMV BLD AUTO: 11.4 FL (ref 9.2–12.9)
RBC # BLD AUTO: 4.66 M/UL (ref 4.6–6.2)
RBC #/AREA URNS AUTO: 0 /HPF (ref 0–4)
TSH SERPL DL<=0.005 MIU/L-ACNC: 0.69 UIU/ML (ref 0.4–4)
WBC # BLD AUTO: 4.98 K/UL (ref 3.9–12.7)
WBC #/AREA URNS AUTO: 1 /HPF (ref 0–5)

## 2020-06-22 ENCOUNTER — PATIENT OUTREACH (OUTPATIENT)
Dept: ADMINISTRATIVE | Facility: OTHER | Age: 70
End: 2020-06-22

## 2020-06-22 DIAGNOSIS — Z12.11 ENCOUNTER FOR FECAL IMMUNOCHEMICAL TEST SCREENING: Primary | ICD-10-CM

## 2020-06-22 NOTE — PROGRESS NOTES
Patient's chart was reviewed.   Requested updates within Care Everywhere.  Immunizations reconciled.    Health Maintenance was updated.  Orders placed: IFOBT

## 2020-06-25 ENCOUNTER — OFFICE VISIT (OUTPATIENT)
Dept: PODIATRY | Facility: CLINIC | Age: 70
End: 2020-06-25
Payer: MEDICARE

## 2020-06-25 VITALS
HEART RATE: 60 BPM | WEIGHT: 208.56 LBS | DIASTOLIC BLOOD PRESSURE: 66 MMHG | HEIGHT: 68 IN | BODY MASS INDEX: 31.61 KG/M2 | SYSTOLIC BLOOD PRESSURE: 106 MMHG

## 2020-06-25 DIAGNOSIS — L84 CORN OR CALLUS: ICD-10-CM

## 2020-06-25 DIAGNOSIS — M20.5X1 ADDUCTOVARUS ROTATION OF TOE, ACQUIRED, RIGHT: Primary | ICD-10-CM

## 2020-06-25 PROCEDURE — 99213 OFFICE O/P EST LOW 20 MIN: CPT | Mod: S$GLB,,, | Performed by: PODIATRIST

## 2020-06-25 PROCEDURE — 99999 PR PBB SHADOW E&M-EST. PATIENT-LVL III: CPT | Mod: PBBFAC,,, | Performed by: PODIATRIST

## 2020-06-25 PROCEDURE — 1159F MED LIST DOCD IN RCRD: CPT | Mod: S$GLB,,, | Performed by: PODIATRIST

## 2020-06-25 PROCEDURE — 1101F PT FALLS ASSESS-DOCD LE1/YR: CPT | Mod: CPTII,S$GLB,, | Performed by: PODIATRIST

## 2020-06-25 PROCEDURE — 3074F SYST BP LT 130 MM HG: CPT | Mod: CPTII,S$GLB,, | Performed by: PODIATRIST

## 2020-06-25 PROCEDURE — 1126F PR PAIN SEVERITY QUANTIFIED, NO PAIN PRESENT: ICD-10-PCS | Mod: S$GLB,,, | Performed by: PODIATRIST

## 2020-06-25 PROCEDURE — 3078F PR MOST RECENT DIASTOLIC BLOOD PRESSURE < 80 MM HG: ICD-10-PCS | Mod: CPTII,S$GLB,, | Performed by: PODIATRIST

## 2020-06-25 PROCEDURE — 1159F PR MEDICATION LIST DOCUMENTED IN MEDICAL RECORD: ICD-10-PCS | Mod: S$GLB,,, | Performed by: PODIATRIST

## 2020-06-25 PROCEDURE — 3078F DIAST BP <80 MM HG: CPT | Mod: CPTII,S$GLB,, | Performed by: PODIATRIST

## 2020-06-25 PROCEDURE — 3074F PR MOST RECENT SYSTOLIC BLOOD PRESSURE < 130 MM HG: ICD-10-PCS | Mod: CPTII,S$GLB,, | Performed by: PODIATRIST

## 2020-06-25 PROCEDURE — 1101F PR PT FALLS ASSESS DOC 0-1 FALLS W/OUT INJ PAST YR: ICD-10-PCS | Mod: CPTII,S$GLB,, | Performed by: PODIATRIST

## 2020-06-25 PROCEDURE — 1126F AMNT PAIN NOTED NONE PRSNT: CPT | Mod: S$GLB,,, | Performed by: PODIATRIST

## 2020-06-25 PROCEDURE — 99999 PR PBB SHADOW E&M-EST. PATIENT-LVL III: ICD-10-PCS | Mod: PBBFAC,,, | Performed by: PODIATRIST

## 2020-06-25 PROCEDURE — 99213 PR OFFICE/OUTPT VISIT, EST, LEVL III, 20-29 MIN: ICD-10-PCS | Mod: S$GLB,,, | Performed by: PODIATRIST

## 2020-06-25 NOTE — PROGRESS NOTES
Subjective:      Patient ID: Farhan Stallworth is a 70 y.o. male.    Chief Complaint: Ingrown Toenail (right 5th)    Farhan is a 70 y.o. male who presents to the clinic complaining of painful right fifth toe, relates the toe wraps under the fourth toe and recently there has been a lot of pain at the medial aspect medial to the nail bed. Relates that the pain is much better today, relates that soaking in epsom salts the last week really seems to have helped. No other pedal complaints at this time.    Review of Systems   Constitution: Negative for chills and fever.   Cardiovascular: Negative for claudication and leg swelling.   Respiratory: Negative for shortness of breath.    Skin: Positive for nail changes. Negative for itching and rash.   Musculoskeletal: Positive for arthritis. Negative for muscle cramps, muscle weakness and myalgias.   Gastrointestinal: Negative for nausea and vomiting.   Neurological: Negative for focal weakness, loss of balance, numbness and paresthesias.           Objective:      Physical Exam  Constitutional:       General: He is not in acute distress.     Appearance: He is well-developed. He is not diaphoretic.   Cardiovascular:      Pulses:           Dorsalis pedis pulses are 2+ on the right side and 2+ on the left side.        Posterior tibial pulses are 2+ on the right side and 2+ on the left side.      Comments: < 3 sec capillary refill time to toes 1-5 bilateral. Toes and feet are warm to touch proximally with normal distal cooling b/l. There is some hair growth on the feet and toes b/l. There is mild edema b/l. No spider veins or varicosities present b/l.     Musculoskeletal:      Comments: Equinus noted b/l ankles with < 10 deg DF noted. MMT 5/5 in DF/PF/Inv/Ev resistance with no reproduction of pain in any direction. Passive range of motion of ankle and pedal joints is painless b/l.    Semi reducible contracture at the right fifth toe PIPJ and MTPJ with adductovarus rotation      Skin:     General: Skin is warm and dry.      Coloration: Skin is not pale.      Findings: Lesion present. No abrasion, bruising, burn, ecchymosis, erythema, laceration, petechiae or rash.      Nails: There is no clubbing.        Comments: Skin temperature, texture and turgor within normal limits.    Right fifth toenail normal in appearance but medial to the bed there is a dense hyperkeratotic lesion from the 4th toe PIPJ as the toes overlap causing pressure   Neurological:      Mental Status: He is alert and oriented to person, place, and time.      Sensory: No sensory deficit.      Motor: No tremor, atrophy or abnormal muscle tone.      Comments: Negative tinel sign bilateral.       Psychiatric:         Behavior: Behavior normal.               Assessment:       Encounter Diagnoses   Name Primary?    Adductovarus rotation of toe, acquired, right Yes    Corn or callus          Plan:       Farhan was seen today for ingrown toenail.    Diagnoses and all orders for this visit:    Adductovarus rotation of toe, acquired, right    Corn or callus      I counseled the patient on his conditions, their implications and medical management.    Discussed toe spacers to help keep the toes from rubbing    Discussed importance of wearing wide shoes to avoid shoe pressure    With patient's verbal consent out of courtesy the hyperkeratotic lesion right foot was trimmed to healthy appearing skin using a # 15 scalpel. Patient relates relief of pain following the procedure. Patient tolerated the procedure well without complications. No anesthesia or hemostasis required. No blood loss.    Discussed surgical derotational arthroplasty if the pain returns or gets worse    Return PRN with any problems going forward    Momo Javier DPM

## 2020-06-30 ENCOUNTER — PATIENT MESSAGE (OUTPATIENT)
Dept: FAMILY MEDICINE | Facility: CLINIC | Age: 70
End: 2020-06-30

## 2020-06-30 DIAGNOSIS — G47.33 OSA (OBSTRUCTIVE SLEEP APNEA): Primary | ICD-10-CM

## 2020-07-02 NOTE — TELEPHONE ENCOUNTER
I signed the order in epic but it does not specify all the details required.  I would print the order, fill in the details and fax please

## 2020-07-02 NOTE — TELEPHONE ENCOUNTER
Just write all that on the order before we fax it.  There is no way for me to enter that into the computer

## 2020-07-08 ENCOUNTER — DOCUMENTATION ONLY (OUTPATIENT)
Dept: FAMILY MEDICINE | Facility: CLINIC | Age: 70
End: 2020-07-08

## 2020-07-08 NOTE — PROGRESS NOTES
This note is to confirm that this patient does have obstructive sleep apnea and needs his CPAP supplies authorized.  I can not confirm he has obstructive sleep apnea.  He reports that he uses his equipment compliantly.    He needs: ResMed AirFit P10 Nasal Pillow CPAP Mask with Headgear.  Replacement hosing for ResMed AirSense 10 AutoSet CPAP Machine with Heated Humidifier.

## 2020-07-08 NOTE — TELEPHONE ENCOUNTER
The problem is that he did not discussed with me at his office visit so there is no documentation.  I cannot go back in the chart and fabricate something that was never discussed.  I can create a separate note saying yes he has sleep apnea and see if that works.  But if they require a face-to-face visit discussing this then he has to come back.  I do not have control of this and I will not fabricate something that did not happen.

## 2020-07-08 NOTE — TELEPHONE ENCOUNTER
Paperwork with orders fax. Attempted to contact pt to notify him. No answer. Left voicemail for pt.

## 2020-07-09 ENCOUNTER — TELEPHONE (OUTPATIENT)
Dept: FAMILY MEDICINE | Facility: CLINIC | Age: 70
End: 2020-07-09

## 2020-07-09 NOTE — TELEPHONE ENCOUNTER
----- Message from Alejandra Mendes sent at 7/9/2020 11:32 AM CDT -----  Contact: call  pt [phone]    Type:  Patient Returning Call    Who Called:  pt  Who Left Message for Patient:   nurse  Does the patient know what this is regarding?:    not   sure  Best Call Back Number:call  pt [phone]  Additional Information:     please call  to pod // no  answer

## 2020-07-09 NOTE — TELEPHONE ENCOUNTER
Reviewed with pt that per previous message, orders and paperwork for CPAP supplies have been sent. He expressed appreciation and understanding.

## 2020-07-20 ENCOUNTER — PATIENT OUTREACH (OUTPATIENT)
Dept: ADMINISTRATIVE | Facility: OTHER | Age: 70
End: 2020-07-20

## 2020-07-20 NOTE — PROGRESS NOTES
Requested updates within Care Everywhere.  Patient's chart was reviewed for overdue CHANDAN topics.  Immunizations reconciled.

## 2020-07-22 ENCOUNTER — PATIENT MESSAGE (OUTPATIENT)
Dept: CARDIOLOGY | Facility: CLINIC | Age: 70
End: 2020-07-22

## 2020-07-22 ENCOUNTER — OFFICE VISIT (OUTPATIENT)
Dept: UROLOGY | Facility: CLINIC | Age: 70
End: 2020-07-22
Payer: MEDICARE

## 2020-07-22 VITALS
HEIGHT: 68 IN | BODY MASS INDEX: 31.61 KG/M2 | DIASTOLIC BLOOD PRESSURE: 73 MMHG | HEART RATE: 60 BPM | WEIGHT: 208.56 LBS | SYSTOLIC BLOOD PRESSURE: 110 MMHG

## 2020-07-22 DIAGNOSIS — N40.0 BPH WITHOUT URINARY OBSTRUCTION: Primary | ICD-10-CM

## 2020-07-22 DIAGNOSIS — Z12.5 SCREENING FOR PROSTATE CANCER: ICD-10-CM

## 2020-07-22 DIAGNOSIS — N52.9 ERECTILE DYSFUNCTION, UNSPECIFIED ERECTILE DYSFUNCTION TYPE: ICD-10-CM

## 2020-07-22 PROCEDURE — 1101F PR PT FALLS ASSESS DOC 0-1 FALLS W/OUT INJ PAST YR: ICD-10-PCS | Mod: CPTII,S$GLB,, | Performed by: UROLOGY

## 2020-07-22 PROCEDURE — 1159F MED LIST DOCD IN RCRD: CPT | Mod: S$GLB,,, | Performed by: UROLOGY

## 2020-07-22 PROCEDURE — 1126F AMNT PAIN NOTED NONE PRSNT: CPT | Mod: S$GLB,,, | Performed by: UROLOGY

## 2020-07-22 PROCEDURE — 3078F DIAST BP <80 MM HG: CPT | Mod: CPTII,S$GLB,, | Performed by: UROLOGY

## 2020-07-22 PROCEDURE — 3008F PR BODY MASS INDEX (BMI) DOCUMENTED: ICD-10-PCS | Mod: CPTII,S$GLB,, | Performed by: UROLOGY

## 2020-07-22 PROCEDURE — 99203 PR OFFICE/OUTPT VISIT, NEW, LEVL III, 30-44 MIN: ICD-10-PCS | Mod: S$GLB,,, | Performed by: UROLOGY

## 2020-07-22 PROCEDURE — 3074F PR MOST RECENT SYSTOLIC BLOOD PRESSURE < 130 MM HG: ICD-10-PCS | Mod: CPTII,S$GLB,, | Performed by: UROLOGY

## 2020-07-22 PROCEDURE — 3078F PR MOST RECENT DIASTOLIC BLOOD PRESSURE < 80 MM HG: ICD-10-PCS | Mod: CPTII,S$GLB,, | Performed by: UROLOGY

## 2020-07-22 PROCEDURE — 99999 PR PBB SHADOW E&M-EST. PATIENT-LVL IV: CPT | Mod: PBBFAC,,, | Performed by: UROLOGY

## 2020-07-22 PROCEDURE — 3008F BODY MASS INDEX DOCD: CPT | Mod: CPTII,S$GLB,, | Performed by: UROLOGY

## 2020-07-22 PROCEDURE — 3074F SYST BP LT 130 MM HG: CPT | Mod: CPTII,S$GLB,, | Performed by: UROLOGY

## 2020-07-22 PROCEDURE — 1126F PR PAIN SEVERITY QUANTIFIED, NO PAIN PRESENT: ICD-10-PCS | Mod: S$GLB,,, | Performed by: UROLOGY

## 2020-07-22 PROCEDURE — 1101F PT FALLS ASSESS-DOCD LE1/YR: CPT | Mod: CPTII,S$GLB,, | Performed by: UROLOGY

## 2020-07-22 PROCEDURE — 99999 PR PBB SHADOW E&M-EST. PATIENT-LVL IV: ICD-10-PCS | Mod: PBBFAC,,, | Performed by: UROLOGY

## 2020-07-22 PROCEDURE — 1159F PR MEDICATION LIST DOCUMENTED IN MEDICAL RECORD: ICD-10-PCS | Mod: S$GLB,,, | Performed by: UROLOGY

## 2020-07-22 PROCEDURE — 99203 OFFICE O/P NEW LOW 30 MIN: CPT | Mod: S$GLB,,, | Performed by: UROLOGY

## 2020-07-22 NOTE — LETTER
July 22, 2020      Asad Perez MD  [address]  [place] [address]           [facility]  [address] [place] [address]  Phone: [phone]  Fax: [phone]          Patient: Farhan Stallworth   MR Number: [id number]   YOB: 1950   Date of Visit: 7/22/2020       Dear Dr. Asad Perez:    Thank you for referring Farhan Stallworth to me for evaluation. Attached you will find relevant portions of my assessment and plan of care.    If you have questions, please do not hesitate to call me. I look forward to following Farhan Stallworth along with you.    Sincerely,    GABRIEL Charles MD    Enclosure  CC:  No Recipients    If you would like to receive this communication electronically, please contact [email or web address] or [phone] to request more information on [employer] Link access.    For providers and/or their staff who would like to refer a patient to [facility], please contact us through our one-stop-shop provider referral line, [facility], at [phone].    If you feel you have received this communication in error or would no longer like to receive these types of communications, please e-mail [email or web address]

## 2020-07-22 NOTE — Clinical Note
This patient has ED and wants to try Viagra.  A total this was contraindicated given his concurrent use of Imdur.  I recommend that he discuss this with you.

## 2020-07-22 NOTE — PROGRESS NOTES
Subjective:       Patient ID: Farhan Stallworth is a 70 y.o. male.    Chief Complaint: Erectile Dysfunction    HPI     70-year-old with ED.  He says this has been a long-term problem for at least the last couple of years.  He has not tried any previous PDE 5 inhibitors.  He does take nitrates.  He denies ongoing chest pain.  Previous testosterone levels were within normal range.  He has no bothersome urinary symptoms.  He denies hematuria dysuria.  He has no family history of prostate cancer.  His last PSA was less than 1.      PSA, SCREEN (ng/mL)   Date Value   11/15/2016 0.52       Past Medical History:   Diagnosis Date    Arthritis     hip    Atrial fibrillation     Coronary artery disease     cardiac stents    Hearing difficulty     Hypertension     Low serum testosterone level     Lumbar disc disease     REYES (obstructive sleep apnea)     has CPAP, better relief of symptoms with nasal pillow.     REYES (obstructive sleep apnea)      Past Surgical History:   Procedure Laterality Date    CARDIOVERSION N/A 1/3/2019    Procedure: CARDIOVERSION;  Surgeon: Nanci Ahuja MD;  Location: [facility];  Service: Cardiology;  Laterality: N/A;    CARPAL TUNNEL RELEASE      2005, bilateral    CLAVICLE SURGERY      CORONARY ANGIOGRAPHY N/A 4/9/2019    Procedure: ANGIOGRAM, CORONARY ARTERY;  Surgeon: Nanci Ahuja MD;  Location: [facility];  Service: Cardiology;  Laterality: N/A;    EPIDURAL BLOCK INJECTION  2010    HIP SURGERY      INTERNAL NEUROLYSIS USING OPERATING MICROSCOPE Left 12/6/2019    Procedure: Cooled radiofrequency ablation of the genicular nerve branches to the left knee;  Surgeon: Jayy Driscoll MD;  Location: [facility] OR;  Service: Orthopedics;  Laterality: Left;    JOINT REPLACEMENT Right 11/2017    hip    KNEE ARTHROSCOPY W/ MENISCAL REPAIR      left    LEFT HEART CATHETERIZATION Left 4/9/2019    Procedure: Left heart cath;  Surgeon: Nanci Ahuja MD;  Location: [facility];  Service:  Cardiology;  Laterality: Left;    LUMBAR FUSION      2010    TONSILLECTOMY         Current Outpatient Medications:     acetaminophen (TYLENOL) 500 MG tablet, , Disp: , Rfl:     aspirin (ECOTRIN) 81 MG EC tablet, once daily. , Disp: , Rfl:     atorvastatin (LIPITOR) 20 MG tablet, TAKE 1 TABLET EVERY DAY, Disp: 90 tablet, Rfl: 0    coenzyme Q10 100 mg/5 mL Syrp, , Disp: , Rfl:     ELIQUIS 5 mg Tab, TAKE 1 TABLET TWICE DAILY, Disp: 180 tablet, Rfl: 0    glucosamine-chondroitin 500-400 mg tablet, Take 1 tablet by mouth 2 (two) times daily. Triflex, Disp: , Rfl:     isosorbide mononitrate (IMDUR) 30 MG 24 hr tablet, Take 1 tablet (30 mg total) by mouth once daily., Disp: 90 tablet, Rfl: 0    metoprolol succinate (TOPROL-XL) 25 MG 24 hr tablet, Take 1 tablet (25 mg total) by mouth once daily., Disp: 90 tablet, Rfl: 1    MULTIVITS-MINERALS/FA/LYCOPENE (MEN'S DAILY MULTIVIT-MINERAL ORAL), Take 1 tablet by mouth once daily. , Disp: , Rfl:     nitroGLYCERIN (NITROSTAT) 0.4 MG SL tablet, PLACE 1 T UNT Q 5 MIN PRN, Disp: , Rfl:       Review of Systems   Constitutional: Negative for fever.   Eyes: Negative for visual disturbance.   Respiratory: Negative for shortness of breath.    Cardiovascular: Negative for chest pain.   Gastrointestinal: Negative for nausea.   Genitourinary: Negative for dysuria and hematuria.   Musculoskeletal: Negative for gait problem.   Skin: Negative for rash.   Neurological: Negative for seizures.   Psychiatric/Behavioral: Negative for confusion.       Objective:      Physical Exam  Vitals signs reviewed.   Constitutional:       Appearance: He is well-developed.   HENT:      Head: Normocephalic and atraumatic.   Eyes:      Conjunctiva/sclera: Conjunctivae normal.   Cardiovascular:      Rate and Rhythm: Normal rate.   Pulmonary:      Effort: Pulmonary effort is normal.   Genitourinary:     Penis: Normal.       Scrotum/Testes: Normal.      Epididymis:      Right: Normal.      Left: Normal.       Prostate: Enlarged (40g, s/s/a). Not tender.      Rectum: No mass. Normal anal tone.   Musculoskeletal: Normal range of motion.   Skin:     General: Skin is warm and dry.      Findings: No rash.   Neurological:      Mental Status: He is alert and oriented to person, place, and time.         Assessment:       1. BPH without urinary obstruction    2. Erectile dysfunction, unspecified erectile dysfunction type    3. Screening for prostate cancer        Plan:       BPH without urinary obstruction    Erectile dysfunction, unspecified erectile dysfunction type  -     Ambulatory referral/consult to Urology  -     Testosterone; Future; Expected date: 07/22/2020    Screening for prostate cancer  -     PSA, Screening; Future; Expected date: 07/22/2020        We discussed treatment options for ED.  Medical therapy is contraindicated given his ongoing use of long-term nitrates.  I recommend he discuss with his cardiologist if alternative medical therapy could be considered.  We also discussed vacuum erection device as well as injection therapy and penile prosthesis.  Recommend update PSA will get repeat baseline testosterone as well.

## 2020-07-26 ENCOUNTER — PATIENT MESSAGE (OUTPATIENT)
Dept: UROLOGY | Facility: CLINIC | Age: 70
End: 2020-07-26

## 2020-07-27 ENCOUNTER — PATIENT MESSAGE (OUTPATIENT)
Dept: UROLOGY | Facility: CLINIC | Age: 70
End: 2020-07-27

## 2020-07-27 RX ORDER — SILDENAFIL 100 MG/1
100 TABLET, FILM COATED ORAL DAILY PRN
Qty: 10 TABLET | Refills: 11 | Status: SHIPPED | OUTPATIENT
Start: 2020-07-27 | End: 2021-01-28

## 2020-07-27 NOTE — TELEPHONE ENCOUNTER
Pt would like to try cialis or Viagra at the [facility] pharmacy. Please advise

## 2020-08-10 LAB — HEMOCCULT STL QL IA: NEGATIVE

## 2020-08-11 ENCOUNTER — LAB VISIT (OUTPATIENT)
Dept: LAB | Facility: HOSPITAL | Age: 70
End: 2020-08-11
Attending: UROLOGY
Payer: MEDICARE

## 2020-08-11 DIAGNOSIS — Z12.5 SCREENING FOR PROSTATE CANCER: ICD-10-CM

## 2020-08-11 DIAGNOSIS — N52.9 ERECTILE DYSFUNCTION, UNSPECIFIED ERECTILE DYSFUNCTION TYPE: ICD-10-CM

## 2020-08-11 LAB — COMPLEXED PSA SERPL-MCNC: 0.4 NG/ML (ref 0–4)

## 2020-08-11 PROCEDURE — 84403 ASSAY OF TOTAL TESTOSTERONE: CPT

## 2020-08-11 PROCEDURE — 84153 ASSAY OF PSA TOTAL: CPT

## 2020-08-11 PROCEDURE — 36415 COLL VENOUS BLD VENIPUNCTURE: CPT | Mod: PO

## 2020-08-12 LAB — TESTOST SERPL-MCNC: 521 NG/DL (ref 304–1227)

## 2020-09-16 ENCOUNTER — TELEPHONE (OUTPATIENT)
Dept: FAMILY MEDICINE | Facility: CLINIC | Age: 70
End: 2020-09-16

## 2020-09-16 NOTE — TELEPHONE ENCOUNTER
Left message for pt to call clinic  In regards to negative Fecal test results. I will mail pt results

## 2020-09-16 NOTE — TELEPHONE ENCOUNTER
----- Message from Chantale  sent at 9/16/2020  3:33 PM CDT -----  Regarding: returning call  Contact: pt  Type:  Patient Returning Call    Who Called:  pt  Who Left Message for Patient:  cait  Does the patient know what this is regarding?:  yes  Best Call Back Number:    Additional Information:  pt called back

## 2020-09-21 ENCOUNTER — PATIENT OUTREACH (OUTPATIENT)
Dept: ADMINISTRATIVE | Facility: HOSPITAL | Age: 70
End: 2020-09-21

## 2020-10-08 ENCOUNTER — OFFICE VISIT (OUTPATIENT)
Dept: CARDIOLOGY | Facility: CLINIC | Age: 70
End: 2020-10-08
Payer: MEDICARE

## 2020-10-08 VITALS
HEART RATE: 71 BPM | BODY MASS INDEX: 32.81 KG/M2 | DIASTOLIC BLOOD PRESSURE: 77 MMHG | HEIGHT: 68 IN | WEIGHT: 216.5 LBS | SYSTOLIC BLOOD PRESSURE: 130 MMHG

## 2020-10-08 DIAGNOSIS — G47.33 OBSTRUCTIVE SLEEP APNEA SYNDROME: ICD-10-CM

## 2020-10-08 DIAGNOSIS — I10 ESSENTIAL HYPERTENSION: ICD-10-CM

## 2020-10-08 DIAGNOSIS — E78.5 DYSLIPIDEMIA (HIGH LDL; LOW HDL): ICD-10-CM

## 2020-10-08 DIAGNOSIS — I25.10 CORONARY ARTERY DISEASE DUE TO LIPID RICH PLAQUE: ICD-10-CM

## 2020-10-08 DIAGNOSIS — Z79.01 LONG TERM (CURRENT) USE OF ANTICOAGULANTS: ICD-10-CM

## 2020-10-08 DIAGNOSIS — I25.83 CORONARY ARTERY DISEASE DUE TO LIPID RICH PLAQUE: ICD-10-CM

## 2020-10-08 DIAGNOSIS — E66.9 OBESITY, CLASS I, BMI 30-34.9: ICD-10-CM

## 2020-10-08 DIAGNOSIS — R07.89 ATYPICAL CHEST PAIN: Primary | ICD-10-CM

## 2020-10-08 DIAGNOSIS — I48.0 PAF (PAROXYSMAL ATRIAL FIBRILLATION): ICD-10-CM

## 2020-10-08 PROCEDURE — 99214 OFFICE O/P EST MOD 30 MIN: CPT | Mod: S$GLB,,, | Performed by: INTERNAL MEDICINE

## 2020-10-08 PROCEDURE — 99999 PR PBB SHADOW E&M-EST. PATIENT-LVL III: CPT | Mod: PBBFAC,,, | Performed by: INTERNAL MEDICINE

## 2020-10-08 PROCEDURE — 1159F MED LIST DOCD IN RCRD: CPT | Mod: S$GLB,,, | Performed by: INTERNAL MEDICINE

## 2020-10-08 PROCEDURE — 1126F AMNT PAIN NOTED NONE PRSNT: CPT | Mod: S$GLB,,, | Performed by: INTERNAL MEDICINE

## 2020-10-08 PROCEDURE — 99999 PR PBB SHADOW E&M-EST. PATIENT-LVL III: ICD-10-PCS | Mod: PBBFAC,,, | Performed by: INTERNAL MEDICINE

## 2020-10-08 PROCEDURE — 3078F PR MOST RECENT DIASTOLIC BLOOD PRESSURE < 80 MM HG: ICD-10-PCS | Mod: CPTII,S$GLB,, | Performed by: INTERNAL MEDICINE

## 2020-10-08 PROCEDURE — 1159F PR MEDICATION LIST DOCUMENTED IN MEDICAL RECORD: ICD-10-PCS | Mod: S$GLB,,, | Performed by: INTERNAL MEDICINE

## 2020-10-08 PROCEDURE — 3075F SYST BP GE 130 - 139MM HG: CPT | Mod: CPTII,S$GLB,, | Performed by: INTERNAL MEDICINE

## 2020-10-08 PROCEDURE — 3078F DIAST BP <80 MM HG: CPT | Mod: CPTII,S$GLB,, | Performed by: INTERNAL MEDICINE

## 2020-10-08 PROCEDURE — 3008F BODY MASS INDEX DOCD: CPT | Mod: CPTII,S$GLB,, | Performed by: INTERNAL MEDICINE

## 2020-10-08 PROCEDURE — 3008F PR BODY MASS INDEX (BMI) DOCUMENTED: ICD-10-PCS | Mod: CPTII,S$GLB,, | Performed by: INTERNAL MEDICINE

## 2020-10-08 PROCEDURE — 1101F PT FALLS ASSESS-DOCD LE1/YR: CPT | Mod: CPTII,S$GLB,, | Performed by: INTERNAL MEDICINE

## 2020-10-08 PROCEDURE — 99214 PR OFFICE/OUTPT VISIT, EST, LEVL IV, 30-39 MIN: ICD-10-PCS | Mod: S$GLB,,, | Performed by: INTERNAL MEDICINE

## 2020-10-08 PROCEDURE — 1126F PR PAIN SEVERITY QUANTIFIED, NO PAIN PRESENT: ICD-10-PCS | Mod: S$GLB,,, | Performed by: INTERNAL MEDICINE

## 2020-10-08 PROCEDURE — 3075F PR MOST RECENT SYSTOLIC BLOOD PRESS GE 130-139MM HG: ICD-10-PCS | Mod: CPTII,S$GLB,, | Performed by: INTERNAL MEDICINE

## 2020-10-08 PROCEDURE — 1101F PR PT FALLS ASSESS DOC 0-1 FALLS W/OUT INJ PAST YR: ICD-10-PCS | Mod: CPTII,S$GLB,, | Performed by: INTERNAL MEDICINE

## 2020-10-08 RX ORDER — NITROGLYCERIN 0.4 MG/1
0.4 TABLET SUBLINGUAL EVERY 5 MIN PRN
Qty: 25 TABLET | Refills: 0 | Status: SHIPPED | OUTPATIENT
Start: 2020-10-08 | End: 2024-03-27

## 2020-10-08 NOTE — PROGRESS NOTES
Subjective:    Patient ID:  Farhan Stallworth is a 70 y.o. male who presents for Hypertension, Coronary Artery Disease, Atrial Fibrillation, and Hyperlipidemia        HPI    DISCUSSED LABS AND GOALS, CMP OK IN June, DOING BETTER, JUST STARTED RIDING BIKE, USES CPAP REGULARLY AND BENEFITS FROM IT, OCCASIONAL ANGINAL-TYPE SYMPTOMS, WANTS TO BE CHECKED BEFORE HE GOES SKIING IN JANUARY, SEE ROS  Past Medical History:   Diagnosis Date    Arthritis     hip    Atrial fibrillation     Coronary artery disease     cardiac stents    Hearing difficulty     Hypertension     Low serum testosterone level     Lumbar disc disease     REYES (obstructive sleep apnea)     has CPAP, better relief of symptoms with nasal pillow.     REYES (obstructive sleep apnea)      Past Surgical History:   Procedure Laterality Date    CARDIOVERSION N/A 1/3/2019    Procedure: CARDIOVERSION;  Surgeon: Nanci Ahuja MD;  Location: [facility];  Service: Cardiology;  Laterality: N/A;    CARPAL TUNNEL RELEASE      2005, bilateral    CLAVICLE SURGERY      CORONARY ANGIOGRAPHY N/A 4/9/2019    Procedure: ANGIOGRAM, CORONARY ARTERY;  Surgeon: Nanci Ahuja MD;  [facility];  Service: Cardiology;  Laterality: N/A;    EPIDURAL BLOCK INJECTION  2010    HIP SURGERY      INTERNAL NEUROLYSIS USING OPERATING MICROSCOPE Left 12/6/2019    Procedure: Cooled radiofrequency ablation of the genicular nerve branches to the left knee;  Surgeon: Jayy Driscoll MD;  Location: [facility];  Service: Orthopedics;  Laterality: Left;    JOINT REPLACEMENT Right 11/2017    hip    KNEE ARTHROSCOPY W/ MENISCAL REPAIR      left    LEFT HEART CATHETERIZATION Left 4/9/2019    Procedure: Left heart cath;  Surgeon: Nanci Ahuja MD;  Location: [facility];  Service: Cardiology;  Laterality: Left;    LUMBAR FUSION      2010    TONSILLECTOMY       Family History   Problem Relation Age of Onset    Cancer Father         lung    Cancer Sister         leukemia    No  Known Problems Mother     Heart disease Neg Hx     Diabetes Neg Hx      Social History     Socioeconomic History    Marital status:      Spouse name: Not on file    Number of children: Not on file    Years of education: Not on file    Highest education level: Not on file   Occupational History    Not on file   Social Needs    Financial resource strain: Not hard at all    Food insecurity     Worry: Never true     Inability: Never true    Transportation needs     Medical: No     Non-medical: No   Tobacco Use    Smoking status: Never Smoker    Smokeless tobacco: Never Used   Substance and Sexual Activity    Alcohol use: Yes     Alcohol/week: 0.0 standard drinks     Frequency: 2-4 times a month     Drinks per session: 1 or 2     Binge frequency: Never     Comment: occasionally    Drug use: No    Sexual activity: Not on file   Lifestyle    Physical activity     Days per week: 4 days     Minutes per session: 40 min    Stress: Not at all   Relationships    Social connections     Talks on phone: More than three times a week     Gets together: Once a week     Attends Mu-ism service: Not on file     Active member of club or organization: Yes     Attends meetings of clubs or organizations: More than 4 times per year     Relationship status:    Other Topics Concern    Not on file   Social History Narrative    Not on file       Review of patient's allergies indicates:  No Known Allergies    Current Outpatient Medications:     acetaminophen (TYLENOL) 500 MG tablet, , Disp: , Rfl:     aspirin (ECOTRIN) 81 MG EC tablet, once daily. , Disp: , Rfl:     atorvastatin (LIPITOR) 20 MG tablet, TAKE 1 TABLET EVERY DAY, Disp: 90 tablet, Rfl: 0    coenzyme Q10 100 mg/5 mL Syrp, , Disp: , Rfl:     glucosamine-chondroitin 500-400 mg tablet, Take 1 tablet by mouth 2 (two) times daily. Triflex, Disp: , Rfl:     metoprolol succinate (TOPROL-XL) 25 MG 24 hr tablet, TAKE 1 TABLET EVERY DAY, Disp: 90  "tablet, Rfl: 1    MULTIVITS-MINERALS/FA/LYCOPENE (MEN'S DAILY MULTIVIT-MINERAL ORAL), Take 1 tablet by mouth once daily. , Disp: , Rfl:     sildenafiL (VIAGRA) 100 MG tablet, Take 1 tablet (100 mg total) by mouth daily as needed., Disp: 10 tablet, Rfl: 11    ELIQUIS 5 mg Tab, TAKE 1 TABLET TWICE DAILY, Disp: 180 tablet, Rfl: 1    nitroGLYCERIN (NITROSTAT) 0.4 MG SL tablet, Place 1 tablet (0.4 mg total) under the tongue every 5 (five) minutes as needed., Disp: 25 tablet, Rfl: 0    Review of Systems   Constitution: Negative for chills, diaphoresis, fever, malaise/fatigue and night sweats.   HENT: Negative for congestion and nosebleeds.    Eyes: Negative for blurred vision and visual disturbance.   Cardiovascular: Negative for chest pain (RARE ACHE), claudication, cyanosis, dyspnea on exertion, irregular heartbeat, leg swelling, near-syncope, orthopnea, palpitations, paroxysmal nocturnal dyspnea and syncope.   Respiratory: Negative for cough, hemoptysis, shortness of breath and wheezing.    Endocrine: Negative for cold intolerance, heat intolerance and polyuria.   Hematologic/Lymphatic: Negative for adenopathy. Does not bruise/bleed easily.   Skin: Negative for color change, itching and rash.   Musculoskeletal: Negative for back pain (SCIATICA), falls and joint pain (R KNEE BETTER).   Gastrointestinal: Negative for abdominal pain, change in bowel habit, dysphagia, hematemesis, jaundice, melena and nausea.   Genitourinary: Negative for dysuria, flank pain and frequency.   Neurological: Negative for brief paralysis, dizziness, focal weakness, light-headedness, loss of balance, tremors and weakness.   Psychiatric/Behavioral: Negative for altered mental status, depression and memory loss. The patient does not have insomnia.    Allergic/Immunologic: Negative for hives and persistent infections.        Objective:      Vitals:    10/08/20 1329   BP: 130/77   Pulse: 71   Weight: 98.2 kg (216 lb 7.9 oz)   Height: 5' 8" " (1.727 m)   PainSc: 0-No pain     Body mass index is 32.92 kg/m².    Physical Exam   Constitutional: He is oriented to person, place, and time. He appears well-developed and well-nourished. He is active.   OVERWEIGHT   HENT:   Head: Normocephalic and atraumatic.   Mouth/Throat: Oropharynx is clear and moist and mucous membranes are normal.   Eyes: Pupils are equal, round, and reactive to light. Conjunctivae and EOM are normal.   Neck: Normal carotid pulses, no hepatojugular reflux and no JVD present. Carotid bruit is not present. No thyromegaly present.   Cardiovascular: Regular rhythm.  No extrasystoles are present. Bradycardia present. PMI is not displaced. Exam reveals no gallop, no distant heart sounds, no friction rub and no midsystolic click.   Murmur heard.   Systolic murmur is present with a grade of 1/6 at the lower left sternal border.  Pulses:       Carotid pulses are 2+ on the right side and 2+ on the left side.       Radial pulses are 2+ on the right side and 2+ on the left side.        Femoral pulses are 2+ on the right side and 2+ on the left side.       Dorsalis pedis pulses are 2+ on the right side and 2+ on the left side.        Posterior tibial pulses are 2+ on the right side and 2+ on the left side.   Pulmonary/Chest: Effort normal and breath sounds normal. No accessory muscle usage. No tachypnea and no bradypnea. No respiratory distress.   Abdominal: Soft. Bowel sounds are normal. He exhibits no distension and no mass. There is no hepatosplenomegaly. There is no CVA tenderness.   Musculoskeletal: Normal range of motion.         General: No deformity or edema.      Comments: NO ANKLE EDEMA, STILL WITH SOME LEFT KNEE EDEMA   Neurological: He is alert and oriented to person, place, and time. He has normal strength. He displays no tremor. No cranial nerve deficit.   Skin: Skin is warm and dry. No rash noted. No pallor.   Psychiatric: He has a normal mood and affect. His speech is normal and behavior  is normal.               ..    Chemistry        Component Value Date/Time     06/16/2020 1449    K 4.1 06/16/2020 1449     06/16/2020 1449    CO2 24 06/16/2020 1449    BUN 19 06/16/2020 1449    CREATININE 1.0 06/16/2020 1449    GLU 93 06/16/2020 1449        Component Value Date/Time    CALCIUM 8.9 06/16/2020 1449    ALKPHOS 97 06/16/2020 1449    AST 27 06/16/2020 1449    ALT 25 06/16/2020 1449    BILITOT 0.5 06/16/2020 1449    ESTGFRAFRICA >60.0 06/16/2020 1449    EGFRNONAA >60.0 06/16/2020 1449            ..  Lab Results   Component Value Date    CHOL 87 (L) 02/08/2020    CHOL 107 (L) 07/30/2019    CHOL 153 03/26/2019     Lab Results   Component Value Date    HDL 28 (L) 02/08/2020    HDL 38 (L) 07/30/2019    HDL 39 (L) 03/26/2019     Lab Results   Component Value Date    LDLCALC 46.4 (L) 02/08/2020    LDLCALC 54.4 (L) 07/30/2019    LDLCALC 100.2 03/26/2019     Lab Results   Component Value Date    TRIG 63 02/08/2020    TRIG 73 07/30/2019    TRIG 69 03/26/2019     Lab Results   Component Value Date    CHOLHDL 32.2 02/08/2020    CHOLHDL 35.5 07/30/2019    CHOLHDL 25.5 03/26/2019     ..  Lab Results   Component Value Date    WBC 4.98 06/16/2020    HGB 12.9 (L) 06/16/2020    HCT 41.8 06/16/2020    MCV 90 06/16/2020     06/16/2020       Test(s) Reviewed  I have reviewed the following in detail:  [] Stress test   [] Angiography   [] Echocardiogram   [] Labs   [] Other:       Assessment:         ICD-10-CM ICD-9-CM   1. Atypical chest pain  R07.89 786.59   2. Coronary artery disease due to lipid rich plaque  I25.10 414.00    I25.83 414.3   3. PAF (paroxysmal atrial fibrillation)  I48.0 427.31   4. Obesity, Class I, BMI 30-34.9  E66.9 278.00   5. Obstructive sleep apnea syndrome  G47.33 327.23   6. Dyslipidemia (high LDL; low HDL)  E78.5 272.4   7. Essential hypertension  I10 401.9   8. Long term (current) use of anticoagulants  Z79.01 V58.61     Problem List Items Addressed This Visit         Cardiac/Vascular    PAF (paroxysmal atrial fibrillation)    Relevant Orders    Comprehensive Metabolic Panel    Nuclear Stress - Cardiology Interpreted    Essential hypertension    Relevant Orders    Comprehensive Metabolic Panel    Dyslipidemia (high LDL; low HDL)    Relevant Orders    Comprehensive Metabolic Panel    Lipid Panel    Coronary artery disease due to lipid rich plaque    Relevant Orders    Comprehensive Metabolic Panel    Nuclear Stress - Cardiology Interpreted       Hematology    Long term (current) use of anticoagulants    Relevant Orders    Comprehensive Metabolic Panel    Hemoglobin       Endocrine    Obesity, Class I, BMI 30-34.9       Other    Obstructive sleep apnea syndrome    Overview     CPAP tolerated with nasal pillow. Muut for equipment.         Relevant Orders    Comprehensive Metabolic Panel    Atypical chest pain - Primary    Relevant Orders    Comprehensive Metabolic Panel    Nuclear Stress - Cardiology Interpreted           Plan:     STRESS TEST BEFORE SKIING, ALL CV CLINICALLY STABLE, NO DEFINITE ANGINA, NO HF, NO TIA, NO CLINICAL ARRHYTHMIA,CONTINUE CURRENT MEDS, EDUCATION, DIET, EXERCISE, WEIGHT LOSS, RETURN TO CLINIC IN 6 MONTHS WITH LABS       Atypical chest pain  -     Comprehensive Metabolic Panel; Future; Expected date: 04/08/2021  -     Nuclear Stress - Cardiology Interpreted; Future    Coronary artery disease due to lipid rich plaque  -     Comprehensive Metabolic Panel; Future; Expected date: 04/08/2021  -     Nuclear Stress - Cardiology Interpreted; Future    PAF (paroxysmal atrial fibrillation)  -     Comprehensive Metabolic Panel; Future; Expected date: 04/08/2021  -     Nuclear Stress - Cardiology Interpreted; Future    Obesity, Class I, BMI 30-34.9    Obstructive sleep apnea syndrome  -     Comprehensive Metabolic Panel; Future; Expected date: 04/08/2021    Dyslipidemia (high LDL; low HDL)  -     Comprehensive Metabolic Panel; Future; Expected date:  04/08/2021  -     Lipid Panel; Future; Expected date: 04/08/2021    Essential hypertension  -     Comprehensive Metabolic Panel; Future; Expected date: 04/08/2021    Long term (current) use of anticoagulants  -     Comprehensive Metabolic Panel; Future; Expected date: 04/08/2021  -     Hemoglobin; Future; Expected date: 04/08/2021    Other orders  -     nitroGLYCERIN (NITROSTAT) 0.4 MG SL tablet; Place 1 tablet (0.4 mg total) under the tongue every 5 (five) minutes as needed.  Dispense: 25 tablet; Refill: 0    RTC Low level/low impact aerobic exercise 5x's/wk. Heart healthy diet and risk factor modification.    See labs and med orders.    Aerobic exercise 5x's/wk. Heart healthy diet and risk factor modification.    See labs and med orders.

## 2020-12-14 ENCOUNTER — PATIENT MESSAGE (OUTPATIENT)
Dept: CARDIOLOGY | Facility: CLINIC | Age: 70
End: 2020-12-14

## 2020-12-14 ENCOUNTER — HOSPITAL ENCOUNTER (OUTPATIENT)
Dept: RADIOLOGY | Facility: HOSPITAL | Age: 70
Discharge: HOME | End: 2020-12-14
Attending: INTERNAL MEDICINE
Payer: MEDICARE

## 2020-12-14 ENCOUNTER — CLINICAL SUPPORT (OUTPATIENT)
Dept: CARDIOLOGY | Facility: CLINIC | Age: 70
End: 2020-12-14
Attending: INTERNAL MEDICINE
Payer: MEDICARE

## 2020-12-14 VITALS — HEIGHT: 68 IN | BODY MASS INDEX: 32.74 KG/M2 | WEIGHT: 216 LBS

## 2020-12-14 DIAGNOSIS — I25.83 CORONARY ARTERY DISEASE DUE TO LIPID RICH PLAQUE: ICD-10-CM

## 2020-12-14 DIAGNOSIS — I25.10 CORONARY ARTERY DISEASE DUE TO LIPID RICH PLAQUE: ICD-10-CM

## 2020-12-14 DIAGNOSIS — I48.0 PAF (PAROXYSMAL ATRIAL FIBRILLATION): ICD-10-CM

## 2020-12-14 DIAGNOSIS — R07.89 ATYPICAL CHEST PAIN: ICD-10-CM

## 2020-12-14 LAB
CV STRESS BASE HR: 54 BPM
DIASTOLIC BLOOD PRESSURE: 87 MMHG
NUC REST EJECTION FRACTION: 61
NUC STRESS EJECTION FRACTION: 66 %
OHS CV CPX 1 MINUTE RECOVERY HEART RATE: 75 BPM
OHS CV CPX 85 PERCENT MAX PREDICTED HEART RATE MALE: 128
OHS CV CPX ESTIMATED METS: 12
OHS CV CPX MAX PREDICTED HEART RATE: 151
OHS CV CPX PATIENT IS FEMALE: 0
OHS CV CPX PATIENT IS MALE: 1
OHS CV CPX PEAK DIASTOLIC BLOOD PRESSURE: 111 MMHG
OHS CV CPX PEAK HEAR RATE: 146 BPM
OHS CV CPX PEAK RATE PRESSURE PRODUCT: NORMAL
OHS CV CPX PEAK SYSTOLIC BLOOD PRESSURE: 230 MMHG
OHS CV CPX PERCENT MAX PREDICTED HEART RATE ACHIEVED: 97
OHS CV CPX RATE PRESSURE PRODUCT PRESENTING: 8046
STRESS ECHO POST EXERCISE DUR MIN: 7 MINUTES
STRESS ECHO POST EXERCISE DUR SEC: 50 SECONDS
SYSTOLIC BLOOD PRESSURE: 149 MMHG

## 2020-12-14 PROCEDURE — 99999 PR PBB SHADOW E&M-EST. PATIENT-LVL I: ICD-10-PCS | Mod: PBBFAC,,,

## 2020-12-14 PROCEDURE — 93018 PR CARDIAC STRESS TST,INTERP/REPT ONLY: ICD-10-PCS | Mod: ,,, | Performed by: INTERNAL MEDICINE

## 2020-12-14 PROCEDURE — 99999 PR PBB SHADOW E&M-EST. PATIENT-LVL I: CPT | Mod: PBBFAC,,,

## 2020-12-14 PROCEDURE — 78452 STRESS TEST WITH MYOCARDIAL PERFUSION (CUPID ONLY): ICD-10-PCS | Mod: 26,,, | Performed by: INTERNAL MEDICINE

## 2020-12-14 PROCEDURE — 93016 STRESS TEST WITH MYOCARDIAL PERFUSION (CUPID ONLY): ICD-10-PCS | Mod: ,,, | Performed by: INTERNAL MEDICINE

## 2020-12-14 PROCEDURE — A9502 TC99M TETROFOSMIN: HCPCS | Mod: PO

## 2020-12-14 PROCEDURE — 93016 CV STRESS TEST SUPVJ ONLY: CPT | Mod: ,,, | Performed by: INTERNAL MEDICINE

## 2020-12-14 PROCEDURE — 93018 CV STRESS TEST I&R ONLY: CPT | Mod: ,,, | Performed by: INTERNAL MEDICINE

## 2020-12-14 PROCEDURE — 78452 HT MUSCLE IMAGE SPECT MULT: CPT | Mod: 26,,, | Performed by: INTERNAL MEDICINE

## 2020-12-29 ENCOUNTER — PATIENT MESSAGE (OUTPATIENT)
Dept: UROLOGY | Facility: CLINIC | Age: 70
End: 2020-12-29

## 2020-12-29 DIAGNOSIS — N52.9 ERECTILE DYSFUNCTION, UNSPECIFIED ERECTILE DYSFUNCTION TYPE: Primary | ICD-10-CM

## 2020-12-30 RX ORDER — SILDENAFIL 100 MG/1
100 TABLET, FILM COATED ORAL DAILY PRN
Qty: 90 TABLET | Refills: 3 | OUTPATIENT
Start: 2020-12-30 | End: 2021-12-30

## 2021-01-22 ENCOUNTER — PATIENT MESSAGE (OUTPATIENT)
Dept: ADMINISTRATIVE | Facility: OTHER | Age: 71
End: 2021-01-22

## 2021-01-24 ENCOUNTER — PATIENT MESSAGE (OUTPATIENT)
Dept: CARDIOLOGY | Facility: CLINIC | Age: 71
End: 2021-01-24

## 2021-01-25 ENCOUNTER — TELEPHONE (OUTPATIENT)
Dept: CARDIOLOGY | Facility: CLINIC | Age: 71
End: 2021-01-25

## 2021-01-26 ENCOUNTER — PATIENT OUTREACH (OUTPATIENT)
Dept: ADMINISTRATIVE | Facility: OTHER | Age: 71
End: 2021-01-26

## 2021-01-28 ENCOUNTER — OFFICE VISIT (OUTPATIENT)
Dept: CARDIOLOGY | Facility: CLINIC | Age: 71
End: 2021-01-28
Payer: MEDICARE

## 2021-01-28 VITALS
SYSTOLIC BLOOD PRESSURE: 131 MMHG | HEART RATE: 59 BPM | BODY MASS INDEX: 34.15 KG/M2 | DIASTOLIC BLOOD PRESSURE: 74 MMHG | WEIGHT: 225.31 LBS | HEIGHT: 68 IN

## 2021-01-28 DIAGNOSIS — Z79.01 LONG TERM (CURRENT) USE OF ANTICOAGULANTS: ICD-10-CM

## 2021-01-28 DIAGNOSIS — I25.83 CORONARY ARTERY DISEASE DUE TO LIPID RICH PLAQUE: ICD-10-CM

## 2021-01-28 DIAGNOSIS — E66.9 OBESITY, CLASS I, BMI 30-34.9: ICD-10-CM

## 2021-01-28 DIAGNOSIS — I25.10 CORONARY ARTERY DISEASE DUE TO LIPID RICH PLAQUE: ICD-10-CM

## 2021-01-28 DIAGNOSIS — I20.0 CRESCENDO ANGINA: Primary | ICD-10-CM

## 2021-01-28 DIAGNOSIS — I10 ESSENTIAL HYPERTENSION: ICD-10-CM

## 2021-01-28 DIAGNOSIS — Z03.818 ENCNTR FOR OBS FOR SUSP EXPSR TO OTH BIOLG AGENTS RULED OUT: Primary | ICD-10-CM

## 2021-01-28 DIAGNOSIS — Z95.5 STENTED CORONARY ARTERY: ICD-10-CM

## 2021-01-28 DIAGNOSIS — I48.0 PAF (PAROXYSMAL ATRIAL FIBRILLATION): ICD-10-CM

## 2021-01-28 PROCEDURE — 99214 PR OFFICE/OUTPT VISIT, EST, LEVL IV, 30-39 MIN: ICD-10-PCS | Mod: S$GLB,,, | Performed by: INTERNAL MEDICINE

## 2021-01-28 PROCEDURE — 3008F PR BODY MASS INDEX (BMI) DOCUMENTED: ICD-10-PCS | Mod: CPTII,S$GLB,, | Performed by: INTERNAL MEDICINE

## 2021-01-28 PROCEDURE — 99214 OFFICE O/P EST MOD 30 MIN: CPT | Mod: S$GLB,,, | Performed by: INTERNAL MEDICINE

## 2021-01-28 PROCEDURE — 1159F MED LIST DOCD IN RCRD: CPT | Mod: S$GLB,,, | Performed by: INTERNAL MEDICINE

## 2021-01-28 PROCEDURE — 3078F DIAST BP <80 MM HG: CPT | Mod: CPTII,S$GLB,, | Performed by: INTERNAL MEDICINE

## 2021-01-28 PROCEDURE — 99999 PR PBB SHADOW E&M-EST. PATIENT-LVL IV: CPT | Mod: PBBFAC,,, | Performed by: INTERNAL MEDICINE

## 2021-01-28 PROCEDURE — 93000 ELECTROCARDIOGRAM COMPLETE: CPT | Mod: S$GLB,,, | Performed by: INTERNAL MEDICINE

## 2021-01-28 PROCEDURE — 93000 EKG 12-LEAD: ICD-10-PCS | Mod: S$GLB,,, | Performed by: INTERNAL MEDICINE

## 2021-01-28 PROCEDURE — 1101F PT FALLS ASSESS-DOCD LE1/YR: CPT | Mod: CPTII,S$GLB,, | Performed by: INTERNAL MEDICINE

## 2021-01-28 PROCEDURE — 3078F PR MOST RECENT DIASTOLIC BLOOD PRESSURE < 80 MM HG: ICD-10-PCS | Mod: CPTII,S$GLB,, | Performed by: INTERNAL MEDICINE

## 2021-01-28 PROCEDURE — 3008F BODY MASS INDEX DOCD: CPT | Mod: CPTII,S$GLB,, | Performed by: INTERNAL MEDICINE

## 2021-01-28 PROCEDURE — 1126F PR PAIN SEVERITY QUANTIFIED, NO PAIN PRESENT: ICD-10-PCS | Mod: S$GLB,,, | Performed by: INTERNAL MEDICINE

## 2021-01-28 PROCEDURE — 1126F AMNT PAIN NOTED NONE PRSNT: CPT | Mod: S$GLB,,, | Performed by: INTERNAL MEDICINE

## 2021-01-28 PROCEDURE — 3075F SYST BP GE 130 - 139MM HG: CPT | Mod: CPTII,S$GLB,, | Performed by: INTERNAL MEDICINE

## 2021-01-28 PROCEDURE — 1159F PR MEDICATION LIST DOCUMENTED IN MEDICAL RECORD: ICD-10-PCS | Mod: S$GLB,,, | Performed by: INTERNAL MEDICINE

## 2021-01-28 PROCEDURE — 3288F PR FALLS RISK ASSESSMENT DOCUMENTED: ICD-10-PCS | Mod: CPTII,S$GLB,, | Performed by: INTERNAL MEDICINE

## 2021-01-28 PROCEDURE — 3288F FALL RISK ASSESSMENT DOCD: CPT | Mod: CPTII,S$GLB,, | Performed by: INTERNAL MEDICINE

## 2021-01-28 PROCEDURE — 99999 PR PBB SHADOW E&M-EST. PATIENT-LVL IV: ICD-10-PCS | Mod: PBBFAC,,, | Performed by: INTERNAL MEDICINE

## 2021-01-28 PROCEDURE — 1101F PR PT FALLS ASSESS DOC 0-1 FALLS W/OUT INJ PAST YR: ICD-10-PCS | Mod: CPTII,S$GLB,, | Performed by: INTERNAL MEDICINE

## 2021-01-28 PROCEDURE — 3075F PR MOST RECENT SYSTOLIC BLOOD PRESS GE 130-139MM HG: ICD-10-PCS | Mod: CPTII,S$GLB,, | Performed by: INTERNAL MEDICINE

## 2021-01-28 RX ORDER — SODIUM CHLORIDE 9 MG/ML
INJECTION, SOLUTION INTRAVENOUS ONCE
Status: CANCELLED | OUTPATIENT
Start: 2021-01-28 | End: 2021-01-28

## 2021-01-28 RX ORDER — ISOSORBIDE MONONITRATE 30 MG/1
30 TABLET, EXTENDED RELEASE ORAL DAILY
Qty: 30 TABLET | Refills: 2 | Status: SHIPPED | OUTPATIENT
Start: 2021-01-28 | End: 2021-06-27 | Stop reason: SDUPTHER

## 2021-01-28 RX ORDER — SODIUM CHLORIDE 0.9 % (FLUSH) 0.9 %
10 SYRINGE (ML) INJECTION
Status: DISCONTINUED | OUTPATIENT
Start: 2021-01-28 | End: 2021-04-12

## 2021-01-29 ENCOUNTER — LAB VISIT (OUTPATIENT)
Dept: FAMILY MEDICINE | Facility: CLINIC | Age: 71
End: 2021-01-29
Payer: MEDICARE

## 2021-01-29 DIAGNOSIS — Z03.818 ENCNTR FOR OBS FOR SUSP EXPSR TO OTH BIOLG AGENTS RULED OUT: ICD-10-CM

## 2021-01-29 PROCEDURE — U0003 INFECTIOUS AGENT DETECTION BY NUCLEIC ACID (DNA OR RNA); SEVERE ACUTE RESPIRATORY SYNDROME CORONAVIRUS 2 (SARS-COV-2) (CORONAVIRUS DISEASE [COVID-19]), AMPLIFIED PROBE TECHNIQUE, MAKING USE OF HIGH THROUGHPUT TECHNOLOGIES AS DESCRIBED BY CMS-2020-01-R: HCPCS

## 2021-01-30 LAB — SARS-COV-2 RNA RESP QL NAA+PROBE: NOT DETECTED

## 2021-02-19 ENCOUNTER — PATIENT MESSAGE (OUTPATIENT)
Dept: CARDIOLOGY | Facility: CLINIC | Age: 71
End: 2021-02-19

## 2021-02-19 DIAGNOSIS — I48.0 PAROXYSMAL ATRIAL FIBRILLATION: ICD-10-CM

## 2021-02-19 RX ORDER — METOPROLOL SUCCINATE 25 MG/1
25 TABLET, EXTENDED RELEASE ORAL DAILY
Qty: 15 TABLET | Refills: 0 | Status: SHIPPED | OUTPATIENT
Start: 2021-02-19 | End: 2021-05-10 | Stop reason: SDUPTHER

## 2021-03-02 ENCOUNTER — PATIENT MESSAGE (OUTPATIENT)
Dept: CARDIOLOGY | Facility: CLINIC | Age: 71
End: 2021-03-02

## 2021-03-03 ENCOUNTER — TELEPHONE (OUTPATIENT)
Dept: RHEUMATOLOGY | Facility: CLINIC | Age: 71
End: 2021-03-03

## 2021-03-03 ENCOUNTER — PATIENT MESSAGE (OUTPATIENT)
Dept: CARDIOLOGY | Facility: CLINIC | Age: 71
End: 2021-03-03

## 2021-03-03 DIAGNOSIS — I48.0 PAROXYSMAL ATRIAL FIBRILLATION: ICD-10-CM

## 2021-04-03 ENCOUNTER — LAB VISIT (OUTPATIENT)
Dept: LAB | Facility: HOSPITAL | Age: 71
End: 2021-04-03
Attending: INTERNAL MEDICINE
Payer: MEDICARE

## 2021-04-03 DIAGNOSIS — I10 ESSENTIAL HYPERTENSION: ICD-10-CM

## 2021-04-03 DIAGNOSIS — I48.0 PAF (PAROXYSMAL ATRIAL FIBRILLATION): ICD-10-CM

## 2021-04-03 DIAGNOSIS — I25.83 CORONARY ARTERY DISEASE DUE TO LIPID RICH PLAQUE: ICD-10-CM

## 2021-04-03 DIAGNOSIS — G47.33 OBSTRUCTIVE SLEEP APNEA SYNDROME: ICD-10-CM

## 2021-04-03 DIAGNOSIS — Z79.01 LONG TERM (CURRENT) USE OF ANTICOAGULANTS: ICD-10-CM

## 2021-04-03 DIAGNOSIS — E78.5 DYSLIPIDEMIA (HIGH LDL; LOW HDL): ICD-10-CM

## 2021-04-03 DIAGNOSIS — I25.10 CORONARY ARTERY DISEASE DUE TO LIPID RICH PLAQUE: ICD-10-CM

## 2021-04-03 DIAGNOSIS — R07.89 ATYPICAL CHEST PAIN: ICD-10-CM

## 2021-04-03 LAB
ALBUMIN SERPL BCP-MCNC: 4.1 G/DL (ref 3.5–5.2)
ALP SERPL-CCNC: 85 U/L (ref 55–135)
ALT SERPL W/O P-5'-P-CCNC: 31 U/L (ref 10–44)
ANION GAP SERPL CALC-SCNC: 9 MMOL/L (ref 8–16)
AST SERPL-CCNC: 34 U/L (ref 10–40)
BILIRUB SERPL-MCNC: 1.3 MG/DL (ref 0.1–1)
BUN SERPL-MCNC: 15 MG/DL (ref 8–23)
CALCIUM SERPL-MCNC: 9 MG/DL (ref 8.7–10.5)
CHLORIDE SERPL-SCNC: 105 MMOL/L (ref 95–110)
CHOLEST SERPL-MCNC: 98 MG/DL (ref 120–199)
CHOLEST/HDLC SERPL: 2.7 {RATIO} (ref 2–5)
CO2 SERPL-SCNC: 25 MMOL/L (ref 23–29)
CREAT SERPL-MCNC: 1.2 MG/DL (ref 0.5–1.4)
EST. GFR  (AFRICAN AMERICAN): >60 ML/MIN/1.73 M^2
EST. GFR  (NON AFRICAN AMERICAN): >60 ML/MIN/1.73 M^2
GLUCOSE SERPL-MCNC: 99 MG/DL (ref 70–110)
HDLC SERPL-MCNC: 36 MG/DL (ref 40–75)
HDLC SERPL: 36.7 % (ref 20–50)
HGB BLD-MCNC: 15 G/DL (ref 14–18)
LDLC SERPL CALC-MCNC: 46.4 MG/DL (ref 63–159)
NONHDLC SERPL-MCNC: 62 MG/DL
POTASSIUM SERPL-SCNC: 4.2 MMOL/L (ref 3.5–5.1)
PROT SERPL-MCNC: 7.1 G/DL (ref 6–8.4)
SODIUM SERPL-SCNC: 139 MMOL/L (ref 136–145)
TRIGL SERPL-MCNC: 78 MG/DL (ref 30–150)

## 2021-04-03 PROCEDURE — 80053 COMPREHEN METABOLIC PANEL: CPT | Performed by: INTERNAL MEDICINE

## 2021-04-03 PROCEDURE — 36415 COLL VENOUS BLD VENIPUNCTURE: CPT | Mod: PO | Performed by: INTERNAL MEDICINE

## 2021-04-03 PROCEDURE — 80061 LIPID PANEL: CPT | Performed by: INTERNAL MEDICINE

## 2021-04-03 PROCEDURE — 85018 HEMOGLOBIN: CPT | Performed by: INTERNAL MEDICINE

## 2021-04-12 ENCOUNTER — OFFICE VISIT (OUTPATIENT)
Dept: CARDIOLOGY | Facility: CLINIC | Age: 71
End: 2021-04-12
Payer: MEDICARE

## 2021-04-12 VITALS
HEIGHT: 68 IN | HEART RATE: 54 BPM | WEIGHT: 228.81 LBS | BODY MASS INDEX: 34.68 KG/M2 | SYSTOLIC BLOOD PRESSURE: 128 MMHG | DIASTOLIC BLOOD PRESSURE: 73 MMHG

## 2021-04-12 DIAGNOSIS — I25.10 CORONARY ARTERY DISEASE INVOLVING NATIVE CORONARY ARTERY OF NATIVE HEART WITHOUT ANGINA PECTORIS: Chronic | ICD-10-CM

## 2021-04-12 DIAGNOSIS — I34.0 NON-RHEUMATIC MITRAL REGURGITATION: Primary | ICD-10-CM

## 2021-04-12 DIAGNOSIS — Z79.01 LONG TERM (CURRENT) USE OF ANTICOAGULANTS: Chronic | ICD-10-CM

## 2021-04-12 DIAGNOSIS — I10 ESSENTIAL HYPERTENSION: ICD-10-CM

## 2021-04-12 DIAGNOSIS — I48.0 PAF (PAROXYSMAL ATRIAL FIBRILLATION): ICD-10-CM

## 2021-04-12 DIAGNOSIS — E66.9 OBESITY, CLASS I, BMI 30-34.9: ICD-10-CM

## 2021-04-12 DIAGNOSIS — E78.00 HYPERCHOLESTEREMIA: ICD-10-CM

## 2021-04-12 PROBLEM — I20.0 CRESCENDO ANGINA: Status: RESOLVED | Noted: 2021-01-28 | Resolved: 2021-04-12

## 2021-04-12 PROCEDURE — 1101F PT FALLS ASSESS-DOCD LE1/YR: CPT | Mod: CPTII,S$GLB,, | Performed by: INTERNAL MEDICINE

## 2021-04-12 PROCEDURE — 3288F FALL RISK ASSESSMENT DOCD: CPT | Mod: CPTII,S$GLB,, | Performed by: INTERNAL MEDICINE

## 2021-04-12 PROCEDURE — 99999 PR PBB SHADOW E&M-EST. PATIENT-LVL III: ICD-10-PCS | Mod: PBBFAC,,, | Performed by: INTERNAL MEDICINE

## 2021-04-12 PROCEDURE — 1159F MED LIST DOCD IN RCRD: CPT | Mod: S$GLB,,, | Performed by: INTERNAL MEDICINE

## 2021-04-12 PROCEDURE — 1125F PR PAIN SEVERITY QUANTIFIED, PAIN PRESENT: ICD-10-PCS | Mod: S$GLB,,, | Performed by: INTERNAL MEDICINE

## 2021-04-12 PROCEDURE — 1101F PR PT FALLS ASSESS DOC 0-1 FALLS W/OUT INJ PAST YR: ICD-10-PCS | Mod: CPTII,S$GLB,, | Performed by: INTERNAL MEDICINE

## 2021-04-12 PROCEDURE — 1125F AMNT PAIN NOTED PAIN PRSNT: CPT | Mod: S$GLB,,, | Performed by: INTERNAL MEDICINE

## 2021-04-12 PROCEDURE — 99999 PR PBB SHADOW E&M-EST. PATIENT-LVL III: CPT | Mod: PBBFAC,,, | Performed by: INTERNAL MEDICINE

## 2021-04-12 PROCEDURE — 3008F PR BODY MASS INDEX (BMI) DOCUMENTED: ICD-10-PCS | Mod: CPTII,S$GLB,, | Performed by: INTERNAL MEDICINE

## 2021-04-12 PROCEDURE — 3288F PR FALLS RISK ASSESSMENT DOCUMENTED: ICD-10-PCS | Mod: CPTII,S$GLB,, | Performed by: INTERNAL MEDICINE

## 2021-04-12 PROCEDURE — 3008F BODY MASS INDEX DOCD: CPT | Mod: CPTII,S$GLB,, | Performed by: INTERNAL MEDICINE

## 2021-04-12 PROCEDURE — 1159F PR MEDICATION LIST DOCUMENTED IN MEDICAL RECORD: ICD-10-PCS | Mod: S$GLB,,, | Performed by: INTERNAL MEDICINE

## 2021-04-12 PROCEDURE — 99214 OFFICE O/P EST MOD 30 MIN: CPT | Mod: S$GLB,,, | Performed by: INTERNAL MEDICINE

## 2021-04-12 PROCEDURE — 99214 PR OFFICE/OUTPT VISIT, EST, LEVL IV, 30-39 MIN: ICD-10-PCS | Mod: S$GLB,,, | Performed by: INTERNAL MEDICINE

## 2021-04-12 RX ORDER — ATORVASTATIN CALCIUM 20 MG/1
20 TABLET, FILM COATED ORAL DAILY
Qty: 90 TABLET | Refills: 1 | Status: SHIPPED | OUTPATIENT
Start: 2021-04-12 | End: 2021-12-03

## 2021-05-10 DIAGNOSIS — I48.91 NEW ONSET ATRIAL FIBRILLATION: Primary | ICD-10-CM

## 2021-05-10 RX ORDER — METOPROLOL SUCCINATE 25 MG/1
25 TABLET, EXTENDED RELEASE ORAL DAILY
Qty: 90 TABLET | Refills: 1 | Status: SHIPPED | OUTPATIENT
Start: 2021-05-10 | End: 2021-11-23

## 2021-06-14 LAB — HEMOCCULT STL QL IA: NEGATIVE

## 2021-06-28 ENCOUNTER — LAB VISIT (OUTPATIENT)
Dept: LAB | Facility: HOSPITAL | Age: 71
End: 2021-06-28
Attending: INTERNAL MEDICINE
Payer: MEDICARE

## 2021-06-28 ENCOUNTER — OFFICE VISIT (OUTPATIENT)
Dept: RHEUMATOLOGY | Facility: CLINIC | Age: 71
End: 2021-06-28
Payer: MEDICARE

## 2021-06-28 VITALS
BODY MASS INDEX: 32.13 KG/M2 | SYSTOLIC BLOOD PRESSURE: 122 MMHG | DIASTOLIC BLOOD PRESSURE: 72 MMHG | HEIGHT: 71 IN | HEART RATE: 55 BPM | WEIGHT: 229.5 LBS

## 2021-06-28 DIAGNOSIS — Z96.641 HISTORY OF RIGHT HIP REPLACEMENT: ICD-10-CM

## 2021-06-28 DIAGNOSIS — M19.041 PRIMARY OSTEOARTHRITIS OF BOTH HANDS: ICD-10-CM

## 2021-06-28 DIAGNOSIS — M19.042 PRIMARY OSTEOARTHRITIS OF BOTH HANDS: Primary | ICD-10-CM

## 2021-06-28 DIAGNOSIS — Z96.652 STATUS POST TOTAL LEFT KNEE REPLACEMENT: ICD-10-CM

## 2021-06-28 DIAGNOSIS — M19.041 PRIMARY OSTEOARTHRITIS OF BOTH HANDS: Primary | ICD-10-CM

## 2021-06-28 DIAGNOSIS — M19.042 PRIMARY OSTEOARTHRITIS OF BOTH HANDS: ICD-10-CM

## 2021-06-28 PROBLEM — I25.10 CORONARY ARTERY DISEASE DUE TO LIPID RICH PLAQUE: Status: RESOLVED | Noted: 2020-10-08 | Resolved: 2021-06-28

## 2021-06-28 PROBLEM — I25.83 CORONARY ARTERY DISEASE DUE TO LIPID RICH PLAQUE: Status: RESOLVED | Noted: 2020-10-08 | Resolved: 2021-06-28

## 2021-06-28 PROBLEM — Z01.810 ENCOUNTER FOR PRE-OPERATIVE CARDIOVASCULAR CLEARANCE: Status: RESOLVED | Noted: 2019-04-29 | Resolved: 2021-06-28

## 2021-06-28 PROBLEM — R07.89 ATYPICAL CHEST PAIN: Status: RESOLVED | Noted: 2020-10-08 | Resolved: 2021-06-28

## 2021-06-28 PROBLEM — R94.39 ABNORMAL NUCLEAR STRESS TEST: Status: RESOLVED | Noted: 2019-04-04 | Resolved: 2021-06-28

## 2021-06-28 PROBLEM — R07.9 ACUTE CHEST PAIN: Status: RESOLVED | Noted: 2019-03-21 | Resolved: 2021-06-28

## 2021-06-28 PROBLEM — R03.0 ELEVATED BLOOD PRESSURE READING: Status: RESOLVED | Noted: 2018-12-03 | Resolved: 2021-06-28

## 2021-06-28 LAB
CCP AB SER IA-ACNC: <0.5 U/ML
CK SERPL-CCNC: 220 U/L (ref 20–200)
CRP SERPL-MCNC: 1.7 MG/L (ref 0–8.2)
ERYTHROCYTE [SEDIMENTATION RATE] IN BLOOD BY WESTERGREN METHOD: 4 MM/HR (ref 0–23)
URATE SERPL-MCNC: 7 MG/DL (ref 3.4–7)

## 2021-06-28 PROCEDURE — 3008F BODY MASS INDEX DOCD: CPT | Mod: CPTII,S$GLB,, | Performed by: INTERNAL MEDICINE

## 2021-06-28 PROCEDURE — 99204 OFFICE O/P NEW MOD 45 MIN: CPT | Mod: S$GLB,,, | Performed by: INTERNAL MEDICINE

## 2021-06-28 PROCEDURE — 82550 ASSAY OF CK (CPK): CPT | Performed by: INTERNAL MEDICINE

## 2021-06-28 PROCEDURE — 86431 RHEUMATOID FACTOR QUANT: CPT | Performed by: INTERNAL MEDICINE

## 2021-06-28 PROCEDURE — 1159F MED LIST DOCD IN RCRD: CPT | Mod: S$GLB,,, | Performed by: INTERNAL MEDICINE

## 2021-06-28 PROCEDURE — 1125F PR PAIN SEVERITY QUANTIFIED, PAIN PRESENT: ICD-10-PCS | Mod: S$GLB,,, | Performed by: INTERNAL MEDICINE

## 2021-06-28 PROCEDURE — 86140 C-REACTIVE PROTEIN: CPT | Performed by: INTERNAL MEDICINE

## 2021-06-28 PROCEDURE — 99999 PR PBB SHADOW E&M-EST. PATIENT-LVL IV: ICD-10-PCS | Mod: PBBFAC,,, | Performed by: INTERNAL MEDICINE

## 2021-06-28 PROCEDURE — 85652 RBC SED RATE AUTOMATED: CPT | Performed by: INTERNAL MEDICINE

## 2021-06-28 PROCEDURE — 36415 COLL VENOUS BLD VENIPUNCTURE: CPT | Performed by: INTERNAL MEDICINE

## 2021-06-28 PROCEDURE — 3008F PR BODY MASS INDEX (BMI) DOCUMENTED: ICD-10-PCS | Mod: CPTII,S$GLB,, | Performed by: INTERNAL MEDICINE

## 2021-06-28 PROCEDURE — 1125F AMNT PAIN NOTED PAIN PRSNT: CPT | Mod: S$GLB,,, | Performed by: INTERNAL MEDICINE

## 2021-06-28 PROCEDURE — 86038 ANTINUCLEAR ANTIBODIES: CPT | Performed by: INTERNAL MEDICINE

## 2021-06-28 PROCEDURE — 1159F PR MEDICATION LIST DOCUMENTED IN MEDICAL RECORD: ICD-10-PCS | Mod: S$GLB,,, | Performed by: INTERNAL MEDICINE

## 2021-06-28 PROCEDURE — 86200 CCP ANTIBODY: CPT | Performed by: INTERNAL MEDICINE

## 2021-06-28 PROCEDURE — 99204 PR OFFICE/OUTPT VISIT, NEW, LEVL IV, 45-59 MIN: ICD-10-PCS | Mod: S$GLB,,, | Performed by: INTERNAL MEDICINE

## 2021-06-28 PROCEDURE — 99999 PR PBB SHADOW E&M-EST. PATIENT-LVL IV: CPT | Mod: PBBFAC,,, | Performed by: INTERNAL MEDICINE

## 2021-06-28 PROCEDURE — 84550 ASSAY OF BLOOD/URIC ACID: CPT | Performed by: INTERNAL MEDICINE

## 2021-06-28 ASSESSMENT — ROUTINE ASSESSMENT OF PATIENT INDEX DATA (RAPID3)
PAIN SCORE: 2
PATIENT GLOBAL ASSESSMENT SCORE: 3
AM STIFFNESS SCORE: 1, YES
FATIGUE SCORE: 0
TOTAL RAPID3 SCORE: 2.22
PSYCHOLOGICAL DISTRESS SCORE: 0
MDHAQ FUNCTION SCORE: 0.5
WHEN YOU AWAKENED IN THE MORNING OVER THE LAST WEEK, PLEASE INDICATE THE AMOUNT OF TIME IT TAKES UNTIL YOU ARE AS LIMBER AS YOU WILL BE FOR THE DAY: 2 HOUR

## 2021-06-29 ENCOUNTER — HOSPITAL ENCOUNTER (OUTPATIENT)
Dept: RADIOLOGY | Facility: HOSPITAL | Age: 71
Discharge: HOME | End: 2021-06-29
Attending: INTERNAL MEDICINE
Payer: MEDICARE

## 2021-06-29 DIAGNOSIS — M19.042 PRIMARY OSTEOARTHRITIS OF BOTH HANDS: ICD-10-CM

## 2021-06-29 DIAGNOSIS — M19.041 PRIMARY OSTEOARTHRITIS OF BOTH HANDS: ICD-10-CM

## 2021-06-29 LAB
ANA SER QL IF: NORMAL
RHEUMATOID FACT SERPL-ACNC: <10 IU/ML (ref 0–15)

## 2021-06-29 PROCEDURE — 73630 X-RAY EXAM OF FOOT: CPT | Mod: 26,50,, | Performed by: RADIOLOGY

## 2021-06-29 PROCEDURE — 73630 X-RAY EXAM OF FOOT: CPT | Mod: TC,50,FY,PO

## 2021-06-29 PROCEDURE — 73130 XR HAND COMPLETE 3 VIEWS BILATERAL: ICD-10-PCS | Mod: 26,50,, | Performed by: RADIOLOGY

## 2021-06-29 PROCEDURE — 73130 X-RAY EXAM OF HAND: CPT | Mod: 26,50,, | Performed by: RADIOLOGY

## 2021-06-29 PROCEDURE — 73130 X-RAY EXAM OF HAND: CPT | Mod: TC,50,FY,PO

## 2021-06-29 PROCEDURE — 73630 XR FOOT COMPLETE 3 VIEW BILATERAL: ICD-10-PCS | Mod: 26,50,, | Performed by: RADIOLOGY

## 2021-07-01 ENCOUNTER — PATIENT MESSAGE (OUTPATIENT)
Dept: RHEUMATOLOGY | Facility: CLINIC | Age: 71
End: 2021-07-01

## 2021-07-01 DIAGNOSIS — M19.042 PRIMARY OSTEOARTHRITIS OF BOTH HANDS: Primary | ICD-10-CM

## 2021-07-01 DIAGNOSIS — M19.041 PRIMARY OSTEOARTHRITIS OF BOTH HANDS: Primary | ICD-10-CM

## 2021-07-01 RX ORDER — SALSALATE 500 MG/1
500-1000 TABLET, FILM COATED ORAL 2 TIMES DAILY PRN
Qty: 120 TABLET | Refills: 0 | Status: SHIPPED | OUTPATIENT
Start: 2021-07-01 | End: 2021-08-21 | Stop reason: SDUPTHER

## 2021-07-08 ENCOUNTER — PATIENT MESSAGE (OUTPATIENT)
Dept: CARDIOLOGY | Facility: CLINIC | Age: 71
End: 2021-07-08

## 2021-09-21 ENCOUNTER — TELEPHONE (OUTPATIENT)
Dept: CARDIOLOGY | Facility: CLINIC | Age: 71
End: 2021-09-21

## 2021-10-05 ENCOUNTER — CLINICAL SUPPORT (OUTPATIENT)
Dept: CARDIOLOGY | Facility: HOSPITAL | Age: 71
End: 2021-10-05
Attending: INTERNAL MEDICINE
Payer: MEDICARE

## 2021-10-05 ENCOUNTER — LAB VISIT (OUTPATIENT)
Dept: LAB | Facility: HOSPITAL | Age: 71
End: 2021-10-05
Attending: INTERNAL MEDICINE
Payer: MEDICARE

## 2021-10-05 VITALS — WEIGHT: 229.5 LBS | HEIGHT: 71 IN | BODY MASS INDEX: 32.13 KG/M2 | HEART RATE: 54 BPM

## 2021-10-05 DIAGNOSIS — Z79.01 LONG TERM (CURRENT) USE OF ANTICOAGULANTS: Chronic | ICD-10-CM

## 2021-10-05 DIAGNOSIS — I25.10 CORONARY ARTERY DISEASE INVOLVING NATIVE CORONARY ARTERY OF NATIVE HEART WITHOUT ANGINA PECTORIS: ICD-10-CM

## 2021-10-05 DIAGNOSIS — I34.0 NON-RHEUMATIC MITRAL REGURGITATION: ICD-10-CM

## 2021-10-05 DIAGNOSIS — I48.0 PAF (PAROXYSMAL ATRIAL FIBRILLATION): ICD-10-CM

## 2021-10-05 DIAGNOSIS — I10 ESSENTIAL HYPERTENSION: ICD-10-CM

## 2021-10-05 DIAGNOSIS — E78.00 HYPERCHOLESTEREMIA: ICD-10-CM

## 2021-10-05 DIAGNOSIS — I25.10 CORONARY ARTERY DISEASE INVOLVING NATIVE CORONARY ARTERY OF NATIVE HEART WITHOUT ANGINA PECTORIS: Chronic | ICD-10-CM

## 2021-10-05 LAB
ALBUMIN SERPL BCP-MCNC: 4.2 G/DL (ref 3.5–5.2)
ALP SERPL-CCNC: 80 U/L (ref 55–135)
ALT SERPL W/O P-5'-P-CCNC: 27 U/L (ref 10–44)
ANION GAP SERPL CALC-SCNC: 10 MMOL/L (ref 8–16)
AST SERPL-CCNC: 27 U/L (ref 10–40)
BILIRUB SERPL-MCNC: 0.7 MG/DL (ref 0.1–1)
BUN SERPL-MCNC: 17 MG/DL (ref 8–23)
CALCIUM SERPL-MCNC: 9.5 MG/DL (ref 8.7–10.5)
CHLORIDE SERPL-SCNC: 104 MMOL/L (ref 95–110)
CO2 SERPL-SCNC: 25 MMOL/L (ref 23–29)
CREAT SERPL-MCNC: 1 MG/DL (ref 0.5–1.4)
EST. GFR  (AFRICAN AMERICAN): >60 ML/MIN/1.73 M^2
EST. GFR  (NON AFRICAN AMERICAN): >60 ML/MIN/1.73 M^2
GLUCOSE SERPL-MCNC: 105 MG/DL (ref 70–110)
HGB BLD-MCNC: 15.2 G/DL (ref 14–18)
POTASSIUM SERPL-SCNC: 4.3 MMOL/L (ref 3.5–5.1)
PROT SERPL-MCNC: 7.3 G/DL (ref 6–8.4)
SODIUM SERPL-SCNC: 139 MMOL/L (ref 136–145)

## 2021-10-05 PROCEDURE — 36415 COLL VENOUS BLD VENIPUNCTURE: CPT | Mod: PO | Performed by: INTERNAL MEDICINE

## 2021-10-05 PROCEDURE — 25500020 PHARM REV CODE 255: Mod: PO | Performed by: INTERNAL MEDICINE

## 2021-10-05 PROCEDURE — 85018 HEMOGLOBIN: CPT | Performed by: INTERNAL MEDICINE

## 2021-10-05 PROCEDURE — 93306 TTE W/DOPPLER COMPLETE: CPT | Mod: 26,,, | Performed by: INTERNAL MEDICINE

## 2021-10-05 PROCEDURE — 93306 ECHO (CUPID ONLY): ICD-10-PCS | Mod: 26,,, | Performed by: INTERNAL MEDICINE

## 2021-10-05 PROCEDURE — 80053 COMPREHEN METABOLIC PANEL: CPT | Performed by: INTERNAL MEDICINE

## 2021-10-05 PROCEDURE — C8929 TTE W OR WO FOL WCON,DOPPLER: HCPCS | Mod: PO

## 2021-10-05 RX ADMIN — HUMAN ALBUMIN MICROSPHERES AND PERFLUTREN 0.11 MG: 10; .22 INJECTION, SOLUTION INTRAVENOUS at 11:10

## 2021-10-06 LAB
ASCENDING AORTA: 2.9 CM
AV INDEX (PROSTH): 1.05
AV MEAN GRADIENT: 4 MMHG
AV PEAK GRADIENT: 7 MMHG
AV VALVE AREA: 3.54 CM2
AV VELOCITY RATIO: 1.02
BSA FOR ECHO PROCEDURE: 2.28 M2
CV ECHO LV RWT: 0.47 CM
DOP CALC AO PEAK VEL: 1.35 M/S
DOP CALC AO VTI: 30.15 CM
DOP CALC LVOT AREA: 3.4 CM2
DOP CALC LVOT DIAMETER: 2.07 CM
DOP CALC LVOT PEAK VEL: 1.38 M/S
DOP CALC LVOT STROKE VOLUME: 106.66 CM3
DOP CALCLVOT PEAK VEL VTI: 31.71 CM
E WAVE DECELERATION TIME: 229.98 MSEC
E/A RATIO: 0.83
E/E' RATIO: 10.5 M/S
ECHO LV POSTERIOR WALL: 0.98 CM (ref 0.6–1.1)
EJECTION FRACTION: 65 %
FRACTIONAL SHORTENING: 34 % (ref 28–44)
INTERVENTRICULAR SEPTUM: 1.01 CM (ref 0.6–1.1)
IVRT: 102.76 MSEC
LA MAJOR: 5.48 CM
LA MINOR: 5.26 CM
LA WIDTH: 2.62 CM
LEFT ATRIUM SIZE: 3.47 CM
LEFT ATRIUM VOLUME INDEX: 18.7 ML/M2
LEFT ATRIUM VOLUME: 41.48 CM3
LEFT INTERNAL DIMENSION IN SYSTOLE: 2.77 CM (ref 2.1–4)
LEFT VENTRICLE DIASTOLIC VOLUME INDEX: 35.25 ML/M2
LEFT VENTRICLE DIASTOLIC VOLUME: 78.26 ML
LEFT VENTRICLE MASS INDEX: 61 G/M2
LEFT VENTRICLE SYSTOLIC VOLUME INDEX: 12.9 ML/M2
LEFT VENTRICLE SYSTOLIC VOLUME: 28.7 ML
LEFT VENTRICULAR INTERNAL DIMENSION IN DIASTOLE: 4.19 CM (ref 3.5–6)
LEFT VENTRICULAR MASS: 135.77 G
LV LATERAL E/E' RATIO: 12 M/S
LV SEPTAL E/E' RATIO: 9.33 M/S
MV PEAK A VEL: 1.01 M/S
MV PEAK E VEL: 0.84 M/S
MV STENOSIS PRESSURE HALF TIME: 66.7 MS
MV VALVE AREA P 1/2 METHOD: 3.3 CM2
PISA TR MAX VEL: 2.02 M/S
RA MAJOR: 5.41 CM
RA PRESSURE: 8 MMHG
RA WIDTH: 3.91 CM
RV TISSUE DOPPLER FREE WALL SYSTOLIC VELOCITY 1 (APICAL 4 CHAMBER VIEW): 12.9 CM/S
SINUS: 3.02 CM
STJ: 2.66 CM
TDI LATERAL: 0.07 M/S
TDI SEPTAL: 0.09 M/S
TDI: 0.08 M/S
TR MAX PG: 16 MMHG
TRICUSPID ANNULAR PLANE SYSTOLIC EXCURSION: 2.16 CM
TV REST PULMONARY ARTERY PRESSURE: 24 MMHG

## 2021-10-11 ENCOUNTER — OFFICE VISIT (OUTPATIENT)
Dept: CARDIOLOGY | Facility: CLINIC | Age: 71
End: 2021-10-11
Payer: MEDICARE

## 2021-10-11 VITALS
HEART RATE: 63 BPM | WEIGHT: 230.19 LBS | SYSTOLIC BLOOD PRESSURE: 134 MMHG | HEIGHT: 68 IN | DIASTOLIC BLOOD PRESSURE: 81 MMHG | BODY MASS INDEX: 34.89 KG/M2

## 2021-10-11 DIAGNOSIS — I25.10 CORONARY ARTERY DISEASE INVOLVING NATIVE CORONARY ARTERY OF NATIVE HEART WITHOUT ANGINA PECTORIS: Primary | Chronic | ICD-10-CM

## 2021-10-11 DIAGNOSIS — E66.01 SEVERE OBESITY (BMI 35.0-35.9 WITH COMORBIDITY): Chronic | ICD-10-CM

## 2021-10-11 DIAGNOSIS — I48.0 PAF (PAROXYSMAL ATRIAL FIBRILLATION): Chronic | ICD-10-CM

## 2021-10-11 DIAGNOSIS — I10 ESSENTIAL HYPERTENSION: Chronic | ICD-10-CM

## 2021-10-11 DIAGNOSIS — R09.89 BRUIT OF LEFT CAROTID ARTERY: ICD-10-CM

## 2021-10-11 DIAGNOSIS — Z79.01 LONG TERM (CURRENT) USE OF ANTICOAGULANTS: Chronic | ICD-10-CM

## 2021-10-11 DIAGNOSIS — E78.00 HYPERCHOLESTEREMIA: Chronic | ICD-10-CM

## 2021-10-11 PROBLEM — R01.1 UNDIAGNOSED CARDIAC MURMURS: Status: RESOLVED | Noted: 2018-12-03 | Resolved: 2021-10-11

## 2021-10-11 PROCEDURE — 1159F MED LIST DOCD IN RCRD: CPT | Mod: CPTII,S$GLB,, | Performed by: INTERNAL MEDICINE

## 2021-10-11 PROCEDURE — 1126F PR PAIN SEVERITY QUANTIFIED, NO PAIN PRESENT: ICD-10-PCS | Mod: CPTII,S$GLB,, | Performed by: INTERNAL MEDICINE

## 2021-10-11 PROCEDURE — 99214 PR OFFICE/OUTPT VISIT, EST, LEVL IV, 30-39 MIN: ICD-10-PCS | Mod: S$GLB,,, | Performed by: INTERNAL MEDICINE

## 2021-10-11 PROCEDURE — 1101F PT FALLS ASSESS-DOCD LE1/YR: CPT | Mod: CPTII,S$GLB,, | Performed by: INTERNAL MEDICINE

## 2021-10-11 PROCEDURE — 3008F PR BODY MASS INDEX (BMI) DOCUMENTED: ICD-10-PCS | Mod: CPTII,S$GLB,, | Performed by: INTERNAL MEDICINE

## 2021-10-11 PROCEDURE — 1159F PR MEDICATION LIST DOCUMENTED IN MEDICAL RECORD: ICD-10-PCS | Mod: CPTII,S$GLB,, | Performed by: INTERNAL MEDICINE

## 2021-10-11 PROCEDURE — 3288F FALL RISK ASSESSMENT DOCD: CPT | Mod: CPTII,S$GLB,, | Performed by: INTERNAL MEDICINE

## 2021-10-11 PROCEDURE — 3075F SYST BP GE 130 - 139MM HG: CPT | Mod: CPTII,S$GLB,, | Performed by: INTERNAL MEDICINE

## 2021-10-11 PROCEDURE — 99999 PR PBB SHADOW E&M-EST. PATIENT-LVL IV: CPT | Mod: PBBFAC,,, | Performed by: INTERNAL MEDICINE

## 2021-10-11 PROCEDURE — 1126F AMNT PAIN NOTED NONE PRSNT: CPT | Mod: CPTII,S$GLB,, | Performed by: INTERNAL MEDICINE

## 2021-10-11 PROCEDURE — 99214 OFFICE O/P EST MOD 30 MIN: CPT | Mod: S$GLB,,, | Performed by: INTERNAL MEDICINE

## 2021-10-11 PROCEDURE — 1101F PR PT FALLS ASSESS DOC 0-1 FALLS W/OUT INJ PAST YR: ICD-10-PCS | Mod: CPTII,S$GLB,, | Performed by: INTERNAL MEDICINE

## 2021-10-11 PROCEDURE — 3008F BODY MASS INDEX DOCD: CPT | Mod: CPTII,S$GLB,, | Performed by: INTERNAL MEDICINE

## 2021-10-11 PROCEDURE — 3079F DIAST BP 80-89 MM HG: CPT | Mod: CPTII,S$GLB,, | Performed by: INTERNAL MEDICINE

## 2021-10-11 PROCEDURE — 3288F PR FALLS RISK ASSESSMENT DOCUMENTED: ICD-10-PCS | Mod: CPTII,S$GLB,, | Performed by: INTERNAL MEDICINE

## 2021-10-11 PROCEDURE — 99999 PR PBB SHADOW E&M-EST. PATIENT-LVL IV: ICD-10-PCS | Mod: PBBFAC,,, | Performed by: INTERNAL MEDICINE

## 2021-10-11 PROCEDURE — 3075F PR MOST RECENT SYSTOLIC BLOOD PRESS GE 130-139MM HG: ICD-10-PCS | Mod: CPTII,S$GLB,, | Performed by: INTERNAL MEDICINE

## 2021-10-11 PROCEDURE — 3079F PR MOST RECENT DIASTOLIC BLOOD PRESSURE 80-89 MM HG: ICD-10-PCS | Mod: CPTII,S$GLB,, | Performed by: INTERNAL MEDICINE

## 2021-10-19 ENCOUNTER — CLINICAL SUPPORT (OUTPATIENT)
Dept: CARDIOLOGY | Facility: HOSPITAL | Age: 71
End: 2021-10-19
Attending: INTERNAL MEDICINE
Payer: MEDICARE

## 2021-10-19 DIAGNOSIS — R09.89 BRUIT OF LEFT CAROTID ARTERY: ICD-10-CM

## 2021-10-19 LAB
LEFT ARM DIASTOLIC BLOOD PRESSURE: 70 MMHG
LEFT ARM SYSTOLIC BLOOD PRESSURE: 120 MMHG
LEFT CBA DIAS: 17 CM/S
LEFT CBA SYS: 78 CM/S
LEFT CCA DIST DIAS: 18 CM/S
LEFT CCA DIST SYS: 79 CM/S
LEFT CCA MID DIAS: 15 CM/S
LEFT CCA MID SYS: 82 CM/S
LEFT CCA PROX DIAS: 14 CM/S
LEFT CCA PROX SYS: 108 CM/S
LEFT ECA DIAS: 14 CM/S
LEFT ECA SYS: 101 CM/S
LEFT ICA DIST DIAS: 24 CM/S
LEFT ICA DIST SYS: 70 CM/S
LEFT ICA MID DIAS: 19 CM/S
LEFT ICA MID SYS: 52 CM/S
LEFT ICA PROX DIAS: 15 CM/S
LEFT ICA PROX SYS: 52 CM/S
LEFT VERTEBRAL DIAS: 16 CM/S
LEFT VERTEBRAL SYS: 44 CM/S
OHS CV CAROTID RIGHT ICA EDV HIGHEST: 27
OHS CV CAROTID ULTRASOUND LEFT ICA/CCA RATIO: 0.89
OHS CV CAROTID ULTRASOUND RIGHT ICA/CCA RATIO: 1.11
OHS CV PV CAROTID LEFT HIGHEST CCA: 108
OHS CV PV CAROTID LEFT HIGHEST ICA: 70
OHS CV PV CAROTID RIGHT HIGHEST CCA: 84
OHS CV PV CAROTID RIGHT HIGHEST ICA: 83
OHS CV US CAROTID LEFT HIGHEST EDV: 24
RIGHT ARM DIASTOLIC BLOOD PRESSURE: 70 MMHG
RIGHT ARM SYSTOLIC BLOOD PRESSURE: 120 MMHG
RIGHT CBA DIAS: 17 CM/S
RIGHT CBA SYS: 78 CM/S
RIGHT CCA DIST DIAS: 16 CM/S
RIGHT CCA DIST SYS: 75 CM/S
RIGHT CCA MID DIAS: 13 CM/S
RIGHT CCA MID SYS: 84 CM/S
RIGHT CCA PROX DIAS: 20 CM/S
RIGHT CCA PROX SYS: 83 CM/S
RIGHT ECA DIAS: 10 CM/S
RIGHT ECA SYS: 80 CM/S
RIGHT ICA DIST DIAS: 27 CM/S
RIGHT ICA DIST SYS: 83 CM/S
RIGHT ICA MID DIAS: 24 CM/S
RIGHT ICA MID SYS: 60 CM/S
RIGHT ICA PROX DIAS: 16 CM/S
RIGHT ICA PROX SYS: 53 CM/S
RIGHT VERTEBRAL DIAS: 8 CM/S
RIGHT VERTEBRAL SYS: 38 CM/S

## 2021-10-19 PROCEDURE — 93880 EXTRACRANIAL BILAT STUDY: CPT | Mod: PO

## 2021-10-19 PROCEDURE — 93880 EXTRACRANIAL BILAT STUDY: CPT | Mod: 26,,, | Performed by: INTERNAL MEDICINE

## 2021-10-19 PROCEDURE — 93880 CV US DOPPLER CAROTID (CUPID ONLY): ICD-10-PCS | Mod: 26,,, | Performed by: INTERNAL MEDICINE

## 2021-11-05 ENCOUNTER — TELEPHONE (OUTPATIENT)
Dept: CARDIOLOGY | Facility: CLINIC | Age: 71
End: 2021-11-05
Payer: MEDICARE

## 2021-11-16 ENCOUNTER — TELEPHONE (OUTPATIENT)
Dept: CARDIOLOGY | Facility: CLINIC | Age: 71
End: 2021-11-16
Payer: MEDICARE

## 2021-12-01 ENCOUNTER — PATIENT MESSAGE (OUTPATIENT)
Dept: FAMILY MEDICINE | Facility: CLINIC | Age: 71
End: 2021-12-01

## 2021-12-01 ENCOUNTER — OFFICE VISIT (OUTPATIENT)
Dept: FAMILY MEDICINE | Facility: CLINIC | Age: 71
End: 2021-12-01
Payer: MEDICARE

## 2021-12-01 VITALS
HEART RATE: 65 BPM | HEIGHT: 68 IN | OXYGEN SATURATION: 98 % | SYSTOLIC BLOOD PRESSURE: 126 MMHG | WEIGHT: 228.19 LBS | DIASTOLIC BLOOD PRESSURE: 80 MMHG | BODY MASS INDEX: 34.59 KG/M2

## 2021-12-01 DIAGNOSIS — G47.33 OBSTRUCTIVE SLEEP APNEA SYNDROME: ICD-10-CM

## 2021-12-01 DIAGNOSIS — E78.5 DYSLIPIDEMIA (HIGH LDL; LOW HDL): ICD-10-CM

## 2021-12-01 DIAGNOSIS — I48.0 PAF (PAROXYSMAL ATRIAL FIBRILLATION): Primary | ICD-10-CM

## 2021-12-01 DIAGNOSIS — Z12.11 SCREENING FOR COLON CANCER: ICD-10-CM

## 2021-12-01 DIAGNOSIS — I10 ESSENTIAL HYPERTENSION: ICD-10-CM

## 2021-12-01 PROBLEM — I48.91 NEW ONSET ATRIAL FIBRILLATION: Status: RESOLVED | Noted: 2018-12-03 | Resolved: 2021-12-01

## 2021-12-01 PROBLEM — R07.2 PRECORDIAL PAIN: Status: RESOLVED | Noted: 2019-03-21 | Resolved: 2021-12-01

## 2021-12-01 PROCEDURE — 99999 PR PBB SHADOW E&M-EST. PATIENT-LVL IV: ICD-10-PCS | Mod: PBBFAC,,, | Performed by: INTERNAL MEDICINE

## 2021-12-01 PROCEDURE — 90732 PNEUMOCOCCAL POLYSACCHARIDE VACCINE 23-VALENT =>2YO SQ IM: ICD-10-PCS | Mod: S$GLB,,, | Performed by: INTERNAL MEDICINE

## 2021-12-01 PROCEDURE — 99214 PR OFFICE/OUTPT VISIT, EST, LEVL IV, 30-39 MIN: ICD-10-PCS | Mod: S$GLB,,, | Performed by: INTERNAL MEDICINE

## 2021-12-01 PROCEDURE — 99999 PR PBB SHADOW E&M-EST. PATIENT-LVL IV: CPT | Mod: PBBFAC,,, | Performed by: INTERNAL MEDICINE

## 2021-12-01 PROCEDURE — 90732 PPSV23 VACC 2 YRS+ SUBQ/IM: CPT | Mod: S$GLB,,, | Performed by: INTERNAL MEDICINE

## 2021-12-01 PROCEDURE — G0009 PNEUMOCOCCAL POLYSACCHARIDE VACCINE 23-VALENT =>2YO SQ IM: ICD-10-PCS | Mod: S$GLB,,, | Performed by: INTERNAL MEDICINE

## 2021-12-01 PROCEDURE — 99214 OFFICE O/P EST MOD 30 MIN: CPT | Mod: S$GLB,,, | Performed by: INTERNAL MEDICINE

## 2021-12-01 PROCEDURE — G0009 ADMIN PNEUMOCOCCAL VACCINE: HCPCS | Mod: S$GLB,,, | Performed by: INTERNAL MEDICINE

## 2021-12-02 ENCOUNTER — TELEPHONE (OUTPATIENT)
Dept: GASTROENTEROLOGY | Facility: CLINIC | Age: 71
End: 2021-12-02
Payer: MEDICARE

## 2021-12-03 ENCOUNTER — PATIENT OUTREACH (OUTPATIENT)
Dept: ADMINISTRATIVE | Facility: HOSPITAL | Age: 71
End: 2021-12-03
Payer: MEDICARE

## 2022-02-15 ENCOUNTER — ANESTHESIA EVENT (OUTPATIENT)
Dept: ENDOSCOPY | Facility: HOSPITAL | Age: 72
End: 2022-02-15
Payer: MEDICARE

## 2022-02-16 ENCOUNTER — HOSPITAL ENCOUNTER (OUTPATIENT)
Facility: HOSPITAL | Age: 72
Discharge: HOME | End: 2022-02-16
Attending: INTERNAL MEDICINE | Admitting: INTERNAL MEDICINE
Payer: MEDICARE

## 2022-02-16 ENCOUNTER — ANESTHESIA (OUTPATIENT)
Dept: ENDOSCOPY | Facility: HOSPITAL | Age: 72
End: 2022-02-16
Payer: MEDICARE

## 2022-02-16 VITALS
SYSTOLIC BLOOD PRESSURE: 122 MMHG | TEMPERATURE: 98 F | BODY MASS INDEX: 34.56 KG/M2 | HEIGHT: 68 IN | RESPIRATION RATE: 18 BRPM | WEIGHT: 228 LBS | HEART RATE: 60 BPM | DIASTOLIC BLOOD PRESSURE: 70 MMHG | OXYGEN SATURATION: 100 %

## 2022-02-16 DIAGNOSIS — Z12.11 SCREEN FOR COLON CANCER: ICD-10-CM

## 2022-02-16 LAB
CTP QC/QA: YES
SARS-COV-2 AG RESP QL IA.RAPID: NEGATIVE

## 2022-02-16 PROCEDURE — 45380 PR COLONOSCOPY,BIOPSY: ICD-10-PCS | Mod: PT,,, | Performed by: INTERNAL MEDICINE

## 2022-02-16 PROCEDURE — 88305 TISSUE EXAM BY PATHOLOGIST: ICD-10-PCS | Mod: 26,,, | Performed by: PATHOLOGY

## 2022-02-16 PROCEDURE — 63600175 PHARM REV CODE 636 W HCPCS: Mod: PO | Performed by: INTERNAL MEDICINE

## 2022-02-16 PROCEDURE — D9220A PRA ANESTHESIA: ICD-10-PCS | Mod: PT,CRNA,, | Performed by: NURSE ANESTHETIST, CERTIFIED REGISTERED

## 2022-02-16 PROCEDURE — 37000008 HC ANESTHESIA 1ST 15 MINUTES: Mod: PO | Performed by: INTERNAL MEDICINE

## 2022-02-16 PROCEDURE — 25000003 PHARM REV CODE 250: Mod: PO | Performed by: NURSE ANESTHETIST, CERTIFIED REGISTERED

## 2022-02-16 PROCEDURE — 45380 COLONOSCOPY AND BIOPSY: CPT | Mod: PT,PO | Performed by: INTERNAL MEDICINE

## 2022-02-16 PROCEDURE — 45380 COLONOSCOPY AND BIOPSY: CPT | Mod: PT,,, | Performed by: INTERNAL MEDICINE

## 2022-02-16 PROCEDURE — D9220A PRA ANESTHESIA: ICD-10-PCS | Mod: PT,ANES,, | Performed by: ANESTHESIOLOGY

## 2022-02-16 PROCEDURE — D9220A PRA ANESTHESIA: Mod: PT,ANES,, | Performed by: ANESTHESIOLOGY

## 2022-02-16 PROCEDURE — 63600175 PHARM REV CODE 636 W HCPCS: Mod: PO | Performed by: NURSE ANESTHETIST, CERTIFIED REGISTERED

## 2022-02-16 PROCEDURE — D9220A PRA ANESTHESIA: Mod: PT,CRNA,, | Performed by: NURSE ANESTHETIST, CERTIFIED REGISTERED

## 2022-02-16 PROCEDURE — 88305 TISSUE EXAM BY PATHOLOGIST: CPT | Performed by: PATHOLOGY

## 2022-02-16 PROCEDURE — 37000009 HC ANESTHESIA EA ADD 15 MINS: Mod: PO | Performed by: INTERNAL MEDICINE

## 2022-02-16 PROCEDURE — 27201012 HC FORCEPS, HOT/COLD, DISP: Mod: PO | Performed by: INTERNAL MEDICINE

## 2022-02-16 PROCEDURE — 88305 TISSUE EXAM BY PATHOLOGIST: CPT | Mod: 26,,, | Performed by: PATHOLOGY

## 2022-02-16 RX ORDER — PROPOFOL 10 MG/ML
VIAL (ML) INTRAVENOUS
Status: DISCONTINUED | OUTPATIENT
Start: 2022-02-16 | End: 2022-02-16

## 2022-02-16 RX ORDER — SODIUM CHLORIDE, SODIUM LACTATE, POTASSIUM CHLORIDE, CALCIUM CHLORIDE 600; 310; 30; 20 MG/100ML; MG/100ML; MG/100ML; MG/100ML
INJECTION, SOLUTION INTRAVENOUS CONTINUOUS
Status: DISCONTINUED | OUTPATIENT
Start: 2022-02-16 | End: 2022-02-16 | Stop reason: HOSPADM

## 2022-02-16 RX ORDER — LIDOCAINE HCL/PF 100 MG/5ML
SYRINGE (ML) INTRAVENOUS
Status: DISCONTINUED | OUTPATIENT
Start: 2022-02-16 | End: 2022-02-16

## 2022-02-16 RX ORDER — SODIUM CHLORIDE 0.9 % (FLUSH) 0.9 %
10 SYRINGE (ML) INJECTION
Status: DISCONTINUED | OUTPATIENT
Start: 2022-02-16 | End: 2022-02-16 | Stop reason: HOSPADM

## 2022-02-16 RX ADMIN — PROPOFOL 50 MG: 10 INJECTION, EMULSION INTRAVENOUS at 11:02

## 2022-02-16 RX ADMIN — SODIUM CHLORIDE, SODIUM LACTATE, POTASSIUM CHLORIDE, AND CALCIUM CHLORIDE: .6; .31; .03; .02 INJECTION, SOLUTION INTRAVENOUS at 10:02

## 2022-02-16 RX ADMIN — LIDOCAINE HYDROCHLORIDE 100 MG: 20 INJECTION, SOLUTION INTRAVENOUS at 11:02

## 2022-02-16 RX ADMIN — PROPOFOL 150 MG: 10 INJECTION, EMULSION INTRAVENOUS at 11:02

## 2022-02-16 NOTE — H&P
History & Physical - Short Stay  Gastroenterology      SUBJECTIVE:     Procedure: Colonoscopy    Chief Complaint/Indication for Procedure: Screening    PTA Medications   Medication Sig    acetaminophen (TYLENOL) 500 MG tablet     atorvastatin (LIPITOR) 20 MG tablet TAKE 1 TABLET EVERY DAY    coenzyme Q10 100 mg capsule Take 100 mg by mouth once daily.     ELIQUIS 5 mg Tab TAKE 1 TABLET TWICE DAILY    glucosamine-chondroitin 500-400 mg tablet Take 1 tablet by mouth Daily. Triflex    isosorbide mononitrate (IMDUR) 30 MG 24 hr tablet TAKE 1 TABLET EVERY DAY    metoprolol succinate (TOPROL-XL) 25 MG 24 hr tablet TAKE 1 TABLET EVERY DAY    MULTIVITS-MINERALS/FA/LYCOPENE (MEN'S DAILY MULTIVIT-MINERAL ORAL) Take 1 tablet by mouth once daily.     salsalate (DISALCID) 500 MG Tab TAKE 1 TO 2 TABLETS TWICE DAILY AS NEEDED FOR ARTHRITIS    aspirin (ECOTRIN) 81 MG EC tablet once daily.     nitroGLYCERIN (NITROSTAT) 0.4 MG SL tablet Place 1 tablet (0.4 mg total) under the tongue every 5 (five) minutes as needed.       Review of patient's allergies indicates:  No Known Allergies     Past Medical History:   Diagnosis Date    Arthritis     hip    Atrial fibrillation 12/2018    Coronary artery disease     cardiac stents    Hearing difficulty     Hypertension     Low serum testosterone level     Lumbar disc disease     REYES (obstructive sleep apnea)     has CPAP, better relief of symptoms with nasal pillow.     REYES (obstructive sleep apnea)      Past Surgical History:   Procedure Laterality Date    CARDIOVERSION N/A 1/3/2019    Procedure: CARDIOVERSION;  Surgeon: Nanci Ahuja MD;  Location: [facility];  Service: Cardiology;  Laterality: N/A;    CARPAL TUNNEL RELEASE      2005, bilateral    CLAVICLE SURGERY      CORONARY ANGIOGRAPHY N/A 4/9/2019    Procedure: ANGIOGRAM, CORONARY ARTERY;  Surgeon: Nanci Ahuja MD;  Location: [facility] CATH;  Service: Cardiology;  Laterality: N/A;    EPIDURAL BLOCK INJECTION  2010     FRACTURE SURGERY  1966    HIP SURGERY      INTERNAL NEUROLYSIS USING OPERATING MICROSCOPE Left 12/6/2019    Procedure: Cooled radiofrequency ablation of the genicular nerve branches to the left knee;  Surgeon: Jayy Driscoll MD;  Location: [facility] OR;  Service: Orthopedics;  Laterality: Left;    JOINT REPLACEMENT Right 11/2017    hip    JOINT REPLACEMENT Left 02/2020    knee    KNEE ARTHROSCOPY W/ MENISCAL REPAIR      left    LEFT HEART CATHETERIZATION Left 4/9/2019    Procedure: Left heart cath;  Surgeon: Nanci Ahuja MD;  Location: [facility] CATH;  Service: Cardiology;  Laterality: Left;    LEFT HEART CATHETERIZATION Left 2/2/2021    Procedure: Left heart cath;  Surgeon: Nanci Ahuja MD;  Location: [facility] CATH;  Service: Cardiology;  Laterality: Left;    LUMBAR FUSION      2010    SPINE SURGERY  5/2010    TONSILLECTOMY       Family History   Problem Relation Age of Onset    Cancer Father         lung cancer    Cancer Sister         leukemia    Heart disease Mother     Arthritis Mother         She's 92    Diabetes Neg Hx      Social History     Tobacco Use    Smoking status: Never Smoker    Smokeless tobacco: Never Used   Substance Use Topics    Alcohol use: Not Currently     Alcohol/week: 3.0 - 5.0 standard drinks     Types: 1 - 2 Glasses of wine, 1 - 2 Cans of beer, 1 Shots of liquor per week     Comment: occasionally    Drug use: No         OBJECTIVE:     Vital Signs (Most Recent)  Temp: 97.4 °F (36.3 °C) (02/16/22 1037)  Pulse: 64 (02/16/22 1037)  Resp: 18 (02/16/22 1037)  BP: 135/80 (02/16/22 1037)  SpO2: 100 % (02/16/22 1037)    Physical Exam                                                        GENERAL:  Comfortable, in no acute distress.                                 HEENT EXAM:  Nonicteric.  No adenopathy.  Oropharynx is clear.               NECK:  Supple.                                                               LUNGS:  Clear.                                                                CARDIAC:  Regular rate and rhythm.  S1, S2.  No murmur.                      ABDOMEN:  Soft, positive bowel sounds, nontender.  No hepatosplenomegaly or masses.  No rebound or guarding.                                             EXTREMITIES:  No edema.     MENTAL STATUS:  Normal, alert and oriented.      ASSESSMENT/PLAN:     Assessment: Colorectal cancer screening    Plan: Colonoscopy    Anesthesia Plan: General    ASA Grade: ASA 2 - Patient with mild systemic disease with no functional limitations    MALLAMPATI SCORE:  I (soft palate, uvula, fauces, and tonsillar pillars visible)

## 2022-02-16 NOTE — DISCHARGE SUMMARY
[facility] - Endoscopy  Discharge Note  Short Stay    Discharge Note  Short Stay      SUMMARY     Admit Date: 2/16/2022    Attending Physician: Gualberto Somers MD     Discharge Physician: Gualberto Somers MD    Discharge Date: 2/16/2022 11:38 AM    Final Diagnosis: Screening for colon cancer [Z12.11]    Disposition: HOME OR SELF CARE    Patient Instructions:   Current Discharge Medication List      CONTINUE these medications which have NOT CHANGED    Details   acetaminophen (TYLENOL) 500 MG tablet       atorvastatin (LIPITOR) 20 MG tablet TAKE 1 TABLET EVERY DAY  Qty: 90 tablet, Refills: 1      coenzyme Q10 100 mg capsule Take 100 mg by mouth once daily.       ELIQUIS 5 mg Tab TAKE 1 TABLET TWICE DAILY  Qty: 180 tablet, Refills: 0    Associated Diagnoses: Paroxysmal atrial fibrillation      glucosamine-chondroitin 500-400 mg tablet Take 1 tablet by mouth Daily. Triflex      isosorbide mononitrate (IMDUR) 30 MG 24 hr tablet TAKE 1 TABLET EVERY DAY  Qty: 90 tablet, Refills: 1    Associated Diagnoses: PAF (paroxysmal atrial fibrillation)      metoprolol succinate (TOPROL-XL) 25 MG 24 hr tablet TAKE 1 TABLET EVERY DAY  Qty: 90 tablet, Refills: 1    Associated Diagnoses: New onset atrial fibrillation      MULTIVITS-MINERALS/FA/LYCOPENE (MEN'S DAILY MULTIVIT-MINERAL ORAL) Take 1 tablet by mouth once daily.       salsalate (DISALCID) 500 MG Tab TAKE 1 TO 2 TABLETS TWICE DAILY AS NEEDED FOR ARTHRITIS  Qty: 120 tablet, Refills: 0    Associated Diagnoses: Primary osteoarthritis of both hands      aspirin (ECOTRIN) 81 MG EC tablet once daily.       nitroGLYCERIN (NITROSTAT) 0.4 MG SL tablet Place 1 tablet (0.4 mg total) under the tongue every 5 (five) minutes as needed.  Qty: 25 tablet, Refills: 0    Comments: DO NOT TAKE WITHIN 24 H OF VIAGRA             Discharge Procedure Orders (must include Diet, Follow-up, Activity)    Follow Up:  Follow up with PCP as previously scheduled  Resume routine diet.  Activity as  tolerated.    No driving day of procedure.

## 2022-02-16 NOTE — DISCHARGE INSTRUCTIONS
"Patient Education       Colon Polyps   The Basics   Written by the doctors and editors at [facility]   What are colon polyps? -- Colon polyps are tiny growths that form on the inside of the large intestine (also known as the colon) (figure 1). Polyps are very common. About one-third to one-half of all adults have them by the time they are 50 years old. They do not usually cause symptoms. But some polyps can be or become cancer, so doctors sometimes remove them.  What are the symptoms of colon polyps? -- Colon polyps do not usually cause symptoms.  How do doctors find colon polyps? -- Doctors usually find colon polyps when they are doing screening tests to check for colon or rectal cancer. Cancer screening tests are tests that are done to try and find cancer early, before a person has symptoms. The screening tests for colon and rectal cancer include:  · Colonoscopy - Before having a colonoscopy, you will get medicine to help you relax. Then a doctor will put a thin tube into your anus and advance it into your colon (figure 2). The tube has a camera attached to it, so the doctor can look inside your colon. The tube also has tools on the end, so the doctor can remove pieces of tissue, including polyps. After polyps are removed, they usually go to a lab to be tested for cancer and other problems.  · Sigmoidoscopy - A sigmoidoscopy is very similar to a colonoscopy. The only difference is that this test looks only at the first part of the colon, and a colonoscopy looks at the whole colon.  · CT colonography (also known as virtual colonoscopy) - For a virtual colonoscopy, you have a special kind of X-ray taken, called a "CT scan." This test creates pictures of the colon.  · Stool test - "Stool" is another word for "bowel movements." Stool tests check for blood or abnormal genes in samples of stool. If a stool test indicates that something might be wrong with the colon, doctors usually follow up with a colonoscopy. Then " "doctors find polyps, if they are there.  · Capsule colonoscopy - Rarely, your doctor might do something called a "capsule" colonoscopy. For this test, you swallow a special capsule that contains tiny wireless video cameras.   How are colon polyps treated? -- Doctors remove polyps using the same tools they use for a colonoscopy. They can remove polyps either by snipping them off with a special cutting tool, or by catching the polyps in a noose (figure 3). Most polyps can be removed during a colonoscopy. But sometimes, large polyps need to be removed at a later time, either with another colonoscopy or with surgery.  What happens after I have polyps removed? -- You might need to have a colonoscopy every few years to check for more polyps. In some people polyps come back. And if you had the kind of polyps that could become cancer, your doctor will want to remove them as they appear. Also, if the polyps you had removed were the kind that could become cancer, people in your family might need to be checked for polyps and colon cancer earlier than if you did not have polyps.  Depending on your situation, your doctor might suggest genetic testing. This can show if your polyps are related to a specific gene that runs in families. If this turns out to be the case, they might recommend other tests that can be done to prevent cancer or find it early.  Can colon polyps be prevented? -- To reduce your chances of getting polyps or colon cancer:  · Eat a diet that is low in fat and high in fruits, vegetables, and fiber  · Lose weight, if you are overweight  · Do not smoke  · Limit the amount of alcohol you drink  All topics are updated as new evidence becomes available and our peer review process is complete.  This topic retrieved from [employer] on: Sep 21, 2021.  Topic 67600 Version 8.0  Release: 29.4.2 - C29.263  © 2021 UpToDate, Inc. and/or its affiliates. All rights reserved.  figure 1: Digestive system     This drawing shows the " "organs in the body that process food. Together these organs are called "the digestive system," or "digestive tract." As food travels through this system, the body absorbs nutrients and water.  Graphic 66324 Version 4.0    figure 2: Colonoscopy     During a colonoscopy, you lie on your side and the doctor puts a thin tube with a camera into your anus (from behind). Then the doctor advances the tube into the rectum and colon. The camera sends pictures from inside your colon to a television screen.  Graphic 24360 Version 6.0    figure 3: Removing a colon polyp     One way doctors remove colon polyps is to use a noose as a tool. They loop a wire around the polyp and squeeze the loop tight. When the polyp comes off, the doctor sucks it up into the endoscope, so that it can go to the lab for tests.  Graphic 10285 Version 5.0    Consumer Information Use and Disclaimer   This information is not specific medical advice and does not replace information you receive from your health care provider. This is only a brief summary of general information. It does NOT include all information about conditions, illnesses, injuries, tests, procedures, treatments, therapies, discharge instructions or life-style choices that may apply to you. You must talk with your health care provider for complete information about your health and treatment options. This information should not be used to decide whether or not to accept your health care provider's advice, instructions or recommendations. Only your health care provider has the knowledge and training to provide advice that is right for you. The use of this information is governed by the [employer] End User License Agreement, available at https://www.Triparazzi.FraudMetrix/en/solutions/Preggers/about/kaur.The use of [employer] content is governed by the [employer] Terms of Use. ©2021 UpToDate, Inc. All rights reserved.  Copyright   © 2021 UpToDate, Inc. and/or its affiliates. All rights reserved.    "

## 2022-02-16 NOTE — PROVATION PATIENT INSTRUCTIONS
Discharge Summary/Instructions after an Endoscopic Procedure  Patient Name: Farhan Stallworth  Patient MRN: [id number]  Patient YOB: 1950 Wednesday, February 16, 2022  Gualberto Somers MD  Dear patient,  As a result of recent federal legislation (The Federal Cures Act), you may   receive lab or pathology results from your procedure in your [facility]   account before your physician is able to contact you. Your physician or   their representative will relay the results to you with their   recommendations at their soonest availability.  Thank you,  RESTRICTIONS:  During your procedure today, you received medications for sedation.  These   medications may affect your judgment, balance and coordination.  Therefore,   for 24 hours, you have the following restrictions:   - DO NOT drive a car, operate machinery, make legal/financial decisions,   sign important papers or drink alcohol.    ACTIVITY:  Today: no heavy lifting, straining or running due to procedural   sedation/anesthesia.  The following day: return to full activity including work.  DIET:  Eat and drink normally unless instructed otherwise.     TREATMENT FOR COMMON SIDE EFFECTS:  - Mild abdominal pain, nausea, belching, bloating or excessive gas:  rest,   eat lightly and use a heating pad.  - Sore Throat: treat with throat lozenges and/or gargle with warm salt   water.  - Because air was used during the procedure, expelling large amounts of air   from your rectum or belching is normal.  - If a bowel prep was taken, you may not have a bowel movement for 1-3 days.    This is normal.  SYMPTOMS TO WATCH FOR AND REPORT TO YOUR PHYSICIAN:  1. Abdominal pain or bloating, other than gas cramps.  2. Chest pain.  3. Back pain.  4. Signs of infection such as: chills or fever occurring within 24 hours   after the procedure.  5. Rectal bleeding, which would show as bright red, maroon, or black stools.   (A tablespoon of blood from the rectum is not serious,  especially if   hemorrhoids are present.)  6. Vomiting.  7. Weakness or dizziness.  GO DIRECTLY TO THE NEAREST EMERGENCY ROOM IF YOU HAVE ANY OF THE FOLLOWING:      Difficulty breathing              Chills and/or fever over 101 F   Persistent vomiting and/or vomiting blood   Severe abdominal pain   Severe chest pain   Black, tarry stools   Bleeding- more than one tablespoon   Any other symptom or condition that you feel may need urgent attention  Your doctor recommends these additional instructions:  If any biopsies were taken, your doctors clinic will contact you in 1 to 2   weeks with any results.  We are waiting for your pathology results.   Your physician has recommended a repeat colonoscopy in five years for   surveillance based on pathology results.   You are being discharged to home.  For questions, problems or results please call your physician - Gualberto Somers MD at Work:  [phone].  EMERGENCY PHONE NUMBER: [phone], LAB RESULTS: [phone]  IF A COMPLICATION OR EMERGENCY SITUATION ARISES AND YOU ARE UNABLE TO REACH   YOUR PHYSICIAN - GO DIRECTLY TO THE EMERGENCY ROOM.  ___________________________________________  Nurse Signature  ___________________________________________  Patient/Designated Responsible Party Signature  Gualberto Somers MD  2/16/2022 11:37:18 AM  This report has been verified and signed electronically.  Dear patient,  As a result of recent federal legislation (The Federal Cures Act), you may   receive lab or pathology results from your procedure in your [facility]   account before your physician is able to contact you. Your physician or   their representative will relay the results to you with their   recommendations at their soonest availability.  Thank you.  PROVATION

## 2022-02-16 NOTE — TRANSFER OF CARE
"Anesthesia Transfer of Care Note    Patient: Farhan Stallworth    Procedure(s) Performed: Procedure(s) (LRB):  COLONOSCOPY (N/A)    Patient location: PACU    Anesthesia Type: general    Transport from OR: Transported from OR on room air with adequate spontaneous ventilation    Post pain: adequate analgesia    Post assessment: no apparent anesthetic complications and tolerated procedure well    Post vital signs: stable    Level of consciousness: sedated and awake    Nausea/Vomiting: no nausea/vomiting    Complications: none    Transfer of care protocol was followed      Last vitals:   Visit Vitals  /80 (BP Location: Right arm, Patient Position: Lying)   Pulse 64   Temp 36.3 °C (97.4 °F) (Skin)   Resp 18   Ht 5' 8" (1.727 m)   Wt 103.4 kg (228 lb)   SpO2 100%   BMI 34.67 kg/m²     "

## 2022-02-16 NOTE — ANESTHESIA PREPROCEDURE EVALUATION
02/16/2022  Farhan Stallworth is a 71 y.o., male.    Pre-op Assessment    I have reviewed the Patient Summary Reports.     I have reviewed the Nursing Notes. I have reviewed the NPO Status.   I have reviewed the Medications.     Review of Systems  Anesthesia Hx:  No problems with previous Anesthesia    Social:  Non-Smoker    Cardiovascular:   Hypertension CAD  Dysrhythmias atrial fibrillation hyperlipidemia ECG has been reviewed. 2018 TTE:  Conclusion     · Mild concentric left ventricular hypertrophy.  · Mildly decreased left ventricular systolic function. The estimated ejection fraction is 40 - 45 %  · Mild left atrial enlargement.  · Trace to mild mitral regurgitation.  · Mild tricuspid regurgitation.  · The estimated PA systolic pressure is 25.98 mm Hg  · TDS.  · Atrial fibrillation observed    Cath: 2021  · The left ventricular end diastolic pressure was slightly elevated.  · The pre-procedure left ventricular end diastolic pressure was 19.  · The ejection fraction was greater than 60 % by visual estimate.  · The Prox RCA to Mid RCA lesion was 50% stenosed. The diagnostic FFR was measured at 0.92, which is normal also normal IFR of 0.95, medical treatment.  · Patent stents in the proximal distal LAD with no InStent restenoses, the circumflex artery was large and essentially normal vessel.       Pulmonary:   Sleep Apnea, CPAP    Musculoskeletal:   Arthritis   Spine Disorders: lumbar Disc disease        Physical Exam  General:  Obesity    Airway/Jaw/Neck:  Airway Findings: Mouth Opening: Normal Tongue: Normal  General Airway Assessment: Adult  Mallampati: II  TM Distance: Normal, at least 6 cm  Jaw/Neck Findings:  Neck ROM: Normal ROM     Eyes/Ears/Nose:  Eyes/Ears/Nose Findings:    Dental:  Dental Findings: In tact   Chest/Lungs:  Chest/Lungs Findings: Normal Respiratory Rate, Clear to auscultation      Heart/Vascular:  Heart Findings: Rate: Normal  Rhythm: Irregularly Irregular  Sounds: Normal  Heart murmur: negative       Mental Status:  Mental Status Findings:  Cooperative         Anesthesia Plan  Type of Anesthesia, risks & benefits discussed:  Anesthesia Type:  general    Patient's Preference:   Plan Factors:          Intra-op Monitoring Plan: standard ASA monitors  Intra-op Monitoring Plan Comments:   Post Op Pain Control Plan:   Post Op Pain Control Plan Comments:     Induction:   IV  Beta Blocker:  Patient is on a Beta-Blocker and has received one dose within the past 24 hours (No further documentation required).       Informed Consent: Patient understands risks and agrees with Anesthesia plan.  Questions answered. Anesthesia consent signed with patient.  ASA Score: 3     Day of Surgery Review of History & Physical: I have interviewed and examined the patient. I have reviewed the patient's H&P dated:  There are no significant changes.          Ready For Surgery From Anesthesia Perspective.

## 2022-02-16 NOTE — ANESTHESIA POSTPROCEDURE EVALUATION
Anesthesia Post Evaluation    Patient: Farhan Stallworth    Procedure(s) Performed: Procedure(s) (LRB):  COLONOSCOPY (N/A)    Final Anesthesia Type: general      Patient location during evaluation: PACU  Patient participation: Yes- Able to Participate  Level of consciousness: sedated and awake  Post-procedure vital signs: reviewed and stable  Pain management: adequate  Airway patency: patent    PONV status at discharge: No PONV  Anesthetic complications: no      Cardiovascular status: blood pressure returned to baseline  Respiratory status: spontaneous ventilation  Hydration status: euvolemic  Follow-up not needed.          Vitals Value Taken Time   /70 02/16/22 1205   Temp 36.4 °C (97.5 °F) 02/16/22 1136   Pulse 60 02/16/22 1205   Resp 18 02/16/22 1205   SpO2 100 % 02/16/22 1205         Event Time   Out of Recovery 12:05:53         Pain/Rachel Score: Rachel Score: 10 (2/16/2022 12:03 PM)

## 2022-02-22 LAB
FINAL PATHOLOGIC DIAGNOSIS: NORMAL
Lab: NORMAL

## 2022-04-25 ENCOUNTER — OFFICE VISIT (OUTPATIENT)
Dept: OTOLARYNGOLOGY | Facility: CLINIC | Age: 72
End: 2022-04-25
Payer: MEDICARE

## 2022-04-25 VITALS — HEIGHT: 68 IN | BODY MASS INDEX: 32.44 KG/M2 | WEIGHT: 214.06 LBS

## 2022-04-25 DIAGNOSIS — G47.33 OSA (OBSTRUCTIVE SLEEP APNEA): Primary | ICD-10-CM

## 2022-04-25 DIAGNOSIS — Z78.9 INTOLERANCE OF CONTINUOUS POSITIVE AIRWAY PRESSURE (CPAP) VENTILATION: ICD-10-CM

## 2022-04-25 PROCEDURE — 1159F PR MEDICATION LIST DOCUMENTED IN MEDICAL RECORD: ICD-10-PCS | Mod: CPTII,S$GLB,, | Performed by: OTOLARYNGOLOGY

## 2022-04-25 PROCEDURE — 3288F PR FALLS RISK ASSESSMENT DOCUMENTED: ICD-10-PCS | Mod: CPTII,S$GLB,, | Performed by: OTOLARYNGOLOGY

## 2022-04-25 PROCEDURE — 3288F FALL RISK ASSESSMENT DOCD: CPT | Mod: CPTII,S$GLB,, | Performed by: OTOLARYNGOLOGY

## 2022-04-25 PROCEDURE — 99999 PR PBB SHADOW E&M-EST. PATIENT-LVL III: CPT | Mod: PBBFAC,,, | Performed by: OTOLARYNGOLOGY

## 2022-04-25 PROCEDURE — 1160F RVW MEDS BY RX/DR IN RCRD: CPT | Mod: CPTII,S$GLB,, | Performed by: OTOLARYNGOLOGY

## 2022-04-25 PROCEDURE — 1126F PR PAIN SEVERITY QUANTIFIED, NO PAIN PRESENT: ICD-10-PCS | Mod: CPTII,S$GLB,, | Performed by: OTOLARYNGOLOGY

## 2022-04-25 PROCEDURE — 99204 OFFICE O/P NEW MOD 45 MIN: CPT | Mod: S$GLB,,, | Performed by: OTOLARYNGOLOGY

## 2022-04-25 PROCEDURE — 99204 PR OFFICE/OUTPT VISIT, NEW, LEVL IV, 45-59 MIN: ICD-10-PCS | Mod: S$GLB,,, | Performed by: OTOLARYNGOLOGY

## 2022-04-25 PROCEDURE — 1126F AMNT PAIN NOTED NONE PRSNT: CPT | Mod: CPTII,S$GLB,, | Performed by: OTOLARYNGOLOGY

## 2022-04-25 PROCEDURE — 1101F PR PT FALLS ASSESS DOC 0-1 FALLS W/OUT INJ PAST YR: ICD-10-PCS | Mod: CPTII,S$GLB,, | Performed by: OTOLARYNGOLOGY

## 2022-04-25 PROCEDURE — 1101F PT FALLS ASSESS-DOCD LE1/YR: CPT | Mod: CPTII,S$GLB,, | Performed by: OTOLARYNGOLOGY

## 2022-04-25 PROCEDURE — 99999 PR PBB SHADOW E&M-EST. PATIENT-LVL III: ICD-10-PCS | Mod: PBBFAC,,, | Performed by: OTOLARYNGOLOGY

## 2022-04-25 PROCEDURE — 1160F PR REVIEW ALL MEDS BY PRESCRIBER/CLIN PHARMACIST DOCUMENTED: ICD-10-PCS | Mod: CPTII,S$GLB,, | Performed by: OTOLARYNGOLOGY

## 2022-04-25 PROCEDURE — 1159F MED LIST DOCD IN RCRD: CPT | Mod: CPTII,S$GLB,, | Performed by: OTOLARYNGOLOGY

## 2022-04-25 PROCEDURE — 3008F BODY MASS INDEX DOCD: CPT | Mod: CPTII,S$GLB,, | Performed by: OTOLARYNGOLOGY

## 2022-04-25 PROCEDURE — 3008F PR BODY MASS INDEX (BMI) DOCUMENTED: ICD-10-PCS | Mod: CPTII,S$GLB,, | Performed by: OTOLARYNGOLOGY

## 2022-04-25 NOTE — PROGRESS NOTES
Subjective:       Patient ID: Farhan Stallworth is a 72 y.o. male.    Chief Complaint: discuss Inspbecca Real is here for Obstructive sleep apnea and intolerance of CPAP.  he was diagnosed with REYES 13 years ago.   Last sleep study: 2009. AYLEEN / AHI: 29  he has issues with CPAP compliance: will awake him at times. He still feels sleep is reduced but unsure. He takes Benadryl to initiate sleep. He cannot initiate without this.   Previous surgical treatments for sleep apnea: none  Body mass index is 32.55 kg/m².      Pertinent medical issues: A-fib, CAD.  Anticoagulation: Eliquis, able to come off   Pertinent surgery: Tonsillectomy    Patient validated questionnaires (if applicable):      %       No flowsheet data found.  No flowsheet data found.  No flowsheet data found.         Social History     Tobacco Use   Smoking Status Never Smoker   Smokeless Tobacco Never Used     Social History     Substance and Sexual Activity   Alcohol Use Not Currently    Alcohol/week: 3.0 - 5.0 standard drinks    Types: 1 - 2 Glasses of wine, 1 - 2 Cans of beer, 1 Shots of liquor per week    Comment: occasionally          Objective:        Constitutional:   He is oriented to person, place, and time. He appears well-developed and well-nourished. He appears alert. He is active. Normal speech.      Head:  Normocephalic and atraumatic. Head is without TMJ tenderness. No scars. Salivary glands normal.  Facial strength is normal.      Ears:    Right Ear: No drainage or swelling. No middle ear effusion.   Left Ear: No drainage or swelling.  No middle ear effusion.     Nose:  Septal deviation (right severe) present. No mucosal edema, rhinorrhea or sinus tenderness. Turbinate hypertrophy.      Mouth/Throat  Oropharynx clear and moist without lesions or asymmetry, normal uvula midline and mirror exam normal. Normal dentition. No uvula swelling, lacerations or trismus. No oropharyngeal exudate. Tonsillar erythema, tonsillar exudate.       Neck:  Full range of motion with neck supple and no adenopathy. Thyroid tenderness is present. No tracheal deviation, no edema, no erythema, normal range of motion, no stridor, no crepitus and no neck rigidity present. No thyroid mass present.     Cardiovascular:   Intact distal pulses and normal pulses.      Pulmonary/Chest:   Effort normal and breath sounds normal. No stridor.     Psychiatric:   His speech is normal and behavior is normal. His mood appears not anxious. His affect is not labile.     Neurological:   He is alert and oriented to person, place, and time. No sensory deficit.     Skin:   No abrasions, lacerations, lesions, or rashes. No abrasion and no bruising noted.         Tests / Results:  Reviewed PSG    Assessment:       1. REYES (obstructive sleep apnea)    2. Intolerance of continuous positive airway pressure (CPAP) ventilation    3. BMI 32.0-32.9,adult          Plan:         Discussed background of REYES at length including medical therapy and surgical therapy    Patient interested in Inspire  Discussed  Repeat PSG   DISE (drug induced sleep endoscopy)    I discussed the risks of hypoglossal nerve stimulation including: scar, bleeding, numbness of incision, numbness or weakness of tongue or marginal mandibular nerve, irritation of tongue from stimulation, implant failure, inadequate improvement, need for further procedures, need for removal of implant, infection, pneumothorax, MRI incompatibility.

## 2022-04-25 NOTE — PATIENT INSTRUCTIONS
Information regarding Hypoglossal Nerve Stimulation Therapy:    Hypoglossal Nerve Stimulation Therapy (HGNS) is a surgical treatment for sleep apnea when a patient fails standard positive pressure (CPAP) therapy. This is an effective surgical therapy for sleep apnea with long term effectiveness in many patients.    What is HGNS and how does it work?  The hypoglossal nerve is the nerve that controls nearly all of the tongue movements.   During sleep, the tongue will often fall into the back of the throat. This causes obstruction and apneas by narrowing the throat.  By targeting the tongue muscles, we can often control the prevention of tongue collapse during sleep thereby preventing collapse and apnea.  This procedure involves inserting an implant over the muscles of the chest wall (similar to a pacemaker.) This implant is connected to the nerve that moves the tongue forward. During sleep, the implant will detect breathing effort, and during inspiration, it will cause a gentle pulse of the tongue forward to prevent collapse.   The machine can be turned on and off, and it can be dialed up or down depending on strength needed to move the tongue forward.    Will I feel it? Will it affect my sleep at night?  Once the implant is in place, it is activated after 1 month. After this, you will begin increasing the settings to find the most comfortable setting for you. We work hard with you to get the right settings.   A majority of patients are not disturbed at night with the device, and satisfaction is extremely high (95%.) This is especially true when compared to wearing an external device (CPAP.)   A post-titration sleep study is typically obtained about 3 months after surgery to assess response.     Am I a Candidate?  Candidacy for HGNS is changing over time; however, current general candidacy requirements are:  Moderate or Severe Sleep Apnea (AHI 15-65) as measured by a recent sleep study  Body Mass Index (BMI) < 35    Failure to tolerate CPAP therapy  Pre-operative endoscopy exam confirming candidacy (performed during a quick outpatient procedure)  No contraindications to implant  Approval by insurance company  **The current device is contraindicated for MRI of the thoracic, shoulder, or abdomen area. If you need regular images of this area, this device is not appropriate for you at this time.     How is the surgery performed:  The surgery is usually performed in an outpatient setting, under general anesthesia.  The procedure generally takes a few hours.  The procedure involved two incisions: one in the neck, just below the jaw line. The other is in the chest between the 2nd and third rib. The surgical scars generally heal very well and are minimally noticeable.   Please let your doctor know if you have had surgery or significant trauma to the chest or neck.     What are the risks?  General surgical risks, similar to any procedure include  Bleeding or hematoma  Numbness over incision site  Incisional scar  Pain over incision site  Infection of implant requiring revision or replacement (<1%)  Intolerance to the therapy resulting in non-usage    Specific risks to this surgery include:  Temporary tongue weakness or tingling, rarely permanent (< 1%)  Mechanical failure of the implant  Temporary or permanent weakness to the muscles of the lip (< 1%)  Stimulation related discomfort or tongue abrasions (8%)  Pneumothorax (dropped lung)    After care:  Instructions will be given following the surgery  Generally, light activity for 2 weeks is recommended.  Blood thinners are held prior to surgery at the discretion of the surgeon and can usually be resumed within 48 hours.     Post-operative timeline:    WEEK 1:  About 7 days after your procedure, you will return to the office for a follow up visit. At this time any surtures that we placed will be removed and your incisions will be checked by the pysician. Please note that device will  remain inactive for ONE MONTH. This is refered to as your healing phase. We want to give your body time to heal before we turn the device on. During this time it would be benefical for you to watch the vidoes on the inspire nick. Simply download the nick if you have not done so, then click on the RESOURCES tab. From there, scrol to the INSPIRE CARE section. There are vidoes here that walk you though this phase and will prepare you for the activation visit. Focus on the vidoes entitled: Rest and Heal, Learning your sleep remote, and Tuning Inspire. This will help you feel educated and empowered for your visit.     WEEK 4:  At the one month visit you will come to the office for the device activation. Please wear a shirt or blouse that can allow the physician to check all the incisions and access the implant. Your physician will use a  to check the device for proper function and turn it on for you to begin using Inspire therapy. The process of activation is NOT painful. At this visit you will be given your remote control and instructions on how to use. You will return home to begin using your therapy with the goal of using it ALL NIGHT, EVERY NIGHT. If you have any issues with discomfort, please call the office so we can help you reach this goal.    WEEK 6-8:  Someone from our office will be doing a check in call to see how you are progressing. If you are having any issues, we can bring you in to the office and make some appropratie adjustments as needed. Otherwise, you will continue to keep stepping up your therapy and using it all night.     WEEK 12:  Roughly 3 months after your device is turned on, you will return to the sleep lab. This will help us determine if your therapy is at the appropriate level for you. A couple of weeks after your sleep study, you will return to the office to review your results and make any changes that your provider feels necessary.       FAQ:  I don't feel the stimulation?    Answer: During the tuning phase, you will get used to stimulation.  It's very common to not feel stimulation.  Try stepping up your level.    The stimulation does not synchronize with my breathing?    Answer: This is normal.  Stimulation timing is designed to work best while you are asleep.  Your breathing pattern is much different while you are awake.  When you go to sleep, the sensor will work normally.     Can I receive an MRI?    Answer: If you have the model 3028 generator it is MRI Conditional, make sure your doctor goes to the Inspire website for more information on how to perform a safe MRI for you.     Can I go to the airport?    Answer: Yes.  You may go through metal detectors and scanners.  Make sure to show the TSA official your medical device registration card and tell them you have an implantable device in your body.  They may require extra screening, so make sure to plan for extra time your first time going to the airport.     Can I go scuba diving and mountain climbing?    Answer: You can go to as high in elevation as you want.  However, you can only go 30 meters below sea level or 4 atmospheres of pressure.     Can I continue welding?    Answer: Inspire does not recommend electrical welding.  Consult your patient manual for more details.    Can I go to the dentist?    Answer: Going to the dentist is perfectly fine. Let your dentist know you have an implantable device and the dentist will take any precautions needed.     Can I receive X-Rays or CT Scans?    Answer: Yes.  X-Ray and CT Scans are safe with Inspire.  If you are having a mammogram though, make sure to let the technician know where your device is implanted so the technician can make the mammogram as comfortable as possible.     Why is stimulation on when I'm awake?    Answer: This is normal.  Inspire works by stimulating during every breath.  Inspire does not know when you are asleep or awake, so once you turn on Inspire, it will  stimulate with each breath. Remember, you are in complete control of your Inspire and can turn Inspire ON or OFF using your Inspire Sleep Remote.     Why is stimulation off when I wake up?    Answer: You may have slept longer than the Therapy Duration Setting, which by default is 8 hours.  Inspire will turn off automatically after 8 hours.  If you are sleeping longer than 8 hours, ask your physician to change the duration setting to a longer time (for example 9 or 10 hours).  It is also possible that you are getting used to Inspire and just don't feel the stimulation when you wake up.  If you're sleep remote is green when you gently shake it, your Inspire is still on.  If your Inspire is white, then it is no longer stimulating.       More information can be found at:  [email or web address].com

## 2022-04-28 NOTE — PROGRESS NOTES
Subjective:    Patient ID:  Farhan Stallworth is a 69 y.o. male who presents for Atrial Fibrillation and Coronary Artery Disease        HPI  RECENT LABS AT [facility] , HB 11.5, CR 1.05, HAD TKR ABOUT A WEEK AGO, HAS BEEN RECOVERING SLOWLY, NO CHEST PAIN, NO SHORTNESS OF BREATH, EASY BRUISING, NO TIA TYPE SYMPTOMS, SEE ROS    Past Medical History:   Diagnosis Date    Arthritis     hip    Atrial fibrillation     Coronary artery disease     cardiac stents    Hearing difficulty     Hypertension     Low serum testosterone level     Lumbar disc disease     REYES (obstructive sleep apnea)     has CPAP, better relief of symptoms with nasal pillow.     REYES (obstructive sleep apnea)      Past Surgical History:   Procedure Laterality Date    CARDIOVERSION N/A 1/3/2019    Procedure: CARDIOVERSION;  Surgeon: Nanci Ahuja MD;  Location: [facility];  Service: Cardiology;  Laterality: N/A;    CARPAL TUNNEL RELEASE      2005, bilateral    CLAVICLE SURGERY      CORONARY ANGIOGRAPHY N/A 4/9/2019    Procedure: ANGIOGRAM, CORONARY ARTERY;  Surgeon: Nanci Ahuja MD;  Location: [facility] CATH;  Service: Cardiology;  Laterality: N/A;    EPIDURAL BLOCK INJECTION  2010    HIP SURGERY      INTERNAL NEUROLYSIS USING OPERATING MICROSCOPE Left 12/6/2019    Procedure: Cooled radiofrequency ablation of the genicular nerve branches to the left knee;  Surgeon: Jayy Driscoll MD;  Location: [facility] OR;  Service: Orthopedics;  Laterality: Left;    JOINT REPLACEMENT Right 11/2017    hip    KNEE ARTHROSCOPY W/ MENISCAL REPAIR      left    LEFT HEART CATHETERIZATION Left 4/9/2019    Procedure: Left heart cath;  Surgeon: Nanci Ahuja MD;  Location: [facility] CATH;  Service: Cardiology;  Laterality: Left;    LUMBAR FUSION      2010    TONSILLECTOMY       Family History   Problem Relation Age of Onset    Cancer Father         lung    Cancer Sister         leukemia    No Known Problems Mother     Heart disease Neg Hx     Diabetes Neg Hx       Social History     Socioeconomic History    Marital status:      Spouse name: Not on file    Number of children: Not on file    Years of education: Not on file    Highest education level: Not on file   Occupational History    Not on file   Social Needs    Financial resource strain: Not hard at all    Food insecurity:     Worry: Never true     Inability: Never true    Transportation needs:     Medical: No     Non-medical: No   Tobacco Use    Smoking status: Never Smoker    Smokeless tobacco: Never Used   Substance and Sexual Activity    Alcohol use: Yes     Alcohol/week: 0.0 standard drinks     Frequency: 2-4 times a month     Drinks per session: 1 or 2     Binge frequency: Never     Comment: occasionally    Drug use: No    Sexual activity: Not on file   Lifestyle    Physical activity:     Days per week: 7 days     Minutes per session: 60 min    Stress: Only a little   Relationships    Social connections:     Talks on phone: More than three times a week     Gets together: Three times a week     Attends Tenriism service: Not on file     Active member of club or organization: Yes     Attends meetings of clubs or organizations: More than 4 times per year     Relationship status:    Other Topics Concern    Not on file   Social History Narrative    Not on file       Review of patient's allergies indicates:  No Known Allergies    Current Outpatient Medications:     acetaminophen (TYLENOL) 500 MG tablet, , Disp: , Rfl:     apixaban (ELIQUIS) 5 mg Tab, Take 1 tablet (5 mg total) by mouth 2 (two) times daily., Disp: 10 tablet, Rfl: 0    aspirin (ECOTRIN) 81 MG EC tablet, , Disp: , Rfl:     atorvastatin (LIPITOR) 20 MG tablet, TAKE 1 TABLET EVERY DAY, Disp: 90 tablet, Rfl: 0    clopidogrel (PLAVIX) 75 mg tablet, TAKE 1 TABLET EVERY DAY, Disp: 90 tablet, Rfl: 1    coenzyme Q10 100 mg/5 mL Syrp, , Disp: , Rfl:     glucosamine-chondroitin 500-400 mg tablet, Take 1 tablet by mouth 2  (two) times daily. Triflex, Disp: , Rfl:     isosorbide mononitrate (IMDUR) 30 MG 24 hr tablet, TAKE 1 TABLET (30 MG TOTAL) BY MOUTH ONCE DAILY., Disp: 90 tablet, Rfl: 0    metoprolol succinate (TOPROL-XL) 25 MG 24 hr tablet, Take 1 tablet (25 mg total) by mouth once daily., Disp: 90 tablet, Rfl: 1    MULTIVITS-MINERALS/FA/LYCOPENE (MEN'S DAILY MULTIVIT-MINERAL ORAL), Take 1 tablet by mouth once daily. , Disp: , Rfl:     nitroGLYCERIN (NITROSTAT) 0.4 MG SL tablet, PLACE 1 T UNT Q 5 MIN PRN, Disp: , Rfl:     oxyCODONE-acetaminophen (PERCOCET)  mg per tablet, Take 1 tablet by mouth every 8 (eight) hours as needed., Disp: , Rfl:     Current Facility-Administered Medications:     sodium chloride 0.9% flush 10 mL, 10 mL, Intravenous, PRN, Nanci Ahuja MD    Review of Systems   Constitution: Negative for chills, diaphoresis, fever, malaise/fatigue and night sweats. Weight loss: SOME.   HENT: Negative for congestion and nosebleeds.    Eyes: Negative for blurred vision and visual disturbance.   Cardiovascular: Negative for chest pain, claudication, cyanosis, dyspnea on exertion, irregular heartbeat, leg swelling, near-syncope, orthopnea, palpitations, paroxysmal nocturnal dyspnea and syncope.   Respiratory: Negative for cough, hemoptysis, shortness of breath and wheezing.    Endocrine: Negative for cold intolerance, heat intolerance and polyuria.   Hematologic/Lymphatic: Negative for adenopathy. Does not bruise/bleed easily (SOME).   Skin: Negative for color change, itching and rash.   Musculoskeletal: Negative for back pain (BETTER), falls and joint pain (L KNEE,).   Gastrointestinal: Negative for abdominal pain, change in bowel habit, dysphagia, hematemesis, jaundice, melena and vomiting.   Genitourinary: Negative for dysuria, flank pain and frequency.   Neurological: Negative for brief paralysis, dizziness, focal weakness, light-headedness, loss of balance, numbness, tremors and weakness.  "  Psychiatric/Behavioral: Negative for altered mental status, depression and memory loss. The patient is not nervous/anxious.    Allergic/Immunologic: Negative for environmental allergies and persistent infections.        Objective:      Vitals:    02/07/20 0834   BP: 129/75   Pulse: 97   Weight: 98.2 kg (216 lb 7.9 oz)   Height: 5' 8" (1.727 m)   PainSc:   6     Body mass index is 32.92 kg/m².    Physical Exam   Constitutional: He is oriented to person, place, and time. He appears well-developed and well-nourished. He is active.   OVERWEIGHT   HENT:   Head: Normocephalic and atraumatic.   Mouth/Throat: Oropharynx is clear and moist and mucous membranes are normal.   Eyes: Pupils are equal, round, and reactive to light. Conjunctivae and EOM are normal.   Neck: Normal range of motion. Neck supple. Normal carotid pulses, no hepatojugular reflux and no JVD present. Carotid bruit is not present. No edema and no erythema present. No thyromegaly present.   Cardiovascular: Regular rhythm.  No extrasystoles are present. Bradycardia present. PMI is not displaced. Exam reveals no gallop, no distant heart sounds, no friction rub and no midsystolic click.   Murmur heard.   Systolic murmur is present with a grade of 1/6 at the lower left sternal border.  Pulses:       Carotid pulses are 2+ on the right side, and 2+ on the left side.       Radial pulses are 2+ on the right side, and 2+ on the left side.        Femoral pulses are 2+ on the right side, and 2+ on the left side.       Dorsalis pedis pulses are 2+ on the right side, and 2+ on the left side.        Posterior tibial pulses are 2+ on the right side, and 2+ on the left side.   Pulmonary/Chest: Effort normal and breath sounds normal. No accessory muscle usage. No tachypnea and no bradypnea. No respiratory distress.   Abdominal: Soft. Bowel sounds are normal. He exhibits no distension and no mass. There is no hepatosplenomegaly. There is no CVA tenderness. "   Musculoskeletal: Normal range of motion. He exhibits no edema or deformity.   WALKER  BRUISE LLE   Lymphadenopathy:     He has no cervical adenopathy.   Neurological: He is alert and oriented to person, place, and time. He has normal strength. He displays no tremor. No cranial nerve deficit.   Skin: Skin is warm and dry. No cyanosis or erythema. No pallor.   Psychiatric: He has a normal mood and affect. His speech is normal and behavior is normal.               ..    Chemistry        Component Value Date/Time     07/26/2019 0755    K 4.2 07/26/2019 0755     07/26/2019 0755    CO2 25 07/26/2019 0755    BUN 18 07/26/2019 0755    CREATININE 1.1 07/26/2019 0755     07/26/2019 0755        Component Value Date/Time    CALCIUM 9.4 07/26/2019 0755    ALKPHOS 87 07/26/2019 0755    AST 28 07/26/2019 0755    ALT 27 07/26/2019 0755    BILITOT 0.5 07/26/2019 0755    ESTGFRAFRICA >60.0 07/26/2019 0755    EGFRNONAA >60.0 07/26/2019 0755            ..  Lab Results   Component Value Date    CHOL 107 (L) 07/30/2019    CHOL 153 03/26/2019    CHOL 163 11/15/2016     Lab Results   Component Value Date    HDL 38 (L) 07/30/2019    HDL 39 (L) 03/26/2019    HDL 42 11/15/2016     Lab Results   Component Value Date    LDLCALC 54.4 (L) 07/30/2019    LDLCALC 100.2 03/26/2019    LDLCALC 104.8 11/15/2016     Lab Results   Component Value Date    TRIG 73 07/30/2019    TRIG 69 03/26/2019    TRIG 81 11/15/2016     Lab Results   Component Value Date    CHOLHDL 35.5 07/30/2019    CHOLHDL 25.5 03/26/2019    CHOLHDL 25.8 11/15/2016     ..  Lab Results   Component Value Date    WBC 5.39 04/02/2019    HGB 14.2 07/26/2019    HCT 45.7 04/02/2019    MCV 94 04/02/2019     04/02/2019       Test(s) Reviewed  I have reviewed the following in detail:  [] Stress test   [] Angiography   [] Echocardiogram   [x] Labs   [] Other:       Assessment:         ICD-10-CM ICD-9-CM   1. Coronary artery disease involving native coronary artery of  native heart without angina pectoris I25.10 414.01   2. PAF (paroxysmal atrial fibrillation) I48.0 427.31   3. Essential hypertension I10 401.9   4. Long term (current) use of anticoagulants Z79.01 V58.61   5. Obesity, Class I, BMI 30-34.9 E66.9 278.00   6. Hypercholesteremia E78.00 272.0     Problem List Items Addressed This Visit        Cardiac/Vascular    PAF (paroxysmal atrial fibrillation)    Relevant Orders    Comprehensive metabolic panel    Essential hypertension    Relevant Orders    Comprehensive metabolic panel    Hypercholesteremia    Relevant Orders    Comprehensive metabolic panel    Lipid panel    Coronary artery disease involving native coronary artery of native heart without angina pectoris - Primary    Relevant Orders    Comprehensive metabolic panel       Hematology    Long term (current) use of anticoagulants    Relevant Orders    Comprehensive metabolic panel    Hemoglobin       Endocrine    Obesity, Class I, BMI 30-34.9           Plan:         DC [place], IN 1 MO, CHECK LABS SOON, ALL CV CLINICALLY STABLE, NO ANGINA, NO HF, NO TIA, NO CLINICAL ARRHYTHMIA,CONTINUE CURRENT MEDS, EDUCATION, DIET, EXERCISE, WEIGHT LOSS, RETURN TO CLINIC IN 6 MONTHS WITH LABS, DISCUSSED PLAN WITH THE PATIENT AND HIS WIFE  Coronary artery disease involving native coronary artery of native heart without angina pectoris  -     Comprehensive metabolic panel; Future; Expected date: 02/07/2020    PAF (paroxysmal atrial fibrillation)  -     Comprehensive metabolic panel; Future; Expected date: 02/07/2020    Essential hypertension  -     Comprehensive metabolic panel; Future; Expected date: 02/07/2020    Long term (current) use of anticoagulants  -     Comprehensive metabolic panel; Future; Expected date: 02/07/2020  -     Hemoglobin; Future; Expected date: 02/07/2020    Obesity, Class I, BMI 30-34.9    Hypercholesteremia  -     Comprehensive metabolic panel; Future; Expected date: 02/07/2020  -     Lipid panel; Future;  Expected date: 02/07/2020    RTC Low level/low impact aerobic exercise 5x's/wk. Heart healthy diet and risk factor modification.    See labs and med orders.    Aerobic exercise 5x's/wk. Heart healthy diet and risk factor modification.    See labs and med orders.         Excision Depth: adipose tissue

## 2022-05-08 DIAGNOSIS — M19.041 PRIMARY OSTEOARTHRITIS OF BOTH HANDS: ICD-10-CM

## 2022-05-08 DIAGNOSIS — M19.042 PRIMARY OSTEOARTHRITIS OF BOTH HANDS: ICD-10-CM

## 2022-05-09 ENCOUNTER — PATIENT MESSAGE (OUTPATIENT)
Dept: SMOKING CESSATION | Facility: CLINIC | Age: 72
End: 2022-05-09
Payer: MEDICARE

## 2022-05-09 RX ORDER — SALSALATE 500 MG/1
TABLET, FILM COATED ORAL
Qty: 120 TABLET | Refills: 0 | Status: SHIPPED | OUTPATIENT
Start: 2022-05-09 | End: 2022-07-05 | Stop reason: SDUPTHER

## 2022-05-12 ENCOUNTER — PATIENT MESSAGE (OUTPATIENT)
Dept: CARDIOLOGY | Facility: CLINIC | Age: 72
End: 2022-05-12
Payer: MEDICARE

## 2022-05-13 ENCOUNTER — PATIENT MESSAGE (OUTPATIENT)
Dept: OTOLARYNGOLOGY | Facility: CLINIC | Age: 72
End: 2022-05-13
Payer: MEDICARE

## 2022-05-13 DIAGNOSIS — Z78.9 INTOLERANCE OF CONTINUOUS POSITIVE AIRWAY PRESSURE (CPAP) VENTILATION: ICD-10-CM

## 2022-05-13 DIAGNOSIS — G47.33 OSA (OBSTRUCTIVE SLEEP APNEA): Primary | ICD-10-CM

## 2022-05-13 NOTE — TELEPHONE ENCOUNTER
Pt wishes to proceed with sleep endoscopy, pt scheduled for 5/27, please add orders. Pt is asking if he will need to hold blood thinners? And he is having a shoulder repair for a torn ligament on 5/18 and wants to know if that will interfere with sleep endoscopy for 5/27? Please advise.

## 2022-05-26 ENCOUNTER — ANESTHESIA EVENT (OUTPATIENT)
Dept: SURGERY | Facility: HOSPITAL | Age: 72
End: 2022-05-26
Payer: MEDICARE

## 2022-05-26 NOTE — PATIENT INSTRUCTIONS
Post-op Sleep Endoscopy  Matthias Zamorano MD  Otolaryngology - [facility] - [phone]  Cell Phone (after hours) - [phone]      Pain and Activity  You will have minimal nasal and throat pain. This can be related to the pressure from the scope and will resolve after a few days.  You can resume your normal activities as tolerated on the day following surgery.    Diet  Make sure to get enough fluids and nutrients. Food and drink guidelines include  No diet restrictions.       Medication  Give only medications approved by your doctor. Follow directions closely when giving your child medications.  Resume your normal medications. OK to take Tylenol or Ibuprofen for pain as needed.       Dr. Zamorano will call to go over the results and options.

## 2022-05-27 ENCOUNTER — PATIENT MESSAGE (OUTPATIENT)
Dept: SURGERY | Facility: HOSPITAL | Age: 72
End: 2022-05-27
Payer: MEDICARE

## 2022-05-27 ENCOUNTER — HOSPITAL ENCOUNTER (OUTPATIENT)
Facility: HOSPITAL | Age: 72
Discharge: HOME | End: 2022-05-27
Attending: OTOLARYNGOLOGY | Admitting: OTOLARYNGOLOGY
Payer: MEDICARE

## 2022-05-27 ENCOUNTER — ANESTHESIA (OUTPATIENT)
Dept: SURGERY | Facility: HOSPITAL | Age: 72
End: 2022-05-27
Payer: MEDICARE

## 2022-05-27 DIAGNOSIS — G47.33 OBSTRUCTIVE SLEEP APNEA SYNDROME: Primary | ICD-10-CM

## 2022-05-27 DIAGNOSIS — G47.33 OSA (OBSTRUCTIVE SLEEP APNEA): ICD-10-CM

## 2022-05-27 DIAGNOSIS — Z78.9 INTOLERANCE OF CONTINUOUS POSITIVE AIRWAY PRESSURE (CPAP) VENTILATION: ICD-10-CM

## 2022-05-27 DIAGNOSIS — E66.9 OBESITY (BMI 30.0-34.9): ICD-10-CM

## 2022-05-27 PROCEDURE — 25000003 PHARM REV CODE 250: Mod: PO | Performed by: NURSE ANESTHETIST, CERTIFIED REGISTERED

## 2022-05-27 PROCEDURE — D9220A PRA ANESTHESIA: ICD-10-PCS | Mod: ANES,,, | Performed by: ANESTHESIOLOGY

## 2022-05-27 PROCEDURE — D9220A PRA ANESTHESIA: Mod: CRNA,,, | Performed by: NURSE ANESTHETIST, CERTIFIED REGISTERED

## 2022-05-27 PROCEDURE — 71000015 HC POSTOP RECOV 1ST HR: Mod: PO | Performed by: OTOLARYNGOLOGY

## 2022-05-27 PROCEDURE — D9220A PRA ANESTHESIA: Mod: ANES,,, | Performed by: ANESTHESIOLOGY

## 2022-05-27 PROCEDURE — 42975 PR SLEEP ENDOSCOPY, DRUG-INDUCED, W/EVAL OF SLEEP DISORD BREATH: ICD-10-PCS | Mod: ,,, | Performed by: OTOLARYNGOLOGY

## 2022-05-27 PROCEDURE — 37000009 HC ANESTHESIA EA ADD 15 MINS: Mod: PO | Performed by: OTOLARYNGOLOGY

## 2022-05-27 PROCEDURE — 37000008 HC ANESTHESIA 1ST 15 MINUTES: Mod: PO | Performed by: OTOLARYNGOLOGY

## 2022-05-27 PROCEDURE — 25000003 PHARM REV CODE 250: Mod: PO | Performed by: OTOLARYNGOLOGY

## 2022-05-27 PROCEDURE — 36000708 HC OR TIME LEV III 1ST 15 MIN: Mod: PO | Performed by: OTOLARYNGOLOGY

## 2022-05-27 PROCEDURE — 71000033 HC RECOVERY, INTIAL HOUR: Mod: PO | Performed by: OTOLARYNGOLOGY

## 2022-05-27 PROCEDURE — 42975 DISE EVAL SLP DO BRTH FLX DX: CPT | Mod: ,,, | Performed by: OTOLARYNGOLOGY

## 2022-05-27 PROCEDURE — 63600175 PHARM REV CODE 636 W HCPCS: Mod: PO | Performed by: ANESTHESIOLOGY

## 2022-05-27 PROCEDURE — 36000709 HC OR TIME LEV III EA ADD 15 MIN: Mod: PO | Performed by: OTOLARYNGOLOGY

## 2022-05-27 PROCEDURE — 25000003 PHARM REV CODE 250: Mod: PO | Performed by: ANESTHESIOLOGY

## 2022-05-27 PROCEDURE — D9220A PRA ANESTHESIA: ICD-10-PCS | Mod: CRNA,,, | Performed by: NURSE ANESTHETIST, CERTIFIED REGISTERED

## 2022-05-27 PROCEDURE — 63600175 PHARM REV CODE 636 W HCPCS: Mod: PO | Performed by: NURSE ANESTHETIST, CERTIFIED REGISTERED

## 2022-05-27 RX ORDER — OXYCODONE HYDROCHLORIDE 5 MG/1
5 TABLET ORAL ONCE AS NEEDED
Status: DISCONTINUED | OUTPATIENT
Start: 2022-05-27 | End: 2022-05-27 | Stop reason: HOSPADM

## 2022-05-27 RX ORDER — OXYMETAZOLINE HCL 0.05 %
2 SPRAY, NON-AEROSOL (ML) NASAL
Status: ACTIVE | OUTPATIENT
Start: 2022-05-27 | End: 2022-05-27

## 2022-05-27 RX ORDER — LIDOCAINE HYDROCHLORIDE 20 MG/ML
10 JELLY TOPICAL ONCE
Status: DISCONTINUED | OUTPATIENT
Start: 2022-05-27 | End: 2022-05-27 | Stop reason: HOSPADM

## 2022-05-27 RX ORDER — PROPOFOL 10 MG/ML
VIAL (ML) INTRAVENOUS
Status: DISCONTINUED | OUTPATIENT
Start: 2022-05-27 | End: 2022-05-27

## 2022-05-27 RX ORDER — PROCHLORPERAZINE EDISYLATE 5 MG/ML
5 INJECTION INTRAMUSCULAR; INTRAVENOUS EVERY 30 MIN PRN
Status: DISCONTINUED | OUTPATIENT
Start: 2022-05-27 | End: 2022-05-27 | Stop reason: HOSPADM

## 2022-05-27 RX ORDER — MEPERIDINE HYDROCHLORIDE 50 MG/ML
12.5 INJECTION INTRAMUSCULAR; INTRAVENOUS; SUBCUTANEOUS ONCE AS NEEDED
Status: DISCONTINUED | OUTPATIENT
Start: 2022-05-27 | End: 2022-05-27 | Stop reason: HOSPADM

## 2022-05-27 RX ORDER — PROPOFOL 10 MG/ML
VIAL (ML) INTRAVENOUS CONTINUOUS PRN
Status: DISCONTINUED | OUTPATIENT
Start: 2022-05-27 | End: 2022-05-27

## 2022-05-27 RX ORDER — SODIUM CHLORIDE, SODIUM LACTATE, POTASSIUM CHLORIDE, CALCIUM CHLORIDE 600; 310; 30; 20 MG/100ML; MG/100ML; MG/100ML; MG/100ML
500 INJECTION, SOLUTION INTRAVENOUS ONCE
Status: DISCONTINUED | OUTPATIENT
Start: 2022-05-27 | End: 2022-05-27 | Stop reason: HOSPADM

## 2022-05-27 RX ORDER — HYDROMORPHONE HYDROCHLORIDE 2 MG/ML
0.2 INJECTION, SOLUTION INTRAMUSCULAR; INTRAVENOUS; SUBCUTANEOUS EVERY 5 MIN PRN
Status: DISCONTINUED | OUTPATIENT
Start: 2022-05-27 | End: 2022-05-27 | Stop reason: HOSPADM

## 2022-05-27 RX ORDER — SODIUM CHLORIDE 0.9 % (FLUSH) 0.9 %
3 SYRINGE (ML) INJECTION EVERY 8 HOURS
Status: DISCONTINUED | OUTPATIENT
Start: 2022-05-27 | End: 2022-05-27 | Stop reason: HOSPADM

## 2022-05-27 RX ORDER — LIDOCAINE HYDROCHLORIDE 10 MG/ML
1 INJECTION, SOLUTION EPIDURAL; INFILTRATION; INTRACAUDAL; PERINEURAL ONCE
Status: DISCONTINUED | OUTPATIENT
Start: 2022-05-27 | End: 2022-05-27 | Stop reason: HOSPADM

## 2022-05-27 RX ORDER — LIDOCAINE HCL/PF 100 MG/5ML
SYRINGE (ML) INTRAVENOUS
Status: DISCONTINUED | OUTPATIENT
Start: 2022-05-27 | End: 2022-05-27

## 2022-05-27 RX ORDER — DIPHENHYDRAMINE HYDROCHLORIDE 50 MG/ML
12.5 INJECTION INTRAMUSCULAR; INTRAVENOUS ONCE AS NEEDED
Status: DISCONTINUED | OUTPATIENT
Start: 2022-05-27 | End: 2022-05-27 | Stop reason: HOSPADM

## 2022-05-27 RX ORDER — SODIUM CHLORIDE, SODIUM LACTATE, POTASSIUM CHLORIDE, CALCIUM CHLORIDE 600; 310; 30; 20 MG/100ML; MG/100ML; MG/100ML; MG/100ML
10 INJECTION, SOLUTION INTRAVENOUS CONTINUOUS
Status: DISCONTINUED | OUTPATIENT
Start: 2022-05-27 | End: 2022-05-27 | Stop reason: HOSPADM

## 2022-05-27 RX ORDER — LORAZEPAM 2 MG/ML
0.25 INJECTION INTRAMUSCULAR ONCE AS NEEDED
Status: DISCONTINUED | OUTPATIENT
Start: 2022-05-27 | End: 2022-05-27 | Stop reason: HOSPADM

## 2022-05-27 RX ADMIN — Medication 2 SPRAY: at 09:05

## 2022-05-27 RX ADMIN — PROPOFOL 100 MG: 10 INJECTION, EMULSION INTRAVENOUS at 10:05

## 2022-05-27 RX ADMIN — SODIUM CHLORIDE, SODIUM LACTATE, POTASSIUM CHLORIDE, AND CALCIUM CHLORIDE 10 ML/HR: .6; .31; .03; .02 INJECTION, SOLUTION INTRAVENOUS at 09:05

## 2022-05-27 RX ADMIN — GLYCOPYRROLATE 0.2 MG: 0.2 INJECTION INTRAMUSCULAR; INTRAVENOUS at 09:05

## 2022-05-27 RX ADMIN — LIDOCAINE HYDROCHLORIDE 100 MG: 20 INJECTION, SOLUTION INTRAVENOUS at 10:05

## 2022-05-27 RX ADMIN — PROPOFOL 5 MCG/KG/MIN: 10 INJECTION, EMULSION INTRAVENOUS at 10:05

## 2022-05-27 NOTE — ANESTHESIA POSTPROCEDURE EVALUATION
Anesthesia Post Evaluation    Patient: Farhan Stallworth    Procedure(s) Performed: Procedure(s) (LRB):  SLEEP ENDOSCOPY,DRUG-INDUCED (Bilateral)    Final Anesthesia Type: MAC      Patient location during evaluation: PACU  Patient participation: Yes- Able to Participate  Level of consciousness: awake and alert  Post-procedure vital signs: reviewed and stable  Pain management: adequate  Airway patency: patent    PONV status at discharge: No PONV  Anesthetic complications: no      Cardiovascular status: blood pressure returned to baseline  Respiratory status: unassisted  Hydration status: euvolemic  Follow-up not needed.          Vitals Value Taken Time   /74 05/27/22 1110   Temp 36.7 °C (98 °F) 05/27/22 1110   Pulse 50 05/27/22 1110   Resp 169 05/27/22 1110   SpO2 98 % 05/27/22 1110         Event Time   Out of Recovery 10:45:00         Pain/Rachel Score: Rachel Score: 10 (5/27/2022 11:00 AM)

## 2022-05-27 NOTE — TRANSFER OF CARE
"Anesthesia Transfer of Care Note    Patient: Farhan Stallworth    Procedure(s) Performed: Procedure(s) (LRB):  SLEEP ENDOSCOPY,DRUG-INDUCED (Bilateral)    Patient location: PACU    Anesthesia Type: MAC    Transport from OR: Transported from OR on room air with adequate spontaneous ventilation    Post pain: adequate analgesia    Post assessment: no apparent anesthetic complications    Post vital signs: stable    Level of consciousness: awake    Nausea/Vomiting: no nausea/vomiting    Complications: none    Transfer of care protocol was followed      Last vitals:   Visit Vitals  /78 (BP Location: Left arm, Patient Position: Sitting)   Pulse (!) 55   Temp 36.5 °C (97.7 °F) (Skin)   Resp 16   Ht 5' 8" (1.727 m)   Wt 95.3 kg (210 lb)   SpO2 98%   BMI 31.93 kg/m²     "

## 2022-05-27 NOTE — ANESTHESIA PREPROCEDURE EVALUATION
05/27/2022  Farhan Stallworth is a 72 y.o., male.      Pre-op Assessment    I have reviewed the Patient Summary Reports.     I have reviewed the Nursing Notes. I have reviewed the NPO Status.   I have reviewed the Medications.     Review of Systems  Anesthesia Hx:  Denies Family Hx of Anesthesia complications.   Denies Personal Hx of Anesthesia complications.   Cardiovascular:   Hypertension CAD  Dysrhythmias atrial fibrillation hyperlipidemia ECG has been reviewed. EF 65%   Pulmonary:   Sleep Apnea    Musculoskeletal:   Arthritis     Endocrine:  Obesity / BMI > 30      Physical Exam  General: Cooperative, Alert and Oriented    Airway:  Mallampati: II   Mouth Opening: Normal  Tongue: Normal  Neck ROM: Normal ROM  Neck: Girth Increased    Dental:  Intact    Chest/Lungs:  Normal Respiratory Rate    Heart:  Rate: Normal  Rhythm: Regular Rhythm        Anesthesia Plan  Type of Anesthesia, risks & benefits discussed:    Anesthesia Type: Gen Natural Airway  Intra-op Monitoring Plan: Standard ASA Monitors  Induction:  IV  Informed Consent: Informed consent signed with the Patient and all parties understand the risks and agree with anesthesia plan.  All questions answered.   ASA Score: 3    Ready For Surgery From Anesthesia Perspective.     .

## 2022-05-27 NOTE — H&P
Subjective:       Patient ID: Farhan Stallworth is a 72 y.o. male.    Chief Complaint: No chief complaint on file.      Farhan is here for DISE. No changes since clinic visit     Review of Systems   Constitutional: Negative for activity change and appetite change.   Eyes: Negative for discharge.   Respiratory: Negative for difficulty breathing and wheezing   Cardiovascular: Negative for chest pain.   Gastrointestinal: Negative for abdominal distention and abdominal pain.   Endocrine: Negative for cold intolerance and heat intolerance.   Genitourinary: Negative for dysuria.   Musculoskeletal: Negative for gait problem and joint swelling.   Skin: Negative for color change and pallor.   Neurological: Negative for syncope and weakness.   Psychiatric/Behavioral: Negative for agitation and confusion.     Objective:        Constitutional:   He is oriented to person, place, and time. He appears well-developed and well-nourished. He appears alert. He is active. Normal speech.      Head:  Normocephalic and atraumatic. Head is without TMJ tenderness. No scars. Salivary glands normal.  Facial strength is normal.      Ears:    Right Ear: No drainage or swelling. No middle ear effusion.   Left Ear: No drainage or swelling.  No middle ear effusion.     Nose:  No mucosal edema, rhinorrhea or sinus tenderness. No turbinate hypertrophy.      Mouth/Throat  Oropharynx clear and moist without lesions or asymmetry, normal uvula midline and mirror exam normal. Normal dentition. No uvula swelling, lacerations or trismus. No oropharyngeal exudate. Tonsillar erythema, tonsillar exudate.      Neck:  Full range of motion with neck supple and no adenopathy. Thyroid tenderness is present. No tracheal deviation, no edema, no erythema, normal range of motion, no stridor, no crepitus and no neck rigidity present. No thyroid mass present.     Cardiovascular:   Intact distal pulses and normal pulses.      Pulmonary/Chest:   Effort normal and breath  sounds normal. No stridor.     Psychiatric:   His speech is normal and behavior is normal. His mood appears not anxious. His affect is not labile.     Neurological:   He is alert and oriented to person, place, and time. No sensory deficit.     Skin:   No abrasions, lacerations, lesions, or rashes. No abrasion and no bruising noted.         Tests / Results:  Reviewed     Assessment:       1. Obstructive sleep apnea syndrome    2. REYES (obstructive sleep apnea)          Plan:         DISE as planned

## 2022-05-30 ENCOUNTER — PATIENT MESSAGE (OUTPATIENT)
Dept: SURGERY | Facility: HOSPITAL | Age: 72
End: 2022-05-30
Payer: MEDICARE

## 2022-05-30 VITALS
TEMPERATURE: 98 F | HEIGHT: 68 IN | OXYGEN SATURATION: 98 % | WEIGHT: 210 LBS | RESPIRATION RATE: 169 BRPM | DIASTOLIC BLOOD PRESSURE: 74 MMHG | BODY MASS INDEX: 31.83 KG/M2 | HEART RATE: 50 BPM | SYSTOLIC BLOOD PRESSURE: 119 MMHG

## 2022-05-30 NOTE — BRIEF OP NOTE
[facility] - Surgery  Brief Operative Note     SUMMARY     Surgery Date: 5/27/2022     Surgeon(s) and Role:     * Matthias Zamorano MD - Primary    Assisting Surgeon: None    Pre-op Diagnosis:  REYES (obstructive sleep apnea) [G47.33]  Intolerance of continuous positive airway pressure (CPAP) ventilation [Z78.9]  BMI 32.0-32.9,adult [Z68.32]    Post-op Diagnosis:  Post-Op Diagnosis Codes:     * REYES (obstructive sleep apnea) [G47.33]     * Intolerance of continuous positive airway pressure (CPAP) ventilation [Z78.9]     * BMI 32.0-32.9,adult [Z68.32]    Procedure(s) (LRB):  SLEEP ENDOSCOPY,DRUG-INDUCED (Bilateral)    Anesthesia: General    Description of the findings of the procedure: DISE    Findings/Key Components: DISE    Estimated Blood Loss: * No values recorded between 5/27/2022 10:22 AM and 5/27/2022 10:32 AM *         Specimens:   Specimen (24h ago, onward)            None          Discharge Note    SUMMARY     Admit Date: 5/27/2022    Discharge Date and Time:  5/27/2022 1120    Hospital Course (synopsis of major diagnoses, care, treatment, and services provided during the course of the hospital stay): Did well following surgery and was discharged uneventfully     Final Diagnosis: Post-Op Diagnosis Codes:     * REYES (obstructive sleep apnea) [G47.33]     * Intolerance of continuous positive airway pressure (CPAP) ventilation [Z78.9]     * BMI 32.0-32.9,adult [Z68.32]    Disposition: Home or Self Care    Follow Up/Patient Instructions: Regular diet. Activity as tolerated    Medications:  Reconciled Home Medications:   Discharge Medication List as of 5/27/2022 10:40 AM      CONTINUE these medications which have NOT CHANGED    Details   acetaminophen (TYLENOL) 500 MG tablet Starting Sat 5/4/2019, Historical Med      aspirin (ECOTRIN) 81 MG EC tablet once daily. , Starting Thu 4/4/2019, Historical Med      atorvastatin (LIPITOR) 20 MG tablet TAKE 1 TABLET EVERY DAY, Normal      coenzyme Q10 100 mg capsule Take 100 mg  by mouth once daily. , Starting Mon 11/4/2019, Historical Med      ELIQUIS 5 mg Tab TAKE 1 TABLET TWICE DAILY, Normal      glucosamine-chondroitin 500-400 mg tablet Take 1 tablet by mouth Daily. Triflex, Historical Med      isosorbide mononitrate (IMDUR) 30 MG 24 hr tablet TAKE 1 TABLET EVERY DAY, Normal      metoprolol succinate (TOPROL-XL) 25 MG 24 hr tablet TAKE 1 TABLET EVERY DAY, Normal      MULTIVITS-MINERALS/FA/LYCOPENE (MEN'S DAILY MULTIVIT-MINERAL ORAL) Take 1 tablet by mouth once daily. , Historical Med      nitroGLYCERIN (NITROSTAT) 0.4 MG SL tablet Place 1 tablet (0.4 mg total) under the tongue every 5 (five) minutes as needed., Starting Thu 10/8/2020, Until Mon 10/11/2021 at 2359, Normal      salsalate (DISALCID) 500 MG Tab TAKE 1 TO 2 TABLETS TWICE DAILY AS NEEDED FOR ARTHRITIS, Normal           No discharge procedures on file.

## 2022-05-30 NOTE — OP NOTE
5/27/2022    PREOPERATIVE DIAGNOSES:   1. Obstructive sleep apnea  2. Positive pressure intolerance and persistent sleep apnea after CPAP    POSTOPERATIVE DIAGNOSES:   1. Obstructive sleep apnea  2. Positive pressure intolerance and persistent sleep apnea after CPAP    PROCEDURES:   1. Drug-induced sleep endoscopy (flexible fiberoptic laryngoscopy with examination under anesthesia).     SURGEON: Matthias Zamorano MD    ASSISTANT: None    ANESTHESIA: IV sedation.     ESTIMATED BLOOD LOSS: None.     COMPLICATIONS: None.     BRIEF CLINICAL HISTORY: This is a 72 y.o. -year-old male with a history of   moderate (AHI 26) symptomatic obstructive sleep apnea, who is intolerant   and unable to achieve benefit with positive pressure therapy. he   presents today for drug-induced sleep endoscopy to better characterize the locations and pattern of obstruction and to predict appropriate medical and/or surgical options moving forward.     DESCRIPTION OF PROCEDURE:  The patient was seen in the pre-operative holding area. Informaed consent was signed. Oxymetazoline was sprayed into the nasal cavities, followed by 2% viscous Lidocaine on pledgets placed into the nasal cavities for anesthesia.     The patient was brought to the operating room and was anesthetized via the standard drug-induced sleep endoscopy  protocol. The propofol infusion rate was started at 100 mcg and gradually increased to a level of 125 mcg, at which point, conditions that mimic sleep were gradually observed.     With a BIS level into the 60s, obstructive sleep-disordered breathing and associated desaturations were clearly observed.    The patient was non-responsive to verbal commands, but still with spontaneous respiration. Sleep disordered breathing and associated desaturation events were clearly observed.     Under these conditions, the flexible endoscope was inserted to examine both sides of the nose as well as the pharynx and larynx.     The nose was  relatively unremarkable. The retropalatal space showed a  intermediate oriented palate. There was no lateral collapse of the pharyngeal wall at this level, and there was complete AP collapse.     The oropharynx revealed: partial lateral oropharyngeal wall collapse and majority AP collapse.     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 He also had an infantile, curled epiglottis.     The VOTE score at baseline was:  Velopharynx:2, AP  Oropharynx: 1, partial AP and lateral  Tongue Base: 2, AP  Epiglottis: 1, AP    In summary, there was no evidence of complete concentric palatal obstruction   and she/he does appear to be a candidate anatomically for hypoglossal nerve   stimulation therapy.     I performed the entire procedure.

## 2022-06-16 ENCOUNTER — TELEPHONE (OUTPATIENT)
Dept: CARDIOLOGY | Facility: CLINIC | Age: 72
End: 2022-06-16
Payer: MEDICARE

## 2022-06-16 NOTE — TELEPHONE ENCOUNTER
Pt is schedule to have a   INSERTION,NEUROSTIMULATOR,HYPOGLOSSAL Right     Per dr Zamorano . cardiac clearance has been requested. Ho many days prior pt needs to hold eliquis and ASA 81 mg? Please advise     Pt last OV 10/11/21

## 2022-07-05 ENCOUNTER — OFFICE VISIT (OUTPATIENT)
Dept: RHEUMATOLOGY | Facility: CLINIC | Age: 72
End: 2022-07-05
Payer: MEDICARE

## 2022-07-05 VITALS
HEIGHT: 68 IN | BODY MASS INDEX: 32.94 KG/M2 | DIASTOLIC BLOOD PRESSURE: 78 MMHG | SYSTOLIC BLOOD PRESSURE: 122 MMHG | TEMPERATURE: 98 F | WEIGHT: 217.38 LBS | HEART RATE: 60 BPM

## 2022-07-05 DIAGNOSIS — M19.041 PRIMARY OSTEOARTHRITIS OF BOTH HANDS: ICD-10-CM

## 2022-07-05 DIAGNOSIS — M19.042 PRIMARY OSTEOARTHRITIS OF BOTH HANDS: ICD-10-CM

## 2022-07-05 PROCEDURE — 99999 PR PBB SHADOW E&M-EST. PATIENT-LVL III: CPT | Mod: PBBFAC,,, | Performed by: INTERNAL MEDICINE

## 2022-07-05 PROCEDURE — 1160F RVW MEDS BY RX/DR IN RCRD: CPT | Mod: CPTII,S$GLB,, | Performed by: INTERNAL MEDICINE

## 2022-07-05 PROCEDURE — 3074F PR MOST RECENT SYSTOLIC BLOOD PRESSURE < 130 MM HG: ICD-10-PCS | Mod: CPTII,S$GLB,, | Performed by: INTERNAL MEDICINE

## 2022-07-05 PROCEDURE — 3078F PR MOST RECENT DIASTOLIC BLOOD PRESSURE < 80 MM HG: ICD-10-PCS | Mod: CPTII,S$GLB,, | Performed by: INTERNAL MEDICINE

## 2022-07-05 PROCEDURE — 1125F AMNT PAIN NOTED PAIN PRSNT: CPT | Mod: CPTII,S$GLB,, | Performed by: INTERNAL MEDICINE

## 2022-07-05 PROCEDURE — 1159F PR MEDICATION LIST DOCUMENTED IN MEDICAL RECORD: ICD-10-PCS | Mod: CPTII,S$GLB,, | Performed by: INTERNAL MEDICINE

## 2022-07-05 PROCEDURE — 3008F BODY MASS INDEX DOCD: CPT | Mod: CPTII,S$GLB,, | Performed by: INTERNAL MEDICINE

## 2022-07-05 PROCEDURE — 99213 PR OFFICE/OUTPT VISIT, EST, LEVL III, 20-29 MIN: ICD-10-PCS | Mod: S$GLB,,, | Performed by: INTERNAL MEDICINE

## 2022-07-05 PROCEDURE — 99213 OFFICE O/P EST LOW 20 MIN: CPT | Mod: S$GLB,,, | Performed by: INTERNAL MEDICINE

## 2022-07-05 PROCEDURE — 1160F PR REVIEW ALL MEDS BY PRESCRIBER/CLIN PHARMACIST DOCUMENTED: ICD-10-PCS | Mod: CPTII,S$GLB,, | Performed by: INTERNAL MEDICINE

## 2022-07-05 PROCEDURE — 99999 PR PBB SHADOW E&M-EST. PATIENT-LVL III: ICD-10-PCS | Mod: PBBFAC,,, | Performed by: INTERNAL MEDICINE

## 2022-07-05 PROCEDURE — 1125F PR PAIN SEVERITY QUANTIFIED, PAIN PRESENT: ICD-10-PCS | Mod: CPTII,S$GLB,, | Performed by: INTERNAL MEDICINE

## 2022-07-05 PROCEDURE — 3008F PR BODY MASS INDEX (BMI) DOCUMENTED: ICD-10-PCS | Mod: CPTII,S$GLB,, | Performed by: INTERNAL MEDICINE

## 2022-07-05 PROCEDURE — 1159F MED LIST DOCD IN RCRD: CPT | Mod: CPTII,S$GLB,, | Performed by: INTERNAL MEDICINE

## 2022-07-05 PROCEDURE — 3074F SYST BP LT 130 MM HG: CPT | Mod: CPTII,S$GLB,, | Performed by: INTERNAL MEDICINE

## 2022-07-05 PROCEDURE — 3078F DIAST BP <80 MM HG: CPT | Mod: CPTII,S$GLB,, | Performed by: INTERNAL MEDICINE

## 2022-07-05 RX ORDER — SALSALATE 500 MG/1
500-1000 TABLET, FILM COATED ORAL 2 TIMES DAILY PRN
Qty: 120 TABLET | Refills: 0 | Status: SHIPPED | OUTPATIENT
Start: 2022-07-05 | End: 2022-07-07 | Stop reason: SDUPTHER

## 2022-07-05 ASSESSMENT — ROUTINE ASSESSMENT OF PATIENT INDEX DATA (RAPID3)
PAIN SCORE: 1
WHEN YOU AWAKENED IN THE MORNING OVER THE LAST WEEK, PLEASE INDICATE THE AMOUNT OF TIME IT TAKES UNTIL YOU ARE AS LIMBER AS YOU WILL BE FOR THE DAY: 1 HOUR
MDHAQ FUNCTION SCORE: 0.1
PSYCHOLOGICAL DISTRESS SCORE: 0
AM STIFFNESS SCORE: 1, YES
TOTAL RAPID3 SCORE: 0.78
PATIENT GLOBAL ASSESSMENT SCORE: 1
FATIGUE SCORE: 0

## 2022-07-05 NOTE — PROGRESS NOTES
"Subjective:       Patient ID: Farhan Stallworth is a 72 y.o. male.    Chief Complaint: Disease Management    HPI     Semi-retired;   Referred by Chung Carpio primary OA of hands    Has had back issues with injections  Hx artificial hip  Hx artificial knee    Hx Celebrex but not since developed heart disease requiring blood thinners (Eliquis, ASA)  Takes tylenol for pain  Saw me 2021 and started salsalate 500 bid    Has not had bleeding on Eliquis; bruises a lot     Plays golf  Exercises 2 hours / day    Just returned from family get together where COVID occured    Has improved diet significantly and feeling a lot    Had distal biceps rupture repaired May; had ruptured it playing golf    Review of Systems   Constitutional: Negative for fever and unexpected weight change.   HENT: Negative for mouth sores and trouble swallowing.    Eyes: Negative for redness.   Respiratory: Negative for cough and shortness of breath.    Cardiovascular: Negative for chest pain.   Gastrointestinal: Negative for constipation and diarrhea.   Skin: Negative for rash.   Neurological: Negative for headaches.   Hematological: Bruises/bleeds easily.       Rapid3 Question Responses and Scores 7/5/2022   MDHAQ Score 0.1   Psychologic Score 0   Pain Score 1   When you awakened in the morning OVER THE LAST WEEK, did you feel stiff? Yes   If Yes, please indicate the number of hours until you are as limber as you will be for the day 1   Fatigue Score 0   Global Health Score 1   RAPID3 Score 0.78        Objective:   /78   Pulse 60   Temp 97.7 °F (36.5 °C)   Ht 5' 8" (1.727 m)   Wt 98.6 kg (217 lb 6 oz)   BMI 33.05 kg/m²      Physical Exam      OA changes hands as before  R elbow surgical site well healed    Assessment:       1. Primary osteoarthritis of both hands            Plan:       Problem List Items Addressed This Visit        Active Problems    Primary osteoarthritis of both hands     Has improved with " TELE RN NOTE 

 ALL NEEDS ATTENDED, NOT IN ACUTE DISTRESS anti-inflammatory diet    Has tolerated salsalate without adverse effects but will try stopping it since he feels better in general    Discussed risk/benefit of Celebrex which should not inhibit platelets like traditional NSAID but which is associated with increase risk of cardiovascular events so I recommend against it.    RTC prn or 1 year           Relevant Medications    salsalate (DISALCID) 500 MG Tab

## 2022-07-05 NOTE — PROGRESS NOTES
Rapid3 Question Responses and Scores 7/5/2022   MDHAQ Score 0.1   Psychologic Score 0   Pain Score 1   When you awakened in the morning OVER THE LAST WEEK, did you feel stiff? Yes   If Yes, please indicate the number of hours until you are as limber as you will be for the day 1   Fatigue Score 0   Global Health Score 1   RAPID3 Score 0.78       Answers for HPI/ROS submitted by the patient on 7/5/2022  fever: No  eye redness: No  mouth sores: No  headaches: No  shortness of breath: No  chest pain: No  trouble swallowing: No  diarrhea: No  constipation: No  unexpected weight change: No  During the last 3 days, have you had a skin rash?: No  Bruises or bleeds easily: Yes  cough: No

## 2022-07-05 NOTE — ASSESSMENT & PLAN NOTE
Has improved with anti-inflammatory diet    Has tolerated salsalate without adverse effects but will try stopping it since he feels better in general    Discussed risk/benefit of Celebrex which should not inhibit platelets like traditional NSAID but which is associated with increase risk of cardiovascular events so I recommend against it.    RTC prn or 1 year

## 2022-07-07 DIAGNOSIS — M19.041 PRIMARY OSTEOARTHRITIS OF BOTH HANDS: ICD-10-CM

## 2022-07-07 DIAGNOSIS — M19.042 PRIMARY OSTEOARTHRITIS OF BOTH HANDS: ICD-10-CM

## 2022-07-08 RX ORDER — SALSALATE 500 MG/1
500-1000 TABLET, FILM COATED ORAL 2 TIMES DAILY PRN
Qty: 120 TABLET | Refills: 0 | Status: SHIPPED | OUTPATIENT
Start: 2022-07-08 | End: 2022-12-12

## 2022-07-18 ENCOUNTER — OFFICE VISIT (OUTPATIENT)
Dept: CARDIOLOGY | Facility: CLINIC | Age: 72
End: 2022-07-18
Payer: MEDICARE

## 2022-07-18 ENCOUNTER — LAB VISIT (OUTPATIENT)
Dept: LAB | Facility: HOSPITAL | Age: 72
End: 2022-07-18
Attending: INTERNAL MEDICINE
Payer: MEDICARE

## 2022-07-18 VITALS
BODY MASS INDEX: 32.44 KG/M2 | WEIGHT: 214.06 LBS | SYSTOLIC BLOOD PRESSURE: 124 MMHG | HEART RATE: 56 BPM | HEIGHT: 68 IN | DIASTOLIC BLOOD PRESSURE: 74 MMHG

## 2022-07-18 DIAGNOSIS — E78.00 HYPERCHOLESTEREMIA: Chronic | ICD-10-CM

## 2022-07-18 DIAGNOSIS — I65.22 CAROTID ARTERY PLAQUE, LEFT: Chronic | ICD-10-CM

## 2022-07-18 DIAGNOSIS — I10 ESSENTIAL HYPERTENSION: ICD-10-CM

## 2022-07-18 DIAGNOSIS — E66.9 OBESITY, CLASS I, BMI 30-34.9: Chronic | ICD-10-CM

## 2022-07-18 DIAGNOSIS — I10 ESSENTIAL HYPERTENSION: Chronic | ICD-10-CM

## 2022-07-18 DIAGNOSIS — I34.0 NON-RHEUMATIC MITRAL REGURGITATION: Chronic | ICD-10-CM

## 2022-07-18 DIAGNOSIS — Z79.01 LONG TERM (CURRENT) USE OF ANTICOAGULANTS: ICD-10-CM

## 2022-07-18 DIAGNOSIS — Z79.01 LONG TERM (CURRENT) USE OF ANTICOAGULANTS: Chronic | ICD-10-CM

## 2022-07-18 DIAGNOSIS — E78.00 HYPERCHOLESTEREMIA: ICD-10-CM

## 2022-07-18 DIAGNOSIS — I48.0 PAF (PAROXYSMAL ATRIAL FIBRILLATION): Chronic | ICD-10-CM

## 2022-07-18 DIAGNOSIS — I25.10 CORONARY ARTERY DISEASE INVOLVING NATIVE CORONARY ARTERY OF NATIVE HEART WITHOUT ANGINA PECTORIS: ICD-10-CM

## 2022-07-18 DIAGNOSIS — G47.33 OBSTRUCTIVE SLEEP APNEA SYNDROME: Chronic | ICD-10-CM

## 2022-07-18 DIAGNOSIS — I25.10 CORONARY ARTERY DISEASE INVOLVING NATIVE CORONARY ARTERY OF NATIVE HEART WITHOUT ANGINA PECTORIS: Primary | Chronic | ICD-10-CM

## 2022-07-18 LAB
ALBUMIN SERPL BCP-MCNC: 4.1 G/DL (ref 3.5–5.2)
ALP SERPL-CCNC: 82 U/L (ref 55–135)
ALT SERPL W/O P-5'-P-CCNC: 28 U/L (ref 10–44)
ANION GAP SERPL CALC-SCNC: 7 MMOL/L (ref 8–16)
AST SERPL-CCNC: 31 U/L (ref 10–40)
BILIRUB SERPL-MCNC: 0.8 MG/DL (ref 0.1–1)
BUN SERPL-MCNC: 19 MG/DL (ref 8–23)
CALCIUM SERPL-MCNC: 9.6 MG/DL (ref 8.7–10.5)
CHLORIDE SERPL-SCNC: 104 MMOL/L (ref 95–110)
CHOLEST SERPL-MCNC: 104 MG/DL (ref 120–199)
CHOLEST/HDLC SERPL: 2.5 {RATIO} (ref 2–5)
CO2 SERPL-SCNC: 27 MMOL/L (ref 23–29)
CREAT SERPL-MCNC: 1.1 MG/DL (ref 0.5–1.4)
EST. GFR  (AFRICAN AMERICAN): >60 ML/MIN/1.73 M^2
EST. GFR  (NON AFRICAN AMERICAN): >60 ML/MIN/1.73 M^2
GLUCOSE SERPL-MCNC: 123 MG/DL (ref 70–110)
HDLC SERPL-MCNC: 41 MG/DL (ref 40–75)
HDLC SERPL: 39.4 % (ref 20–50)
HGB BLD-MCNC: 14.9 G/DL (ref 14–18)
LDLC SERPL CALC-MCNC: 51.6 MG/DL (ref 63–159)
NONHDLC SERPL-MCNC: 63 MG/DL
POTASSIUM SERPL-SCNC: 4.7 MMOL/L (ref 3.5–5.1)
PROT SERPL-MCNC: 7.4 G/DL (ref 6–8.4)
SODIUM SERPL-SCNC: 138 MMOL/L (ref 136–145)
TRIGL SERPL-MCNC: 57 MG/DL (ref 30–150)

## 2022-07-18 PROCEDURE — 1126F AMNT PAIN NOTED NONE PRSNT: CPT | Mod: CPTII,S$GLB,, | Performed by: INTERNAL MEDICINE

## 2022-07-18 PROCEDURE — 80061 LIPID PANEL: CPT | Performed by: INTERNAL MEDICINE

## 2022-07-18 PROCEDURE — 3008F BODY MASS INDEX DOCD: CPT | Mod: CPTII,S$GLB,, | Performed by: INTERNAL MEDICINE

## 2022-07-18 PROCEDURE — 1159F MED LIST DOCD IN RCRD: CPT | Mod: CPTII,S$GLB,, | Performed by: INTERNAL MEDICINE

## 2022-07-18 PROCEDURE — 99999 PR PBB SHADOW E&M-EST. PATIENT-LVL III: ICD-10-PCS | Mod: PBBFAC,,, | Performed by: INTERNAL MEDICINE

## 2022-07-18 PROCEDURE — 36415 COLL VENOUS BLD VENIPUNCTURE: CPT | Mod: PO | Performed by: INTERNAL MEDICINE

## 2022-07-18 PROCEDURE — 3078F PR MOST RECENT DIASTOLIC BLOOD PRESSURE < 80 MM HG: ICD-10-PCS | Mod: CPTII,S$GLB,, | Performed by: INTERNAL MEDICINE

## 2022-07-18 PROCEDURE — 3288F PR FALLS RISK ASSESSMENT DOCUMENTED: ICD-10-PCS | Mod: CPTII,S$GLB,, | Performed by: INTERNAL MEDICINE

## 2022-07-18 PROCEDURE — 1101F PR PT FALLS ASSESS DOC 0-1 FALLS W/OUT INJ PAST YR: ICD-10-PCS | Mod: CPTII,S$GLB,, | Performed by: INTERNAL MEDICINE

## 2022-07-18 PROCEDURE — 1101F PT FALLS ASSESS-DOCD LE1/YR: CPT | Mod: CPTII,S$GLB,, | Performed by: INTERNAL MEDICINE

## 2022-07-18 PROCEDURE — 85018 HEMOGLOBIN: CPT | Performed by: INTERNAL MEDICINE

## 2022-07-18 PROCEDURE — 1159F PR MEDICATION LIST DOCUMENTED IN MEDICAL RECORD: ICD-10-PCS | Mod: CPTII,S$GLB,, | Performed by: INTERNAL MEDICINE

## 2022-07-18 PROCEDURE — 3008F PR BODY MASS INDEX (BMI) DOCUMENTED: ICD-10-PCS | Mod: CPTII,S$GLB,, | Performed by: INTERNAL MEDICINE

## 2022-07-18 PROCEDURE — 3074F SYST BP LT 130 MM HG: CPT | Mod: CPTII,S$GLB,, | Performed by: INTERNAL MEDICINE

## 2022-07-18 PROCEDURE — 99999 PR PBB SHADOW E&M-EST. PATIENT-LVL III: CPT | Mod: PBBFAC,,, | Performed by: INTERNAL MEDICINE

## 2022-07-18 PROCEDURE — 1126F PR PAIN SEVERITY QUANTIFIED, NO PAIN PRESENT: ICD-10-PCS | Mod: CPTII,S$GLB,, | Performed by: INTERNAL MEDICINE

## 2022-07-18 PROCEDURE — 99214 OFFICE O/P EST MOD 30 MIN: CPT | Mod: S$GLB,,, | Performed by: INTERNAL MEDICINE

## 2022-07-18 PROCEDURE — 80053 COMPREHEN METABOLIC PANEL: CPT | Performed by: INTERNAL MEDICINE

## 2022-07-18 PROCEDURE — 3288F FALL RISK ASSESSMENT DOCD: CPT | Mod: CPTII,S$GLB,, | Performed by: INTERNAL MEDICINE

## 2022-07-18 PROCEDURE — 3074F PR MOST RECENT SYSTOLIC BLOOD PRESSURE < 130 MM HG: ICD-10-PCS | Mod: CPTII,S$GLB,, | Performed by: INTERNAL MEDICINE

## 2022-07-18 PROCEDURE — 99214 PR OFFICE/OUTPT VISIT, EST, LEVL IV, 30-39 MIN: ICD-10-PCS | Mod: S$GLB,,, | Performed by: INTERNAL MEDICINE

## 2022-07-18 PROCEDURE — 3078F DIAST BP <80 MM HG: CPT | Mod: CPTII,S$GLB,, | Performed by: INTERNAL MEDICINE

## 2022-07-18 NOTE — PROGRESS NOTES
Subjective:    Patient ID:  Farhan Stallworth is a 72 y.o. male who presents for Coronary Artery Disease, Atrial Fibrillation, and Valvular Heart Disease        HPI    NO LABS, PT FORGOT, DOING WELL, TO HAVE INSPIRE FOR SLEEP APNEA, NO CP/SOB/PALP, , SEE ROS  Past Medical History:   Diagnosis Date    Arthritis     hip    Atrial fibrillation 12/2018    Coronary artery disease     cardiac stents    Hearing difficulty     Hypertension     Low serum testosterone level     Lumbar disc disease     REYES (obstructive sleep apnea)     has CPAP, better relief of symptoms with nasal pillow.     REYES (obstructive sleep apnea)      Past Surgical History:   Procedure Laterality Date    CARDIOVERSION N/A 1/3/2019    Procedure: CARDIOVERSION;  Surgeon: Nanci Ahuja MD;  Location: [facility];  Service: Cardiology;  Laterality: N/A;    CARPAL TUNNEL RELEASE      2005, bilateral    CLAVICLE SURGERY      COLONOSCOPY N/A 2/16/2022    Procedure: COLONOSCOPY;  Surgeon: Gualberto Somers MD;  Location: [facility] ENDO;  Service: Endoscopy;  Laterality: N/A;    CORONARY ANGIOGRAPHY N/A 4/9/2019    Procedure: ANGIOGRAM, CORONARY ARTERY;  Surgeon: Nanci Ahuja MD;  Location: [facility] CATH;  Service: Cardiology;  Laterality: N/A;    EPIDURAL BLOCK INJECTION  2010    FRACTURE SURGERY  1966    HIP SURGERY      INTERNAL NEUROLYSIS USING OPERATING MICROSCOPE Left 12/6/2019    Procedure: Cooled radiofrequency ablation of the genicular nerve branches to the left knee;  Surgeon: Jayy Driscoll MD;  Location: [facility] OR;  Service: Orthopedics;  Laterality: Left;    JOINT REPLACEMENT Right 11/2017    hip    JOINT REPLACEMENT Left 02/2020    knee    KNEE ARTHROSCOPY W/ MENISCAL REPAIR      left    LEFT HEART CATHETERIZATION Left 4/9/2019    Procedure: Left heart cath;  Surgeon: Nanci Ahuja MD;  Location: [facility] CATH;  Service: Cardiology;  Laterality: Left;    LEFT HEART CATHETERIZATION Left 2/2/2021    Procedure: Left heart cath;   Surgeon: Nanci Ahuja MD;  Location: [facility];  Service: Cardiology;  Laterality: Left;    LUMBAR FUSION      2010    SLEEP ENDOSCOPY, DRUG-INDUCED Bilateral 5/27/2022    Procedure: SLEEP ENDOSCOPY,DRUG-INDUCED;  Surgeon: Matthias Zamorano MD;  Location: [facility] OR;  Service: ENT;  Laterality: Bilateral;    SPINE SURGERY  5/2010    TONSILLECTOMY       Family History   Problem Relation Age of Onset    Cancer Father         lung cancer    Cancer Sister         leukemia    Heart disease Mother     Arthritis Mother         She's 92    Diabetes Neg Hx      Social History     Socioeconomic History    Marital status:    Tobacco Use    Smoking status: Never Smoker    Smokeless tobacco: Never Used   Substance and Sexual Activity    Alcohol use: Not Currently     Alcohol/week: 3.0 - 5.0 standard drinks     Types: 1 - 2 Glasses of wine, 1 - 2 Cans of beer, 1 Shots of liquor per week     Comment: occasionally    Drug use: No    Sexual activity: Yes     Partners: Female     Birth control/protection: None     Comment: Have  Erectile Dysfunction     Social Determinants of Health     Financial Resource Strain: Low Risk     Difficulty of Paying Living Expenses: Not hard at all   Food Insecurity: No Food Insecurity    Worried About Running Out of Food in the Last Year: Never true    Ran Out of Food in the Last Year: Never true   Transportation Needs: No Transportation Needs    Lack of Transportation (Medical): No    Lack of Transportation (Non-Medical): No   Physical Activity: Sufficiently Active    Days of Exercise per Week: 6 days    Minutes of Exercise per Session: 90 min   Stress: No Stress Concern Present    Feeling of Stress : Only a little   Social Connections: Unknown    Frequency of Communication with Friends and Family: More than three times a week    Frequency of Social Gatherings with Friends and Family: Once a week    Active Member of Clubs or Organizations: Yes    Attends Club or  Organization Meetings: More than 4 times per year    Marital Status:    Housing Stability: Low Risk     Unable to Pay for Housing in the Last Year: No    Number of Places Lived in the Last Year: 1    Unstable Housing in the Last Year: No       Review of patient's allergies indicates:  No Known Allergies    Current Outpatient Medications:     acetaminophen (TYLENOL) 500 MG tablet, , Disp: , Rfl:     atorvastatin (LIPITOR) 20 MG tablet, TAKE 1 TABLET EVERY DAY, Disp: 90 tablet, Rfl: 1    coenzyme Q10 100 mg capsule, Take 100 mg by mouth once daily. , Disp: , Rfl:     ELIQUIS 5 mg Tab, TAKE 1 TABLET TWICE DAILY, Disp: 180 tablet, Rfl: 0    glucosamine-chondroitin 500-400 mg tablet, Take 1 tablet by mouth Daily. Triflex, Disp: , Rfl:     isosorbide mononitrate (IMDUR) 30 MG 24 hr tablet, TAKE 1 TABLET EVERY DAY, Disp: 90 tablet, Rfl: 1    metoprolol succinate (TOPROL-XL) 25 MG 24 hr tablet, TAKE 1 TABLET EVERY DAY, Disp: 90 tablet, Rfl: 1    MULTIVITS-MINERALS/FA/LYCOPENE (MEN'S DAILY MULTIVIT-MINERAL ORAL), Take 1 tablet by mouth once daily. , Disp: , Rfl:     salsalate (DISALCID) 500 MG Tab, Take 1-2 tablets (500-1,000 mg total) by mouth 2 (two) times daily as needed (arthritis)., Disp: 120 tablet, Rfl: 0    nitroGLYCERIN (NITROSTAT) 0.4 MG SL tablet, Place 1 tablet (0.4 mg total) under the tongue every 5 (five) minutes as needed. (Patient not taking: No sig reported), Disp: 25 tablet, Rfl: 0    Review of Systems   Constitutional: Negative for chills, diaphoresis, fever, malaise/fatigue, night sweats and weight loss (DIET).   HENT: Negative for congestion and sore throat.    Eyes: Negative for blurred vision and visual disturbance.   Cardiovascular: Negative for chest pain, claudication, cyanosis, dyspnea on exertion, irregular heartbeat, leg swelling, near-syncope, orthopnea, palpitations, paroxysmal nocturnal dyspnea and syncope.   Respiratory: Negative for cough, hemoptysis, shortness of breath  "and wheezing.    Hematologic/Lymphatic: Negative for adenopathy. Does not bruise/bleed easily.   Skin: Negative for color change and rash.   Musculoskeletal: Negative for back pain (CHRONIC, MILD) and falls.   Gastrointestinal: Negative for abdominal pain, change in bowel habit, dysphagia, jaundice, melena and nausea.   Genitourinary: Negative for dysuria and flank pain.   Neurological: Negative for brief paralysis, focal weakness, headaches, light-headedness, loss of balance and weakness. Paresthesias: FEET.   Psychiatric/Behavioral: Negative for altered mental status, depression and memory loss. The patient does not have insomnia.    Allergic/Immunologic: Negative.         Objective:      Vitals:    07/18/22 0810   BP: 124/74   Pulse: (!) 56   Weight: 97.1 kg (214 lb 1.1 oz)   Height: 5' 8" (1.727 m)   PainSc: 0-No pain     Body mass index is 32.55 kg/m².    Physical Exam  Constitutional:       Appearance: He is well-developed. He is obese.   HENT:      Head: Normocephalic and atraumatic.   Eyes:      General: No scleral icterus.     Extraocular Movements: Extraocular movements intact.      Pupils: Pupils are equal, round, and reactive to light.   Neck:      Vascular: Normal carotid pulses. No JVD.   Cardiovascular:      Rate and Rhythm: Normal rate and regular rhythm.  No extrasystoles are present.     Pulses:           Carotid pulses are 2+ on the right side and 2+ on the left side.       Radial pulses are 2+ on the right side and 2+ on the left side.        Femoral pulses are 2+ on the right side and 2+ on the left side.       Posterior tibial pulses are 2+ on the right side and 2+ on the left side.      Heart sounds: Murmur heard.    Systolic murmur is present with a grade of 1/6.    No gallop.   Pulmonary:      Effort: Pulmonary effort is normal. No respiratory distress.      Breath sounds: Normal breath sounds. No rales.   Abdominal:      Palpations: Abdomen is soft. There is no hepatomegaly.      " Tenderness: There is no abdominal tenderness.   Musculoskeletal:         General: No deformity. Normal range of motion.      Cervical back: Neck supple.      Right lower leg: No edema.      Left lower leg: No edema.   Skin:     General: Skin is warm and dry.      Capillary Refill: Capillary refill takes less than 2 seconds.   Neurological:      General: No focal deficit present.      Mental Status: He is alert and oriented to person, place, and time.   Psychiatric:         Mood and Affect: Mood normal.         Speech: Speech normal.         Behavior: Behavior normal.                 ..    Chemistry        Component Value Date/Time     07/18/2022 0850    K 4.7 07/18/2022 0850     07/18/2022 0850    CO2 27 07/18/2022 0850    BUN 19 07/18/2022 0850    CREATININE 1.1 07/18/2022 0850     (H) 07/18/2022 0850        Component Value Date/Time    CALCIUM 9.6 07/18/2022 0850    ALKPHOS 82 07/18/2022 0850    AST 31 07/18/2022 0850    ALT 28 07/18/2022 0850    BILITOT 0.8 07/18/2022 0850    ESTGFRAFRICA >60.0 07/18/2022 0850    EGFRNONAA >60.0 07/18/2022 0850            ..  Lab Results   Component Value Date    CHOL 104 (L) 07/18/2022    CHOL 98 (L) 04/03/2021    CHOL 87 (L) 02/08/2020     Lab Results   Component Value Date    HDL 41 07/18/2022    HDL 36 (L) 04/03/2021    HDL 28 (L) 02/08/2020     Lab Results   Component Value Date    LDLCALC 51.6 (L) 07/18/2022    LDLCALC 46.4 (L) 04/03/2021    LDLCALC 46.4 (L) 02/08/2020     Lab Results   Component Value Date    TRIG 57 07/18/2022    TRIG 78 04/03/2021    TRIG 63 02/08/2020     Lab Results   Component Value Date    CHOLHDL 39.4 07/18/2022    CHOLHDL 36.7 04/03/2021    CHOLHDL 32.2 02/08/2020     ..  Lab Results   Component Value Date    WBC 4.54 02/02/2021    HGB 14.9 07/18/2022    HCT 41.5 02/02/2021    MCV 90 02/02/2021     02/02/2021       Test(s) Reviewed  I have reviewed the following in detail:  [] Stress test   [] Angiography   []  Echocardiogram   [] Labs   [] Other:       Assessment:         ICD-10-CM ICD-9-CM   1. Coronary artery disease involving native coronary artery of native heart without angina pectoris  I25.10 414.01   2. PAF (paroxysmal atrial fibrillation)  I48.0 427.31   3. Carotid artery plaque, left  I65.22 433.10   4. Non-rheumatic mitral regurgitation  I34.0 424.0   5. Essential hypertension  I10 401.9   6. Hypercholesteremia  E78.00 272.0   7. Obesity, Class I, BMI 30-34.9  E66.9 278.00   8. Long term (current) use of anticoagulants  Z79.01 V58.61   9. Obstructive sleep apnea syndrome  G47.33 327.23     Problem List Items Addressed This Visit        Cardiac/Vascular    PAF (paroxysmal atrial fibrillation)    Non-rheumatic mitral regurgitation    Essential hypertension    Relevant Orders    Comprehensive Metabolic Panel (Completed)    Hypercholesteremia    Relevant Orders    Comprehensive Metabolic Panel (Completed)    Lipid Panel (Completed)    Coronary artery disease involving native coronary artery of native heart without angina pectoris - Primary    Relevant Orders    Comprehensive Metabolic Panel (Completed)    Lipid Panel (Completed)    Carotid artery plaque, left       Hematology    Long term (current) use of anticoagulants    Relevant Orders    Hemoglobin (Completed)       Endocrine    Obesity, Class I, BMI 30-34.9       Other    Obstructive sleep apnea syndrome    Overview     CPAP tolerated with nasal pillow. Real Time Tomography for equipment.                  Plan:     OK TO HOLD ELIQUIS 2 DAYS BEFORE INSPIRE, ALL CV CLINICALLY STABLE, NO ANGINA, NO HF, NO TIA, NO CLINICAL ARRHYTHMIA,CONTINUE CURRENT MEDS, EDUCATION, DIET, EXERCISE, WEIGHT LOSS, RTC IN 6-7 MO, LABS TODAY      Coronary artery disease involving native coronary artery of native heart without angina pectoris  -     Comprehensive Metabolic Panel; Future; Expected date: 07/18/2022  -     Lipid Panel; Future; Expected date: 07/18/2022    PAF (paroxysmal  atrial fibrillation)  Comments:  stable    Carotid artery plaque, left  Comments:  No TIA    Non-rheumatic mitral regurgitation    Essential hypertension  Comments:  controlled  Orders:  -     Comprehensive Metabolic Panel; Future; Expected date: 07/18/2022    Hypercholesteremia  -     Comprehensive Metabolic Panel; Future; Expected date: 07/18/2022  -     Lipid Panel; Future; Expected date: 07/18/2022    Obesity, Class I, BMI 30-34.9    Long term (current) use of anticoagulants  -     Hemoglobin; Future; Expected date: 07/18/2022    Obstructive sleep apnea syndrome    RTC Low level/low impact aerobic exercise 5x's/wk. Heart healthy diet and risk factor modification.    See labs and med orders.    Aerobic exercise 5x's/wk. Heart healthy diet and risk factor modification.    See labs and med orders.

## 2022-07-19 ENCOUNTER — PATIENT MESSAGE (OUTPATIENT)
Dept: CARDIOLOGY | Facility: CLINIC | Age: 72
End: 2022-07-19
Payer: MEDICARE

## 2022-07-19 ENCOUNTER — LAB VISIT (OUTPATIENT)
Dept: FAMILY MEDICINE | Facility: CLINIC | Age: 72
End: 2022-07-19
Payer: MEDICARE

## 2022-07-19 DIAGNOSIS — Z11.52 ENCOUNTER FOR SCREENING FOR SEVERE ACUTE RESPIRATORY SYNDROME CORONAVIRUS 2 (SARS-COV-2) INFECTION: Primary | ICD-10-CM

## 2022-07-19 PROCEDURE — U0003 INFECTIOUS AGENT DETECTION BY NUCLEIC ACID (DNA OR RNA); SEVERE ACUTE RESPIRATORY SYNDROME CORONAVIRUS 2 (SARS-COV-2) (CORONAVIRUS DISEASE [COVID-19]), AMPLIFIED PROBE TECHNIQUE, MAKING USE OF HIGH THROUGHPUT TECHNOLOGIES AS DESCRIBED BY CMS-2020-01-R: HCPCS | Performed by: OTOLARYNGOLOGY

## 2022-07-19 PROCEDURE — U0005 INFEC AGEN DETEC AMPLI PROBE: HCPCS | Performed by: OTOLARYNGOLOGY

## 2022-07-20 LAB
SARS-COV-2 RNA RESP QL NAA+PROBE: NOT DETECTED
SARS-COV-2- CYCLE NUMBER: NORMAL

## 2022-07-28 ENCOUNTER — PATIENT MESSAGE (OUTPATIENT)
Dept: OTOLARYNGOLOGY | Facility: CLINIC | Age: 72
End: 2022-07-28

## 2022-07-28 ENCOUNTER — OFFICE VISIT (OUTPATIENT)
Dept: OTOLARYNGOLOGY | Facility: CLINIC | Age: 72
End: 2022-07-28
Payer: MEDICARE

## 2022-07-28 VITALS — WEIGHT: 213.88 LBS | TEMPERATURE: 99 F | BODY MASS INDEX: 32.41 KG/M2 | HEIGHT: 68 IN

## 2022-07-28 DIAGNOSIS — Z48.89 ENCOUNTER FOR POST SURGICAL WOUND CHECK: Primary | ICD-10-CM

## 2022-07-28 PROCEDURE — 1159F MED LIST DOCD IN RCRD: CPT | Mod: CPTII,S$GLB,, | Performed by: NURSE PRACTITIONER

## 2022-07-28 PROCEDURE — 1126F PR PAIN SEVERITY QUANTIFIED, NO PAIN PRESENT: ICD-10-PCS | Mod: CPTII,S$GLB,, | Performed by: NURSE PRACTITIONER

## 2022-07-28 PROCEDURE — 1126F AMNT PAIN NOTED NONE PRSNT: CPT | Mod: CPTII,S$GLB,, | Performed by: NURSE PRACTITIONER

## 2022-07-28 PROCEDURE — 3008F PR BODY MASS INDEX (BMI) DOCUMENTED: ICD-10-PCS | Mod: CPTII,S$GLB,, | Performed by: NURSE PRACTITIONER

## 2022-07-28 PROCEDURE — 99024 POSTOP FOLLOW-UP VISIT: CPT | Mod: S$GLB,,, | Performed by: NURSE PRACTITIONER

## 2022-07-28 PROCEDURE — 3008F BODY MASS INDEX DOCD: CPT | Mod: CPTII,S$GLB,, | Performed by: NURSE PRACTITIONER

## 2022-07-28 PROCEDURE — 99024 PR POST-OP FOLLOW-UP VISIT: ICD-10-PCS | Mod: S$GLB,,, | Performed by: NURSE PRACTITIONER

## 2022-07-28 PROCEDURE — 99999 PR PBB SHADOW E&M-EST. PATIENT-LVL III: CPT | Mod: PBBFAC,,, | Performed by: NURSE PRACTITIONER

## 2022-07-28 PROCEDURE — 99999 PR PBB SHADOW E&M-EST. PATIENT-LVL III: ICD-10-PCS | Mod: PBBFAC,,, | Performed by: NURSE PRACTITIONER

## 2022-07-28 PROCEDURE — 1159F PR MEDICATION LIST DOCUMENTED IN MEDICAL RECORD: ICD-10-PCS | Mod: CPTII,S$GLB,, | Performed by: NURSE PRACTITIONER

## 2022-07-28 NOTE — PROGRESS NOTES
Patient is one week s/p Inspire insertion (hypoglossal nerve stimulator electrode and generator and breathing electrode sensor).   Patient is doing well. Some sore throat. Denies neck pain. Eating/drinking fine.     PE: Edema to right neck/submental area with some firmness. Normal neuromuscular exam of head and neck. Voice strong and clear. Full range of mobility with no glossal deviation.     Plan: May get area wet, gently pat dry. Patient would like to resume cardio exercise (walking, swimming, elliptical). Would do so minimally with very slow progression.   Device activation in 3 weeks. Call for any issues or concerns.

## 2022-08-17 ENCOUNTER — OFFICE VISIT (OUTPATIENT)
Dept: OTOLARYNGOLOGY | Facility: CLINIC | Age: 72
End: 2022-08-17
Payer: MEDICARE

## 2022-08-17 VITALS — WEIGHT: 213.88 LBS | HEIGHT: 68 IN | BODY MASS INDEX: 32.41 KG/M2

## 2022-08-17 DIAGNOSIS — Z48.89 ENCOUNTER FOR POST SURGICAL WOUND CHECK: Primary | ICD-10-CM

## 2022-08-17 DIAGNOSIS — G47.33 OSA (OBSTRUCTIVE SLEEP APNEA): ICD-10-CM

## 2022-08-17 PROCEDURE — 1159F MED LIST DOCD IN RCRD: CPT | Mod: CPTII,S$GLB,, | Performed by: OTOLARYNGOLOGY

## 2022-08-17 PROCEDURE — 1159F PR MEDICATION LIST DOCUMENTED IN MEDICAL RECORD: ICD-10-PCS | Mod: CPTII,S$GLB,, | Performed by: OTOLARYNGOLOGY

## 2022-08-17 PROCEDURE — 99999 PR PBB SHADOW E&M-EST. PATIENT-LVL III: ICD-10-PCS | Mod: PBBFAC,,, | Performed by: OTOLARYNGOLOGY

## 2022-08-17 PROCEDURE — 3008F PR BODY MASS INDEX (BMI) DOCUMENTED: ICD-10-PCS | Mod: CPTII,S$GLB,, | Performed by: OTOLARYNGOLOGY

## 2022-08-17 PROCEDURE — 3008F BODY MASS INDEX DOCD: CPT | Mod: CPTII,S$GLB,, | Performed by: OTOLARYNGOLOGY

## 2022-08-17 PROCEDURE — 99024 POSTOP FOLLOW-UP VISIT: CPT | Mod: S$GLB,,, | Performed by: OTOLARYNGOLOGY

## 2022-08-17 PROCEDURE — 1160F RVW MEDS BY RX/DR IN RCRD: CPT | Mod: CPTII,S$GLB,, | Performed by: OTOLARYNGOLOGY

## 2022-08-17 PROCEDURE — 99024 PR POST-OP FOLLOW-UP VISIT: ICD-10-PCS | Mod: S$GLB,,, | Performed by: OTOLARYNGOLOGY

## 2022-08-17 PROCEDURE — 1160F PR REVIEW ALL MEDS BY PRESCRIBER/CLIN PHARMACIST DOCUMENTED: ICD-10-PCS | Mod: CPTII,S$GLB,, | Performed by: OTOLARYNGOLOGY

## 2022-08-17 PROCEDURE — 99999 PR PBB SHADOW E&M-EST. PATIENT-LVL III: CPT | Mod: PBBFAC,,, | Performed by: OTOLARYNGOLOGY

## 2022-08-17 NOTE — LETTER
August 18, 2022      Rosita Duque MD  [address]  [facility] [address]           [facility]  [address] [place] [address]  Phone: [phone]  Fax: [phone]          Patient: Farhan Stallworth   MR Number: [id number]   YOB: 1950   Date of Visit: 8/17/2022       Dear No ref. provider found:    Thank you for referring Farhan Stallworth to me for evaluation. Attached you will find relevant portions of my assessment and plan of care.    If you have questions, please do not hesitate to call me. I look forward to following Farhan Stallworth along with you.    Sincerely,    Matthias Zamorano MD    Enclosure  CC:  No Recipients    If you would like to receive this communication electronically, please contact [email or web address] or [phone] to request more information on [employer] Link access.    For providers and/or their staff who would like to refer a patient to [facility], please contact us through our one-stop-shop provider referral line, [facility], at [phone].    If you feel you have received this communication in error or would no longer like to receive these types of communications, please e-mail [email or web address]

## 2022-08-18 NOTE — PROGRESS NOTES
Farhan is here for a post-operative visit following   Hypoglossal nerve stimulator 7/21/2022    No issues, doing well  Has issues initiating sleep. Takes Benadryl prior to bedtime. Wondering if anything else better.     Exam:  Alert, active, appropriate  Incisions c/d/I  Expected amount of swelling; no hematoma or seroma  Tongue midline with protrusion and strong  No facial weakness    Strong, bilateral stimulation  Activated, Range 1.7-2.7  Will set up visit with Dr. Duque to establish care

## 2022-12-12 ENCOUNTER — OFFICE VISIT (OUTPATIENT)
Dept: FAMILY MEDICINE | Facility: CLINIC | Age: 72
End: 2022-12-12
Payer: MEDICARE

## 2022-12-12 VITALS
WEIGHT: 214.75 LBS | BODY MASS INDEX: 32.55 KG/M2 | HEIGHT: 68 IN | HEART RATE: 70 BPM | DIASTOLIC BLOOD PRESSURE: 62 MMHG | OXYGEN SATURATION: 99 % | SYSTOLIC BLOOD PRESSURE: 118 MMHG

## 2022-12-12 DIAGNOSIS — I10 ESSENTIAL HYPERTENSION: ICD-10-CM

## 2022-12-12 DIAGNOSIS — I48.0 PAF (PAROXYSMAL ATRIAL FIBRILLATION): ICD-10-CM

## 2022-12-12 DIAGNOSIS — N52.9 VASCULOGENIC ERECTILE DYSFUNCTION, UNSPECIFIED VASCULOGENIC ERECTILE DYSFUNCTION TYPE: ICD-10-CM

## 2022-12-12 DIAGNOSIS — G47.00 INSOMNIA, UNSPECIFIED TYPE: ICD-10-CM

## 2022-12-12 DIAGNOSIS — I25.10 CORONARY ARTERY DISEASE INVOLVING NATIVE CORONARY ARTERY OF NATIVE HEART WITHOUT ANGINA PECTORIS: Primary | ICD-10-CM

## 2022-12-12 DIAGNOSIS — E78.5 DYSLIPIDEMIA (HIGH LDL; LOW HDL): ICD-10-CM

## 2022-12-12 PROCEDURE — 1160F RVW MEDS BY RX/DR IN RCRD: CPT | Mod: CPTII,S$GLB,, | Performed by: INTERNAL MEDICINE

## 2022-12-12 PROCEDURE — 3008F PR BODY MASS INDEX (BMI) DOCUMENTED: ICD-10-PCS | Mod: CPTII,S$GLB,, | Performed by: INTERNAL MEDICINE

## 2022-12-12 PROCEDURE — 3074F SYST BP LT 130 MM HG: CPT | Mod: CPTII,S$GLB,, | Performed by: INTERNAL MEDICINE

## 2022-12-12 PROCEDURE — 3008F BODY MASS INDEX DOCD: CPT | Mod: CPTII,S$GLB,, | Performed by: INTERNAL MEDICINE

## 2022-12-12 PROCEDURE — 1159F PR MEDICATION LIST DOCUMENTED IN MEDICAL RECORD: ICD-10-PCS | Mod: CPTII,S$GLB,, | Performed by: INTERNAL MEDICINE

## 2022-12-12 PROCEDURE — 99214 PR OFFICE/OUTPT VISIT, EST, LEVL IV, 30-39 MIN: ICD-10-PCS | Mod: S$GLB,,, | Performed by: INTERNAL MEDICINE

## 2022-12-12 PROCEDURE — 3288F PR FALLS RISK ASSESSMENT DOCUMENTED: ICD-10-PCS | Mod: CPTII,S$GLB,, | Performed by: INTERNAL MEDICINE

## 2022-12-12 PROCEDURE — 99999 PR PBB SHADOW E&M-EST. PATIENT-LVL III: CPT | Mod: PBBFAC,,, | Performed by: INTERNAL MEDICINE

## 2022-12-12 PROCEDURE — 3074F PR MOST RECENT SYSTOLIC BLOOD PRESSURE < 130 MM HG: ICD-10-PCS | Mod: CPTII,S$GLB,, | Performed by: INTERNAL MEDICINE

## 2022-12-12 PROCEDURE — 3078F PR MOST RECENT DIASTOLIC BLOOD PRESSURE < 80 MM HG: ICD-10-PCS | Mod: CPTII,S$GLB,, | Performed by: INTERNAL MEDICINE

## 2022-12-12 PROCEDURE — 99214 OFFICE O/P EST MOD 30 MIN: CPT | Mod: S$GLB,,, | Performed by: INTERNAL MEDICINE

## 2022-12-12 PROCEDURE — 1101F PT FALLS ASSESS-DOCD LE1/YR: CPT | Mod: CPTII,S$GLB,, | Performed by: INTERNAL MEDICINE

## 2022-12-12 PROCEDURE — 99999 PR PBB SHADOW E&M-EST. PATIENT-LVL III: ICD-10-PCS | Mod: PBBFAC,,, | Performed by: INTERNAL MEDICINE

## 2022-12-12 PROCEDURE — 1160F PR REVIEW ALL MEDS BY PRESCRIBER/CLIN PHARMACIST DOCUMENTED: ICD-10-PCS | Mod: CPTII,S$GLB,, | Performed by: INTERNAL MEDICINE

## 2022-12-12 PROCEDURE — 1159F MED LIST DOCD IN RCRD: CPT | Mod: CPTII,S$GLB,, | Performed by: INTERNAL MEDICINE

## 2022-12-12 PROCEDURE — 1101F PR PT FALLS ASSESS DOC 0-1 FALLS W/OUT INJ PAST YR: ICD-10-PCS | Mod: CPTII,S$GLB,, | Performed by: INTERNAL MEDICINE

## 2022-12-12 PROCEDURE — 3288F FALL RISK ASSESSMENT DOCD: CPT | Mod: CPTII,S$GLB,, | Performed by: INTERNAL MEDICINE

## 2022-12-12 PROCEDURE — 3078F DIAST BP <80 MM HG: CPT | Mod: CPTII,S$GLB,, | Performed by: INTERNAL MEDICINE

## 2022-12-12 RX ORDER — PRAZOSIN HYDROCHLORIDE 5 MG/1
5 CAPSULE ORAL NIGHTLY
Qty: 30 CAPSULE | Refills: 11 | Status: SHIPPED | OUTPATIENT
Start: 2022-12-12 | End: 2023-12-11

## 2022-12-12 NOTE — Clinical Note
I'd like to stop his Imdur (don't think he needs it) and start cialis or viagra. That okay with you?

## 2022-12-12 NOTE — PROGRESS NOTES
Subjective     Farhan Stallworth is a 72 y.o. old, male here for Annual Exam    73 y/o with PMH of CAD s/p stent, PAF on eliquis, REYES on Inspire device, HLD, OA    Overall doing well; very active, works out consistently weight training and cardio.    CAD s/p stent: Stable, no CP or FREEMAN. Compliant with meds.  PAF: no recent recurrence on eliquis, no bleeding complications.  REYES: Recently placed Inspire device placed by ENT, seems to work well, no longer on CPAP therapy.  HLD: on statin therapy, reviewed labs, continue.  OA: Limits physical activity at times, stable  ED: worsening problem, requests prescription for phosphodiesterase inhibitor to try. He is on imdur but I think it can be safely stopped.  Insomnia: worsening problem, has not tried anything other than OTC medications.    Review of Systems   Constitutional:  Negative for malaise/fatigue and weight loss.   Respiratory:  Negative for cough and shortness of breath.    Cardiovascular:  Negative for chest pain and palpitations.   Medications     Outpatient Medications Marked as Taking for the 12/12/22 encounter (Office Visit) with Samuel Laura MD   Medication Sig Dispense Refill    acetaminophen (TYLENOL) 500 MG tablet       atorvastatin (LIPITOR) 20 MG tablet TAKE 1 TABLET EVERY DAY 90 tablet 1    chlorpheniramine/dextromethorp (CORICIDIN HBP COUGH AND COLD ORAL) Take 1 tablet by mouth nightly as needed.      coenzyme Q10 100 mg capsule Take 100 mg by mouth once daily.       ELIQUIS 5 mg Tab TAKE 1 TABLET TWICE DAILY 180 tablet 0    glucosamine-chondroitin 500-400 mg tablet Take 1 tablet by mouth Daily. Triflex      isosorbide mononitrate (IMDUR) 30 MG 24 hr tablet TAKE 1 TABLET EVERY DAY 90 tablet 1    metoprolol succinate (TOPROL-XL) 25 MG 24 hr tablet TAKE 1 TABLET EVERY DAY (Patient taking differently: Take 25 mg by mouth once daily.) 90 tablet 1    MULTIVITS-MINERALS/FA/LYCOPENE (MEN'S DAILY MULTIVIT-MINERAL ORAL) Take 1 tablet by mouth once  "daily.       nitroGLYCERIN (NITROSTAT) 0.4 MG SL tablet Place 1 tablet (0.4 mg total) under the tongue every 5 (five) minutes as needed. 25 tablet 0     Objective     /62   Pulse 70   Ht 5' 8" (1.727 m)   Wt 97.4 kg (214 lb 11.7 oz)   SpO2 99%   BMI 32.65 kg/m²   Physical Exam  Constitutional:       General: He is not in acute distress.     Appearance: Normal appearance. He is well-developed.   HENT:      Head: Normocephalic and atraumatic.   Eyes:      Conjunctiva/sclera: Conjunctivae normal.      Pupils: Pupils are equal, round, and reactive to light.   Neck:      Thyroid: No thyroid mass or thyromegaly.   Cardiovascular:      Rate and Rhythm: Normal rate and regular rhythm.      Heart sounds: Normal heart sounds. No murmur heard.  Pulmonary:      Effort: No respiratory distress.      Breath sounds: Normal breath sounds.   Abdominal:      General: Bowel sounds are normal.      Palpations: Abdomen is soft.      Tenderness: There is no abdominal tenderness.   Musculoskeletal:         General: Deformity present.      Cervical back: Neck supple.   Lymphadenopathy:      Cervical: No cervical adenopathy.      Upper Body:      Right upper body: No supraclavicular adenopathy.      Left upper body: No supraclavicular adenopathy.   Skin:     General: Skin is warm and dry.      Findings: No rash.   Neurological:      Mental Status: He is alert and oriented to person, place, and time.   Psychiatric:         Behavior: Behavior normal.     Assessment and Plan     Coronary artery disease involving native coronary artery of native heart without angina pectoris    Dyslipidemia (high LDL; low HDL)    Essential hypertension    PAF (paroxysmal atrial fibrillation)    Insomnia, unspecified type  -     prazosin (MINIPRESS) 5 MG capsule; Take 1 capsule (5 mg total) by mouth nightly.  Dispense: 30 capsule; Refill: 11    Vasculogenic erectile dysfunction, unspecified vasculogenic erectile dysfunction type      Trial of prazosin " as above.  Start tadalafil or sildenafil after Imdur is stopped.    Follow up in about 1 year (around 12/12/2023).  ___________________  Samuel Laura MD  Internal Medicine and Pediatrics

## 2022-12-14 ENCOUNTER — NURSE TRIAGE (OUTPATIENT)
Dept: ADMINISTRATIVE | Facility: CLINIC | Age: 72
End: 2022-12-14
Payer: MEDICARE

## 2022-12-14 ENCOUNTER — OFFICE VISIT (OUTPATIENT)
Dept: FAMILY MEDICINE | Facility: CLINIC | Age: 72
End: 2022-12-14
Payer: MEDICARE

## 2022-12-14 ENCOUNTER — TELEPHONE (OUTPATIENT)
Dept: FAMILY MEDICINE | Facility: CLINIC | Age: 72
End: 2022-12-14

## 2022-12-14 DIAGNOSIS — U07.1 COVID-19: Primary | ICD-10-CM

## 2022-12-14 PROBLEM — G47.00 INSOMNIA: Status: ACTIVE | Noted: 2022-12-14

## 2022-12-14 PROBLEM — N52.9 VASCULOGENIC ERECTILE DYSFUNCTION: Status: ACTIVE | Noted: 2022-12-14

## 2022-12-14 PROCEDURE — 1160F PR REVIEW ALL MEDS BY PRESCRIBER/CLIN PHARMACIST DOCUMENTED: ICD-10-PCS | Mod: CPTII,95,, | Performed by: NURSE PRACTITIONER

## 2022-12-14 PROCEDURE — 99213 OFFICE O/P EST LOW 20 MIN: CPT | Mod: 95,,, | Performed by: NURSE PRACTITIONER

## 2022-12-14 PROCEDURE — 1159F MED LIST DOCD IN RCRD: CPT | Mod: CPTII,95,, | Performed by: NURSE PRACTITIONER

## 2022-12-14 PROCEDURE — 99213 PR OFFICE/OUTPT VISIT, EST, LEVL III, 20-29 MIN: ICD-10-PCS | Mod: 95,,, | Performed by: NURSE PRACTITIONER

## 2022-12-14 PROCEDURE — 1160F RVW MEDS BY RX/DR IN RCRD: CPT | Mod: CPTII,95,, | Performed by: NURSE PRACTITIONER

## 2022-12-14 PROCEDURE — 1159F PR MEDICATION LIST DOCUMENTED IN MEDICAL RECORD: ICD-10-PCS | Mod: CPTII,95,, | Performed by: NURSE PRACTITIONER

## 2022-12-14 RX ORDER — FLUTICASONE PROPIONATE 50 MCG
1 SPRAY, SUSPENSION (ML) NASAL DAILY
Qty: 16 G | Refills: 0 | Status: SHIPPED | OUTPATIENT
Start: 2022-12-14 | End: 2023-12-11

## 2022-12-14 RX ORDER — GUAIFENESIN 1200 MG/1
1200 TABLET, EXTENDED RELEASE ORAL 2 TIMES DAILY
Qty: 20 TABLET | Refills: 0 | Status: SHIPPED | OUTPATIENT
Start: 2022-12-14 | End: 2022-12-24

## 2022-12-14 RX ORDER — ALBUTEROL SULFATE 90 UG/1
2 AEROSOL, METERED RESPIRATORY (INHALATION) EVERY 6 HOURS PRN
Qty: 8 G | Refills: 0 | Status: SHIPPED | OUTPATIENT
Start: 2022-12-14 | End: 2023-02-24

## 2022-12-14 RX ORDER — PROMETHAZINE HYDROCHLORIDE AND DEXTROMETHORPHAN HYDROBROMIDE 6.25; 15 MG/5ML; MG/5ML
5 SYRUP ORAL 3 TIMES DAILY PRN
Qty: 240 ML | Refills: 0 | Status: SHIPPED | OUTPATIENT
Start: 2022-12-14 | End: 2022-12-24

## 2022-12-14 NOTE — PROGRESS NOTES
Answers submitted by the patient for this visit:  Review of Systems Questionnaire (Submitted on 12/14/2022)  activity change: Yes  unexpected weight change: No  neck pain: Yes  hearing loss: No  rhinorrhea: Yes  trouble swallowing: Yes  eye discharge: No  chest tightness: No  wheezing: Yes  chest pain: No  palpitations: No  blood in stool: No  constipation: No  vomiting: No  diarrhea: No  polydipsia: No  polyuria: No  difficulty urinating: No  urgency: No  hematuria: No  joint swelling: No  arthralgias: Yes  headaches: Yes  weakness: Yes  confusion: No  dysphoric mood: No

## 2022-12-14 NOTE — TELEPHONE ENCOUNTER
Pt covid positive via home test today. C/o cough, temp 100.3, runny nose and headache. Denies SOB at this time. Pt is high risk per Covid risk of complications with a score of 6 per chart. Care advice to receive call back within a hour per protocol. Advised pt to call back if haven't heard back from provider within 1 hour. Verbalized understanding. Encounter routed to provider for f/u.        Reason for Disposition   [1] HIGH RISK for severe COVID complications (e.g., weak immune system, age > 64 years, obesity with BMI of 30 or higher, pregnant, chronic lung disease or other chronic medical condition) AND [2] COVID symptoms (e.g., cough, fever)  (Exceptions: Already seen by PCP and no new or worsening symptoms.)    Additional Information   Negative: SEVERE difficulty breathing (e.g., struggling for each breath, speaks in single words)   Negative: Difficult to awaken or acting confused (e.g., disoriented, slurred speech)   Negative: Bluish (or gray) lips or face now   Negative: Shock suspected (e.g., cold/pale/clammy skin, too weak to stand, low BP, rapid pulse)   Negative: Sounds like a life-threatening emergency to the triager   Negative: SEVERE or constant chest pain or pressure  (Exception: Mild central chest pain, present only when coughing.)   Negative: MODERATE difficulty breathing (e.g., speaks in phrases, SOB even at rest, pulse 100-120)   Negative: Headache and stiff neck (can't touch chin to chest)   Negative: Oxygen level (e.g., pulse oximetry) 90 percent or lower   Negative: Chest pain or pressure  (Exception: MILD central chest pain, present only when coughing.)   Negative: Patient sounds very sick or weak to the triager   Negative: MILD difficulty breathing (e.g., minimal/no SOB at rest, SOB with walking, pulse <100)   Negative: Fever > 103 F (39.4 C)   Negative: [1] Fever > 101 F (38.3 C) AND [2] over 60 years of age   Negative: [1] Fever > 100.0 F (37.8 C) AND [2] bedridden (e.g., CVA, chronic  illness, recovering from surgery)    Protocols used: Coronavirus (COVID-19) Diagnosed or [id number]

## 2022-12-14 NOTE — TELEPHONE ENCOUNTER
----- Message from Camille Christian sent at 12/14/2022 11:37 AM CST -----  .Type:  Patient Call Back    Who Called: PT       Does the patient know what this is regarding?: 11:20 AM VIRTUAL VISIT     Would the patient rather a call back YES     Best Call Back Number: [phone]    Additional Information: Thank You

## 2022-12-14 NOTE — PATIENT INSTRUCTIONS
Vit C 2000 mg daily, Vit D 1000 iu daily, Zinc 50 mg daily  ED for Fever > 102 not resolving with medication, significant shortness of breath or chest pain, Oxygen level less than 93%, or other worsening symptoms.    Instructions for Patients with Confirmed or Suspected COVID-19    If you are awaiting your test result, you will either be called or it will be released to the patient portal.  If you have any questions about your test, please visit [email or web address] or call our COVID-19 information line at [phone].      Please isolate yourself at home.  You may leave home and/or return to work once the following conditions are met:    If you have symptoms and tested positive:  More than 5 days since symptoms first appeared AND  More than 24 hours fever free without medications AND       symptoms have improved   For five days after ending isolation, masks are required.    If you had no symptoms but tested positive:  More than 5 days since the date of the first positive test. If you develop symptoms, then use the guidelines above  For five days after ending isolation, masks are required.      Testing is not recommended if you are symptom free after completing isolation.

## 2022-12-14 NOTE — PROGRESS NOTES
Subjective:       Patient ID: Farhan Stallworth is a 72 y.o. male.    Chief Complaint: No chief complaint on file.    The patient location is: [place], [place]  The chief complaint leading to consultation is: covid 19    Visit type: audiovisual    Face to Face time with patient: 15  15 minutes of total time spent on the encounter, which includes face to face time and non-face to face time preparing to see the patient (eg, review of tests), Obtaining and/or reviewing separately obtained history, Documenting clinical information in the electronic or other health record, Independently interpreting results (not separately reported) and communicating results to the patient/family/caregiver, or Care coordination (not separately reported).         Each patient to whom he or she provides medical services by telemedicine is:  (1) informed of the relationship between the physician and patient and the respective role of any other health care provider with respect to management of the patient; and (2) notified that he or she may decline to receive medical services by telemedicine and may withdraw from such care at any time.    Notes:    Symptoms started yesterday. Positive home covid 19 test today. Fever 102.  6 COVID risk score.     Past Medical History:  No date: Arthritis      Comment:  hip  12/2018: Atrial fibrillation  No date: Coronary artery disease      Comment:  cardiac stents  No date: Difficult intubation  No date: Hearing difficulty  No date: Hypertension  No date: Low serum testosterone level  No date: Lumbar disc disease  No date: REYES (obstructive sleep apnea)      Comment:  has CPAP, better relief of symptoms with nasal pillow.   No date: REYES (obstructive sleep apnea)    Past Surgical History:  No date: BICEPS TENDON REPAIR; Right  01/03/2019: CARDIOVERSION; N/A      Comment:  Procedure: CARDIOVERSION;  Surgeon: Nanci Ahuja MD;                 Location: [facility];  Service: Cardiology;  Laterality:                 N/A;  No date: CARPAL TUNNEL RELEASE      Comment:  2005, bilateral  No date: CLAVICLE SURGERY  02/16/2022: COLONOSCOPY; N/A      Comment:  Procedure: COLONOSCOPY;  Surgeon: Gualberto Somers MD;  Location: [facility] ENDO;  Service: Endoscopy;                 Laterality: N/A;  04/09/2019: CORONARY ANGIOGRAPHY; N/A      Comment:  Procedure: ANGIOGRAM, CORONARY ARTERY;  Surgeon: Nanci Ahuja MD;  Location: [facility] CATH;  Service: Cardiology;                 Laterality: N/A;  2010: EPIDURAL BLOCK INJECTION  1966: FRACTURE SURGERY  No date: HIP SURGERY  07/21/2022: INSERTION, NEUROSTIMULATOR, HYPOGLOSSAL; Right      Comment:  Procedure: INSERTION,NEUROSTIMULATOR,HYPOGLOSSAL;                 Surgeon: Matthias Zamorano MD;  Location: [facility] OR;                 Service: ENT;  Laterality: Right;  12/06/2019: INTERNAL NEUROLYSIS USING OPERATING MICROSCOPE; Left      Comment:  Procedure: Cooled radiofrequency ablation of the                genicular nerve branches to the left knee;  Surgeon:                Jayy Driscoll MD;  Location: [facility] OR;  Service:                Orthopedics;  Laterality: Left;  11/2017: JOINT REPLACEMENT; Right      Comment:  hip  02/2020: JOINT REPLACEMENT; Left      Comment:  knee  No date: KNEE ARTHROSCOPY W/ MENISCAL REPAIR      Comment:  left  04/09/2019: LEFT HEART CATHETERIZATION; Left      Comment:  Procedure: Left heart cath;  Surgeon: Nanci Ahuja MD;               Location: [facility] CATH;  Service: Cardiology;  Laterality:                Left;  02/02/2021: LEFT HEART CATHETERIZATION; Left      Comment:  Procedure: Left heart cath;  Surgeon: Nanci Ahuja MD;               Location: [facility] CATH;  Service: Cardiology;  Laterality:                Left;  05/2022: ligament reattachment; Right      Comment:  bicep tendon reattachment  No date: LUMBAR FUSION      Comment:  2010 05/27/2022: SLEEP ENDOSCOPY, DRUG-INDUCED; Bilateral      Comment:  Procedure: SLEEP  ENDOSCOPY,DRUG-INDUCED;  Surgeon: Matthias Zamorano MD;  Location: [facility];  Service: ENT;                 Laterality: Bilateral;  05/2010: SPINE SURGERY  No date: TONSILLECTOMY    Review of patient's family history indicates:      Social History    Socioeconomic History      Marital status:     Tobacco Use      Smoking status: Never      Smokeless tobacco: Never    Substance and Sexual Activity      Alcohol use: Yes        Alcohol/week: 3.0 - 5.0 standard drinks        Types: 1 - 2 Glasses of wine, 1 - 2 Cans of beer, 1 Shots of liquor per week        Comment: occasionally      Drug use: No      Sexual activity: Yes        Partners: Female        Birth control/protection: None        Comment: Have  Erectile Dysfunction    Social Determinants of Health  Financial Resource Strain: Low Risk       Difficulty of Paying Living Expenses: Not hard at all  Food Insecurity: No Food Insecurity      Worried About Running Out of Food in the Last Year: Never true      Ran Out of Food in the Last Year: Never true  Transportation Needs: No Transportation Needs      Lack of Transportation (Medical): No      Lack of Transportation (Non-Medical): No  Physical Activity: Sufficiently Active      Days of Exercise per Week: 7 days      Minutes of Exercise per Session: 120 min  Stress: No Stress Concern Present      Feeling of Stress : Only a little  Social Connections: Unknown      Frequency of Communication with Friends and Family: More than three times a week      Frequency of Social Gatherings with Friends and Family: More than three times a week      Active Member of Clubs or Organizations: Yes      Attends Club or Organization Meetings: More than 4 times per year      Marital Status:   Housing Stability: High Risk      Unable to Pay for Housing in the Last Year: Yes      Number of Places Lived in the Last Year: 1      Unstable Housing in the Last Year: No    Current Outpatient Medications:  acetaminophen  (TYLENOL) 500 MG tablet, , Disp: , Rfl:   atorvastatin (LIPITOR) 20 MG tablet, TAKE 1 TABLET EVERY DAY, Disp: 90 tablet, Rfl: 1  chlorpheniramine/dextromethorp (CORICIDIN HBP COUGH AND COLD ORAL), Take 1 tablet by mouth nightly as needed., Disp: , Rfl:   coenzyme Q10 100 mg capsule, Take 100 mg by mouth once daily. , Disp: , Rfl:   ELIQUIS 5 mg Tab, TAKE 1 TABLET TWICE DAILY, Disp: 180 tablet, Rfl: 0  glucosamine-chondroitin 500-400 mg tablet, Take 1 tablet by mouth Daily. Triflex, Disp: , Rfl:   isosorbide mononitrate (IMDUR) 30 MG 24 hr tablet, TAKE 1 TABLET EVERY DAY, Disp: 90 tablet, Rfl: 1  metoprolol succinate (TOPROL-XL) 25 MG 24 hr tablet, TAKE 1 TABLET EVERY DAY (Patient taking differently: Take 25 mg by mouth once daily.), Disp: 90 tablet, Rfl: 1  MULTIVITS-MINERALS/FA/LYCOPENE (MEN'S DAILY MULTIVIT-MINERAL ORAL), Take 1 tablet by mouth once daily. , Disp: , Rfl:   nitroGLYCERIN (NITROSTAT) 0.4 MG SL tablet, Place 1 tablet (0.4 mg total) under the tongue every 5 (five) minutes as needed., Disp: 25 tablet, Rfl: 0  prazosin (MINIPRESS) 5 MG capsule, Take 1 capsule (5 mg total) by mouth nightly., Disp: 30 capsule, Rfl: 11    No current facility-administered medications for this visit.  Facility-Administered Medications Ordered in Other Visits:  HYDROmorphone injection 0.5 mg, 0.5 mg, Intravenous, Q5 Min PRN, Jacqueline Volpi-Abadie, MD, 0.5 mg at 07/21/22 1219  ondansetron injection 4 mg, 4 mg, Intravenous, Daily PRN, Jacqueline Volpi-Abadie, MD  oxyCODONE-acetaminophen 5-325 mg per tablet 1 tablet, 1 tablet, Oral, Q3H PRN, Jacqueline Volpi-Abadie, MD  sodium chloride 0.9% flush 3 mL, 3 mL, Intravenous, PRN, Jacqueline Volpi-Abadie, MD        Review of patient's allergies indicates:  No Known Allergies     Review of Systems   Constitutional:  Positive for activity change, fatigue and fever. Negative for unexpected weight change.   HENT:  Positive for rhinorrhea and trouble swallowing. Negative for hearing  loss.    Eyes:  Negative for discharge.   Respiratory:  Positive for cough and wheezing. Negative for chest tightness.    Cardiovascular:  Negative for chest pain and palpitations.   Gastrointestinal:  Negative for blood in stool, constipation, diarrhea and vomiting.   Endocrine: Negative for polydipsia and polyuria.   Genitourinary:  Negative for difficulty urinating, hematuria and urgency.   Musculoskeletal:  Positive for arthralgias and neck pain. Negative for joint swelling.   Neurological:  Positive for weakness and headaches.   Psychiatric/Behavioral:  Negative for confusion and dysphoric mood.        Objective:      Physical Exam  Constitutional:       General: He is not in acute distress.     Appearance: Normal appearance. He is not toxic-appearing.   Pulmonary:      Effort: Pulmonary effort is normal. No respiratory distress.   Neurological:      General: No focal deficit present.      Mental Status: He is alert and oriented to person, place, and time.       Assessment:       Problem List Items Addressed This Visit    None  Visit Diagnoses       COVID-19    -  Primary    Relevant Medications    promethazine-dextromethorphan (PROMETHAZINE-DM) 6.25-15 mg/5 mL Syrp    fluticasone propionate (FLONASE) 50 mcg/actuation nasal spray    albuterol (VENTOLIN HFA) 90 mcg/actuation inhaler    guaiFENesin (MUCINEX) 1,200 mg Ta12    molnupiravir 200 mg capsule (EUA)            Plan:       1. COVID-19  COVID 19 dicussed with patient, questions answered. Symptomatic treatment as discussed, Self Quarantine guidelines as discussed, and ED precuaitons reviewed. Patient states understanding. COVID 19 patient education handout given. He would like antiviral treatment, risk score 6. He is on Eliquis for atrial fibrillation, will treat with molnupiravir, EUA status discussed.   - promethazine-dextromethorphan (PROMETHAZINE-DM) 6.25-15 mg/5 mL Syrp; Take 5 mLs by mouth 3 (three) times daily as needed.  Dispense: 240 mL; Refill:  0  - fluticasone propionate (FLONASE) 50 mcg/actuation nasal spray; 1 spray (50 mcg total) by Each Nostril route once daily.  Dispense: 16 g; Refill: 0  - albuterol (VENTOLIN HFA) 90 mcg/actuation inhaler; Inhale 2 puffs into the lungs every 6 (six) hours as needed for Wheezing. Rescue  Dispense: 8 g; Refill: 0  - guaiFENesin (MUCINEX) 1,200 mg Ta12; Take 1,200 mg by mouth 2 (two) times a day. for 10 days  Dispense: 20 tablet; Refill: 0  - molnupiravir 200 mg capsule (EUA); Take 4 capsules (800 mg total) by mouth every 12 (twelve) hours. for 5 days  Dispense: 40 capsule; Refill: 0

## 2022-12-15 ENCOUNTER — PATIENT MESSAGE (OUTPATIENT)
Dept: FAMILY MEDICINE | Facility: CLINIC | Age: 72
End: 2022-12-15
Payer: MEDICARE

## 2022-12-15 RX ORDER — TADALAFIL 5 MG/1
5 TABLET ORAL DAILY
Qty: 90 TABLET | Refills: 3 | Status: SHIPPED | OUTPATIENT
Start: 2022-12-15 | End: 2024-01-17 | Stop reason: SDUPTHER

## 2022-12-20 ENCOUNTER — PATIENT MESSAGE (OUTPATIENT)
Dept: OTOLARYNGOLOGY | Facility: CLINIC | Age: 72
End: 2022-12-20
Payer: MEDICARE

## 2023-01-06 ENCOUNTER — PATIENT MESSAGE (OUTPATIENT)
Dept: FAMILY MEDICINE | Facility: CLINIC | Age: 73
End: 2023-01-06
Payer: MEDICARE

## 2023-01-06 DIAGNOSIS — I48.0 PAROXYSMAL ATRIAL FIBRILLATION: ICD-10-CM

## 2023-01-06 NOTE — TELEPHONE ENCOUNTER
----- Message from Sara Herrera, Patient Care Assistant sent at 1/6/2023 12:14 PM CST -----  Regarding: medication  Contact: tomas with [facility]  Type: Needs Medical Advice  Who Called:  tomas with [facility] #[id number] - [place] [address] AT [facility] [address] [facility] [address]  [address] [address] [address]  Phone: [phone] Fax: [phone]    Best Call Back Number: [phone] (home)     Additional Information: tomas with [facility] states he would like a callback regarding sending ELIQUIS 5 mg Tab to [facility] pharmacy. Please call pt to advise. Thanks!

## 2023-01-06 NOTE — TELEPHONE ENCOUNTER
Patient requesting short suuply of medication. Mail order has not been delivered yet.  Dr. Smith typically fills this for patient. Medication pended. Please advise   Purse String (Simple) Text: Given the location of the defect and the characteristics of the surrounding skin a purse string closure was deemed most appropriate.  Undermining was performed circumfirentially around the surgical defect.  A purse string suture was then placed and tightened.

## 2023-02-11 ENCOUNTER — PATIENT MESSAGE (OUTPATIENT)
Dept: CARDIOLOGY | Facility: CLINIC | Age: 73
End: 2023-02-11
Payer: MEDICARE

## 2023-02-11 DIAGNOSIS — E78.00 HYPERCHOLESTEREMIA: Primary | ICD-10-CM

## 2023-02-13 RX ORDER — ATORVASTATIN CALCIUM 20 MG/1
20 TABLET, FILM COATED ORAL DAILY
Qty: 90 TABLET | Refills: 0 | Status: SHIPPED | OUTPATIENT
Start: 2023-02-13 | End: 2023-02-24 | Stop reason: ALTCHOICE

## 2023-02-24 ENCOUNTER — LAB VISIT (OUTPATIENT)
Dept: LAB | Facility: HOSPITAL | Age: 73
End: 2023-02-24
Attending: INTERNAL MEDICINE
Payer: MEDICARE

## 2023-02-24 ENCOUNTER — OFFICE VISIT (OUTPATIENT)
Dept: CARDIOLOGY | Facility: CLINIC | Age: 73
End: 2023-02-24
Payer: MEDICARE

## 2023-02-24 VITALS
WEIGHT: 215.63 LBS | HEIGHT: 68 IN | DIASTOLIC BLOOD PRESSURE: 71 MMHG | BODY MASS INDEX: 32.68 KG/M2 | HEART RATE: 50 BPM | SYSTOLIC BLOOD PRESSURE: 117 MMHG

## 2023-02-24 DIAGNOSIS — I25.10 CORONARY ARTERY DISEASE INVOLVING NATIVE CORONARY ARTERY OF NATIVE HEART WITHOUT ANGINA PECTORIS: ICD-10-CM

## 2023-02-24 DIAGNOSIS — I34.0 NON-RHEUMATIC MITRAL REGURGITATION: Chronic | ICD-10-CM

## 2023-02-24 DIAGNOSIS — I10 ESSENTIAL HYPERTENSION: Chronic | ICD-10-CM

## 2023-02-24 DIAGNOSIS — I10 ESSENTIAL HYPERTENSION: ICD-10-CM

## 2023-02-24 DIAGNOSIS — E78.00 HYPERCHOLESTEREMIA: Chronic | ICD-10-CM

## 2023-02-24 DIAGNOSIS — I48.0 PAF (PAROXYSMAL ATRIAL FIBRILLATION): Chronic | ICD-10-CM

## 2023-02-24 DIAGNOSIS — E78.00 HYPERCHOLESTEREMIA: ICD-10-CM

## 2023-02-24 DIAGNOSIS — E66.9 OBESITY, CLASS I, BMI 30-34.9: Chronic | ICD-10-CM

## 2023-02-24 DIAGNOSIS — I25.10 CORONARY ARTERY DISEASE INVOLVING NATIVE CORONARY ARTERY OF NATIVE HEART WITHOUT ANGINA PECTORIS: Primary | Chronic | ICD-10-CM

## 2023-02-24 DIAGNOSIS — E66.9 OBESITY, CLASS I, BMI 30-34.9: ICD-10-CM

## 2023-02-24 DIAGNOSIS — Z79.01 LONG TERM (CURRENT) USE OF ANTICOAGULANTS: Chronic | ICD-10-CM

## 2023-02-24 DIAGNOSIS — Z79.01 LONG TERM (CURRENT) USE OF ANTICOAGULANTS: ICD-10-CM

## 2023-02-24 LAB
ALBUMIN SERPL BCP-MCNC: 4.2 G/DL (ref 3.5–5.2)
ALP SERPL-CCNC: 75 U/L (ref 55–135)
ALT SERPL W/O P-5'-P-CCNC: 26 U/L (ref 10–44)
ANION GAP SERPL CALC-SCNC: 7 MMOL/L (ref 8–16)
AST SERPL-CCNC: 31 U/L (ref 10–40)
BASOPHILS # BLD AUTO: 0.03 K/UL (ref 0–0.2)
BASOPHILS NFR BLD: 0.6 % (ref 0–1.9)
BILIRUB SERPL-MCNC: 1 MG/DL (ref 0.1–1)
BUN SERPL-MCNC: 19 MG/DL (ref 8–23)
CALCIUM SERPL-MCNC: 9.2 MG/DL (ref 8.7–10.5)
CHLORIDE SERPL-SCNC: 105 MMOL/L (ref 95–110)
CO2 SERPL-SCNC: 26 MMOL/L (ref 23–29)
CREAT SERPL-MCNC: 1.1 MG/DL (ref 0.5–1.4)
DIFFERENTIAL METHOD: NORMAL
EOSINOPHIL # BLD AUTO: 0.1 K/UL (ref 0–0.5)
EOSINOPHIL NFR BLD: 1.9 % (ref 0–8)
ERYTHROCYTE [DISTWIDTH] IN BLOOD BY AUTOMATED COUNT: 13.1 % (ref 11.5–14.5)
EST. GFR  (NO RACE VARIABLE): >60 ML/MIN/1.73 M^2
GLUCOSE SERPL-MCNC: 100 MG/DL (ref 70–110)
HCT VFR BLD AUTO: 46.7 % (ref 40–54)
HGB BLD-MCNC: 15 G/DL (ref 14–18)
IMM GRANULOCYTES # BLD AUTO: 0.01 K/UL (ref 0–0.04)
IMM GRANULOCYTES NFR BLD AUTO: 0.2 % (ref 0–0.5)
LYMPHOCYTES # BLD AUTO: 1.2 K/UL (ref 1–4.8)
LYMPHOCYTES NFR BLD: 24.9 % (ref 18–48)
MCH RBC QN AUTO: 29.9 PG (ref 27–31)
MCHC RBC AUTO-ENTMCNC: 32.1 G/DL (ref 32–36)
MCV RBC AUTO: 93 FL (ref 82–98)
MONOCYTES # BLD AUTO: 0.6 K/UL (ref 0.3–1)
MONOCYTES NFR BLD: 12.1 % (ref 4–15)
NEUTROPHILS # BLD AUTO: 2.9 K/UL (ref 1.8–7.7)
NEUTROPHILS NFR BLD: 60.3 % (ref 38–73)
NRBC BLD-RTO: 0 /100 WBC
PLATELET # BLD AUTO: 160 K/UL (ref 150–450)
PMV BLD AUTO: 10.7 FL (ref 9.2–12.9)
POTASSIUM SERPL-SCNC: 4.7 MMOL/L (ref 3.5–5.1)
PROT SERPL-MCNC: 7.1 G/DL (ref 6–8.4)
RBC # BLD AUTO: 5.02 M/UL (ref 4.6–6.2)
SODIUM SERPL-SCNC: 138 MMOL/L (ref 136–145)
WBC # BLD AUTO: 4.73 K/UL (ref 3.9–12.7)

## 2023-02-24 PROCEDURE — 3078F DIAST BP <80 MM HG: CPT | Mod: CPTII,S$GLB,, | Performed by: INTERNAL MEDICINE

## 2023-02-24 PROCEDURE — 1101F PT FALLS ASSESS-DOCD LE1/YR: CPT | Mod: CPTII,S$GLB,, | Performed by: INTERNAL MEDICINE

## 2023-02-24 PROCEDURE — 3288F FALL RISK ASSESSMENT DOCD: CPT | Mod: CPTII,S$GLB,, | Performed by: INTERNAL MEDICINE

## 2023-02-24 PROCEDURE — 3074F PR MOST RECENT SYSTOLIC BLOOD PRESSURE < 130 MM HG: ICD-10-PCS | Mod: CPTII,S$GLB,, | Performed by: INTERNAL MEDICINE

## 2023-02-24 PROCEDURE — 1126F AMNT PAIN NOTED NONE PRSNT: CPT | Mod: CPTII,S$GLB,, | Performed by: INTERNAL MEDICINE

## 2023-02-24 PROCEDURE — 3288F PR FALLS RISK ASSESSMENT DOCUMENTED: ICD-10-PCS | Mod: CPTII,S$GLB,, | Performed by: INTERNAL MEDICINE

## 2023-02-24 PROCEDURE — 1101F PR PT FALLS ASSESS DOC 0-1 FALLS W/OUT INJ PAST YR: ICD-10-PCS | Mod: CPTII,S$GLB,, | Performed by: INTERNAL MEDICINE

## 2023-02-24 PROCEDURE — 99999 PR PBB SHADOW E&M-EST. PATIENT-LVL III: ICD-10-PCS | Mod: PBBFAC,,, | Performed by: INTERNAL MEDICINE

## 2023-02-24 PROCEDURE — 99214 OFFICE O/P EST MOD 30 MIN: CPT | Mod: S$GLB,,, | Performed by: INTERNAL MEDICINE

## 2023-02-24 PROCEDURE — 80053 COMPREHEN METABOLIC PANEL: CPT | Performed by: INTERNAL MEDICINE

## 2023-02-24 PROCEDURE — 3008F PR BODY MASS INDEX (BMI) DOCUMENTED: ICD-10-PCS | Mod: CPTII,S$GLB,, | Performed by: INTERNAL MEDICINE

## 2023-02-24 PROCEDURE — 1159F MED LIST DOCD IN RCRD: CPT | Mod: CPTII,S$GLB,, | Performed by: INTERNAL MEDICINE

## 2023-02-24 PROCEDURE — 99214 PR OFFICE/OUTPT VISIT, EST, LEVL IV, 30-39 MIN: ICD-10-PCS | Mod: S$GLB,,, | Performed by: INTERNAL MEDICINE

## 2023-02-24 PROCEDURE — 1159F PR MEDICATION LIST DOCUMENTED IN MEDICAL RECORD: ICD-10-PCS | Mod: CPTII,S$GLB,, | Performed by: INTERNAL MEDICINE

## 2023-02-24 PROCEDURE — 1126F PR PAIN SEVERITY QUANTIFIED, NO PAIN PRESENT: ICD-10-PCS | Mod: CPTII,S$GLB,, | Performed by: INTERNAL MEDICINE

## 2023-02-24 PROCEDURE — 3008F BODY MASS INDEX DOCD: CPT | Mod: CPTII,S$GLB,, | Performed by: INTERNAL MEDICINE

## 2023-02-24 PROCEDURE — 36415 COLL VENOUS BLD VENIPUNCTURE: CPT | Mod: PO | Performed by: INTERNAL MEDICINE

## 2023-02-24 PROCEDURE — 99999 PR PBB SHADOW E&M-EST. PATIENT-LVL III: CPT | Mod: PBBFAC,,, | Performed by: INTERNAL MEDICINE

## 2023-02-24 PROCEDURE — 3078F PR MOST RECENT DIASTOLIC BLOOD PRESSURE < 80 MM HG: ICD-10-PCS | Mod: CPTII,S$GLB,, | Performed by: INTERNAL MEDICINE

## 2023-02-24 PROCEDURE — 84403 ASSAY OF TOTAL TESTOSTERONE: CPT | Performed by: INTERNAL MEDICINE

## 2023-02-24 PROCEDURE — 85025 COMPLETE CBC W/AUTO DIFF WBC: CPT | Performed by: INTERNAL MEDICINE

## 2023-02-24 PROCEDURE — 3074F SYST BP LT 130 MM HG: CPT | Mod: CPTII,S$GLB,, | Performed by: INTERNAL MEDICINE

## 2023-02-24 RX ORDER — ROSUVASTATIN CALCIUM 20 MG/1
20 TABLET, COATED ORAL NIGHTLY
Qty: 90 TABLET | Refills: 1 | Status: SHIPPED | OUTPATIENT
Start: 2023-02-24 | End: 2023-11-08

## 2023-02-24 NOTE — PROGRESS NOTES
Subjective:    Patient ID:  Farhan Stallworth is a 72 y.o. male who presents for Coronary Artery Disease, Atrial Fibrillation, and Valvular Heart Disease        Coronary Artery Disease  Pertinent negatives include no chest pain, leg swelling, palpitations, shortness of breath or weight gain.   LABS FROM JULT LDL 51, DOING WELL, HAD ASPIRE FOR SLEEP APNEA, NO CHEST PAIN NO SHORTNESS OF BREATH NO TIA TYPE SYMPTOMS NO NEAR-SYNCOPE NO PALPITATIONS, SEE ROS    Past Medical History:   Diagnosis Date    Arthritis     hip    Atrial fibrillation 12/2018    Coronary artery disease     cardiac stents    Difficult intubation     Hearing difficulty     Hypertension     Low serum testosterone level     Lumbar disc disease     REYES (obstructive sleep apnea)     has CPAP, better relief of symptoms with nasal pillow.     REYES (obstructive sleep apnea)      Past Surgical History:   Procedure Laterality Date    BICEPS TENDON REPAIR Right     CARDIOVERSION N/A 01/03/2019    Procedure: CARDIOVERSION;  Surgeon: Nanci Ahuja MD;  Location: [facility];  Service: Cardiology;  Laterality: N/A;    CARPAL TUNNEL RELEASE      2005, bilateral    CLAVICLE SURGERY      COLONOSCOPY N/A 02/16/2022    Procedure: COLONOSCOPY;  Surgeon: Gualberto Somers MD;  Location: [facility] ENDO;  Service: Endoscopy;  Laterality: N/A;    CORONARY ANGIOGRAPHY N/A 04/09/2019    Procedure: ANGIOGRAM, CORONARY ARTERY;  Surgeon: Nanci Ahuja MD;  Location: [facility] CATH;  Service: Cardiology;  Laterality: N/A;    EPIDURAL BLOCK INJECTION  2010    FRACTURE SURGERY  1966    HIP SURGERY      INSERTION, NEUROSTIMULATOR, HYPOGLOSSAL Right 07/21/2022    Procedure: INSERTION,NEUROSTIMULATOR,HYPOGLOSSAL;  Surgeon: Matthias Zamorano MD;  Location: [facility] OR;  Service: ENT;  Laterality: Right;    INTERNAL NEUROLYSIS USING OPERATING MICROSCOPE Left 12/06/2019    Procedure: Cooled radiofrequency ablation of the genicular nerve branches to the left knee;  Surgeon: Jayy Driscoll MD;   Location: [facility] OR;  Service: Orthopedics;  Laterality: Left;    JOINT REPLACEMENT Right 2017    hip    JOINT REPLACEMENT Left 2020    knee    KNEE ARTHROSCOPY W/ MENISCAL REPAIR      left    LEFT HEART CATHETERIZATION Left 2019    Procedure: Left heart cath;  Surgeon: Nanci Ahuja MD;  Location: [facility] CATH;  Service: Cardiology;  Laterality: Left;    LEFT HEART CATHETERIZATION Left 2021    Procedure: Left heart cath;  Surgeon: Nanci Ahuja MD;  [facility];  Service: Cardiology;  Laterality: Left;    ligament reattachment Right 2022    bicep tendon reattachment    LUMBAR FUSION          SLEEP ENDOSCOPY, DRUG-INDUCED Bilateral 2022    Procedure: SLEEP ENDOSCOPY,DRUG-INDUCED;  Surgeon: Matthias Zamorano MD;  Location: [facility] OR;  Service: ENT;  Laterality: Bilateral;    SPINE SURGERY  2010    TONSILLECTOMY       Family History   Problem Relation Age of Onset    Heart disease Mother     Arthritis Mother          at age 92    Cancer Father         lung cancer    Cancer Sister         leukemia    Diabetes Neg Hx      Social History     Socioeconomic History    Marital status:    Tobacco Use    Smoking status: Never    Smokeless tobacco: Never   Substance and Sexual Activity    Alcohol use: Yes     Alcohol/week: 3.0 - 5.0 standard drinks     Types: 1 - 2 Glasses of wine, 1 - 2 Cans of beer, 1 Shots of liquor per week     Comment: occasionally    Drug use: No    Sexual activity: Yes     Partners: Female     Birth control/protection: None     Comment: Have  Erectile Dysfunction     Social Determinants of Health     Financial Resource Strain: Low Risk     Difficulty of Paying Living Expenses: Not hard at all   Food Insecurity: No Food Insecurity    Worried About Running Out of Food in the Last Year: Never true    Ran Out of Food in the Last Year: Never true   Transportation Needs: No Transportation Needs    Lack of Transportation (Medical): No    Lack of Transportation  (Non-Medical): No   Physical Activity: Sufficiently Active    Days of Exercise per Week: 6 days    Minutes of Exercise per Session: 120 min   Stress: No Stress Concern Present    Feeling of Stress : Only a little   Social Connections: Unknown    Frequency of Communication with Friends and Family: More than three times a week    Frequency of Social Gatherings with Friends and Family: Once a week    Active Member of Clubs or Organizations: Yes    Attends Club or Organization Meetings: More than 4 times per year    Marital Status:    Housing Stability: Low Risk     Unable to Pay for Housing in the Last Year: No    Number of Places Lived in the Last Year: 1    Unstable Housing in the Last Year: No       Review of patient's allergies indicates:  No Known Allergies    Current Outpatient Medications:     acetaminophen (TYLENOL) 500 MG tablet, , Disp: , Rfl:     apixaban (ELIQUIS) 5 mg Tab, Take 1 tablet (5 mg total) by mouth 2 (two) times daily., Disp: 180 tablet, Rfl: 0    coenzyme Q10 100 mg capsule, Take 100 mg by mouth once daily. , Disp: , Rfl:     glucosamine-chondroitin 500-400 mg tablet, Take 1 tablet by mouth Daily. Triflex, Disp: , Rfl:     metoprolol succinate (TOPROL-XL) 25 MG 24 hr tablet, TAKE 1 TABLET EVERY DAY, Disp: 90 tablet, Rfl: 0    MULTIVITS-MINERALS/FA/LYCOPENE (MEN'S DAILY MULTIVIT-MINERAL ORAL), Take 1 tablet by mouth once daily. , Disp: , Rfl:     prazosin (MINIPRESS) 5 MG capsule, Take 1 capsule (5 mg total) by mouth nightly., Disp: 30 capsule, Rfl: 11    tadalafiL (CIALIS) 5 MG tablet, Take 1 tablet (5 mg total) by mouth Daily., Disp: 90 tablet, Rfl: 3    chlorpheniramine/dextromethorp (CORICIDIN HBP COUGH AND COLD ORAL), Take 1 tablet by mouth nightly as needed., Disp: , Rfl:     fluticasone propionate (FLONASE) 50 mcg/actuation nasal spray, 1 spray (50 mcg total) by Each Nostril route once daily., Disp: 16 g, Rfl: 0    nitroGLYCERIN (NITROSTAT) 0.4 MG SL tablet, Place 1 tablet (0.4 mg  total) under the tongue every 5 (five) minutes as needed., Disp: 25 tablet, Rfl: 0    rosuvastatin (CRESTOR) 20 MG tablet, Take 1 tablet (20 mg total) by mouth every evening., Disp: 90 tablet, Rfl: 1  No current facility-administered medications for this visit.    Facility-Administered Medications Ordered in Other Visits:     HYDROmorphone injection 0.5 mg, 0.5 mg, Intravenous, Q5 Min PRN, Jacqueline Volpi-Abadie, MD, 0.5 mg at 07/21/22 1219    ondansetron injection 4 mg, 4 mg, Intravenous, Daily PRN, Jacqueline Volpi-Abadie, MD    oxyCODONE-acetaminophen 5-325 mg per tablet 1 tablet, 1 tablet, Oral, Q3H PRN, Jacqueline Volpi-Abadie, MD    sodium chloride 0.9% flush 3 mL, 3 mL, Intravenous, PRN, Jacqueline Volpi-Abadie, MD    Review of Systems   Constitutional: Negative for chills, diaphoresis, fever, malaise/fatigue, night sweats and weight gain.   HENT:  Negative for congestion and sore throat.    Eyes:  Negative for blurred vision and visual disturbance.   Cardiovascular:  Negative for chest pain, claudication, cyanosis, dyspnea on exertion, irregular heartbeat, leg swelling, near-syncope, orthopnea, palpitations, paroxysmal nocturnal dyspnea and syncope.   Respiratory:  Negative for cough, hemoptysis, shortness of breath and wheezing.    Hematologic/Lymphatic: Negative for adenopathy. Does not bruise/bleed easily.   Skin:  Negative for color change and rash.   Musculoskeletal:  Positive for arthritis. Negative for back pain and falls.   Gastrointestinal:  Negative for abdominal pain, change in bowel habit, dysphagia, jaundice, melena, nausea and vomiting.   Genitourinary:  Negative for dysuria and flank pain.   Neurological:  Negative for brief paralysis, focal weakness, headaches, light-headedness, loss of balance and weakness. Paresthesias: FEET.  Psychiatric/Behavioral:  Negative for altered mental status and depression.       Objective:      Vitals:    02/24/23 0913   BP: 117/71   Pulse: (!) 50   Weight:  "97.8 kg (215 lb 9.8 oz)   Height: 5' 8" (1.727 m)   PainSc: 0-No pain     Body mass index is 32.78 kg/m².    Physical Exam  Constitutional:       Appearance: He is well-developed. He is obese.   HENT:      Head: Normocephalic and atraumatic.   Eyes:      Extraocular Movements: Extraocular movements intact.      Conjunctiva/sclera: Conjunctivae normal.      Pupils: Pupils are equal, round, and reactive to light.   Neck:      Vascular: Normal carotid pulses. No JVD.   Cardiovascular:      Rate and Rhythm: Normal rate and regular rhythm. No extrasystoles are present.     Pulses:           Carotid pulses are 2+ on the right side and 2+ on the left side.       Radial pulses are 2+ on the right side and 2+ on the left side.        Femoral pulses are 2+ on the right side and 2+ on the left side.       Posterior tibial pulses are 2+ on the right side and 2+ on the left side.      Heart sounds: Murmur heard.   Systolic murmur is present with a grade of 1/6 at the lower left sternal border.     No gallop.   Pulmonary:      [place]: Pulmonary effort is normal.      Breath sounds: Normal breath sounds. No rales.   Abdominal:      Palpations: Abdomen is soft. There is no hepatomegaly.      Tenderness: There is no abdominal tenderness.   Musculoskeletal:         General: Normal range of motion.      Cervical back: Neck supple.      Right lower leg: No edema.      Left lower leg: No edema.   Skin:     General: Skin is warm and dry.      Capillary Refill: Capillary refill takes less than 2 seconds.   Neurological:      General: No focal deficit present.      Mental Status: He is alert and oriented to person, place, and time.   Psychiatric:         Mood and Affect: Mood normal.         Speech: Speech normal.         Behavior: Behavior normal.               ..    Chemistry        Component Value Date/Time     02/24/2023 0950    K 4.7 02/24/2023 0950     02/24/2023 0950    CO2 26 02/24/2023 0950    BUN 19 02/24/2023 0950    " CREATININE 1.1 02/24/2023 0950     02/24/2023 0950        Component Value Date/Time    CALCIUM 9.2 02/24/2023 0950    ALKPHOS 75 02/24/2023 0950    AST 31 02/24/2023 0950    ALT 26 02/24/2023 0950    BILITOT 1.0 02/24/2023 0950    ESTGFRAFRICA >60.0 07/18/2022 0850    EGFRNONAA >60.0 07/18/2022 0850            ..  Lab Results   Component Value Date    CHOL 104 (L) 07/18/2022    CHOL 98 (L) 04/03/2021    CHOL 87 (L) 02/08/2020     Lab Results   Component Value Date    HDL 41 07/18/2022    HDL 36 (L) 04/03/2021    HDL 28 (L) 02/08/2020     Lab Results   Component Value Date    LDLCALC 51.6 (L) 07/18/2022    LDLCALC 46.4 (L) 04/03/2021    LDLCALC 46.4 (L) 02/08/2020     Lab Results   Component Value Date    TRIG 57 07/18/2022    TRIG 78 04/03/2021    TRIG 63 02/08/2020     Lab Results   Component Value Date    CHOLHDL 39.4 07/18/2022    CHOLHDL 36.7 04/03/2021    CHOLHDL 32.2 02/08/2020     ..  Lab Results   Component Value Date    WBC 4.73 02/24/2023    HGB 15.0 02/24/2023    HCT 46.7 02/24/2023    MCV 93 02/24/2023     02/24/2023       Test(s) Reviewed  I have reviewed the following in detail:  [] Stress test   [] Angiography   [] Echocardiogram   [x] Labs   [x] Other:       Assessment:         ICD-10-CM ICD-9-CM   1. Coronary artery disease involving native coronary artery of native heart without angina pectoris  I25.10 414.01   2. Non-rheumatic mitral regurgitation  I34.0 424.0   3. PAF (paroxysmal atrial fibrillation)  I48.0 427.31   4. Hypercholesteremia  E78.00 272.0   5. Essential hypertension  I10 401.9   6. Obesity, Class I, BMI 30-34.9  E66.9 278.00   7. Long term (current) use of anticoagulants  Z79.01 V58.61     Problem List Items Addressed This Visit          Cardiac/Vascular    PAF (paroxysmal atrial fibrillation)    Non-rheumatic mitral regurgitation    Essential hypertension    Relevant Orders    Comprehensive Metabolic Panel (Completed)    Hypercholesteremia    Relevant Orders     Comprehensive Metabolic Panel (Completed)    Coronary artery disease involving native coronary artery of native heart without angina pectoris - Primary    Relevant Orders    Comprehensive Metabolic Panel (Completed)    CBC Auto Differential (Completed)       Hematology    Long term (current) use of anticoagulants    Relevant Orders    CBC Auto Differential (Completed)       Endocrine    Obesity, Class I, BMI 30-34.9    Relevant Orders    TESTOSTERONE (Completed)        Plan:     LABS NOW,OK TO CAHNGE LIPITOR TO CRESTOR, B/O GRAPEFRUIT, THAT THE PATIENT WANTS TO CONSUME MORE OFF, REQUESTS TESTOSTERONE LEVEL, ALL CV CLINICALLY STABLE, NO ANGINA, NO HF, NO TIA, NO CLINICAL ARRHYTHMIA,CONTINUE CURRENT MEDS, EDUCATION, DIET, EXERCISE , WEIGHT LOSS RTC IN  6-8 MO      Coronary artery disease involving native coronary artery of native heart without angina pectoris  -     Comprehensive Metabolic Panel; Future; Expected date: 02/24/2023  -     CBC Auto Differential; Future; Expected date: 02/24/2023    Non-rheumatic mitral regurgitation    PAF (paroxysmal atrial fibrillation)    Hypercholesteremia  -     Comprehensive Metabolic Panel; Future; Expected date: 02/24/2023    Essential hypertension  -     Comprehensive Metabolic Panel; Future; Expected date: 02/24/2023    Obesity, Class I, BMI 30-34.9  -     TESTOSTERONE; Future; Expected date: 02/24/2023    Long term (current) use of anticoagulants  -     CBC Auto Differential; Future; Expected date: 02/24/2023    Other orders  -     rosuvastatin (CRESTOR) 20 MG tablet; Take 1 tablet (20 mg total) by mouth every evening.  Dispense: 90 tablet; Refill: 1    RTC Low level/low impact aerobic exercise 5x's/wk. Heart healthy diet and risk factor modification.    See labs and med orders.    Aerobic exercise 5x's/wk. Heart healthy diet and risk factor modification.    See labs and med orders.

## 2023-02-25 LAB — TESTOST SERPL-MCNC: 497 NG/DL (ref 304–1227)

## 2023-03-31 ENCOUNTER — PATIENT MESSAGE (OUTPATIENT)
Dept: OTOLARYNGOLOGY | Facility: CLINIC | Age: 73
End: 2023-03-31
Payer: MEDICARE

## 2023-05-17 ENCOUNTER — PATIENT MESSAGE (OUTPATIENT)
Dept: FAMILY MEDICINE | Facility: CLINIC | Age: 73
End: 2023-05-17
Payer: MEDICARE

## 2023-05-18 RX ORDER — SILDENAFIL 100 MG/1
100 TABLET, FILM COATED ORAL DAILY PRN
Qty: 50 TABLET | Refills: 1 | Status: SHIPPED | OUTPATIENT
Start: 2023-05-18 | End: 2024-05-17

## 2023-05-30 ENCOUNTER — PATIENT MESSAGE (OUTPATIENT)
Dept: FAMILY MEDICINE | Facility: CLINIC | Age: 73
End: 2023-05-30
Payer: MEDICARE

## 2023-05-31 ENCOUNTER — PATIENT MESSAGE (OUTPATIENT)
Dept: FAMILY MEDICINE | Facility: CLINIC | Age: 73
End: 2023-05-31
Payer: MEDICARE

## 2023-06-07 DIAGNOSIS — I48.0 PAROXYSMAL ATRIAL FIBRILLATION: ICD-10-CM

## 2023-06-07 NOTE — TELEPHONE ENCOUNTER
----- Message from Chiquisemileetalon Piyush sent at 6/7/2023 11:05 AM CDT -----  Regarding: pharmacy called  Name of Who is Calling:[facility] [[id number]] Liz (pharm, tech)           What is the request in detail: Pharmacy requesting a script be sent over, so that they can fill it for apixaban (ELIQUIS) 5 mg Tab. Please Advise            Can the clinic reply by [facility]:No           What Number to Call Back if not in [facility]:  [phone]      [facility] - [place], [place] - [address]  [address] [address]  Phone: [phone] Fax: [phone]

## 2023-07-11 ENCOUNTER — PATIENT MESSAGE (OUTPATIENT)
Dept: OTOLARYNGOLOGY | Facility: CLINIC | Age: 73
End: 2023-07-11
Payer: MEDICARE

## 2023-07-25 DIAGNOSIS — I48.91 NEW ONSET ATRIAL FIBRILLATION: ICD-10-CM

## 2023-07-25 RX ORDER — METOPROLOL SUCCINATE 25 MG/1
TABLET, EXTENDED RELEASE ORAL
Qty: 90 TABLET | Refills: 0 | Status: SHIPPED | OUTPATIENT
Start: 2023-07-25 | End: 2024-03-08 | Stop reason: SDUPTHER

## 2023-07-27 ENCOUNTER — TELEPHONE (OUTPATIENT)
Dept: CARDIOLOGY | Facility: CLINIC | Age: 73
End: 2023-07-27
Payer: MEDICARE

## 2023-07-27 ENCOUNTER — PATIENT MESSAGE (OUTPATIENT)
Dept: CARDIOLOGY | Facility: CLINIC | Age: 73
End: 2023-07-27
Payer: MEDICARE

## 2023-07-27 NOTE — TELEPHONE ENCOUNTER
REC FAX FROM [facility] REQUESTING CLEARANCE FOR RIGHT SACROILIAC JOINT FUSION AND TO HOLD ELIQUIS PRIOR  THX

## 2023-08-01 ENCOUNTER — LAB VISIT (OUTPATIENT)
Dept: LAB | Facility: HOSPITAL | Age: 73
End: 2023-08-01
Payer: MEDICARE

## 2023-08-01 DIAGNOSIS — M46.1 SACROILIITIS, NOT ELSEWHERE CLASSIFIED: Primary | ICD-10-CM

## 2023-08-01 LAB
ALBUMIN SERPL BCP-MCNC: 4.1 G/DL (ref 3.5–5.2)
ALP SERPL-CCNC: 72 U/L (ref 55–135)
ALT SERPL W/O P-5'-P-CCNC: 26 U/L (ref 10–44)
ANION GAP SERPL CALC-SCNC: 8 MMOL/L (ref 8–16)
AST SERPL-CCNC: 30 U/L (ref 10–40)
BACTERIA #/AREA URNS HPF: NORMAL /HPF
BASOPHILS # BLD AUTO: 0.02 K/UL (ref 0–0.2)
BASOPHILS NFR BLD: 0.4 % (ref 0–1.9)
BILIRUB SERPL-MCNC: 0.6 MG/DL (ref 0.1–1)
BILIRUB UR QL STRIP: NEGATIVE
BUN SERPL-MCNC: 19 MG/DL (ref 8–23)
CALCIUM SERPL-MCNC: 9.4 MG/DL (ref 8.7–10.5)
CHLORIDE SERPL-SCNC: 107 MMOL/L (ref 95–110)
CLARITY UR: CLEAR
CO2 SERPL-SCNC: 24 MMOL/L (ref 23–29)
COLOR UR: YELLOW
CREAT SERPL-MCNC: 1.1 MG/DL (ref 0.5–1.4)
DIFFERENTIAL METHOD: ABNORMAL
EOSINOPHIL # BLD AUTO: 0 K/UL (ref 0–0.5)
EOSINOPHIL NFR BLD: 0.8 % (ref 0–8)
ERYTHROCYTE [DISTWIDTH] IN BLOOD BY AUTOMATED COUNT: 12.8 % (ref 11.5–14.5)
EST. GFR  (NO RACE VARIABLE): >60 ML/MIN/1.73 M^2
GLUCOSE SERPL-MCNC: 105 MG/DL (ref 70–110)
GLUCOSE UR QL STRIP: NEGATIVE
HCT VFR BLD AUTO: 44.9 % (ref 40–54)
HGB BLD-MCNC: 15 G/DL (ref 14–18)
HGB UR QL STRIP: NEGATIVE
IMM GRANULOCYTES # BLD AUTO: 0.03 K/UL (ref 0–0.04)
IMM GRANULOCYTES NFR BLD AUTO: 0.6 % (ref 0–0.5)
KETONES UR QL STRIP: NEGATIVE
LEUKOCYTE ESTERASE UR QL STRIP: NEGATIVE
LYMPHOCYTES # BLD AUTO: 1.1 K/UL (ref 1–4.8)
LYMPHOCYTES NFR BLD: 21.7 % (ref 18–48)
MCH RBC QN AUTO: 30.5 PG (ref 27–31)
MCHC RBC AUTO-ENTMCNC: 33.4 G/DL (ref 32–36)
MCV RBC AUTO: 91 FL (ref 82–98)
MICROSCOPIC COMMENT: NORMAL
MONOCYTES # BLD AUTO: 0.6 K/UL (ref 0.3–1)
MONOCYTES NFR BLD: 10.9 % (ref 4–15)
NEUTROPHILS # BLD AUTO: 3.4 K/UL (ref 1.8–7.7)
NEUTROPHILS NFR BLD: 65.6 % (ref 38–73)
NITRITE UR QL STRIP: NEGATIVE
NRBC BLD-RTO: 0 /100 WBC
PH UR STRIP: 6 [PH] (ref 5–8)
PLATELET # BLD AUTO: 168 K/UL (ref 150–450)
PMV BLD AUTO: 10.9 FL (ref 9.2–12.9)
POTASSIUM SERPL-SCNC: 4.5 MMOL/L (ref 3.5–5.1)
PROT SERPL-MCNC: 7.1 G/DL (ref 6–8.4)
PROT UR QL STRIP: NEGATIVE
RBC # BLD AUTO: 4.91 M/UL (ref 4.6–6.2)
SODIUM SERPL-SCNC: 139 MMOL/L (ref 136–145)
SP GR UR STRIP: 1.01 (ref 1–1.03)
URN SPEC COLLECT METH UR: NORMAL
WBC # BLD AUTO: 5.15 K/UL (ref 3.9–12.7)

## 2023-08-01 PROCEDURE — 87086 URINE CULTURE/COLONY COUNT: CPT | Performed by: PAIN MEDICINE

## 2023-08-01 PROCEDURE — 36415 COLL VENOUS BLD VENIPUNCTURE: CPT | Mod: PO | Performed by: PAIN MEDICINE

## 2023-08-01 PROCEDURE — 81000 URINALYSIS NONAUTO W/SCOPE: CPT | Mod: PO | Performed by: PAIN MEDICINE

## 2023-08-01 PROCEDURE — 85025 COMPLETE CBC W/AUTO DIFF WBC: CPT | Performed by: PAIN MEDICINE

## 2023-08-01 PROCEDURE — 80053 COMPREHEN METABOLIC PANEL: CPT | Performed by: PAIN MEDICINE

## 2023-08-02 LAB — BACTERIA UR CULT: NO GROWTH

## 2023-08-04 ENCOUNTER — TELEPHONE (OUTPATIENT)
Dept: CARDIOLOGY | Facility: CLINIC | Age: 73
End: 2023-08-04
Payer: MEDICARE

## 2023-08-04 NOTE — TELEPHONE ENCOUNTER
----- Message from Sue Valverde sent at 8/4/2023  8:54 AM CDT -----  Regarding: reschedule appt  Contact: pt  Type:  Needs Medical Advice    Who Called: pt    Would the patient rather a call back or a response via [facility]? Call back    Best Call Back Number: [phone]    Additional Information: sts he received a message to reschedule his appt by Sosa and he doesn't know when to reschedule for--please advise and thank you

## 2023-10-12 PROBLEM — E66.01 SEVERE OBESITY (BMI 35.0-35.9 WITH COMORBIDITY): Status: ACTIVE | Noted: 2023-10-12

## 2023-10-12 NOTE — PROGRESS NOTES
Subjective:    Patient ID:  Farhan Stallworth is a 73 y.o. male who presents for Coronary Artery Disease        HPI  RECENT LABS NOTED CMP AND CBC OK, DOING WELL, NO CHEST PAIN NO SHORTNESS OF BREATH NO TIA TYPE SYMPTOMS NO NEAR-SYNCOPE NO PALPABLE SEE ROS    Past Medical History:   Diagnosis Date    Arthritis     hip    Atrial fibrillation 12/2018    Coronary artery disease     cardiac stents    Difficult intubation     Hearing difficulty     Hypertension     Low serum testosterone level     Lumbar disc disease     REYES (obstructive sleep apnea)     has CPAP, better relief of symptoms with nasal pillow.     REYES (obstructive sleep apnea)      Past Surgical History:   Procedure Laterality Date    BICEPS TENDON REPAIR Right     CARDIOVERSION N/A 01/03/2019    Procedure: CARDIOVERSION;  Surgeon: Nanci Ahuja MD;  Location: [facility];  Service: Cardiology;  Laterality: N/A;    CARPAL TUNNEL RELEASE      2005, bilateral    CLAVICLE SURGERY      COLONOSCOPY N/A 02/16/2022    Procedure: COLONOSCOPY;  Surgeon: Gualberto Somers MD;  Location: [facility] ENDO;  Service: Endoscopy;  Laterality: N/A;    CORONARY ANGIOGRAPHY N/A 04/09/2019    Procedure: ANGIOGRAM, CORONARY ARTERY;  Surgeon: Nanci Ahuja MD;  Location: [facility] CATH;  Service: Cardiology;  Laterality: N/A;    EPIDURAL BLOCK INJECTION  2010    FRACTURE SURGERY  1966    HIP SURGERY      INSERTION, NEUROSTIMULATOR, HYPOGLOSSAL Right 07/21/2022    Procedure: INSERTION,NEUROSTIMULATOR,HYPOGLOSSAL;  Surgeon: Matthias Zamorano MD;  Location: [facility] OR;  Service: ENT;  Laterality: Right;    INTERNAL NEUROLYSIS USING OPERATING MICROSCOPE Left 12/06/2019    Procedure: Cooled radiofrequency ablation of the genicular nerve branches to the left knee;  Surgeon: Jayy Driscoll MD;  Location: [facility] OR;  Service: Orthopedics;  Laterality: Left;    JOINT REPLACEMENT Right 11/2017    hip    JOINT REPLACEMENT Left 02/2020    knee    KNEE ARTHROSCOPY W/ MENISCAL REPAIR      left     LEFT HEART CATHETERIZATION Left 2019    Procedure: Left heart cath;  Surgeon: Nanci Ahuja MD;  Location: [facility] CATH;  Service: Cardiology;  Laterality: Left;    LEFT HEART CATHETERIZATION Left 2021    Procedure: Left heart cath;  Surgeon: Nanci Ahuja MD;  Location: [facility] CATH;  Service: Cardiology;  Laterality: Left;    ligament reattachment Right 2022    bicep tendon reattachment    LUMBAR FUSION          SLEEP ENDOSCOPY, DRUG-INDUCED Bilateral 2022    Procedure: SLEEP ENDOSCOPY,DRUG-INDUCED;  Surgeon: Matthias Zamorano MD;  Location: [facility] OR;  Service: ENT;  Laterality: Bilateral;    SPINE SURGERY  2010    TONSILLECTOMY       Family History   Problem Relation Age of Onset    Heart disease Mother     Arthritis Mother          at age 92    Cancer Father         lung cancer    Cancer Sister         leukemia    Diabetes Neg Hx      Social History     Socioeconomic History    Marital status:    Tobacco Use    Smoking status: Never    Smokeless tobacco: Never   Substance and Sexual Activity    Alcohol use: Yes     Alcohol/week: 3.0 - 5.0 standard drinks of alcohol     Types: 1 - 2 Glasses of wine, 1 - 2 Cans of beer, 1 Shots of liquor per week     Comment: occasionally    Drug use: No    Sexual activity: Yes     Partners: Female     Birth control/protection: None     Comment: Have  Erectile Dysfunction     Social Determinants of Health     Financial Resource Strain: Low Risk  (10/10/2023)    Overall Financial Resource Strain (CARDIA)     Difficulty of Paying Living Expenses: Not hard at all   Food Insecurity: No Food Insecurity (10/10/2023)    Hunger Vital Sign     Worried About Running Out of Food in the Last Year: Never true     Ran Out of Food in the Last Year: Never true   Transportation Needs: No Transportation Needs (10/10/2023)    PRAPARE - Transportation     Lack of Transportation (Medical): No     Lack of Transportation (Non-Medical): No   Physical Activity:  Sufficiently Active (10/10/2023)    Exercise Vital Sign     Days of Exercise per Week: 6 days     Minutes of Exercise per Session: 120 min   Stress: No Stress Concern Present (10/10/2023)    [employer] [employer] - Occupational Stress Questionnaire     Feeling of Stress : Only a little   Social Connections: Unknown (10/10/2023)    Social Connection and Isolation Panel [NHANES]     Frequency of Communication with Friends and Family: More than three times a week     Frequency of Social Gatherings with Friends and Family: Twice a week     Active Member of Clubs or Organizations: Yes     Attends Club or Organization Meetings: More than 4 times per year     Marital Status:    Housing Stability: Low Risk  (10/10/2023)    Housing Stability Vital Sign     Unable to Pay for Housing in the Last Year: No     Number of Places Lived in the Last Year: 1     Unstable Housing in the Last Year: No       Review of patient's allergies indicates:  No Known Allergies    Current Outpatient Medications:     acetaminophen (TYLENOL) 500 MG tablet, , Disp: , Rfl:     coenzyme Q10 100 mg capsule, Take 100 mg by mouth once daily. , Disp: , Rfl:     ELIQUIS 5 mg Tab, TAKE 1 TABLET TWICE DAILY, Disp: 180 tablet, Rfl: 1    glucosamine-chondroitin 500-400 mg tablet, Take 1 tablet by mouth Daily. Triflex, Disp: , Rfl:     metoprolol succinate (TOPROL-XL) 25 MG 24 hr tablet, TAKE 1 TABLET EVERY DAY, Disp: 90 tablet, Rfl: 0    MULTIVITS-MINERALS/FA/LYCOPENE (MEN'S DAILY MULTIVIT-MINERAL ORAL), Take 1 tablet by mouth once daily. , Disp: , Rfl:     nitroGLYCERIN (NITROSTAT) 0.4 MG SL tablet, Place 1 tablet (0.4 mg total) under the tongue every 5 (five) minutes as needed., Disp: 25 tablet, Rfl: 0    rosuvastatin (CRESTOR) 20 MG tablet, Take 1 tablet (20 mg total) by mouth every evening., Disp: 90 tablet, Rfl: 1    tadalafiL (CIALIS) 5 MG tablet, Take 1 tablet (5 mg total) by mouth Daily., Disp: 90 tablet, Rfl: 3     chlorpheniramine/dextromethorp (CORICIDIN HBP COUGH AND COLD ORAL), Take 1 tablet by mouth nightly as needed., Disp: , Rfl:     fluticasone propionate (FLONASE) 50 mcg/actuation nasal spray, 1 spray (50 mcg total) by Each Nostril route once daily., Disp: 16 g, Rfl: 0    prazosin (MINIPRESS) 5 MG capsule, Take 1 capsule (5 mg total) by mouth nightly., Disp: 30 capsule, Rfl: 11    sildenafiL (VIAGRA) 100 MG tablet, Take 1 tablet (100 mg total) by mouth daily as needed for Erectile Dysfunction. (Patient not taking: Reported on 10/13/2023), Disp: 50 tablet, Rfl: 1  No current facility-administered medications for this visit.    Facility-Administered Medications Ordered in Other Visits:     HYDROmorphone injection 0.5 mg, 0.5 mg, Intravenous, Q5 Min PRN, Volpi-Abadie, Jacqueline, MD, 0.5 mg at 07/21/22 1219    ondansetron injection 4 mg, 4 mg, Intravenous, Daily PRN, Volpi-Abadie, Jacqueline, MD    oxyCODONE-acetaminophen 5-325 mg per tablet 1 tablet, 1 tablet, Oral, Q3H PRN, Volpi-Abadie, Jacqueline, MD    sodium chloride 0.9% flush 3 mL, 3 mL, Intravenous, PRN, Volpi-Abadie, Jacqueline, MD    Review of Systems   Constitutional: Negative for chills, diaphoresis, fever, malaise/fatigue, night sweats and weight gain.   HENT:  Negative for congestion and sore throat.    Eyes:  Negative for blurred vision and visual disturbance.   Cardiovascular:  Negative for chest pain, claudication, cyanosis, dyspnea on exertion, irregular heartbeat, leg swelling, near-syncope, orthopnea, palpitations, paroxysmal nocturnal dyspnea and syncope.   Respiratory:  Negative for cough, hemoptysis, shortness of breath and wheezing.    Hematologic/Lymphatic: Negative for adenopathy. Does not bruise/bleed easily.   Skin:  Negative for color change and rash.   Musculoskeletal:  Positive for joint pain. Negative for back pain and falls.   Gastrointestinal:  Negative for abdominal pain, change in bowel habit, dysphagia, jaundice, melena, nausea and  "vomiting.   Genitourinary:  Negative for dysuria and flank pain.   Neurological:  Negative for brief paralysis, focal weakness, headaches, light-headedness, loss of balance and weakness. Paresthesias: FEET NEUROPATHY.  Psychiatric/Behavioral:  Negative for altered mental status and depression.    Allergic/Immunologic: Negative for hives and persistent infections.        Objective:      Vitals:    10/13/23 0932   BP: 119/70   Pulse: (!) 57   Weight: 99.9 kg (220 lb 3.8 oz)   Height: 5' 8" (1.727 m)   PainSc: 0-No pain     Body mass index is 33.49 kg/m².    Physical Exam  Constitutional:       Appearance: He is obese.   HENT:      Head: Normocephalic and atraumatic.   Eyes:      Extraocular Movements: Extraocular movements intact.      Conjunctiva/sclera: Conjunctivae normal.   Cardiovascular:      Rate and Rhythm: Normal rate and regular rhythm.      Pulses: Normal pulses.   Abdominal:      Tenderness: There is no abdominal tenderness.   Musculoskeletal:      Cervical back: Neck supple.      Right lower leg: No edema.      Left lower leg: No edema.   Skin:     Capillary Refill: Capillary refill takes less than 2 seconds.   Neurological:      General: No focal deficit present.      Mental Status: He is alert and oriented to person, place, and time.   Psychiatric:         Mood and Affect: Mood normal.         Behavior: Behavior normal.                 ..    Chemistry        Component Value Date/Time     08/01/2023 1014    K 4.5 08/01/2023 1014     08/01/2023 1014    CO2 24 08/01/2023 1014    BUN 19 08/01/2023 1014    CREATININE 1.1 08/01/2023 1014     08/01/2023 1014        Component Value Date/Time    CALCIUM 9.4 08/01/2023 1014    ALKPHOS 72 08/01/2023 1014    AST 30 08/01/2023 1014    ALT 26 08/01/2023 1014    BILITOT 0.6 08/01/2023 1014    ESTGFRAFRICA >60.0 07/18/2022 0850    EGFRNONAA >60.0 07/18/2022 0850            ..  Lab Results   Component Value Date    CHOL 104 (L) 07/18/2022    CHOL 98 " (L) 04/03/2021    CHOL 87 (L) 02/08/2020     Lab Results   Component Value Date    HDL 41 07/18/2022    HDL 36 (L) 04/03/2021    HDL 28 (L) 02/08/2020     Lab Results   Component Value Date    LDLCALC 51.6 (L) 07/18/2022    LDLCALC 46.4 (L) 04/03/2021    LDLCALC 46.4 (L) 02/08/2020     Lab Results   Component Value Date    TRIG 57 07/18/2022    TRIG 78 04/03/2021    TRIG 63 02/08/2020     Lab Results   Component Value Date    CHOLHDL 39.4 07/18/2022    CHOLHDL 36.7 04/03/2021    CHOLHDL 32.2 02/08/2020     ..  Lab Results   Component Value Date    WBC 5.15 08/01/2023    HGB 15.0 08/01/2023    HCT 44.9 08/01/2023    MCV 91 08/01/2023     08/01/2023       Test(s) Reviewed  I have reviewed the following in detail:  [] Stress test   [] Angiography   [] Echocardiogram   [x] Labs   [] Other:       Assessment:         ICD-10-CM ICD-9-CM   1. Coronary artery disease involving native coronary artery of native heart without angina pectoris  I25.10 414.01   2. Carotid artery plaque, left  I65.22 433.10   3. Aortic valve sclerosis  I35.8 424.1   4. Obesity, Class I, BMI 30-34.9  E66.9 278.00   5. PAF (paroxysmal atrial fibrillation)  I48.0 427.31   6. Dyslipidemia (high LDL; low HDL)  E78.5 272.4   7. Essential hypertension  I10 401.9   8. Long term (current) use of anticoagulants  Z79.01 V58.61     Problem List Items Addressed This Visit          Cardiac/Vascular    PAF (paroxysmal atrial fibrillation)    Non-rheumatic mitral regurgitation    Essential hypertension    Relevant Orders    Comprehensive Metabolic Panel    Dyslipidemia (high LDL; low HDL)    Relevant Orders    Comprehensive Metabolic Panel    Lipid Panel    Coronary artery disease involving native coronary artery of native heart without angina pectoris - Primary    Relevant Orders    Comprehensive Metabolic Panel    Lipid Panel    Carotid artery plaque, left       Hematology    Long term (current) use of anticoagulants    Relevant Orders    Comprehensive  Metabolic Panel    Hemoglobin       Endocrine    Obesity, Class I, BMI 30-34.9        Plan:     ALL CV CLINICALLY STABLE, NO ANGINA, NO HF, NO TIA, NO CLINICAL ARRHYTHMIA,CONTINUE CURRENT MEDS, EDUCATION, DIET, EXERCISE , WEIGHT LOSS, RETURN TO CLINIC IN 6 MO WITH LABS      Coronary artery disease involving native coronary artery of native heart without angina pectoris  -     Comprehensive Metabolic Panel; Future; Expected date: 04/13/2024  -     Lipid Panel; Future; Expected date: 04/13/2024    Carotid artery plaque, left    Aortic valve sclerosis  Comments:  MINOR    Obesity, Class I, BMI 30-34.9    PAF (paroxysmal atrial fibrillation)    Dyslipidemia (high LDL; low HDL)  -     Comprehensive Metabolic Panel; Future; Expected date: 04/13/2024  -     Lipid Panel; Future; Expected date: 04/13/2024    Essential hypertension  Comments:  CONTROLLED  Orders:  -     Comprehensive Metabolic Panel; Future; Expected date: 04/13/2024    Long term (current) use of anticoagulants  -     Comprehensive Metabolic Panel; Future; Expected date: 04/13/2024  -     Hemoglobin; Future; Expected date: 04/13/2024    RTC Low level/low impact aerobic exercise 5x's/wk. Heart healthy diet and risk factor modification.    See labs and med orders.    Aerobic exercise 5x's/wk. Heart healthy diet and risk factor modification.    See labs and med orders.

## 2023-10-13 ENCOUNTER — OFFICE VISIT (OUTPATIENT)
Dept: CARDIOLOGY | Facility: CLINIC | Age: 73
End: 2023-10-13
Payer: MEDICARE

## 2023-10-13 VITALS
DIASTOLIC BLOOD PRESSURE: 70 MMHG | BODY MASS INDEX: 33.38 KG/M2 | HEIGHT: 68 IN | SYSTOLIC BLOOD PRESSURE: 119 MMHG | WEIGHT: 220.25 LBS | HEART RATE: 57 BPM

## 2023-10-13 DIAGNOSIS — I25.10 CORONARY ARTERY DISEASE INVOLVING NATIVE CORONARY ARTERY OF NATIVE HEART WITHOUT ANGINA PECTORIS: Primary | Chronic | ICD-10-CM

## 2023-10-13 DIAGNOSIS — Z79.01 LONG TERM (CURRENT) USE OF ANTICOAGULANTS: Chronic | ICD-10-CM

## 2023-10-13 DIAGNOSIS — E66.9 OBESITY, CLASS I, BMI 30-34.9: Chronic | ICD-10-CM

## 2023-10-13 DIAGNOSIS — E78.5 DYSLIPIDEMIA (HIGH LDL; LOW HDL): Chronic | ICD-10-CM

## 2023-10-13 DIAGNOSIS — I10 ESSENTIAL HYPERTENSION: Chronic | ICD-10-CM

## 2023-10-13 DIAGNOSIS — I65.22 CAROTID ARTERY PLAQUE, LEFT: Chronic | ICD-10-CM

## 2023-10-13 DIAGNOSIS — I35.8 AORTIC VALVE SCLEROSIS: Chronic | ICD-10-CM

## 2023-10-13 DIAGNOSIS — I48.0 PAF (PAROXYSMAL ATRIAL FIBRILLATION): Chronic | ICD-10-CM

## 2023-10-13 PROBLEM — R09.89 BRUIT OF LEFT CAROTID ARTERY: Status: RESOLVED | Noted: 2021-10-11 | Resolved: 2023-10-13

## 2023-10-13 PROBLEM — E66.01 SEVERE OBESITY (BMI 35.0-35.9 WITH COMORBIDITY): Status: RESOLVED | Noted: 2023-10-12 | Resolved: 2023-10-13

## 2023-10-13 PROCEDURE — 3078F PR MOST RECENT DIASTOLIC BLOOD PRESSURE < 80 MM HG: ICD-10-PCS | Mod: CPTII,S$GLB,, | Performed by: INTERNAL MEDICINE

## 2023-10-13 PROCEDURE — 99214 OFFICE O/P EST MOD 30 MIN: CPT | Mod: S$GLB,,, | Performed by: INTERNAL MEDICINE

## 2023-10-13 PROCEDURE — 1101F PT FALLS ASSESS-DOCD LE1/YR: CPT | Mod: CPTII,S$GLB,, | Performed by: INTERNAL MEDICINE

## 2023-10-13 PROCEDURE — 1126F PR PAIN SEVERITY QUANTIFIED, NO PAIN PRESENT: ICD-10-PCS | Mod: CPTII,S$GLB,, | Performed by: INTERNAL MEDICINE

## 2023-10-13 PROCEDURE — 99999 PR PBB SHADOW E&M-EST. PATIENT-LVL III: CPT | Mod: PBBFAC,,, | Performed by: INTERNAL MEDICINE

## 2023-10-13 PROCEDURE — 1126F AMNT PAIN NOTED NONE PRSNT: CPT | Mod: CPTII,S$GLB,, | Performed by: INTERNAL MEDICINE

## 2023-10-13 PROCEDURE — 3008F BODY MASS INDEX DOCD: CPT | Mod: CPTII,S$GLB,, | Performed by: INTERNAL MEDICINE

## 2023-10-13 PROCEDURE — 3288F PR FALLS RISK ASSESSMENT DOCUMENTED: ICD-10-PCS | Mod: CPTII,S$GLB,, | Performed by: INTERNAL MEDICINE

## 2023-10-13 PROCEDURE — 1101F PR PT FALLS ASSESS DOC 0-1 FALLS W/OUT INJ PAST YR: ICD-10-PCS | Mod: CPTII,S$GLB,, | Performed by: INTERNAL MEDICINE

## 2023-10-13 PROCEDURE — 3288F FALL RISK ASSESSMENT DOCD: CPT | Mod: CPTII,S$GLB,, | Performed by: INTERNAL MEDICINE

## 2023-10-13 PROCEDURE — 99999 PR PBB SHADOW E&M-EST. PATIENT-LVL III: ICD-10-PCS | Mod: PBBFAC,,, | Performed by: INTERNAL MEDICINE

## 2023-10-13 PROCEDURE — 99214 PR OFFICE/OUTPT VISIT, EST, LEVL IV, 30-39 MIN: ICD-10-PCS | Mod: S$GLB,,, | Performed by: INTERNAL MEDICINE

## 2023-10-13 PROCEDURE — 1159F PR MEDICATION LIST DOCUMENTED IN MEDICAL RECORD: ICD-10-PCS | Mod: CPTII,S$GLB,, | Performed by: INTERNAL MEDICINE

## 2023-10-13 PROCEDURE — 3078F DIAST BP <80 MM HG: CPT | Mod: CPTII,S$GLB,, | Performed by: INTERNAL MEDICINE

## 2023-10-13 PROCEDURE — 3074F SYST BP LT 130 MM HG: CPT | Mod: CPTII,S$GLB,, | Performed by: INTERNAL MEDICINE

## 2023-10-13 PROCEDURE — 1159F MED LIST DOCD IN RCRD: CPT | Mod: CPTII,S$GLB,, | Performed by: INTERNAL MEDICINE

## 2023-10-13 PROCEDURE — 3074F PR MOST RECENT SYSTOLIC BLOOD PRESSURE < 130 MM HG: ICD-10-PCS | Mod: CPTII,S$GLB,, | Performed by: INTERNAL MEDICINE

## 2023-10-13 PROCEDURE — 3008F PR BODY MASS INDEX (BMI) DOCUMENTED: ICD-10-PCS | Mod: CPTII,S$GLB,, | Performed by: INTERNAL MEDICINE

## 2023-10-25 ENCOUNTER — PATIENT MESSAGE (OUTPATIENT)
Dept: CARDIOLOGY | Facility: CLINIC | Age: 73
End: 2023-10-25
Payer: MEDICARE

## 2023-11-08 RX ORDER — ROSUVASTATIN CALCIUM 20 MG/1
20 TABLET, COATED ORAL NIGHTLY
Qty: 90 TABLET | Refills: 1 | Status: SHIPPED | OUTPATIENT
Start: 2023-11-08 | End: 2023-12-11

## 2023-12-11 ENCOUNTER — OFFICE VISIT (OUTPATIENT)
Dept: FAMILY MEDICINE | Facility: CLINIC | Age: 73
End: 2023-12-11
Payer: MEDICARE

## 2023-12-11 VITALS
SYSTOLIC BLOOD PRESSURE: 122 MMHG | OXYGEN SATURATION: 99 % | DIASTOLIC BLOOD PRESSURE: 74 MMHG | BODY MASS INDEX: 33.19 KG/M2 | HEART RATE: 58 BPM | WEIGHT: 218.25 LBS

## 2023-12-11 DIAGNOSIS — G47.33 OBSTRUCTIVE SLEEP APNEA SYNDROME: ICD-10-CM

## 2023-12-11 DIAGNOSIS — Z95.5 STENTED CORONARY ARTERY: ICD-10-CM

## 2023-12-11 DIAGNOSIS — I10 ESSENTIAL HYPERTENSION: ICD-10-CM

## 2023-12-11 DIAGNOSIS — N52.9 ERECTILE DYSFUNCTION, UNSPECIFIED ERECTILE DYSFUNCTION TYPE: ICD-10-CM

## 2023-12-11 DIAGNOSIS — I48.0 PAF (PAROXYSMAL ATRIAL FIBRILLATION): Primary | ICD-10-CM

## 2023-12-11 DIAGNOSIS — E78.00 HYPERCHOLESTEREMIA: ICD-10-CM

## 2023-12-11 PROCEDURE — 99999 PR PBB SHADOW E&M-EST. PATIENT-LVL III: CPT | Mod: PBBFAC,,, | Performed by: INTERNAL MEDICINE

## 2023-12-11 PROCEDURE — 1101F PT FALLS ASSESS-DOCD LE1/YR: CPT | Mod: CPTII,S$GLB,, | Performed by: INTERNAL MEDICINE

## 2023-12-11 PROCEDURE — 1159F MED LIST DOCD IN RCRD: CPT | Mod: CPTII,S$GLB,, | Performed by: INTERNAL MEDICINE

## 2023-12-11 PROCEDURE — 3078F PR MOST RECENT DIASTOLIC BLOOD PRESSURE < 80 MM HG: ICD-10-PCS | Mod: CPTII,S$GLB,, | Performed by: INTERNAL MEDICINE

## 2023-12-11 PROCEDURE — 99214 OFFICE O/P EST MOD 30 MIN: CPT | Mod: S$GLB,,, | Performed by: INTERNAL MEDICINE

## 2023-12-11 PROCEDURE — 1101F PR PT FALLS ASSESS DOC 0-1 FALLS W/OUT INJ PAST YR: ICD-10-PCS | Mod: CPTII,S$GLB,, | Performed by: INTERNAL MEDICINE

## 2023-12-11 PROCEDURE — 3074F PR MOST RECENT SYSTOLIC BLOOD PRESSURE < 130 MM HG: ICD-10-PCS | Mod: CPTII,S$GLB,, | Performed by: INTERNAL MEDICINE

## 2023-12-11 PROCEDURE — 99214 PR OFFICE/OUTPT VISIT, EST, LEVL IV, 30-39 MIN: ICD-10-PCS | Mod: S$GLB,,, | Performed by: INTERNAL MEDICINE

## 2023-12-11 PROCEDURE — 99999 PR PBB SHADOW E&M-EST. PATIENT-LVL III: ICD-10-PCS | Mod: PBBFAC,,, | Performed by: INTERNAL MEDICINE

## 2023-12-11 PROCEDURE — 3074F SYST BP LT 130 MM HG: CPT | Mod: CPTII,S$GLB,, | Performed by: INTERNAL MEDICINE

## 2023-12-11 PROCEDURE — 3078F DIAST BP <80 MM HG: CPT | Mod: CPTII,S$GLB,, | Performed by: INTERNAL MEDICINE

## 2023-12-11 PROCEDURE — 1160F RVW MEDS BY RX/DR IN RCRD: CPT | Mod: CPTII,S$GLB,, | Performed by: INTERNAL MEDICINE

## 2023-12-11 PROCEDURE — 3008F BODY MASS INDEX DOCD: CPT | Mod: CPTII,S$GLB,, | Performed by: INTERNAL MEDICINE

## 2023-12-11 PROCEDURE — 1159F PR MEDICATION LIST DOCUMENTED IN MEDICAL RECORD: ICD-10-PCS | Mod: CPTII,S$GLB,, | Performed by: INTERNAL MEDICINE

## 2023-12-11 PROCEDURE — 3288F PR FALLS RISK ASSESSMENT DOCUMENTED: ICD-10-PCS | Mod: CPTII,S$GLB,, | Performed by: INTERNAL MEDICINE

## 2023-12-11 PROCEDURE — 1160F PR REVIEW ALL MEDS BY PRESCRIBER/CLIN PHARMACIST DOCUMENTED: ICD-10-PCS | Mod: CPTII,S$GLB,, | Performed by: INTERNAL MEDICINE

## 2023-12-11 PROCEDURE — 3288F FALL RISK ASSESSMENT DOCD: CPT | Mod: CPTII,S$GLB,, | Performed by: INTERNAL MEDICINE

## 2023-12-11 PROCEDURE — 3008F PR BODY MASS INDEX (BMI) DOCUMENTED: ICD-10-PCS | Mod: CPTII,S$GLB,, | Performed by: INTERNAL MEDICINE

## 2023-12-11 RX ORDER — ROSUVASTATIN CALCIUM 5 MG/1
5 TABLET, COATED ORAL DAILY
Qty: 90 TABLET | Refills: 3 | Status: SHIPPED | OUTPATIENT
Start: 2023-12-11 | End: 2024-12-10

## 2023-12-11 NOTE — PROGRESS NOTES
Subjective     Farhan Stallworth is a 73 y.o. old, male here for Annual Exam    72 y/o with PMH of CAD s/p stent, PAF on eliquis, REYES on Inspire device, HLD, OA     Recent ear infection/ears clogged. He went to , put on steroids, still had the same problem. He was later put on an antibiotic. It seems to have improved.    Long term neuropathy in feet, stinging type pain. It comes and goes, improves some with rest. He has been taking B vitamins. H/o DDD, lumbar surgery, NEY's and RFA's.    Leg cramps at night, getting worse, getting up every 10-15 minutes, he suspects it is due to statin therapy - does have elevated CK in the past.    REYES: inspire device continues to work well.    He remains very active: exercising at the gym almost every day, cardio and lifting.    Continues to have issues with ED and also had genital numbness for several years.    ROS  Medications     Outpatient Medications Marked as Taking for the 12/11/23 encounter (Office Visit) with Samuel Laura MD   Medication Sig Dispense Refill    acetaminophen (TYLENOL) 500 MG tablet       chlorpheniramine/dextromethorp (CORICIDIN HBP COUGH AND COLD ORAL) Take 1 tablet by mouth nightly as needed.      coenzyme Q10 100 mg capsule Take 100 mg by mouth once daily.       ELIQUIS 5 mg Tab TAKE 1 TABLET TWICE DAILY 180 tablet 1    glucosamine-chondroitin 500-400 mg tablet Take 1 tablet by mouth Daily. Triflex      metoprolol succinate (TOPROL-XL) 25 MG 24 hr tablet TAKE 1 TABLET EVERY DAY 90 tablet 0    MULTIVITS-MINERALS/FA/LYCOPENE (MEN'S DAILY MULTIVIT-MINERAL ORAL) Take 1 tablet by mouth once daily.       sildenafiL (VIAGRA) 100 MG tablet Take 1 tablet (100 mg total) by mouth daily as needed for Erectile Dysfunction. 50 tablet 1    tadalafiL (CIALIS) 5 MG tablet Take 1 tablet (5 mg total) by mouth Daily. 90 tablet 3    [DISCONTINUED] rosuvastatin (CRESTOR) 20 MG tablet TAKE 1 TABLET EVERY EVENING 90 tablet 1     Objective     /74   Pulse  (!) 58   Wt 99 kg (218 lb 4.1 oz)   SpO2 99%   BMI 33.19 kg/m²   Physical Exam  Constitutional:       General: He is not in acute distress.     Appearance: Normal appearance. He is well-developed.   HENT:      Head: Normocephalic and atraumatic.   Eyes:      Conjunctiva/sclera: Conjunctivae normal.      Pupils: Pupils are equal, round, and reactive to light.   Neck:      Thyroid: No thyroid mass or thyromegaly.   Cardiovascular:      Rate and Rhythm: Normal rate and regular rhythm.      Heart sounds: Normal heart sounds. No murmur heard.  Pulmonary:      Effort: No respiratory distress.      Breath sounds: Normal breath sounds.   Abdominal:      General: Bowel sounds are normal.      Palpations: Abdomen is soft.      Tenderness: There is no abdominal tenderness.   Musculoskeletal:         General: No deformity.      Cervical back: Neck supple.   Lymphadenopathy:      Cervical: No cervical adenopathy.      Upper Body:      Right upper body: No supraclavicular adenopathy.      Left upper body: No supraclavicular adenopathy.   Skin:     General: Skin is warm and dry.      Findings: No rash.   Neurological:      Mental Status: He is alert and oriented to person, place, and time.   Psychiatric:         Behavior: Behavior normal.       Assessment and Plan     PAF (paroxysmal atrial fibrillation)    Stented coronary artery  -     rosuvastatin (CRESTOR) 5 MG tablet; Take 1 tablet (5 mg total) by mouth once daily.  Dispense: 90 tablet; Refill: 3    Obstructive sleep apnea syndrome    Essential hypertension    Hypercholesteremia  -     rosuvastatin (CRESTOR) 5 MG tablet; Take 1 tablet (5 mg total) by mouth once daily.  Dispense: 90 tablet; Refill: 3    Erectile dysfunction, unspecified erectile dysfunction type  -     Ambulatory referral/consult to Urology; Future; Expected date: 12/18/2023      Lower dose of statin therapy to see if cramping issues resolve. CAD and HLD otherwise stable.  LE neuropathy is likely related to  DDD and due to compression. Consult with pain management.    ___________________  Samuel Laura MD  Internal Medicine and Pediatrics

## 2024-01-17 ENCOUNTER — LAB VISIT (OUTPATIENT)
Dept: LAB | Facility: HOSPITAL | Age: 74
End: 2024-01-17
Attending: PHYSICIAN ASSISTANT
Payer: MEDICARE

## 2024-01-17 ENCOUNTER — PATIENT MESSAGE (OUTPATIENT)
Dept: CARDIOLOGY | Facility: CLINIC | Age: 74
End: 2024-01-17
Payer: MEDICARE

## 2024-01-17 DIAGNOSIS — G89.4 CHRONIC PAIN SYNDROME: Primary | ICD-10-CM

## 2024-01-17 DIAGNOSIS — M46.1 INFLAMMATION, JOINT, SACROILIAC: ICD-10-CM

## 2024-01-17 LAB
ALBUMIN SERPL BCP-MCNC: 4.2 G/DL (ref 3.5–5.2)
ALP SERPL-CCNC: 74 U/L (ref 55–135)
ALT SERPL W/O P-5'-P-CCNC: 29 U/L (ref 10–44)
ANION GAP SERPL CALC-SCNC: 7 MMOL/L (ref 8–16)
AST SERPL-CCNC: 30 U/L (ref 10–40)
BASOPHILS # BLD AUTO: 0.02 K/UL (ref 0–0.2)
BASOPHILS NFR BLD: 0.4 % (ref 0–1.9)
BILIRUB SERPL-MCNC: 0.7 MG/DL (ref 0.1–1)
BILIRUB UR QL STRIP: NEGATIVE
BUN SERPL-MCNC: 17 MG/DL (ref 8–23)
CALCIUM SERPL-MCNC: 9.2 MG/DL (ref 8.7–10.5)
CHLORIDE SERPL-SCNC: 106 MMOL/L (ref 95–110)
CLARITY UR: CLEAR
CO2 SERPL-SCNC: 24 MMOL/L (ref 23–29)
COLOR UR: YELLOW
CREAT SERPL-MCNC: 1.1 MG/DL (ref 0.5–1.4)
DIFFERENTIAL METHOD BLD: ABNORMAL
EOSINOPHIL # BLD AUTO: 0.1 K/UL (ref 0–0.5)
EOSINOPHIL NFR BLD: 1.3 % (ref 0–8)
ERYTHROCYTE [DISTWIDTH] IN BLOOD BY AUTOMATED COUNT: 13.5 % (ref 11.5–14.5)
EST. GFR  (NO RACE VARIABLE): >60 ML/MIN/1.73 M^2
GLUCOSE SERPL-MCNC: 103 MG/DL (ref 70–110)
GLUCOSE UR QL STRIP: NEGATIVE
HCT VFR BLD AUTO: 44.5 % (ref 40–54)
HGB BLD-MCNC: 15.1 G/DL (ref 14–18)
HGB UR QL STRIP: NEGATIVE
IMM GRANULOCYTES # BLD AUTO: 0.03 K/UL (ref 0–0.04)
IMM GRANULOCYTES NFR BLD AUTO: 0.6 % (ref 0–0.5)
KETONES UR QL STRIP: NEGATIVE
LEUKOCYTE ESTERASE UR QL STRIP: NEGATIVE
LYMPHOCYTES # BLD AUTO: 1 K/UL (ref 1–4.8)
LYMPHOCYTES NFR BLD: 18.4 % (ref 18–48)
MCH RBC QN AUTO: 30.6 PG (ref 27–31)
MCHC RBC AUTO-ENTMCNC: 33.9 G/DL (ref 32–36)
MCV RBC AUTO: 90 FL (ref 82–98)
MONOCYTES # BLD AUTO: 0.6 K/UL (ref 0.3–1)
MONOCYTES NFR BLD: 10.3 % (ref 4–15)
NEUTROPHILS # BLD AUTO: 3.7 K/UL (ref 1.8–7.7)
NEUTROPHILS NFR BLD: 69 % (ref 38–73)
NITRITE UR QL STRIP: NEGATIVE
NRBC BLD-RTO: 0 /100 WBC
PH UR STRIP: 6 [PH] (ref 5–8)
PLATELET # BLD AUTO: 178 K/UL (ref 150–450)
PMV BLD AUTO: 10.6 FL (ref 9.2–12.9)
POTASSIUM SERPL-SCNC: 4.3 MMOL/L (ref 3.5–5.1)
PROT SERPL-MCNC: 7.2 G/DL (ref 6–8.4)
PROT UR QL STRIP: NEGATIVE
RBC # BLD AUTO: 4.94 M/UL (ref 4.6–6.2)
SODIUM SERPL-SCNC: 137 MMOL/L (ref 136–145)
SP GR UR STRIP: 1.02 (ref 1–1.03)
URN SPEC COLLECT METH UR: NORMAL
WBC # BLD AUTO: 5.34 K/UL (ref 3.9–12.7)

## 2024-01-17 PROCEDURE — 80053 COMPREHEN METABOLIC PANEL: CPT | Performed by: PHYSICIAN ASSISTANT

## 2024-01-17 PROCEDURE — 81003 URINALYSIS AUTO W/O SCOPE: CPT | Mod: PO | Performed by: PHYSICIAN ASSISTANT

## 2024-01-17 PROCEDURE — 36415 COLL VENOUS BLD VENIPUNCTURE: CPT | Mod: PO | Performed by: PHYSICIAN ASSISTANT

## 2024-01-17 PROCEDURE — 85025 COMPLETE CBC W/AUTO DIFF WBC: CPT | Performed by: PHYSICIAN ASSISTANT

## 2024-01-18 NOTE — TELEPHONE ENCOUNTER
Care Due:                  Date            Visit Type   Department     Provider  --------------------------------------------------------------------------------                                MYCHART                              ANNUAL                              CHECKUP/PHY  [facility]  Last Visit: 12-      S            DANIEL Laura                               -                              PRIMARY      [facility]  Next Visit: 12-      CARE ([facility])   MEDICINE       Samuel Laura                                                            Last  Test          Frequency    Reason                     Performed    Due Date  --------------------------------------------------------------------------------    Lipid Panel.  12 months..  rosuvastatin.............  07- 07-    [facility] Embedded Care Due Messages. Reference number: [id number].   1/17/2024 6:36:34 PM CST

## 2024-01-19 RX ORDER — TADALAFIL 5 MG/1
5 TABLET ORAL DAILY
Qty: 90 TABLET | Refills: 3 | Status: SHIPPED | OUTPATIENT
Start: 2024-01-19 | End: 2024-01-26 | Stop reason: SDUPTHER

## 2024-01-22 ENCOUNTER — TELEPHONE (OUTPATIENT)
Dept: CARDIOLOGY | Facility: CLINIC | Age: 74
End: 2024-01-22
Payer: MEDICARE

## 2024-01-22 NOTE — TELEPHONE ENCOUNTER
Pt having sacroiliac joint fusion, left. Pt taking eliquis.   Last seen 10/13/2023  Please advise.       [facility]   P- [phone]  [phone]

## 2024-01-26 ENCOUNTER — PATIENT MESSAGE (OUTPATIENT)
Dept: FAMILY MEDICINE | Facility: CLINIC | Age: 74
End: 2024-01-26
Payer: MEDICARE

## 2024-01-31 ENCOUNTER — OFFICE VISIT (OUTPATIENT)
Dept: CARDIOLOGY | Facility: CLINIC | Age: 74
End: 2024-01-31
Payer: MEDICARE

## 2024-01-31 VITALS
SYSTOLIC BLOOD PRESSURE: 148 MMHG | HEIGHT: 68 IN | WEIGHT: 218.06 LBS | BODY MASS INDEX: 33.05 KG/M2 | HEART RATE: 57 BPM | DIASTOLIC BLOOD PRESSURE: 77 MMHG

## 2024-01-31 DIAGNOSIS — I65.22 CAROTID ARTERY PLAQUE, LEFT: ICD-10-CM

## 2024-01-31 DIAGNOSIS — I25.10 CORONARY ARTERY DISEASE INVOLVING NATIVE CORONARY ARTERY OF NATIVE HEART WITHOUT ANGINA PECTORIS: ICD-10-CM

## 2024-01-31 DIAGNOSIS — G47.33 OBSTRUCTIVE SLEEP APNEA SYNDROME: ICD-10-CM

## 2024-01-31 DIAGNOSIS — Z95.5 STENTED CORONARY ARTERY: ICD-10-CM

## 2024-01-31 DIAGNOSIS — I10 ESSENTIAL HYPERTENSION: Primary | ICD-10-CM

## 2024-01-31 DIAGNOSIS — I48.0 PAF (PAROXYSMAL ATRIAL FIBRILLATION): ICD-10-CM

## 2024-01-31 PROCEDURE — 1126F AMNT PAIN NOTED NONE PRSNT: CPT | Mod: CPTII,S$GLB,, | Performed by: INTERNAL MEDICINE

## 2024-01-31 PROCEDURE — 3078F DIAST BP <80 MM HG: CPT | Mod: CPTII,S$GLB,, | Performed by: INTERNAL MEDICINE

## 2024-01-31 PROCEDURE — 1159F MED LIST DOCD IN RCRD: CPT | Mod: CPTII,S$GLB,, | Performed by: INTERNAL MEDICINE

## 2024-01-31 PROCEDURE — 3288F FALL RISK ASSESSMENT DOCD: CPT | Mod: CPTII,S$GLB,, | Performed by: INTERNAL MEDICINE

## 2024-01-31 PROCEDURE — 93010 ELECTROCARDIOGRAM REPORT: CPT | Mod: S$GLB,,, | Performed by: INTERNAL MEDICINE

## 2024-01-31 PROCEDURE — 99214 OFFICE O/P EST MOD 30 MIN: CPT | Mod: S$GLB,,, | Performed by: INTERNAL MEDICINE

## 2024-01-31 PROCEDURE — 99999 PR PBB SHADOW E&M-EST. PATIENT-LVL III: CPT | Mod: PBBFAC,,, | Performed by: INTERNAL MEDICINE

## 2024-01-31 PROCEDURE — 1101F PT FALLS ASSESS-DOCD LE1/YR: CPT | Mod: CPTII,S$GLB,, | Performed by: INTERNAL MEDICINE

## 2024-01-31 PROCEDURE — 93005 ELECTROCARDIOGRAM TRACING: CPT | Mod: PO

## 2024-01-31 PROCEDURE — 3077F SYST BP >= 140 MM HG: CPT | Mod: CPTII,S$GLB,, | Performed by: INTERNAL MEDICINE

## 2024-01-31 PROCEDURE — 3008F BODY MASS INDEX DOCD: CPT | Mod: CPTII,S$GLB,, | Performed by: INTERNAL MEDICINE

## 2024-01-31 RX ORDER — TADALAFIL 5 MG/1
5 TABLET ORAL DAILY
Qty: 90 TABLET | Refills: 3 | Status: SHIPPED | OUTPATIENT
Start: 2024-01-31 | End: 2025-01-30

## 2024-01-31 NOTE — PROGRESS NOTES
Subjective:    Patient ID:  Farhan Stallworth is a 73 y.o. male patient here for evaluation Hypertension      History of Present Illness:  Cardiology  preop clearance.  Back surgery.  Coronary artery disease, history of PCI stent proximal mid LAD 2018.  Follow-up left heart catheterization 2021 with patent stent sites, moderate disease right coronary artery with normal IFR, medical therapy recommended.  EF normal.    History of PAF on chronic oral anticoagulation with Eliquis.  Non recurrent.    No angina, no significant dyspnea.  No PND orthopnea            Review of patient's allergies indicates:  No Known Allergies    Past Medical History:   Diagnosis Date    Arthritis     hip    Atrial fibrillation 12/2018    Coronary artery disease     cardiac stents    Difficult intubation     Hearing difficulty     Hypertension     Low serum testosterone level     Lumbar disc disease     REYES (obstructive sleep apnea)     has CPAP, better relief of symptoms with nasal pillow.     REYES (obstructive sleep apnea)      Past Surgical History:   Procedure Laterality Date    BICEPS TENDON REPAIR Right     CARDIOVERSION N/A 01/03/2019    Procedure: CARDIOVERSION;  Surgeon: Nanci Ahuja MD;  Location: [facility];  Service: Cardiology;  Laterality: N/A;    CARPAL TUNNEL RELEASE      2005, bilateral    CLAVICLE SURGERY      COLONOSCOPY N/A 02/16/2022    Procedure: COLONOSCOPY;  Surgeon: Gualberto Somers MD;  Location: [facility] ENDO;  Service: Endoscopy;  Laterality: N/A;    CORONARY ANGIOGRAPHY N/A 04/09/2019    Procedure: ANGIOGRAM, CORONARY ARTERY;  Surgeon: Nanci Ahuja MD;  Location: [facility] CATH;  Service: Cardiology;  Laterality: N/A;    EPIDURAL BLOCK INJECTION  2010    FRACTURE SURGERY  1966    HIP SURGERY      INSERTION, NEUROSTIMULATOR, HYPOGLOSSAL Right 07/21/2022    Procedure: INSERTION,NEUROSTIMULATOR,HYPOGLOSSAL;  Surgeon: Matthias Zamoraon MD;  Location: [facility] OR;  Service: ENT;  Laterality: Right;    INTERNAL NEUROLYSIS  USING OPERATING MICROSCOPE Left 12/06/2019    Procedure: Cooled radiofrequency ablation of the genicular nerve branches to the left knee;  Surgeon: Jayy Driscoll MD;  Location: [facility] OR;  Service: Orthopedics;  Laterality: Left;    JOINT REPLACEMENT Right 11/2017    hip    JOINT REPLACEMENT Left 02/2020    knee    KNEE ARTHROSCOPY W/ MENISCAL REPAIR      left    LEFT HEART CATHETERIZATION Left 04/09/2019    Procedure: Left heart cath;  Surgeon: Nanci Ahuja MD;  Location: [facility] CATH;  Service: Cardiology;  Laterality: Left;    LEFT HEART CATHETERIZATION Left 02/02/2021    Procedure: Left heart cath;  Surgeon: Nanci Ahuja MD;  Location: [facility] CATH;  Service: Cardiology;  Laterality: Left;    ligament reattachment Right 05/2022    bicep tendon reattachment    LUMBAR FUSION      2010    SLEEP ENDOSCOPY, DRUG-INDUCED Bilateral 05/27/2022    Procedure: SLEEP ENDOSCOPY,DRUG-INDUCED;  Surgeon: Matthias Zamorano MD;  Location: [facility] OR;  Service: ENT;  Laterality: Bilateral;    SPINE SURGERY  05/2010    TONSILLECTOMY       Social History     Tobacco Use    Smoking status: Never    Smokeless tobacco: Never   Substance Use Topics    Alcohol use: Yes     Alcohol/week: 3.0 - 5.0 standard drinks of alcohol     Types: 1 - 2 Glasses of wine, 1 - 2 Cans of beer, 1 Shots of liquor per week     Comment: occasionally    Drug use: No        Review of Systems:    As noted in HPI in addition      REVIEW OF SYSTEMS  Review of Systems   Constitutional: Negative for decreased appetite, diaphoresis, night sweats, weight gain and weight loss.   HENT:  Negative for nosebleeds and odynophagia.    Eyes:  Negative for double vision and photophobia.   Cardiovascular:  Negative for chest pain, claudication, cyanosis, dyspnea on exertion, irregular heartbeat, leg swelling, near-syncope, orthopnea, palpitations, paroxysmal nocturnal dyspnea and syncope.   Respiratory:  Negative for cough, hemoptysis, shortness of breath and wheezing.     Hematologic/Lymphatic: Negative for adenopathy.   Skin:  Negative for flushing, skin cancer and suspicious lesions.   Musculoskeletal:  Negative for gout, myalgias and neck pain.   Gastrointestinal:  Negative for abdominal pain, heartburn, hematemesis and hematochezia.   Genitourinary:  Negative for bladder incontinence, hesitancy and nocturia.   Neurological:  Negative for focal weakness, headaches, light-headedness and paresthesias.   Psychiatric/Behavioral:  Negative for memory loss and substance abuse.               Objective:        Vitals:    01/31/24 1005   BP: (!) 148/77   Pulse: (!) 57       Lab Results   Component Value Date    WBC 5.34 01/17/2024    HGB 15.1 01/17/2024    HCT 44.5 01/17/2024     01/17/2024    CHOL 104 (L) 07/18/2022    TRIG 57 07/18/2022    HDL 41 07/18/2022    ALT 29 01/17/2024    AST 30 01/17/2024     01/17/2024    K 4.3 01/17/2024     01/17/2024    CREATININE 1.1 01/17/2024    BUN 17 01/17/2024    CO2 24 01/17/2024    TSH 0.686 06/16/2020    PSA 0.40 08/11/2020    INR 1.1 05/06/2020    GLUF 112 (H) 04/25/2005        ECHOCARDIOGRAM RESULTS  Results for orders placed in visit on 10/05/21    Echo Color Flow Doppler? Yes    Interpretation Summary  · Technically difficult study, Optison was used  · The left ventricle is normal in size with concentric remodeling and normal systolic function.  · The estimated ejection fraction is 60-65%.  · Normal left ventricular diastolic function.  · With normal right ventricular systolic function.  · Mildly sclerotic aortic and mitral valve with no stenosis.  · Intermediate central venous pressure (8 mmHg).  · The estimated PA systolic pressure is 24 mmHg.        CURRENT/PREVIOUS VISIT EKG  Results for orders placed or performed during the hospital encounter of 02/02/21   EKG 12-lead    Collection Time: 02/02/21  8:59 AM    Narrative    Test Reason : R07.9,    Vent. Rate : 050 BPM     Atrial Rate : 050 BPM     P-R Int : 160 ms           QRS Dur : 094 ms      QT Int : 474 ms       P-R-T Axes : 066 -07 003 degrees     QTc Int : 432 ms    Sinus bradycardia  Minimal voltage criteria for LVH, may be normal variant  Borderline Abnormal ECG  When compared with ECG of 28-JAN-2021 14:21,  T wave inversion now evident in Inferior leads  Confirmed by Enzo Vu MD ([id number]) on 2/2/2021 2:31:52 PM    Referred By:  DENZEL           Confirmed By:Enzo Vu MD     No valid procedures specified.   Results for orders placed during the hospital encounter of 12/14/20    Nuclear Stress - Cardiology Interpreted    Interpretation Summary    Normal myocardial perfusion scan. There is no evidence of myocardial ischemia or infarction.    Gated perfusion images showed an ejection fraction of 61% at rest.    Gated perfusion images showed an ejection fraction of 66% post stress.    The EKG portion of this study is negative for ischemia.    Arrhythmias during stress: rare PVCs.    The exercise capacity was normal.  The patient exercised for 7 min 50 sec, achieved more than 12 Mets and 97% of maximum predicted heart rate.    No valid procedures specified.    PHYSICAL EXAM  CONSTITUTIONAL: Well built, well nourished in no apparent distress  NECK: no carotid bruit, no JVD  LUNGS: CTA  CHEST WALL: no tenderness,  HEART: regular rate and rhythm, S1, S2 normal, no murmur, click, rub or gallop   ABDOMEN: soft, non-tender; bowel sounds normal; no masses,  no organomegaly  EXTREMITIES: Extremities normal, no edema, no calf tenderness noted  NEURO: AAO X 3    I HAVE REVIEWED :    The vital signs, nurses notes, and all the pertinent radiology and labs.         Current Outpatient Medications   Medication Instructions    acetaminophen (TYLENOL) 500 MG tablet No dose, route, or frequency recorded.    chlorpheniramine/dextromethorp (CORICIDIN HBP COUGH AND COLD ORAL) 1 tablet, Oral, Nightly PRN    coenzyme Q10 100 mg, Oral, Daily    ELIQUIS 5 mg, Oral, 2 times daily     glucosamine-chondroitin 500-400 mg tablet 1 tablet, Oral, Daily, Triflex    metoprolol succinate (TOPROL-XL) 25 MG 24 hr tablet TAKE 1 TABLET EVERY DAY    MULTIVITS-MINERALS/FA/LYCOPENE (MEN'S DAILY MULTIVIT-MINERAL ORAL) 1 tablet, Oral, Daily    nitroGLYCERIN (NITROSTAT) 0.4 mg, Sublingual, Every 5 min PRN    rosuvastatin (CRESTOR) 5 mg, Oral, Daily    sildenafiL (VIAGRA) 100 mg, Oral, Daily PRN    tadalafiL (CIALIS) 5 mg, Oral, Daily          Assessment:   Coronary disease.  PCI stent proximal LAD 2018.  Follow up left heart catheterization 2021 with patent stent sites, moderate disease right coronary artery, therapy recommended.  EF normal.    Stable carotid ultrasound 10/2021.    Hypertension, dyslipidemia.        Plan:   Exam review of systems stable.  Baseline EKG without acute change, sinus rhythm with sinus bradycardia.  Low risk cleared upcoming back surgery.  Follow up with Cardiology as scheduled.          No follow-ups on file.

## 2024-01-31 NOTE — TELEPHONE ENCOUNTER
No care due was identified.  [facility] Embedded Care Due Messages. Reference number: [id number].   1/31/2024 11:35:52 AM CST

## 2024-02-05 LAB
OHS QRS DURATION: 86 MS
OHS QTC CALCULATION: 430 MS

## 2024-03-06 ENCOUNTER — TELEPHONE (OUTPATIENT)
Dept: UROLOGY | Facility: CLINIC | Age: 74
End: 2024-03-06
Payer: MEDICARE

## 2024-03-06 NOTE — TELEPHONE ENCOUNTER
----- Message from Bee Quintero sent at 3/6/2024  8:49 AM CST -----  Regarding: Pt Advice  Needs Medical Advice      Who Called: Pt        Would the patient rather a call back or a response via [facility]? Call back    Best Call Back Number:  [phone]      Additional Information: Sts he received an email that there was an available sooner time appt open and he is wanted a call to schedule an earlier time than 3/27.   Please Advise - Thank you

## 2024-03-08 DIAGNOSIS — I25.10 CORONARY ARTERY DISEASE INVOLVING NATIVE CORONARY ARTERY OF NATIVE HEART WITHOUT ANGINA PECTORIS: Primary | ICD-10-CM

## 2024-03-08 DIAGNOSIS — I48.91 NEW ONSET ATRIAL FIBRILLATION: ICD-10-CM

## 2024-03-11 DIAGNOSIS — I48.91 NEW ONSET ATRIAL FIBRILLATION: ICD-10-CM

## 2024-03-11 RX ORDER — METOPROLOL SUCCINATE 25 MG/1
25 TABLET, EXTENDED RELEASE ORAL DAILY
Qty: 90 TABLET | Refills: 0 | Status: SHIPPED | OUTPATIENT
Start: 2024-03-11 | End: 2024-03-11 | Stop reason: SDUPTHER

## 2024-03-12 RX ORDER — METOPROLOL SUCCINATE 25 MG/1
25 TABLET, EXTENDED RELEASE ORAL DAILY
Qty: 90 TABLET | Refills: 0 | Status: SHIPPED | OUTPATIENT
Start: 2024-03-12 | End: 2024-05-21

## 2024-03-13 ENCOUNTER — OFFICE VISIT (OUTPATIENT)
Dept: UROLOGY | Facility: CLINIC | Age: 74
End: 2024-03-13
Payer: MEDICARE

## 2024-03-13 VITALS — HEIGHT: 68 IN | WEIGHT: 220.88 LBS | BODY MASS INDEX: 33.48 KG/M2

## 2024-03-13 DIAGNOSIS — N52.01 ERECTILE DYSFUNCTION DUE TO ARTERIAL INSUFFICIENCY: Primary | ICD-10-CM

## 2024-03-13 PROCEDURE — 1159F MED LIST DOCD IN RCRD: CPT | Mod: CPTII,S$GLB,, | Performed by: UROLOGY

## 2024-03-13 PROCEDURE — 1101F PT FALLS ASSESS-DOCD LE1/YR: CPT | Mod: CPTII,S$GLB,, | Performed by: UROLOGY

## 2024-03-13 PROCEDURE — 3008F BODY MASS INDEX DOCD: CPT | Mod: CPTII,S$GLB,, | Performed by: UROLOGY

## 2024-03-13 PROCEDURE — 99999 PR PBB SHADOW E&M-EST. PATIENT-LVL III: CPT | Mod: PBBFAC,,, | Performed by: UROLOGY

## 2024-03-13 PROCEDURE — 99203 OFFICE O/P NEW LOW 30 MIN: CPT | Mod: S$GLB,,, | Performed by: UROLOGY

## 2024-03-13 PROCEDURE — 1126F AMNT PAIN NOTED NONE PRSNT: CPT | Mod: CPTII,S$GLB,, | Performed by: UROLOGY

## 2024-03-13 PROCEDURE — 3288F FALL RISK ASSESSMENT DOCD: CPT | Mod: CPTII,S$GLB,, | Performed by: UROLOGY

## 2024-03-13 RX ORDER — PAPAV/PHENTOLAM/ALPROST/WATER 12-1-10/ML
VIAL (ML) INTRACAVERNOSAL DAILY PRN
Qty: 5 ML | Refills: 2 | Status: SHIPPED | OUTPATIENT
Start: 2024-03-13

## 2024-03-13 RX ORDER — ISOSORBIDE MONONITRATE 30 MG/1
30 TABLET, EXTENDED RELEASE ORAL
COMMUNITY
End: 2024-03-13

## 2024-03-13 RX ORDER — ASPIRIN 81 MG/1
81 TABLET ORAL DAILY
COMMUNITY

## 2024-03-13 NOTE — PROGRESS NOTES
Subjective:       Patient ID: Farhan Stallworth is a 73 y.o. male.    Chief Complaint: Erectile Dysfunction    HPI    73-year-old with ED. this has been a long-term problem.  He is tried daily Cialis as well as Viagra 100 mg.  He gets weak erections.  He is here to discuss alternative treatment options for ED. he has no bothersome urinary symptoms.  We discussed all treatment options for ED including intracavernosal injections.       Past Medical History:   Diagnosis Date    Arthritis     hip    Atrial fibrillation 12/2018    Coronary artery disease     cardiac stents    Difficult intubation     Hearing difficulty     Hypertension     Low serum testosterone level     Lumbar disc disease     REYES (obstructive sleep apnea)     has CPAP, better relief of symptoms with nasal pillow.     REYES (obstructive sleep apnea)      Past Surgical History:   Procedure Laterality Date    BICEPS TENDON REPAIR Right     CARDIOVERSION N/A 01/03/2019    Procedure: CARDIOVERSION;  Surgeon: Nanci Ahuja MD;  Location: [facility];  Service: Cardiology;  Laterality: N/A;    CARPAL TUNNEL RELEASE      2005, bilateral    CLAVICLE SURGERY      COLONOSCOPY N/A 02/16/2022    Procedure: COLONOSCOPY;  Surgeon: Gualberto Somers MD;  Location: [facility] ENDO;  Service: Endoscopy;  Laterality: N/A;    CORONARY ANGIOGRAPHY N/A 04/09/2019    Procedure: ANGIOGRAM, CORONARY ARTERY;  Surgeon: Nanci Ahuja MD;  Location: [facility] CATH;  Service: Cardiology;  Laterality: N/A;    EPIDURAL BLOCK INJECTION  2010    FRACTURE SURGERY  1966    HIP SURGERY      INSERTION, NEUROSTIMULATOR, HYPOGLOSSAL Right 07/21/2022    Procedure: INSERTION,NEUROSTIMULATOR,HYPOGLOSSAL;  Surgeon: Matthias Zamorano MD;  Location: [facility] OR;  Service: ENT;  Laterality: Right;    INTERNAL NEUROLYSIS USING OPERATING MICROSCOPE Left 12/06/2019    Procedure: Cooled radiofrequency ablation of the genicular nerve branches to the left knee;  Surgeon: Jayy Driscoll MD;  Location: [facility] OR;   Service: Orthopedics;  Laterality: Left;    JOINT REPLACEMENT Right 11/2017    hip    JOINT REPLACEMENT Left 02/2020    knee    KNEE ARTHROSCOPY W/ MENISCAL REPAIR      left    LEFT HEART CATHETERIZATION Left 04/09/2019    Procedure: Left heart cath;  Surgeon: Nanci Ahuja MD;  Location: [facility] CATH;  Service: Cardiology;  Laterality: Left;    LEFT HEART CATHETERIZATION Left 02/02/2021    Procedure: Left heart cath;  Surgeon: Nanci Ahuja MD;  Location: [facility] CATH;  Service: Cardiology;  Laterality: Left;    ligament reattachment Right 05/2022    bicep tendon reattachment    LUMBAR FUSION      2010    SLEEP ENDOSCOPY, DRUG-INDUCED Bilateral 05/27/2022    Procedure: SLEEP ENDOSCOPY,DRUG-INDUCED;  Surgeon: Matthias Zamorano MD;  Location: [facility] OR;  Service: ENT;  Laterality: Bilateral;    SPINE SURGERY  05/2010    TONSILLECTOMY         Current Outpatient Medications:     acetaminophen (TYLENOL) 500 MG tablet, , Disp: , Rfl:     aspirin (ECOTRIN) 81 MG EC tablet, Take 81 mg by mouth once daily., Disp: , Rfl:     coenzyme Q10 100 mg capsule, Take 100 mg by mouth once daily. , Disp: , Rfl:     ELIQUIS 5 mg Tab, TAKE 1 TABLET TWICE DAILY, Disp: 180 tablet, Rfl: 1    glucosamine-chondroitin 500-400 mg tablet, Take 1 tablet by mouth Daily. Triflex, Disp: , Rfl:     metoprolol succinate (TOPROL-XL) 25 MG 24 hr tablet, Take 1 tablet (25 mg total) by mouth once daily., Disp: 90 tablet, Rfl: 0    MULTIVITS-MINERALS/FA/LYCOPENE (MEN'S DAILY MULTIVIT-MINERAL ORAL), Take 1 tablet by mouth once daily. , Disp: , Rfl:     nitroGLYCERIN (NITROSTAT) 0.4 MG SL tablet, Place 1 tablet (0.4 mg total) under the tongue every 5 (five) minutes as needed., Disp: 25 tablet, Rfl: 0    rosuvastatin (CRESTOR) 5 MG tablet, Take 1 tablet (5 mg total) by mouth once daily., Disp: 90 tablet, Rfl: 3    sildenafiL (VIAGRA) 100 MG tablet, Take 1 tablet (100 mg total) by mouth daily as needed for Erectile Dysfunction., Disp: 50 tablet, Rfl: 1    tadalafiL  (CIALIS) 5 MG tablet, Take 1 tablet (5 mg total) by mouth Daily., Disp: 90 tablet, Rfl: 3    chlorpheniramine/dextromethorp (CORICIDIN HBP COUGH AND COLD ORAL), Take 1 tablet by mouth nightly as needed., Disp: , Rfl:     Papaverine 300mg (30mg/mL), PGE 100mcg (10 mcg/mL), Phentolamine 20mg (2mg/mL) ICAV injection, by Intracavernosal route daily as needed (ED). Inject directed amount into side of penis (discuss with your physician), Disp: 5 mL, Rfl: 2  No current facility-administered medications for this visit.    Facility-Administered Medications Ordered in Other Visits:     HYDROmorphone injection 0.5 mg, 0.5 mg, Intravenous, Q5 Min PRN, Volpi-Abadie, Jacqueline, MD, 0.5 mg at 07/21/22 1219    ondansetron injection 4 mg, 4 mg, Intravenous, Daily PRN, Volpi-Abadie, Jacqueline, MD    oxyCODONE-acetaminophen 5-325 mg per tablet 1 tablet, 1 tablet, Oral, Q3H PRN, Volpi-Abadie, Jacqueline, MD    sodium chloride 0.9% flush 3 mL, 3 mL, Intravenous, PRN, Volpi-Abadie, Jacqueline, MD      Review of Systems   Constitutional:  Negative for fever.   Genitourinary:  Negative for dysuria and hematuria.       Objective:      Physical Exam  Vitals reviewed.   Constitutional:       Appearance: He is well-developed.   Pulmonary:      Effort: Pulmonary effort is normal.   Skin:     Findings: No rash.   Neurological:      Mental Status: He is alert and oriented to person, place, and time.         Assessment:       1. Erectile dysfunction due to arterial insufficiency        Plan:       Erectile dysfunction due to arterial insufficiency  -     Ambulatory referral/consult to Urology    Other orders  -     Papaverine 300mg (30mg/mL), PGE 100mcg (10 mcg/mL), Phentolamine 20mg (2mg/mL) ICAV injection; by Intracavernosal route daily as needed (ED). Inject directed amount into side of penis (discuss with your physician)  Dispense: 5 mL; Refill: 2      I recommended trial of ICI.  Follow-up for demonstration

## 2024-03-27 ENCOUNTER — OFFICE VISIT (OUTPATIENT)
Dept: UROLOGY | Facility: CLINIC | Age: 74
End: 2024-03-27
Payer: MEDICARE

## 2024-03-27 VITALS — BODY MASS INDEX: 33.48 KG/M2 | WEIGHT: 220.88 LBS | HEIGHT: 68 IN

## 2024-03-27 DIAGNOSIS — N52.01 ERECTILE DYSFUNCTION DUE TO ARTERIAL INSUFFICIENCY: Primary | ICD-10-CM

## 2024-03-27 PROCEDURE — 3288F FALL RISK ASSESSMENT DOCD: CPT | Mod: CPTII,S$GLB,, | Performed by: UROLOGY

## 2024-03-27 PROCEDURE — 99213 OFFICE O/P EST LOW 20 MIN: CPT | Mod: S$GLB,,, | Performed by: UROLOGY

## 2024-03-27 PROCEDURE — 1101F PT FALLS ASSESS-DOCD LE1/YR: CPT | Mod: CPTII,S$GLB,, | Performed by: UROLOGY

## 2024-03-27 PROCEDURE — 1159F MED LIST DOCD IN RCRD: CPT | Mod: CPTII,S$GLB,, | Performed by: UROLOGY

## 2024-03-27 PROCEDURE — 1126F AMNT PAIN NOTED NONE PRSNT: CPT | Mod: CPTII,S$GLB,, | Performed by: UROLOGY

## 2024-03-27 PROCEDURE — 3008F BODY MASS INDEX DOCD: CPT | Mod: CPTII,S$GLB,, | Performed by: UROLOGY

## 2024-03-27 PROCEDURE — 99999 PR PBB SHADOW E&M-EST. PATIENT-LVL III: CPT | Mod: PBBFAC,,, | Performed by: UROLOGY

## 2024-03-27 NOTE — PROGRESS NOTES
Subjective:       Patient ID: Farhan Stallworth is a 73 y.o. male.    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 this has been a long-term problem. He is tried daily Cialis as well as Viagra 100 mg. He gets weak erections. He is here to discuss alternative treatment options for ED.  We discussed intracavernosal injections.     Review of Systems   Constitutional:  Negative for fever.   Genitourinary:  Negative for dysuria and hematuria.       Objective:      Physical Exam  Vitals reviewed.   Constitutional:       Appearance: He is well-developed.   Pulmonary:      Effort: Pulmonary effort is normal.   Abdominal:      Palpations: Abdomen is soft.   Genitourinary:     Penis: Normal.    Skin:     Findings: No rash.   Neurological:      Mental Status: He is alert and oriented to person, place, and time.           I demonstrated proper technique and I injected  0.2 cc of Trimix solution (10-30-2) in the the corpora.  Moderate engorgement noted after 10 minutes.  He had no immediate adverse reaction      Assessment:       1. Erectile dysfunction due to arterial insufficiency        Plan:       Erectile dysfunction due to arterial insufficiency      Titrate up to lowest effective dose

## 2024-04-11 ENCOUNTER — TELEPHONE (OUTPATIENT)
Dept: CARDIOLOGY | Facility: CLINIC | Age: 74
End: 2024-04-11
Payer: MEDICARE

## 2024-04-11 ENCOUNTER — PATIENT MESSAGE (OUTPATIENT)
Dept: CARDIOLOGY | Facility: CLINIC | Age: 74
End: 2024-04-11
Payer: MEDICARE

## 2024-04-15 ENCOUNTER — LAB VISIT (OUTPATIENT)
Dept: LAB | Facility: HOSPITAL | Age: 74
End: 2024-04-15
Attending: INTERNAL MEDICINE
Payer: MEDICARE

## 2024-04-15 DIAGNOSIS — E78.5 DYSLIPIDEMIA (HIGH LDL; LOW HDL): Chronic | ICD-10-CM

## 2024-04-15 DIAGNOSIS — I25.10 CORONARY ARTERY DISEASE INVOLVING NATIVE CORONARY ARTERY OF NATIVE HEART WITHOUT ANGINA PECTORIS: Chronic | ICD-10-CM

## 2024-04-15 DIAGNOSIS — Z79.01 LONG TERM (CURRENT) USE OF ANTICOAGULANTS: Chronic | ICD-10-CM

## 2024-04-15 DIAGNOSIS — I10 ESSENTIAL HYPERTENSION: Chronic | ICD-10-CM

## 2024-04-15 LAB
ALBUMIN SERPL BCP-MCNC: 4.1 G/DL (ref 3.5–5.2)
ALP SERPL-CCNC: 79 U/L (ref 55–135)
ALT SERPL W/O P-5'-P-CCNC: 26 U/L (ref 10–44)
ANION GAP SERPL CALC-SCNC: 10 MMOL/L (ref 8–16)
AST SERPL-CCNC: 30 U/L (ref 10–40)
BILIRUB SERPL-MCNC: 0.6 MG/DL (ref 0.1–1)
BUN SERPL-MCNC: 24 MG/DL (ref 8–23)
CALCIUM SERPL-MCNC: 9.3 MG/DL (ref 8.7–10.5)
CHLORIDE SERPL-SCNC: 108 MMOL/L (ref 95–110)
CHOLEST SERPL-MCNC: 112 MG/DL (ref 120–199)
CHOLEST/HDLC SERPL: 2.4 {RATIO} (ref 2–5)
CO2 SERPL-SCNC: 22 MMOL/L (ref 23–29)
CREAT SERPL-MCNC: 1.1 MG/DL (ref 0.5–1.4)
EST. GFR  (NO RACE VARIABLE): >60 ML/MIN/1.73 M^2
GLUCOSE SERPL-MCNC: 101 MG/DL (ref 70–110)
HDLC SERPL-MCNC: 46 MG/DL (ref 40–75)
HDLC SERPL: 41.1 % (ref 20–50)
HGB BLD-MCNC: 14.9 G/DL (ref 14–18)
LDLC SERPL CALC-MCNC: 55.4 MG/DL (ref 63–159)
NONHDLC SERPL-MCNC: 66 MG/DL
POTASSIUM SERPL-SCNC: 4.2 MMOL/L (ref 3.5–5.1)
PROT SERPL-MCNC: 7.1 G/DL (ref 6–8.4)
SODIUM SERPL-SCNC: 140 MMOL/L (ref 136–145)
TRIGL SERPL-MCNC: 53 MG/DL (ref 30–150)

## 2024-04-15 PROCEDURE — 80061 LIPID PANEL: CPT | Performed by: INTERNAL MEDICINE

## 2024-04-15 PROCEDURE — 85018 HEMOGLOBIN: CPT | Performed by: INTERNAL MEDICINE

## 2024-04-15 PROCEDURE — 80053 COMPREHEN METABOLIC PANEL: CPT | Performed by: INTERNAL MEDICINE

## 2024-04-15 PROCEDURE — 36415 COLL VENOUS BLD VENIPUNCTURE: CPT | Mod: PO | Performed by: INTERNAL MEDICINE

## 2024-04-23 ENCOUNTER — OFFICE VISIT (OUTPATIENT)
Dept: CARDIOLOGY | Facility: CLINIC | Age: 74
End: 2024-04-23
Payer: MEDICARE

## 2024-04-23 VITALS
HEART RATE: 57 BPM | WEIGHT: 219.56 LBS | SYSTOLIC BLOOD PRESSURE: 132 MMHG | BODY MASS INDEX: 33.28 KG/M2 | DIASTOLIC BLOOD PRESSURE: 72 MMHG | HEIGHT: 68 IN

## 2024-04-23 DIAGNOSIS — Z79.01 LONG TERM (CURRENT) USE OF ANTICOAGULANTS: Chronic | ICD-10-CM

## 2024-04-23 DIAGNOSIS — I10 ESSENTIAL HYPERTENSION: Chronic | ICD-10-CM

## 2024-04-23 DIAGNOSIS — E78.5 DYSLIPIDEMIA (HIGH LDL; LOW HDL): Chronic | ICD-10-CM

## 2024-04-23 DIAGNOSIS — I65.22 CAROTID ARTERY PLAQUE, LEFT: Chronic | ICD-10-CM

## 2024-04-23 DIAGNOSIS — I34.0 NON-RHEUMATIC MITRAL REGURGITATION: Chronic | ICD-10-CM

## 2024-04-23 DIAGNOSIS — I25.10 CORONARY ARTERY DISEASE INVOLVING NATIVE CORONARY ARTERY OF NATIVE HEART WITHOUT ANGINA PECTORIS: Primary | Chronic | ICD-10-CM

## 2024-04-23 DIAGNOSIS — I48.0 PAF (PAROXYSMAL ATRIAL FIBRILLATION): Chronic | ICD-10-CM

## 2024-04-23 DIAGNOSIS — E66.9 OBESITY, CLASS I, BMI 30-34.9: Chronic | ICD-10-CM

## 2024-04-23 PROCEDURE — 1101F PT FALLS ASSESS-DOCD LE1/YR: CPT | Mod: CPTII,S$GLB,, | Performed by: INTERNAL MEDICINE

## 2024-04-23 PROCEDURE — 3008F BODY MASS INDEX DOCD: CPT | Mod: CPTII,S$GLB,, | Performed by: INTERNAL MEDICINE

## 2024-04-23 PROCEDURE — 3075F SYST BP GE 130 - 139MM HG: CPT | Mod: CPTII,S$GLB,, | Performed by: INTERNAL MEDICINE

## 2024-04-23 PROCEDURE — 99214 OFFICE O/P EST MOD 30 MIN: CPT | Mod: S$GLB,,, | Performed by: INTERNAL MEDICINE

## 2024-04-23 PROCEDURE — 99999 PR PBB SHADOW E&M-EST. PATIENT-LVL III: CPT | Mod: PBBFAC,,, | Performed by: INTERNAL MEDICINE

## 2024-04-23 PROCEDURE — 3078F DIAST BP <80 MM HG: CPT | Mod: CPTII,S$GLB,, | Performed by: INTERNAL MEDICINE

## 2024-04-23 PROCEDURE — 1159F MED LIST DOCD IN RCRD: CPT | Mod: CPTII,S$GLB,, | Performed by: INTERNAL MEDICINE

## 2024-04-23 PROCEDURE — 3288F FALL RISK ASSESSMENT DOCD: CPT | Mod: CPTII,S$GLB,, | Performed by: INTERNAL MEDICINE

## 2024-04-23 PROCEDURE — 1125F AMNT PAIN NOTED PAIN PRSNT: CPT | Mod: CPTII,S$GLB,, | Performed by: INTERNAL MEDICINE

## 2024-04-23 NOTE — PROGRESS NOTES
Subjective:    Patient ID:  Farhan Stallworth is a 74 y.o. male who presents for Follow-up and Medication Management (Blood thinner reduction and muscle cramps @ legs possible by statin)        HPI  DISCUSSED LABS AND GOALS CMP OK HEMOGLOBIN 14.9, LDL 55, HDL 46, TRIGLYCERIDE 53, DOING OK, SOME CRAMPS, SEE ROS    Past Medical History:   Diagnosis Date    Arthritis     hip    Atrial fibrillation 12/2018    Coronary artery disease     cardiac stents    Difficult intubation     Hearing difficulty     Hypertension     Low serum testosterone level     Lumbar disc disease     REYES (obstructive sleep apnea)     has CPAP, better relief of symptoms with nasal pillow.     REYES (obstructive sleep apnea)      Past Surgical History:   Procedure Laterality Date    BICEPS TENDON REPAIR Right     CARDIOVERSION N/A 01/03/2019    Procedure: CARDIOVERSION;  Surgeon: Nanci Ahuja MD;  Location: [facility];  Service: Cardiology;  Laterality: N/A;    CARPAL TUNNEL RELEASE      2005, bilateral    CLAVICLE SURGERY      COLONOSCOPY N/A 02/16/2022    Procedure: COLONOSCOPY;  Surgeon: Gualberto Somers MD;  Location: [facility] ENDO;  Service: Endoscopy;  Laterality: N/A;    CORONARY ANGIOGRAPHY N/A 04/09/2019    Procedure: ANGIOGRAM, CORONARY ARTERY;  Surgeon: Nanci Ahuja MD;  Location: [facility] CATH;  Service: Cardiology;  Laterality: N/A;    EPIDURAL BLOCK INJECTION  2010    FRACTURE SURGERY  1966    HIP SURGERY      INSERTION, NEUROSTIMULATOR, HYPOGLOSSAL Right 07/21/2022    Procedure: INSERTION,NEUROSTIMULATOR,HYPOGLOSSAL;  Surgeon: Matthias Zamorano MD;  Location: [facility] OR;  Service: ENT;  Laterality: Right;    INTERNAL NEUROLYSIS USING OPERATING MICROSCOPE Left 12/06/2019    Procedure: Cooled radiofrequency ablation of the genicular nerve branches to the left knee;  Surgeon: Jayy Driscoll MD;  Location: [facility] OR;  Service: Orthopedics;  Laterality: Left;    JOINT REPLACEMENT Right 11/2017    hip    JOINT REPLACEMENT Left 02/2020     knee    KNEE ARTHROSCOPY W/ MENISCAL REPAIR      left    LEFT HEART CATHETERIZATION Left 2019    Procedure: Left heart cath;  Surgeon: Nanci Ahuja MD;  Location: [facility] CATH;  Service: Cardiology;  Laterality: Left;    LEFT HEART CATHETERIZATION Left 2021    Procedure: Left heart cath;  Surgeon: Nanci Ahuja MD;  Location: [facility] CATH;  Service: Cardiology;  Laterality: Left;    ligament reattachment Right 2022    bicep tendon reattachment    LUMBAR FUSION          SLEEP ENDOSCOPY, DRUG-INDUCED Bilateral 2022    Procedure: SLEEP ENDOSCOPY,DRUG-INDUCED;  Surgeon: Matthias Zamorano MD;  Location: [facility] OR;  Service: ENT;  Laterality: Bilateral;    SPINE SURGERY  2010    TONSILLECTOMY       Family History   Problem Relation Name Age of Onset    Heart disease Mother Nadja Stallworth     Arthritis Mother Nadja Stallworth          at age 92    Cancer Father Srikanth Stallworth         lung cancer    Cancer Sister Charissa Angel         leukemia    Diabetes Neg Hx       Social History     Socioeconomic History    Marital status:    Tobacco Use    Smoking status: Never    Smokeless tobacco: Never   Substance and Sexual Activity    Alcohol use: Yes     Alcohol/week: 3.0 - 5.0 standard drinks of alcohol     Types: 1 - 2 Glasses of wine, 1 - 2 Cans of beer, 1 Shots of liquor per week     Comment: occasionally    Drug use: No    Sexual activity: Yes     Partners: Female     Birth control/protection: None     Comment: Have  Erectile Dysfunction     Social Determinants of Health     Financial Resource Strain: Low Risk  (2024)    Overall Financial Resource Strain (CARDIA)     Difficulty of Paying Living Expenses: Not hard at all   Food Insecurity: No Food Insecurity (2024)    Hunger Vital Sign     Worried About Running Out of Food in the Last Year: Never true     Ran Out of Food in the Last Year: Never true   Transportation Needs: No Transportation Needs (2024)    PRAPARE -  Transportation     Lack of Transportation (Medical): No     Lack of Transportation (Non-Medical): No   Physical Activity: Sufficiently Active (4/23/2024)    Exercise Vital Sign     Days of Exercise per Week: 7 days     Minutes of Exercise per Session: 60 min   Stress: No Stress Concern Present (4/23/2024)    [employer] [place] [employer] - Occupational Stress Questionnaire     Feeling of Stress : Only a little   Social Connections: Unknown (4/23/2024)    Social Connection and Isolation Panel [NHANES]     Frequency of Communication with Friends and Family: More than three times a week     Frequency of Social Gatherings with Friends and Family: Twice a week     Active Member of Clubs or Organizations: Yes     Attends Club or Organization Meetings: More than 4 times per year     Marital Status:    Housing Stability: Low Risk  (12/10/2023)    Housing Stability Vital Sign     Unable to Pay for Housing in the Last Year: No     Number of Places Lived in the Last Year: 1     Unstable Housing in the Last Year: No       Review of patient's allergies indicates:  No Known Allergies    Current Outpatient Medications:     acetaminophen (TYLENOL) 500 MG tablet, , Disp: , Rfl:     aspirin (ECOTRIN) 81 MG EC tablet, Take 81 mg by mouth once daily., Disp: , Rfl:     coenzyme Q10 100 mg capsule, Take 100 mg by mouth once daily. , Disp: , Rfl:     ELIQUIS 5 mg Tab, TAKE 1 TABLET TWICE DAILY, Disp: 180 tablet, Rfl: 1    glucosamine-chondroitin 500-400 mg tablet, Take 1 tablet by mouth Daily. Triflex, Disp: , Rfl:     metoprolol succinate (TOPROL-XL) 25 MG 24 hr tablet, Take 1 tablet (25 mg total) by mouth once daily., Disp: 90 tablet, Rfl: 0    MULTIVITS-MINERALS/FA/LYCOPENE (MEN'S DAILY MULTIVIT-MINERAL ORAL), Take 1 tablet by mouth once daily. , Disp: , Rfl:     nitroGLYCERIN (NITROSTAT) 0.4 MG SL tablet, Place 1 tablet (0.4 mg total) under the tongue every 5 (five) minutes as needed., Disp: 25 tablet, Rfl: 0     Papaverine 300mg (30mg/mL), PGE 100mcg (10 mcg/mL), Phentolamine 20mg (2mg/mL) ICAV injection, by Intracavernosal route daily as needed (ED). Inject directed amount into side of penis (discuss with your physician), Disp: 5 mL, Rfl: 2    rosuvastatin (CRESTOR) 5 MG tablet, Take 1 tablet (5 mg total) by mouth once daily., Disp: 90 tablet, Rfl: 3    tadalafiL (CIALIS) 5 MG tablet, Take 1 tablet (5 mg total) by mouth Daily., Disp: 90 tablet, Rfl: 3    sildenafiL (VIAGRA) 100 MG tablet, Take 1 tablet (100 mg total) by mouth daily as needed for Erectile Dysfunction. (Patient not taking: Reported on 4/23/2024), Disp: 50 tablet, Rfl: 1  No current facility-administered medications for this visit.    Facility-Administered Medications Ordered in Other Visits:     HYDROmorphone injection 0.5 mg, 0.5 mg, Intravenous, Q5 Min PRN, Volpi-Abadie, Jacqueline, MD, 0.5 mg at 07/21/22 1219    ondansetron injection 4 mg, 4 mg, Intravenous, Daily PRN, Volpi-Abadie, Jacqueline, MD    oxyCODONE-acetaminophen 5-325 mg per tablet 1 tablet, 1 tablet, Oral, Q3H PRN, Volpi-Abadie, Jacqueline, MD    sodium chloride 0.9% flush 3 mL, 3 mL, Intravenous, PRN, Volpi-Abadie, Jacqueline, MD    Review of Systems   Constitutional: Negative for chills, diaphoresis, fever, malaise/fatigue, night sweats and weight gain.   HENT:  Negative for congestion and sore throat.    Eyes:  Negative for blurred vision and visual disturbance.   Cardiovascular:  Negative for chest pain, claudication, cyanosis, dyspnea on exertion, irregular heartbeat, leg swelling, near-syncope, orthopnea, palpitations, paroxysmal nocturnal dyspnea and syncope.   Respiratory:  Negative for cough, hemoptysis, shortness of breath and wheezing.    Hematologic/Lymphatic: Negative for adenopathy. Does not bruise/bleed easily.   Skin:  Negative for color change and rash.   Musculoskeletal:  Positive for joint pain (R KNEE). Negative for back pain and falls.   Gastrointestinal:  Negative for  "abdominal pain, change in bowel habit, dysphagia, jaundice, melena and nausea.   Genitourinary:  Negative for dysuria and flank pain.   Neurological:  Negative for brief paralysis, focal weakness, headaches, light-headedness, loss of balance, paresthesias (BETTER WITH B12) and weakness.   Psychiatric/Behavioral:  Negative for altered mental status and depression.    Allergic/Immunologic: Negative for persistent infections.        Objective:      Vitals:    04/23/24 1152 04/23/24 1210   BP: (!) 153/85 132/72   Pulse: (!) 57    Weight: 99.6 kg (219 lb 9.3 oz)    Height: 5' 8" (1.727 m)    PainSc:   3    PainLoc: Knee      Body mass index is 33.39 kg/m².    Physical Exam  Constitutional:       Appearance: He is well-developed. He is obese.   HENT:      Head: Normocephalic and atraumatic.   Eyes:      General: No scleral icterus.     Extraocular Movements: Extraocular movements intact.      Conjunctiva/sclera: Conjunctivae normal.      Pupils: Pupils are equal, round, and reactive to light.   Neck:      Vascular: Normal carotid pulses. No JVD.   Cardiovascular:      Rate and Rhythm: Normal rate and regular rhythm. No extrasystoles are present.     Pulses:           Carotid pulses are 2+ on the right side and 2+ on the left side.       Radial pulses are 2+ on the right side and 2+ on the left side.        Posterior tibial pulses are 2+ on the right side and 2+ on the left side.      Heart sounds: Murmur heard.      Systolic murmur is present with a grade of 1/6 at the lower left sternal border and apex.      No friction rub. No gallop.   Pulmonary:      Effort: Pulmonary effort is normal.      Breath sounds: Normal breath sounds and air entry. No rales.   Abdominal:      Palpations: Abdomen is soft. There is no hepatomegaly.      Tenderness: There is no abdominal tenderness.   Musculoskeletal:         General: Normal range of motion.      Cervical back: Neck supple.      Right lower leg: No edema.      Left lower leg: No " edema.   Skin:     General: Skin is warm and dry.      Capillary Refill: Capillary refill takes less than 2 seconds.   Neurological:      General: No focal deficit present.      Mental Status: He is alert and oriented to person, place, and time.      Cranial Nerves: No cranial nerve deficit.   Psychiatric:         Mood and Affect: Mood normal.         Speech: Speech normal.         Behavior: Behavior normal.               ..    Chemistry        Component Value Date/Time     04/15/2024 0737    K 4.2 04/15/2024 0737     04/15/2024 0737    CO2 22 (L) 04/15/2024 0737    BUN 24 (H) 04/15/2024 0737    CREATININE 1.1 04/15/2024 0737     04/15/2024 0737        Component Value Date/Time    CALCIUM 9.3 04/15/2024 0737    ALKPHOS 79 04/15/2024 0737    AST 30 04/15/2024 0737    ALT 26 04/15/2024 0737    BILITOT 0.6 04/15/2024 0737    ESTGFRAFRICA >60.0 07/18/2022 0850    EGFRNONAA >60.0 07/18/2022 0850            ..  Lab Results   Component Value Date    CHOL 112 (L) 04/15/2024    CHOL 104 (L) 07/18/2022    CHOL 98 (L) 04/03/2021     Lab Results   Component Value Date    HDL 46 04/15/2024    HDL 41 07/18/2022    HDL 36 (L) 04/03/2021     Lab Results   Component Value Date    LDLCALC 55.4 (L) 04/15/2024    LDLCALC 51.6 (L) 07/18/2022    LDLCALC 46.4 (L) 04/03/2021     Lab Results   Component Value Date    TRIG 53 04/15/2024    TRIG 57 07/18/2022    TRIG 78 04/03/2021     Lab Results   Component Value Date    CHOLHDL 41.1 04/15/2024    CHOLHDL 39.4 07/18/2022    CHOLHDL 36.7 04/03/2021     ..  Lab Results   Component Value Date    WBC 5.34 01/17/2024    HGB 14.9 04/15/2024    HCT 44.5 01/17/2024    MCV 90 01/17/2024     01/17/2024       Test(s) Reviewed  I have reviewed the following in detail:  [] Stress test   [] Angiography   [] Echocardiogram   [x] Labs   [] Other:       Assessment:         ICD-10-CM ICD-9-CM   1. Coronary artery disease involving native coronary artery of native heart without angina  pectoris  I25.10 414.01   2. Carotid artery plaque, left  I65.22 433.10   3. PAF (paroxysmal atrial fibrillation)  I48.0 427.31   4. Non-rheumatic mitral regurgitation  I34.0 424.0   5. Long term (current) use of anticoagulants  Z79.01 V58.61   6. Obesity, Class I, BMI 30-34.9  E66.9 278.00   7. Essential hypertension  I10 401.9   8. Dyslipidemia (high LDL; low HDL)  E78.5 272.4     Problem List Items Addressed This Visit          Cardiac/Vascular    PAF (paroxysmal atrial fibrillation)    Non-rheumatic mitral regurgitation    Essential hypertension    Relevant Orders    Magnesium    Dyslipidemia (high LDL; low HDL)    Relevant Orders    Comprehensive Metabolic Panel    CK    Coronary artery disease involving native coronary artery of native heart without angina pectoris - Primary    Relevant Orders    Comprehensive Metabolic Panel    Hemoglobin    Magnesium    CK    Carotid artery plaque, left       Hematology    Long term (current) use of anticoagulants    Relevant Orders    Hemoglobin       Endocrine    Obesity, Class I, BMI 30-34.9        Plan:         INCREASE CO-Q-10 -400 MG, QUESTIONABLE MUSCLE ACHES WITH STATIN DOUBTFUL MOSTLY ORTHOPEDIC SYMPTOMS, DECREASE ASPIRIN TO EVERY OTHER DAY,ALL CV CLINICALLY STABLE, NO ANGINA, NO HF, NO TIA, NO CLINICAL ARRHYTHMIA,CONTINUE CURRENT MEDS, EDUCATION, DIET, EXERCISE , WEIGHT LOSS RETURN TO CLINIC IN 6 MO WITH LABS  Coronary artery disease involving native coronary artery of native heart without angina pectoris  -     Comprehensive Metabolic Panel; Future; Expected date: 10/23/2024  -     Hemoglobin; Future; Expected date: 10/23/2024  -     Magnesium; Future; Expected date: 10/23/2024  -     CK; Future; Expected date: 10/23/2024    Carotid artery plaque, left    PAF (paroxysmal atrial fibrillation)    Non-rheumatic mitral regurgitation    Long term (current) use of anticoagulants  -     Hemoglobin; Future; Expected date: 10/23/2024    Obesity, Class I, BMI  30-34.9    Essential hypertension  Comments:  CONTROLLED  Orders:  -     Magnesium; Future; Expected date: 10/23/2024    Dyslipidemia (high LDL; low HDL)  -     Comprehensive Metabolic Panel; Future; Expected date: 10/23/2024  -     CK; Future; Expected date: 10/23/2024    RTC Low level/low impact aerobic exercise 5x's/wk. Heart healthy diet and risk factor modification.    See labs and med orders.    Aerobic exercise 5x's/wk. Heart healthy diet and risk factor modification.    See labs and med orders.

## 2024-05-15 DIAGNOSIS — I48.0 PAROXYSMAL ATRIAL FIBRILLATION: ICD-10-CM

## 2024-05-15 RX ORDER — APIXABAN 5 MG/1
5 TABLET, FILM COATED ORAL 2 TIMES DAILY
Qty: 180 TABLET | Refills: 2 | Status: SHIPPED | OUTPATIENT
Start: 2024-05-15

## 2024-05-20 DIAGNOSIS — I48.91 NEW ONSET ATRIAL FIBRILLATION: ICD-10-CM

## 2024-05-21 RX ORDER — METOPROLOL SUCCINATE 25 MG/1
25 TABLET, EXTENDED RELEASE ORAL
Qty: 90 TABLET | Refills: 2 | Status: SHIPPED | OUTPATIENT
Start: 2024-05-21

## 2024-05-22 ENCOUNTER — PATIENT MESSAGE (OUTPATIENT)
Dept: OTOLARYNGOLOGY | Facility: CLINIC | Age: 74
End: 2024-05-22
Payer: MEDICARE

## 2024-06-11 ENCOUNTER — PATIENT MESSAGE (OUTPATIENT)
Dept: OTOLARYNGOLOGY | Facility: CLINIC | Age: 74
End: 2024-06-11
Payer: MEDICARE

## 2024-06-25 ENCOUNTER — PATIENT MESSAGE (OUTPATIENT)
Dept: UROLOGY | Facility: CLINIC | Age: 74
End: 2024-06-25
Payer: MEDICARE

## 2024-06-25 DIAGNOSIS — N52.01 ERECTILE DYSFUNCTION DUE TO ARTERIAL INSUFFICIENCY: Primary | ICD-10-CM

## 2024-06-25 RX ORDER — PAPAVERINE HYDROCHLORIDE 30 MG/ML
30 INJECTION INTRAMUSCULAR; INTRAVENOUS
Qty: 10 ML | Refills: 2 | Status: SHIPPED | OUTPATIENT
Start: 2024-06-25 | End: 2024-12-22

## 2024-08-11 ENCOUNTER — PATIENT MESSAGE (OUTPATIENT)
Dept: OTOLARYNGOLOGY | Facility: CLINIC | Age: 74
End: 2024-08-11
Payer: MEDICARE

## 2024-10-02 ENCOUNTER — TELEPHONE (OUTPATIENT)
Dept: CARDIOLOGY | Facility: CLINIC | Age: 74
End: 2024-10-02
Payer: MEDICARE

## 2024-10-02 NOTE — TELEPHONE ENCOUNTER
Pt needing CV risk and medication instructions for Cervical NEY under MAC anesthesia. Pt taking Eliquis and ASA.    Fax: [phone]

## 2024-10-03 ENCOUNTER — PATIENT MESSAGE (OUTPATIENT)
Dept: CARDIOLOGY | Facility: CLINIC | Age: 74
End: 2024-10-03
Payer: MEDICARE

## 2024-10-08 ENCOUNTER — PATIENT MESSAGE (OUTPATIENT)
Dept: FAMILY MEDICINE | Facility: CLINIC | Age: 74
End: 2024-10-08
Payer: MEDICARE

## 2024-10-08 ENCOUNTER — PATIENT MESSAGE (OUTPATIENT)
Dept: RHEUMATOLOGY | Facility: CLINIC | Age: 74
End: 2024-10-08
Payer: MEDICARE

## 2024-10-08 ENCOUNTER — PATIENT MESSAGE (OUTPATIENT)
Dept: CARDIOLOGY | Facility: CLINIC | Age: 74
End: 2024-10-08
Payer: MEDICARE

## 2024-10-08 DIAGNOSIS — M25.50 POLYARTHRALGIA: Primary | ICD-10-CM

## 2024-10-24 DIAGNOSIS — Z95.5 STENTED CORONARY ARTERY: ICD-10-CM

## 2024-10-24 DIAGNOSIS — E78.00 HYPERCHOLESTEREMIA: ICD-10-CM

## 2024-10-24 RX ORDER — ROSUVASTATIN CALCIUM 5 MG/1
5 TABLET, COATED ORAL
Qty: 90 TABLET | Refills: 0 | Status: SHIPPED | OUTPATIENT
Start: 2024-10-24

## 2024-10-24 NOTE — TELEPHONE ENCOUNTER
Refill Decision Note   Farhan Stallworth  is requesting a refill authorization.  Brief Assessment and Rationale for Refill:  Approve     Medication Therapy Plan:         Comments:     Note composed:10:51 AM 10/24/2024

## 2024-10-24 NOTE — TELEPHONE ENCOUNTER
No care due was identified.  [facility] Embedded Care Due Messages. Reference number: [id number].   10/24/2024 2:07:58 AM CDT

## 2024-11-18 ENCOUNTER — OFFICE VISIT (OUTPATIENT)
Dept: FAMILY MEDICINE | Facility: CLINIC | Age: 74
End: 2024-11-18
Payer: MEDICARE

## 2024-11-18 VITALS
DIASTOLIC BLOOD PRESSURE: 80 MMHG | BODY MASS INDEX: 31.79 KG/M2 | WEIGHT: 209.13 LBS | SYSTOLIC BLOOD PRESSURE: 132 MMHG | HEART RATE: 60 BPM | OXYGEN SATURATION: 99 %

## 2024-11-18 DIAGNOSIS — I25.10 CORONARY ARTERY DISEASE INVOLVING NATIVE CORONARY ARTERY OF NATIVE HEART WITHOUT ANGINA PECTORIS: ICD-10-CM

## 2024-11-18 DIAGNOSIS — E78.5 DYSLIPIDEMIA (HIGH LDL; LOW HDL): ICD-10-CM

## 2024-11-18 DIAGNOSIS — I48.0 PAROXYSMAL ATRIAL FIBRILLATION: ICD-10-CM

## 2024-11-18 DIAGNOSIS — I10 ESSENTIAL HYPERTENSION: Primary | ICD-10-CM

## 2024-11-18 DIAGNOSIS — M15.0 PRIMARY OSTEOARTHRITIS INVOLVING MULTIPLE JOINTS: ICD-10-CM

## 2024-11-18 DIAGNOSIS — G47.33 OBSTRUCTIVE SLEEP APNEA SYNDROME: ICD-10-CM

## 2024-11-18 DIAGNOSIS — Z95.5 STENTED CORONARY ARTERY: ICD-10-CM

## 2024-11-18 PROCEDURE — 99214 OFFICE O/P EST MOD 30 MIN: CPT | Mod: S$GLB,,, | Performed by: INTERNAL MEDICINE

## 2024-11-18 PROCEDURE — 3075F SYST BP GE 130 - 139MM HG: CPT | Mod: CPTII,S$GLB,, | Performed by: INTERNAL MEDICINE

## 2024-11-18 PROCEDURE — 3079F DIAST BP 80-89 MM HG: CPT | Mod: CPTII,S$GLB,, | Performed by: INTERNAL MEDICINE

## 2024-11-18 PROCEDURE — 1101F PT FALLS ASSESS-DOCD LE1/YR: CPT | Mod: CPTII,S$GLB,, | Performed by: INTERNAL MEDICINE

## 2024-11-18 PROCEDURE — 3008F BODY MASS INDEX DOCD: CPT | Mod: CPTII,S$GLB,, | Performed by: INTERNAL MEDICINE

## 2024-11-18 PROCEDURE — 3288F FALL RISK ASSESSMENT DOCD: CPT | Mod: CPTII,S$GLB,, | Performed by: INTERNAL MEDICINE

## 2024-11-18 PROCEDURE — 1160F RVW MEDS BY RX/DR IN RCRD: CPT | Mod: CPTII,S$GLB,, | Performed by: INTERNAL MEDICINE

## 2024-11-18 PROCEDURE — G2211 COMPLEX E/M VISIT ADD ON: HCPCS | Mod: S$GLB,,, | Performed by: INTERNAL MEDICINE

## 2024-11-18 PROCEDURE — 1159F MED LIST DOCD IN RCRD: CPT | Mod: CPTII,S$GLB,, | Performed by: INTERNAL MEDICINE

## 2024-11-18 PROCEDURE — 99999 PR PBB SHADOW E&M-EST. PATIENT-LVL III: CPT | Mod: PBBFAC,,, | Performed by: INTERNAL MEDICINE

## 2024-11-18 NOTE — PROGRESS NOTES
Subjective     Farhan Stallworth is a 74 y.o. old, male here for Annual Exam    73 y/o with PMH of CAD s/p stent, PAF on eliquis, REYES on Inspire device, HLD, OA     Patient is here for follow-up on chronic medical problems    CAD: stable, remains very active with no exertional symptoms.  PAF: remains on eliquis with no recurrence of symptoms nor bleeding complications.  REYES: using Inspire device nightly, previously intolerant to CPAP, turned the power up recently to try and help with snoring.  HLD: on statin therapy, labs pending soon.  OA: R knee OA, gel injection recently worked.    He is followed by Dr. Harrington and spinal surgeon due to some pain that radiates into the shoulder and hands bilaterally. NEY helped for 2 weeks, then nerve block tests.  Put on prednisone for a month, shoulder pain and should strength improved very dramatically leading ortho to think of possible PMR as a diagnosis.    Review of Systems   Musculoskeletal:  Positive for joint pain, myalgias and neck pain.   All other systems reviewed and are negative.    Medications     Outpatient Medications Marked as Taking for the 11/18/24 encounter (Office Visit) with Samuel Laura MD   Medication Sig Dispense Refill    acetaminophen (TYLENOL) 500 MG tablet       apixaban (ELIQUIS) 5 mg Tab TAKE 1 TABLET TWICE DAILY 180 tablet 2    aspirin (ECOTRIN) 81 MG EC tablet Take 81 mg by mouth once daily.      coenzyme Q10 100 mg capsule Take 100 mg by mouth once daily.       glucosamine-chondroitin 500-400 mg tablet Take 1 tablet by mouth Daily. Triflex      metoprolol succinate (TOPROL-XL) 25 MG 24 hr tablet TAKE 1 TABLET EVERY DAY 90 tablet 2    MULTIVITS-MINERALS/FA/LYCOPENE (MEN'S DAILY MULTIVIT-MINERAL ORAL) Take 1 tablet by mouth once daily.       papaverine 30 mg/mL injection Inject 30,000 mcg (30 mg total) into the muscle as needed (ED). 10 mL 2    rosuvastatin (CRESTOR) 5 MG tablet TAKE 1 TABLET ONE TIME DAILY 90 tablet 0    tadalafiL  (CIALIS) 5 MG tablet Take 1 tablet (5 mg total) by mouth Daily. 90 tablet 3     Objective     /80   Pulse 60   Wt 94.8 kg (209 lb 1.7 oz)   SpO2 99%   BMI 31.79 kg/m²   Physical Exam  Constitutional:       General: He is not in acute distress.     Appearance: Normal appearance. He is well-developed.   HENT:      Head: Normocephalic and atraumatic.   Eyes:      Conjunctiva/sclera: Conjunctivae normal.      Pupils: Pupils are equal, round, and reactive to light.   Neck:      Thyroid: No thyroid mass or thyromegaly.   Cardiovascular:      Rate and Rhythm: Normal rate and regular rhythm.      Heart sounds: Normal heart sounds. No murmur heard.  Pulmonary:      Effort: No respiratory distress.      Breath sounds: Normal breath sounds.   Abdominal:      General: Bowel sounds are normal.      Palpations: Abdomen is soft.      Tenderness: There is no abdominal tenderness.   Musculoskeletal:         General: No deformity.      Cervical back: Neck supple.   Lymphadenopathy:      Cervical: No cervical adenopathy.      Upper Body:      Right upper body: No supraclavicular adenopathy.      Left upper body: No supraclavicular adenopathy.   Skin:     General: Skin is warm and dry.      Findings: No rash.   Neurological:      Mental Status: He is alert and oriented to person, place, and time.   Psychiatric:         Behavior: Behavior normal.       Assessment and Plan     Essential hypertension    Paroxysmal atrial fibrillation    Obstructive sleep apnea syndrome    Coronary artery disease involving native coronary artery of native heart without angina pectoris    Stented coronary artery    Dyslipidemia (high LDL; low HDL)    Primary osteoarthritis involving multiple joints      Advised him to hold off prednisone for awhile until we can draw labs.  Tylenol no more than 3k mg daily.  Sparing use of naproxen for severe pain, cautious while on eliquis.  Attach labs from Dr. Duque to his upcoming lab appt so they can be  reviewed.  Possibly PMR but don't have all the records from pain management and want to see the sed rate.    ___________________  Samuel Laura MD  Internal Medicine and Pediatrics

## 2024-11-26 ENCOUNTER — PATIENT MESSAGE (OUTPATIENT)
Dept: FAMILY MEDICINE | Facility: CLINIC | Age: 74
End: 2024-11-26
Payer: MEDICARE

## 2024-12-02 ENCOUNTER — LAB VISIT (OUTPATIENT)
Dept: LAB | Facility: HOSPITAL | Age: 74
End: 2024-12-02
Attending: INTERNAL MEDICINE
Payer: MEDICARE

## 2024-12-02 DIAGNOSIS — I10 ESSENTIAL HYPERTENSION: Chronic | ICD-10-CM

## 2024-12-02 DIAGNOSIS — E78.5 DYSLIPIDEMIA (HIGH LDL; LOW HDL): Chronic | ICD-10-CM

## 2024-12-02 DIAGNOSIS — M25.50 POLYARTHRALGIA: ICD-10-CM

## 2024-12-02 DIAGNOSIS — I25.10 CORONARY ARTERY DISEASE INVOLVING NATIVE CORONARY ARTERY OF NATIVE HEART WITHOUT ANGINA PECTORIS: Chronic | ICD-10-CM

## 2024-12-02 DIAGNOSIS — Z79.01 LONG TERM (CURRENT) USE OF ANTICOAGULANTS: Chronic | ICD-10-CM

## 2024-12-02 LAB
ALBUMIN SERPL BCP-MCNC: 3.7 G/DL (ref 3.5–5.2)
ALBUMIN SERPL BCP-MCNC: 3.7 G/DL (ref 3.5–5.2)
ALP SERPL-CCNC: 95 U/L (ref 40–150)
ALP SERPL-CCNC: 95 U/L (ref 40–150)
ALT SERPL W/O P-5'-P-CCNC: 19 U/L (ref 10–44)
ALT SERPL W/O P-5'-P-CCNC: 19 U/L (ref 10–44)
ANION GAP SERPL CALC-SCNC: 9 MMOL/L (ref 8–16)
ANION GAP SERPL CALC-SCNC: 9 MMOL/L (ref 8–16)
AST SERPL-CCNC: 23 U/L (ref 10–40)
AST SERPL-CCNC: 23 U/L (ref 10–40)
BASOPHILS # BLD AUTO: 0.02 K/UL (ref 0–0.2)
BASOPHILS NFR BLD: 0.4 % (ref 0–1.9)
BILIRUB SERPL-MCNC: 0.5 MG/DL (ref 0.1–1)
BILIRUB SERPL-MCNC: 0.5 MG/DL (ref 0.1–1)
BUN SERPL-MCNC: 15 MG/DL (ref 8–23)
BUN SERPL-MCNC: 15 MG/DL (ref 8–23)
CALCIUM SERPL-MCNC: 9.3 MG/DL (ref 8.7–10.5)
CALCIUM SERPL-MCNC: 9.3 MG/DL (ref 8.7–10.5)
CCP AB SER IA-ACNC: <0.5 U/ML
CHLORIDE SERPL-SCNC: 107 MMOL/L (ref 95–110)
CHLORIDE SERPL-SCNC: 107 MMOL/L (ref 95–110)
CK SERPL-CCNC: 70 U/L (ref 20–200)
CK SERPL-CCNC: 70 U/L (ref 20–200)
CO2 SERPL-SCNC: 22 MMOL/L (ref 23–29)
CO2 SERPL-SCNC: 22 MMOL/L (ref 23–29)
CREAT SERPL-MCNC: 1 MG/DL (ref 0.5–1.4)
CREAT SERPL-MCNC: 1 MG/DL (ref 0.5–1.4)
CRP SERPL-MCNC: 20.4 MG/L (ref 0–8.2)
DIFFERENTIAL METHOD BLD: ABNORMAL
EOSINOPHIL # BLD AUTO: 0.1 K/UL (ref 0–0.5)
EOSINOPHIL NFR BLD: 1.2 % (ref 0–8)
ERYTHROCYTE [DISTWIDTH] IN BLOOD BY AUTOMATED COUNT: 14.2 % (ref 11.5–14.5)
ERYTHROCYTE [SEDIMENTATION RATE] IN BLOOD BY PHOTOMETRIC METHOD: 17 MM/HR (ref 0–23)
EST. GFR  (NO RACE VARIABLE): >60 ML/MIN/1.73 M^2
EST. GFR  (NO RACE VARIABLE): >60 ML/MIN/1.73 M^2
GLUCOSE SERPL-MCNC: 106 MG/DL (ref 70–110)
GLUCOSE SERPL-MCNC: 106 MG/DL (ref 70–110)
HCT VFR BLD AUTO: 38.3 % (ref 40–54)
HGB BLD-MCNC: 12.8 G/DL (ref 14–18)
HGB BLD-MCNC: 12.8 G/DL (ref 14–18)
IMM GRANULOCYTES # BLD AUTO: 0.03 K/UL (ref 0–0.04)
IMM GRANULOCYTES NFR BLD AUTO: 0.6 % (ref 0–0.5)
LYMPHOCYTES # BLD AUTO: 0.8 K/UL (ref 1–4.8)
LYMPHOCYTES NFR BLD: 15.1 % (ref 18–48)
MAGNESIUM SERPL-MCNC: 2 MG/DL (ref 1.6–2.6)
MCH RBC QN AUTO: 28.5 PG (ref 27–31)
MCHC RBC AUTO-ENTMCNC: 33.4 G/DL (ref 32–36)
MCV RBC AUTO: 85 FL (ref 82–98)
MONOCYTES # BLD AUTO: 0.5 K/UL (ref 0.3–1)
MONOCYTES NFR BLD: 9.8 % (ref 4–15)
NEUTROPHILS # BLD AUTO: 3.7 K/UL (ref 1.8–7.7)
NEUTROPHILS NFR BLD: 72.9 % (ref 38–73)
NRBC BLD-RTO: 0 /100 WBC
PLATELET # BLD AUTO: 182 K/UL (ref 150–450)
PMV BLD AUTO: 10.1 FL (ref 9.2–12.9)
POTASSIUM SERPL-SCNC: 4.7 MMOL/L (ref 3.5–5.1)
POTASSIUM SERPL-SCNC: 4.7 MMOL/L (ref 3.5–5.1)
PROT SERPL-MCNC: 6.8 G/DL (ref 6–8.4)
PROT SERPL-MCNC: 6.8 G/DL (ref 6–8.4)
RBC # BLD AUTO: 4.49 M/UL (ref 4.6–6.2)
RHEUMATOID FACT SERPL-ACNC: <13 IU/ML (ref 0–15)
SODIUM SERPL-SCNC: 138 MMOL/L (ref 136–145)
SODIUM SERPL-SCNC: 138 MMOL/L (ref 136–145)
URATE SERPL-MCNC: 5.9 MG/DL (ref 3.4–7)
WBC # BLD AUTO: 5.02 K/UL (ref 3.9–12.7)

## 2024-12-02 PROCEDURE — 85025 COMPLETE CBC W/AUTO DIFF WBC: CPT | Performed by: INTERNAL MEDICINE

## 2024-12-02 PROCEDURE — 86200 CCP ANTIBODY: CPT | Performed by: INTERNAL MEDICINE

## 2024-12-02 PROCEDURE — 80053 COMPREHEN METABOLIC PANEL: CPT | Performed by: INTERNAL MEDICINE

## 2024-12-02 PROCEDURE — 83735 ASSAY OF MAGNESIUM: CPT | Performed by: INTERNAL MEDICINE

## 2024-12-02 PROCEDURE — 86140 C-REACTIVE PROTEIN: CPT | Performed by: INTERNAL MEDICINE

## 2024-12-02 PROCEDURE — 84550 ASSAY OF BLOOD/URIC ACID: CPT | Performed by: INTERNAL MEDICINE

## 2024-12-02 PROCEDURE — 86038 ANTINUCLEAR ANTIBODIES: CPT | Performed by: INTERNAL MEDICINE

## 2024-12-02 PROCEDURE — 82550 ASSAY OF CK (CPK): CPT | Performed by: INTERNAL MEDICINE

## 2024-12-02 PROCEDURE — 86431 RHEUMATOID FACTOR QUANT: CPT | Performed by: INTERNAL MEDICINE

## 2024-12-02 PROCEDURE — 36415 COLL VENOUS BLD VENIPUNCTURE: CPT | Mod: PO | Performed by: INTERNAL MEDICINE

## 2024-12-02 PROCEDURE — 85652 RBC SED RATE AUTOMATED: CPT | Performed by: INTERNAL MEDICINE

## 2024-12-03 ENCOUNTER — OFFICE VISIT (OUTPATIENT)
Dept: CARDIOLOGY | Facility: CLINIC | Age: 74
End: 2024-12-03
Payer: MEDICARE

## 2024-12-03 VITALS
HEIGHT: 68 IN | HEART RATE: 54 BPM | SYSTOLIC BLOOD PRESSURE: 139 MMHG | DIASTOLIC BLOOD PRESSURE: 80 MMHG | BODY MASS INDEX: 32.98 KG/M2 | WEIGHT: 217.63 LBS

## 2024-12-03 DIAGNOSIS — I10 ESSENTIAL HYPERTENSION: ICD-10-CM

## 2024-12-03 DIAGNOSIS — I48.0 PAF (PAROXYSMAL ATRIAL FIBRILLATION): Chronic | ICD-10-CM

## 2024-12-03 DIAGNOSIS — I25.10 CORONARY ARTERY DISEASE INVOLVING NATIVE CORONARY ARTERY OF NATIVE HEART WITHOUT ANGINA PECTORIS: Primary | Chronic | ICD-10-CM

## 2024-12-03 DIAGNOSIS — Z79.01 LONG TERM (CURRENT) USE OF ANTICOAGULANTS: Chronic | ICD-10-CM

## 2024-12-03 DIAGNOSIS — E78.00 HYPERCHOLESTEREMIA: Chronic | ICD-10-CM

## 2024-12-03 DIAGNOSIS — E66.811 OBESITY, CLASS I, BMI 30-34.9: Chronic | ICD-10-CM

## 2024-12-03 DIAGNOSIS — Z95.5 STENTED CORONARY ARTERY: ICD-10-CM

## 2024-12-03 LAB — ANA SER QL IF: NORMAL

## 2024-12-03 PROCEDURE — 1101F PT FALLS ASSESS-DOCD LE1/YR: CPT | Mod: CPTII,S$GLB,, | Performed by: INTERNAL MEDICINE

## 2024-12-03 PROCEDURE — 3008F BODY MASS INDEX DOCD: CPT | Mod: CPTII,S$GLB,, | Performed by: INTERNAL MEDICINE

## 2024-12-03 PROCEDURE — 3075F SYST BP GE 130 - 139MM HG: CPT | Mod: CPTII,S$GLB,, | Performed by: INTERNAL MEDICINE

## 2024-12-03 PROCEDURE — 99999 PR PBB SHADOW E&M-EST. PATIENT-LVL III: CPT | Mod: PBBFAC,,, | Performed by: INTERNAL MEDICINE

## 2024-12-03 PROCEDURE — 1125F AMNT PAIN NOTED PAIN PRSNT: CPT | Mod: CPTII,S$GLB,, | Performed by: INTERNAL MEDICINE

## 2024-12-03 PROCEDURE — 3288F FALL RISK ASSESSMENT DOCD: CPT | Mod: CPTII,S$GLB,, | Performed by: INTERNAL MEDICINE

## 2024-12-03 PROCEDURE — 99214 OFFICE O/P EST MOD 30 MIN: CPT | Mod: S$GLB,,, | Performed by: INTERNAL MEDICINE

## 2024-12-03 PROCEDURE — 1159F MED LIST DOCD IN RCRD: CPT | Mod: CPTII,S$GLB,, | Performed by: INTERNAL MEDICINE

## 2024-12-03 PROCEDURE — 3079F DIAST BP 80-89 MM HG: CPT | Mod: CPTII,S$GLB,, | Performed by: INTERNAL MEDICINE

## 2024-12-03 RX ORDER — ROSUVASTATIN CALCIUM 5 MG/1
5 TABLET, COATED ORAL NIGHTLY
Qty: 90 TABLET | Refills: 1 | Status: SHIPPED | OUTPATIENT
Start: 2024-12-03

## 2024-12-03 NOTE — PROGRESS NOTES
Subjective:    Patient ID:  Farhan Stallworth is a 74 y.o. male who presents for Coronary Artery Disease        HPI  RECENT LABS NOTED CMP OK, SED RATE AND RHEUMATOID FACTOR OK, CRP ELEVATED AT 20, ORTHOPEDIC ISSUES WITH KNEE AND SHOULDERS, HAD SHOULDER INJECTIONS / STEROIDS, C5- C6 NARROWING,TO GO FOR ABLATION, WAS ON STEROIDS PO FOR A MONTH,  SEE ROS    Past Medical History:   Diagnosis Date    Arthritis     hip    Atrial fibrillation 12/2018    Coronary artery disease     cardiac stents    Difficult intubation     Hearing difficulty     Hypertension     Low serum testosterone level     Lumbar disc disease     REYES (obstructive sleep apnea)     has CPAP, better relief of symptoms with nasal pillow.     REYES (obstructive sleep apnea)      Past Surgical History:   Procedure Laterality Date    BICEPS TENDON REPAIR Right     CARDIOVERSION N/A 01/03/2019    Procedure: CARDIOVERSION;  Surgeon: Nanci Ahuja MD;  Location: [facility];  Service: Cardiology;  Laterality: N/A;    CARPAL TUNNEL RELEASE      2005, bilateral    CLAVICLE SURGERY      COLONOSCOPY N/A 02/16/2022    Procedure: COLONOSCOPY;  Surgeon: Gualberto Somers MD;  Location: [facility] ENDO;  Service: Endoscopy;  Laterality: N/A;    CORONARY ANGIOGRAPHY N/A 04/09/2019    Procedure: ANGIOGRAM, CORONARY ARTERY;  Surgeon: Nanci Ahuja MD;  Location: [facility] CATH;  Service: Cardiology;  Laterality: N/A;    EPIDURAL BLOCK INJECTION  2010    FRACTURE SURGERY  1966    HIP SURGERY      INSERTION, NEUROSTIMULATOR, HYPOGLOSSAL Right 07/21/2022    Procedure: INSERTION,NEUROSTIMULATOR,HYPOGLOSSAL;  Surgeon: Matthias Zamorano MD;  Location: [facility] OR;  Service: ENT;  Laterality: Right;    INTERNAL NEUROLYSIS USING OPERATING MICROSCOPE Left 12/06/2019    Procedure: Cooled radiofrequency ablation of the genicular nerve branches to the left knee;  Surgeon: Jayy Driscoll MD;  Location: [facility] OR;  Service: Orthopedics;  Laterality: Left;    JOINT REPLACEMENT Right 11/2017     hip    JOINT REPLACEMENT Left 2020    knee    KNEE ARTHROSCOPY W/ MENISCAL REPAIR      left    LEFT HEART CATHETERIZATION Left 2019    Procedure: Left heart cath;  Surgeon: Nanci Ahuja MD;  Location: [facility] CATH;  Service: Cardiology;  Laterality: Left;    LEFT HEART CATHETERIZATION Left 2021    Procedure: Left heart cath;  Surgeon: Nanci Ahuja MD;  Location: [facility] CATH;  Service: Cardiology;  Laterality: Left;    ligament reattachment Right 2022    bicep tendon reattachment    LUMBAR FUSION          SLEEP ENDOSCOPY, DRUG-INDUCED Bilateral 2022    Procedure: SLEEP ENDOSCOPY,DRUG-INDUCED;  Surgeon: Matthias Zamorano MD;  Location: [facility] OR;  Service: ENT;  Laterality: Bilateral;    SPINE SURGERY  2010    TONSILLECTOMY       Family History   Problem Relation Name Age of Onset    Heart disease Mother Nadja Stallworth     Arthritis Mother Nadja Stallworth          at age 92    Cancer Father Srikanth Stallworth         lung cancer    Cancer Sister Charissa Angel         leukemia    Diabetes Neg Hx       Social History     Socioeconomic History    Marital status:    Tobacco Use    Smoking status: Never    Smokeless tobacco: Never   Substance and Sexual Activity    Alcohol use: Yes     Alcohol/week: 3.0 - 5.0 standard drinks of alcohol     Types: 1 - 2 Glasses of wine, 1 - 2 Cans of beer, 1 Shots of liquor per week     Comment: occasionally    Drug use: No    Sexual activity: Yes     Partners: Female     Birth control/protection: None     Comment: Have  Erectile Dysfunction     Social Drivers of Health     Financial Resource Strain: Low Risk  (2024)    Overall Financial Resource Strain (CARDIA)     Difficulty of Paying Living Expenses: Not hard at all   Food Insecurity: No Food Insecurity (2024)    Hunger Vital Sign     Worried About Running Out of Food in the Last Year: Never true     Ran Out of Food in the Last Year: Never true   Transportation Needs: No Transportation  Needs (4/23/2024)    PRAPARE - Transportation     Lack of Transportation (Medical): No     Lack of Transportation (Non-Medical): No   Physical Activity: Sufficiently Active (4/23/2024)    Exercise Vital Sign     Days of Exercise per Week: 7 days     Minutes of Exercise per Session: 60 min   Stress: No Stress Concern Present (4/23/2024)    [employer] [place] [employer] - Occupational Stress Questionnaire     Feeling of Stress : Only a little   Housing Stability: Low Risk  (12/10/2023)    Housing Stability Vital Sign     Unable to Pay for Housing in the Last Year: No     Number of Places Lived in the Last Year: 1     Unstable Housing in the Last Year: No       Review of patient's allergies indicates:  No Known Allergies    Current Outpatient Medications:     acetaminophen (TYLENOL) 500 MG tablet, , Disp: , Rfl:     apixaban (ELIQUIS) 5 mg Tab, TAKE 1 TABLET TWICE DAILY, Disp: 180 tablet, Rfl: 2    aspirin (ECOTRIN) 81 MG EC tablet, Take 81 mg by mouth once daily., Disp: , Rfl:     coenzyme Q10 100 mg capsule, Take 100 mg by mouth once daily. , Disp: , Rfl:     glucosamine-chondroitin 500-400 mg tablet, Take 1 tablet by mouth Daily. Triflex, Disp: , Rfl:     metoprolol succinate (TOPROL-XL) 25 MG 24 hr tablet, TAKE 1 TABLET EVERY DAY, Disp: 90 tablet, Rfl: 2    MULTIVITS-MINERALS/FA/LYCOPENE (MEN'S DAILY MULTIVIT-MINERAL ORAL), Take 1 tablet by mouth once daily. , Disp: , Rfl:     papaverine 30 mg/mL injection, Inject 30,000 mcg (30 mg total) into the muscle as needed (ED)., Disp: 10 mL, Rfl: 2    rosuvastatin (CRESTOR) 5 MG tablet, TAKE 1 TABLET ONE TIME DAILY, Disp: 90 tablet, Rfl: 0    tadalafiL (CIALIS) 5 MG tablet, Take 1 tablet (5 mg total) by mouth Daily., Disp: 90 tablet, Rfl: 3    nitroGLYCERIN (NITROSTAT) 0.4 MG SL tablet, Place 1 tablet (0.4 mg total) under the tongue every 5 (five) minutes as needed., Disp: 25 tablet, Rfl: 0    sildenafiL (VIAGRA) 100 MG tablet, Take 1 tablet (100 mg total) by  mouth daily as needed for Erectile Dysfunction. (Patient not taking: Reported on 4/23/2024), Disp: 50 tablet, Rfl: 1  No current facility-administered medications for this visit.    Facility-Administered Medications Ordered in Other Visits:     HYDROmorphone injection 0.5 mg, 0.5 mg, Intravenous, Q5 Min PRN, Volpi-Abadie, Jacqueline, MD, 0.5 mg at 07/21/22 1219    ondansetron injection 4 mg, 4 mg, Intravenous, Daily PRN, Volpi-Abadie, Jacqueline, MD    oxyCODONE-acetaminophen 5-325 mg per tablet 1 tablet, 1 tablet, Oral, Q3H PRN, Volpi-Abadie, Jacqueline, MD    sodium chloride 0.9% flush 3 mL, 3 mL, Intravenous, PRN, Volpi-Abadie, Jacqueline, MD    Review of Systems   Constitutional: Negative for chills, diaphoresis, fever, malaise/fatigue, night sweats and weight gain.   HENT:  Negative for congestion and sore throat.    Eyes:  Negative for blurred vision and visual disturbance.   Cardiovascular:  Negative for chest pain, claudication, cyanosis, dyspnea on exertion, irregular heartbeat, leg swelling, near-syncope, orthopnea, palpitations, paroxysmal nocturnal dyspnea and syncope.   Respiratory:  Negative for cough, hemoptysis, shortness of breath and wheezing.    Hematologic/Lymphatic: Negative for adenopathy. Does not bruise/bleed easily.   Skin:  Negative for color change and rash.   Musculoskeletal:  Positive for joint pain (R KNEE,SHOULDERS) and neck pain. Negative for back pain and falls.   Gastrointestinal:  Negative for abdominal pain, change in bowel habit, dysphagia, jaundice, melena and nausea.   Genitourinary:  Negative for dysuria and flank pain.   Neurological:  Negative for brief paralysis, focal weakness, headaches, light-headedness, loss of balance, numbness and weakness.   Psychiatric/Behavioral:  Negative for altered mental status and depression.    Allergic/Immunologic: Negative for persistent infections.        Objective:      Vitals:    12/03/24 1310   BP: 139/80   Pulse: (!) 54   Weight: 98.7  "kg (217 lb 9.5 oz)   Height: 5' 8" (1.727 m)   PainSc:   4   PainLoc: Shoulder     Body mass index is 33.09 kg/m².    Physical Exam  Constitutional:       Appearance: He is well-developed. He is obese.   HENT:      Head: Normocephalic and atraumatic.   Eyes:      General: No scleral icterus.     Extraocular Movements: Extraocular movements intact.   Neck:      Vascular: Normal carotid pulses. No JVD.   Cardiovascular:      Rate and Rhythm: Normal rate and regular rhythm. No extrasystoles are present.     Pulses:           Carotid pulses are 2+ on the right side and 2+ on the left side.       Radial pulses are 2+ on the right side and 2+ on the left side.        Posterior tibial pulses are 2+ on the right side and 2+ on the left side.      Heart sounds: Murmur heard.      Systolic murmur is present with a grade of 1/6 at the lower left sternal border.      No friction rub. No gallop.   Pulmonary:      Effort: Pulmonary effort is normal.      Breath sounds: Normal breath sounds and air entry. No rales.   Abdominal:      Palpations: Abdomen is soft. There is no hepatomegaly.      Tenderness: There is no abdominal tenderness.   Musculoskeletal:         General: Normal range of motion.      Cervical back: Neck supple.      Right lower leg: No edema.      Left lower leg: No edema.   Skin:     General: Skin is warm and dry.      Capillary Refill: Capillary refill takes less than 2 seconds.   Neurological:      General: No focal deficit present.      Mental Status: He is alert and oriented to person, place, and time.      Cranial Nerves: No cranial nerve deficit.   Psychiatric:         Mood and Affect: Mood normal.         Speech: Speech normal.         Behavior: Behavior normal.                 ..    Chemistry        Component Value Date/Time     12/02/2024 1026     12/02/2024 1026    K 4.7 12/02/2024 1026    K 4.7 12/02/2024 1026     12/02/2024 1026     12/02/2024 1026    CO2 22 (L) 12/02/2024 1026 "    CO2 22 (L) 12/02/2024 1026    BUN 15 12/02/2024 1026    BUN 15 12/02/2024 1026    CREATININE 1.0 12/02/2024 1026    CREATININE 1.0 12/02/2024 1026     12/02/2024 1026     12/02/2024 1026        Component Value Date/Time    CALCIUM 9.3 12/02/2024 1026    CALCIUM 9.3 12/02/2024 1026    ALKPHOS 95 12/02/2024 1026    ALKPHOS 95 12/02/2024 1026    AST 23 12/02/2024 1026    AST 23 12/02/2024 1026    ALT 19 12/02/2024 1026    ALT 19 12/02/2024 1026    BILITOT 0.5 12/02/2024 1026    BILITOT 0.5 12/02/2024 1026    ESTGFRAFRICA >60.0 07/18/2022 0850    EGFRNONAA >60.0 07/18/2022 0850            ..  Lab Results   Component Value Date    CHOL 112 (L) 04/15/2024    CHOL 104 (L) 07/18/2022    CHOL 98 (L) 04/03/2021     Lab Results   Component Value Date    HDL 46 04/15/2024    HDL 41 07/18/2022    HDL 36 (L) 04/03/2021     Lab Results   Component Value Date    LDLCALC 55.4 (L) 04/15/2024    LDLCALC 51.6 (L) 07/18/2022    LDLCALC 46.4 (L) 04/03/2021     Lab Results   Component Value Date    TRIG 53 04/15/2024    TRIG 57 07/18/2022    TRIG 78 04/03/2021     Lab Results   Component Value Date    CHOLHDL 41.1 04/15/2024    CHOLHDL 39.4 07/18/2022    CHOLHDL 36.7 04/03/2021     ..  Lab Results   Component Value Date    WBC 5.02 12/02/2024    HGB 12.8 (L) 12/02/2024    HGB 12.8 (L) 12/02/2024    HCT 38.3 (L) 12/02/2024    MCV 85 12/02/2024     12/02/2024       Test(s) Reviewed  I have reviewed the following in detail:  [] Stress test   [] Angiography   [] Echocardiogram   [x] Labs   [] Other:       Assessment:         ICD-10-CM ICD-9-CM   1. Coronary artery disease involving native coronary artery of native heart without angina pectoris  I25.10 414.01   2. Essential hypertension  I10 401.9   3. PAF (paroxysmal atrial fibrillation)  I48.0 427.31   4. Hypercholesteremia  E78.00 272.0   5. Stented coronary artery  Z95.5 V45.82   6. Long term (current) use of anticoagulants  Z79.01 V58.61   7. Obesity, Class I, BMI  30-34.9  E66.811 278.00     Problem List Items Addressed This Visit          Cardiac/Vascular    PAF (paroxysmal atrial fibrillation)    Essential hypertension    Relevant Orders    Hypertension Digital Medicine (HDMP) Enrollment Order (Completed)    Comprehensive Metabolic Panel    Hypercholesteremia    Relevant Orders    Comprehensive Metabolic Panel    Lipid Panel    Stented coronary artery    Coronary artery disease involving native coronary artery of native heart without angina pectoris - Primary    Relevant Orders    Comprehensive Metabolic Panel    Lipid Panel       Hematology    Long term (current) use of anticoagulants    Relevant Orders    Comprehensive Metabolic Panel    Hemoglobin       Endocrine    Obesity, Class I, BMI 30-34.9        Plan:     DIGITAL HTN, ALL CV CLINICALLY STABLE, NO ANGINA, NO HF, NO TIA, NO CLINICAL ARRHYTHMIA,CONTINUE CURRENT MEDS, EDUCATION, DIET, EXERCISE , WEIGHT LOSS, RTC IN 8- 9 MO      Coronary artery disease involving native coronary artery of native heart without angina pectoris  -     Comprehensive Metabolic Panel; Future; Expected date: 12/03/2024  -     Lipid Panel; Future; Expected date: 12/03/2024    Essential hypertension  -     Hypertension Digital Medicine (HDMP) Enrollment Order  -     Comprehensive Metabolic Panel; Future; Expected date: 12/03/2024    PAF (paroxysmal atrial fibrillation)    Hypercholesteremia  -     Comprehensive Metabolic Panel; Future; Expected date: 12/03/2024  -     Lipid Panel; Future; Expected date: 12/03/2024    Stented coronary artery    Long term (current) use of anticoagulants  -     Comprehensive Metabolic Panel; Future; Expected date: 12/03/2024  -     Hemoglobin; Future; Expected date: 06/03/2025    Obesity, Class I, BMI 30-34.9    RTC Low level/low impact aerobic exercise 5x's/wk. Heart healthy diet and risk factor modification.    See labs and med orders.    Aerobic exercise 5x's/wk. Heart healthy diet and risk factor modification.     See labs and med orders.      ALL CV CLINICALLY STABLE, NO ANGINA, NO HF, NO TIA, NO CLINICAL ARRHYTHMIA,CONTINUE CURRENT MEDS, EDUCATION, DIET, EXERCISE

## 2024-12-16 ENCOUNTER — OFFICE VISIT (OUTPATIENT)
Dept: RHEUMATOLOGY | Facility: CLINIC | Age: 74
End: 2024-12-16
Payer: MEDICARE

## 2024-12-16 VITALS
BODY MASS INDEX: 32.91 KG/M2 | HEIGHT: 68 IN | WEIGHT: 217.13 LBS | DIASTOLIC BLOOD PRESSURE: 88 MMHG | SYSTOLIC BLOOD PRESSURE: 149 MMHG | HEART RATE: 59 BPM

## 2024-12-16 DIAGNOSIS — Z79.52 ON CORTICOSTEROID THERAPY: ICD-10-CM

## 2024-12-16 DIAGNOSIS — M35.3 POLYMYALGIA RHEUMATICA: Primary | ICD-10-CM

## 2024-12-16 PROCEDURE — 99214 OFFICE O/P EST MOD 30 MIN: CPT | Mod: S$GLB,,, | Performed by: INTERNAL MEDICINE

## 2024-12-16 PROCEDURE — 3288F FALL RISK ASSESSMENT DOCD: CPT | Mod: CPTII,S$GLB,, | Performed by: INTERNAL MEDICINE

## 2024-12-16 PROCEDURE — 1101F PT FALLS ASSESS-DOCD LE1/YR: CPT | Mod: CPTII,S$GLB,, | Performed by: INTERNAL MEDICINE

## 2024-12-16 PROCEDURE — 1125F AMNT PAIN NOTED PAIN PRSNT: CPT | Mod: CPTII,S$GLB,, | Performed by: INTERNAL MEDICINE

## 2024-12-16 PROCEDURE — 99999 PR PBB SHADOW E&M-EST. PATIENT-LVL IV: CPT | Mod: PBBFAC,,, | Performed by: INTERNAL MEDICINE

## 2024-12-16 PROCEDURE — 3077F SYST BP >= 140 MM HG: CPT | Mod: CPTII,S$GLB,, | Performed by: INTERNAL MEDICINE

## 2024-12-16 PROCEDURE — 3008F BODY MASS INDEX DOCD: CPT | Mod: CPTII,S$GLB,, | Performed by: INTERNAL MEDICINE

## 2024-12-16 PROCEDURE — 1160F RVW MEDS BY RX/DR IN RCRD: CPT | Mod: CPTII,S$GLB,, | Performed by: INTERNAL MEDICINE

## 2024-12-16 PROCEDURE — 1159F MED LIST DOCD IN RCRD: CPT | Mod: CPTII,S$GLB,, | Performed by: INTERNAL MEDICINE

## 2024-12-16 PROCEDURE — 3079F DIAST BP 80-89 MM HG: CPT | Mod: CPTII,S$GLB,, | Performed by: INTERNAL MEDICINE

## 2024-12-16 RX ORDER — PREDNISONE 5 MG/1
15 TABLET ORAL DAILY
Qty: 90 TABLET | Refills: 2 | Status: SHIPPED | OUTPATIENT
Start: 2024-12-16

## 2024-12-16 RX ORDER — LANOLIN ALCOHOL/MO/W.PET/CERES
CREAM (GRAM) TOPICAL
COMMUNITY
Start: 2023-10-02

## 2024-12-16 NOTE — PATIENT INSTRUCTIONS
Prednisone 15 mg/d for 2 weeks  Then 12.5 mg/d for 2 weeks  Then 10 mg/d until you return    Virtual follow up visit in 2 mo with lab (sedimentation rate , CRP, CBC to follow up on anemia that will have resolved)     [email or web address]

## 2024-12-16 NOTE — PROGRESS NOTES
Subjective:       Patient ID: Farhan Stallworth is a 74 y.o. male.    Chief Complaint: Disease Management    HPI    Semi-retired;   Referred by Chung Carpio primary OA of hands    Has had back issues with injections  Hx artificial R hip  Hx artificial L knee  RUE bicipital tendon repair   Hx Celebrex but not since developed heart disease requiring blood thinners (Eliquis, ASA)  Takes tylenol for pain  Saw me 2021 and started salsalate 500 twice daily for a while     Plays golf  Exercises 2 hours / day     Today:  Sept Developed a lot of neck and shoulder pain after overactivity    Then more problems R knee; Dr. Zamorano injected and pain relieved but when returned in 2 weeks he had stiffness for which Dr ESCOBAR gave prednisone which was very dramatic; kept him on 10 mg /d prednisone for another 2 weeks; then stopped and pain returned; Dr Zamorano suspected PMR    Returned to Dr Yee who injected shoulders with relief for couple days  Then nerve block test that helped, then nerve ablation 10 days ago that helped but still with pain down arms to hands and back of fingers  Night pain with difficulty sleeping  Morning stiffness for a while  Tylenol a little help  Tramadol did not help  +fatigue that he blames on not sleeping    Family MD ordered blood tests and referred him back  Lab 12/2/24 ESR 17, CRP 20.4    Review of Systems   Constitutional:  Negative for fever and unexpected weight change.   HENT:  Negative for mouth sores and trouble swallowing.    Eyes:  Negative for redness.   Respiratory:  Negative for cough and shortness of breath.    Cardiovascular:  Negative for chest pain.   Gastrointestinal:  Negative for constipation and diarrhea.   Genitourinary:  Negative for genital sores.   Skin:  Negative for rash.   Neurological:  Negative for headaches.   Hematological:  Bruises/bleeds easily.         Objective:   BP (!) 149/88 (Patient Position: Sitting)   Pulse (!) 59   Ht 5'  "8" (1.727 m)   Wt 98.5 kg (217 lb 2.5 oz)   BMI 33.02 kg/m²      Physical Exam   Pulmonary/Chest: No respiratory distress.   No increased work of breathing   Musculoskeletal:      Comments: Erosive osteoarthritis changes hands  Limited Range of Motion shoulders, neck, hips, L TKP  Trace fluid wave R knee   Neurological:   Alert, awake, with no abnormal movements   Skin:   No rash on face; skin clear   Psychiatric:   Normal mood and affect; clear, rational thinking; speech normal and not pressured         Lab Results   Component Value Date    SEDRATE 17 12/02/2024      Lab Results   Component Value Date    CRP 20.4 (H) 12/02/2024      Lab Results   Component Value Date    HGB 12.8 (L) 12/02/2024    HGB 12.8 (L) 12/02/2024      Assessment:       1. Polymyalgia rheumatica    2. On corticosteroid therapy            Plan:       Problem List Items Addressed This Visit          Active Problems    Polymyalgia rheumatica - Primary     PMR symptoms with no symptoms of giant cell arteritis  Anemia likely due to PMR inflammation    Start back on prednisone 15 mg/d then to 10 mg/d until follow up with me in 2 mo    Schedule follow up labs in 2 mo    Check BMD in meantime         Relevant Medications    predniSONE (DELTASONE) 5 MG tablet    Other Relevant Orders    CBC Without Differential    C-Reactive Protein    Sedimentation rate    On corticosteroid therapy     Discussed adverse effects and will get BMD and likely start bis-phosphonate         Relevant Orders    DXA Bone Density Axial Skeleton 1 or more sites         "

## 2024-12-16 NOTE — ASSESSMENT & PLAN NOTE
PMR symptoms with no symptoms of giant cell arteritis  Anemia likely due to PMR inflammation    Start back on prednisone 15 mg/d then to 10 mg/d until follow up with me in 2 mo    Schedule follow up labs in 2 mo    Check BMD in meantime

## 2024-12-16 NOTE — PROGRESS NOTES
12/13/2024    10:35 AM   Rapid3 Question Responses and Scores   MDHAQ Score 0.7   Psychologic Score 2.2   Pain Score 6   When you awakened in the morning OVER THE LAST WEEK, did you feel stiff? Yes   If Yes, please indicate the number of hours until you are as limber as you will be for the day 2   Fatigue Score 5   Global Health Score 5   RAPID3 Score 4.44    Answers submitted by the patient for this visit:  Rheumatology Questionnaire (Submitted on 12/13/2024)  fever: No  eye redness: No  mouth sores: No  headaches: No  shortness of breath: No  chest pain: No  trouble swallowing: No  diarrhea: No  constipation: No  unexpected weight change: No  genital sore: No  During the last 3 days, have you had a skin rash?: No  Bruises or bleeds easily: Yes  cough: No

## 2024-12-18 RX ORDER — TADALAFIL 5 MG/1
5 TABLET ORAL DAILY
Qty: 90 TABLET | Refills: 3 | Status: SHIPPED | OUTPATIENT
Start: 2024-12-18 | End: 2025-12-18

## 2024-12-18 NOTE — TELEPHONE ENCOUNTER
No care due was identified.  Health [facility] Embedded Care Due Messages. Reference number: [id number].   12/18/2024 10:09:12 AM CST

## 2024-12-21 DIAGNOSIS — I48.0 PAROXYSMAL ATRIAL FIBRILLATION: ICD-10-CM

## 2024-12-21 DIAGNOSIS — I48.91 NEW ONSET ATRIAL FIBRILLATION: ICD-10-CM

## 2024-12-23 ENCOUNTER — PATIENT MESSAGE (OUTPATIENT)
Dept: RHEUMATOLOGY | Facility: CLINIC | Age: 74
End: 2024-12-23
Payer: MEDICARE

## 2024-12-24 RX ORDER — APIXABAN 5 MG/1
5 TABLET, FILM COATED ORAL 2 TIMES DAILY
Qty: 180 TABLET | Refills: 3 | Status: SHIPPED | OUTPATIENT
Start: 2024-12-24

## 2024-12-24 RX ORDER — METOPROLOL SUCCINATE 25 MG/1
25 TABLET, EXTENDED RELEASE ORAL
Qty: 90 TABLET | Refills: 3 | Status: SHIPPED | OUTPATIENT
Start: 2024-12-24

## 2025-01-06 DIAGNOSIS — Z95.5 STENTED CORONARY ARTERY: ICD-10-CM

## 2025-01-06 DIAGNOSIS — E78.00 HYPERCHOLESTEREMIA: Chronic | ICD-10-CM

## 2025-01-06 RX ORDER — ROSUVASTATIN CALCIUM 5 MG/1
5 TABLET, COATED ORAL
Qty: 90 TABLET | Refills: 1 | Status: SHIPPED | OUTPATIENT
Start: 2025-01-06

## 2025-01-06 NOTE — TELEPHONE ENCOUNTER
Refill Routing Note   Medication(s) are not appropriate for processing by [facility] for the following reason(s):        Responsible provider unclear    [facility] action(s):  Defer             Appointments  past 12m or future 3m with PCP    Date Provider   Last Visit   11/18/2024 Samuel Laura MD   Next Visit   Visit date not found Samuel Laura MD   ED visits in past 90 days: 0        Note composed:10:16 AM 01/06/2025

## 2025-01-06 NOTE — TELEPHONE ENCOUNTER
No care due was identified.  Health [facility] Embedded Care Due Messages. Reference number: [id number].   1/06/2025 1:33:16 AM CST

## 2025-02-10 ENCOUNTER — LAB VISIT (OUTPATIENT)
Dept: LAB | Facility: HOSPITAL | Age: 75
End: 2025-02-10
Attending: INTERNAL MEDICINE
Payer: MEDICARE

## 2025-02-10 DIAGNOSIS — M35.3 POLYMYALGIA RHEUMATICA: ICD-10-CM

## 2025-02-10 LAB
CRP SERPL-MCNC: 2.5 MG/L (ref 0–8.2)
ERYTHROCYTE [SEDIMENTATION RATE] IN BLOOD BY PHOTOMETRIC METHOD: <2 MM/HR (ref 0–23)

## 2025-02-10 PROCEDURE — 85652 RBC SED RATE AUTOMATED: CPT | Performed by: INTERNAL MEDICINE

## 2025-02-10 PROCEDURE — 85027 COMPLETE CBC AUTOMATED: CPT | Performed by: INTERNAL MEDICINE

## 2025-02-10 PROCEDURE — 86140 C-REACTIVE PROTEIN: CPT | Performed by: INTERNAL MEDICINE

## 2025-02-10 PROCEDURE — 36415 COLL VENOUS BLD VENIPUNCTURE: CPT | Mod: PO | Performed by: INTERNAL MEDICINE

## 2025-02-11 LAB
ERYTHROCYTE [DISTWIDTH] IN BLOOD BY AUTOMATED COUNT: 14.8 % (ref 11.5–14.5)
HCT VFR BLD AUTO: 46.7 % (ref 40–54)
HGB BLD-MCNC: 14.7 G/DL (ref 14–18)
MCH RBC QN AUTO: 28.9 PG (ref 27–31)
MCHC RBC AUTO-ENTMCNC: 31.5 G/DL (ref 32–36)
MCV RBC AUTO: 92 FL (ref 82–98)
PLATELET # BLD AUTO: 183 K/UL (ref 150–450)
PMV BLD AUTO: 11.5 FL (ref 9.2–12.9)
RBC # BLD AUTO: 5.09 M/UL (ref 4.6–6.2)
WBC # BLD AUTO: 7.68 K/UL (ref 3.9–12.7)

## 2025-02-19 ENCOUNTER — OFFICE VISIT (OUTPATIENT)
Dept: RHEUMATOLOGY | Facility: CLINIC | Age: 75
End: 2025-02-19
Payer: MEDICARE

## 2025-02-19 DIAGNOSIS — Z79.52 ON CORTICOSTEROID THERAPY: ICD-10-CM

## 2025-02-19 DIAGNOSIS — M35.3 POLYMYALGIA RHEUMATICA: Primary | ICD-10-CM

## 2025-02-19 RX ORDER — PREDNISONE 1 MG/1
5 TABLET ORAL DAILY
Qty: 120 TABLET | Refills: 1 | Status: SHIPPED | OUTPATIENT
Start: 2025-02-19

## 2025-02-19 ASSESSMENT — ROUTINE ASSESSMENT OF PATIENT INDEX DATA (RAPID3)
FATIGUE SCORE: 3
PSYCHOLOGICAL DISTRESS SCORE: 1.1
MDHAQ FUNCTION SCORE: 0
PATIENT GLOBAL ASSESSMENT SCORE: 1
PAIN SCORE: 0.5
TOTAL RAPID3 SCORE: 0.5

## 2025-02-19 NOTE — ASSESSMENT & PLAN NOTE
Progress Note - Critical Care/ICU   Name: Sarah Zayas 80 y.o. female I MRN: [id number]  Unit/Bed#: ICU 06 I Date of Admission: 10/10/2024   Date of Service: 10/19/2024 I Hospital Day: 9      Assessment & Plan  Metabolic encephalopathy  Had episodes of increasing lethargy, difficult to arouse  Likely due to hyponatremia, possible UTI and hypercapnia  Previous CT imaging without any acute process  MRI brain without any evidence of stroke or acute intracranial process  VBG noted for elevated CO2, concerning for CO2 narcosis  Suspect benzo may be playing a role with history of COPD, and likely CO2 retention  Plan:  Recommend to maintain O2 above 88%, avoid O2 sats greater than 95% due to decreased respiratory drive  Some concerns for possible UTI, empirically started on ceftriaxone D4 awaiting culture sensitivities  Seizure-like activity (HCC)  10/18 stroke alert initiated for unresponsiveness and fluctuating tonic activity in RUE noted by bedside RN around 5:45 AM on 10/18.   LKW 5 - 5:30 AM.    Initial CT imaging negative for acute intracranial changes, LVO or significant stenosis.     -CTA head/neck (10/9): 2 mm left A2/A3 junction aneurysm, negative for acute intracranial abnormalities or significant stenosis.  -CTH x 2 (10/10 and 10/13) unremarkable for acute changes  -MRI brain wo (10/16) chronic microangiopathic, negative for acute intracranial abnormality  -CTH and CTA head/neck (10/18, stroke alert)  - Negative for significant stenosis or acute intracranial changes  - Redemonstrated intracranial aneurysms - R P-Comm (2.5 mm) and left LISA A2/A3 junction (2 mm).     10/18 loaded on IV Keppra 2 g per neurology.   Seizure likely in setting of hyponatremia.     Plan:  s/p Keppra 2 g  EEG  Frequent neuro checks  Seizure precautions  Correct hyponatremia with 3% saline per nephrology and follow-up with repeat labs  Neurology consulted recommendations appreciated  Hyponatremia  Acute hyponatremia in the setting of lung  So far has had no significant prednisone complications; since he has been able to taper to 5 mg/d the risk of corticosteroid complications is low going forward    BMD on DXA was very good and he will not be on >7.5 mg/d prednisone so no anti-resorptive is indicated   issues with acute exacerbation of severe COPD, also noted patient was recently started on Cymbalta on 10/13 which can cause SIADH.    Serum osmolality true hypotonic hyponatremia 262/TSH 3.182  Urine sodium 105 compatible with SIADH/urine osmolality 665  CT of the head: No acute finding/MRI negative  CT the chest: Possible airway debris consistent with aspiration trace pleural effusions stable right breast lesion    10/18 bolus of 100 mL of 3% saline Central line not required-Per nephrology    Plan:  Fluid restriction 1000 mL  Received NSS at 100 cc/hr overnight for 1 liter  10/18 torsemide 5 mg was added to help with blood pressure and hyponatremia  Open to tolerate p.o. then will start salt tabs 2 g 3 times daily  Follow-up on repeat BMP 2 hours post 3% saline  Goal to increase sodium no faster than 130-131 in 24 hours by tomorrow morning  Acute exacerbation of chronic obstructive pulmonary disease (COPD) (HCC)  History of severe COPD  Initial concern for exacerbation but likely oversedation from benzodiazepine  Transfer from  to ICU on 10/10, downgraded to MS on 10/10  Continue budesonide, Xopenex  Repeat VBG with noted CO2 retention    Plan:  Avoid benzos, discontinue lyrica  On BiPAP qhs and as needed with sleep  Anxiety/depression  Patient notes chronic history of generalized anxiety with panic attacks at times.   Patient was previously maintained on alprazolam 0.25 mg twice daily as needed which was recently discontinued by her geriatrician 8/30/2024  Patient was started on buspirone 5 mg twice daily    Patient presents to the ED now with insomnia worsening  buspirone was stopped, patient was started on mirtazapine and was later discontinued  On Cymbalta but concerns for hyponatremia-currently discontinued  Geriatrics consulted-recommendation appreciated  Neck pain/shoulder pain  Patient also complaining of neck and shoulder pain-chronic pain ongoing for the past 1 year which has been worsening  recently  Denies have any chest pain, shortness of breath  10/10/24 Xray L shoulder: normal  10/10 CT head: No acute intracranial abnormality.  No concerns for stroke at this time  Previous history of temporal arthritis - ESR/CRP normal  Also noted to have a previous history of sialoadenitis and associated neck edema and was seen by ENT in the past.  Patient is able to have purposeful movements in the bilateral upper extremities 5/5  strength bilaterally but has poor effort lifting the left arm without drift to bed.   Plan:  Evidence of bicep tendinitis  Add Voltaren gel and Bengay  PT OT recommending rehab  Awaiting further evaluation above prior to discharge to rehab  Gastroesophageal reflux disease without esophagitis  Continue Protonix 40 mg daily  Hypertension  PTA valsartan 160 mg daily, propranolol 10 mg twice shwetha  Continue losartan and propanolol  Monitor BP and titrate as needed  Temporal arteritis (HCC)  History of temporal arteritis  No current active/associated acute symptoms  Maintained on prednisone 2.5mg daily at baseline  Follow up with PCP, Rheumatology as appropriate   Acute respiratory failure with hypercapnia (HCC)  Back to baseline oxygen, recommend 2L NC  Hypothyroidism  Levothyroxine reduced to 25 mcg  TSH suppressed and free T4 elevated-suspect some iatrogenic hyperthyroidism  Repeat TSH and T4 in 8 to 12 weeks outpatient  Macular degeneration  Follows with [facility]  Continue artifical tears  Reports decrease sight in her left eye with some discharge concerning for conjunctivitis  Recommend trial with ofloxacin for 7 days  Urinary retention  Did have episode of urinary retention, now resolved with incontinence  UA concerning for infection, currently on ceftriaxone awaiting cultures  Chronic respiratory failure with hypercapnia (HCC)    Metabolic alkalosis with respiratory acidosis  Metabolic alkalosis important compensation for respiratory acidosis pH 7.2    Hyperkalemia  Hyperkalemia being treated with Lokelma will monitor and stop losartan and to maintain low potassium diet  Disposition: Stepdown Level 1    ICU Core Measures     A: Assess, Prevent, and Manage Pain Has pain been assessed? Yes  Need for changes to pain regimen? No   B: Both SAT/SAT  N/A   C: Choice of Sedation RASS Goal: N/A patient not on sedation  Need for changes to sedation or analgesia regimen? NA   D: Delirium CAM-ICU: Negative   E: Early Mobility  Plan for early mobility? Yes   F: Family Engagement Plan for family engagement today? Yes         Prophylaxis:  VTE VTE covered by:  enoxaparin, Subcutaneous, 30 mg at 10/18/24 0844       Stress Ulcer  covered bypantoprazole (PROTONIX) 40 mg tablet [[id number]] (Long-Term Med), pantoprazole (PROTONIX) EC tablet 40 mg [[id number]]         24 Hour Events : No events overnight. More alert. Generalized discomfort is reported  Subjective   Review of Systems: Review of Systems   All other systems reviewed and are negative.      Objective :                   Vitals I/O      Most Recent Min/Max in 24hrs   Temp 99 °F (37.2 °C) Temp  Min: 97.3 °F (36.3 °C)  Max: 99.1 °F (37.3 °C)   Pulse 85 Pulse  Min: 62  Max: 85   Resp (!) 28 Resp  Min: 16  Max: 35   /61 BP  Min: 84/48  Max: 168/74   O2 Sat 98 % SpO2  Min: 88 %  Max: 100 %      Intake/Output Summary (Last 24 hours) at 10/19/2024 0535  Last data filed at 10/19/2024 0400  Gross per 24 hour   Intake 1060 ml   Output 165 ml   Net 895 ml       Diet NPO    Invasive Monitoring           Physical Exam   Physical Exam  Eyes:      Pupils: Pupils are equal, round, and reactive to light.   Skin:     General: Skin is warm and dry.   HENT:      Head: Normocephalic.      Mouth/Throat:      Mouth: Mucous membranes are dry.   Cardiovascular:      Rate and Rhythm: Normal rate.   Abdominal:      Palpations: Abdomen is soft.      Tenderness: There is no abdominal tenderness.   Constitutional:       Appearance: She is  ill-appearing.   Pulmonary:      Effort: Pulmonary effort is normal.      Breath sounds: Normal breath sounds.   Neurological:      Mental Status: Mental status is at baseline.          Diagnostic Studies      Lab Results: I have reviewed the following results: BMP     Medications:  Scheduled PRN   acetaminophen, 975 mg, Q8H RACHEL  amLODIPine, 5 mg, Daily  Artificial Tears, 1 drop, TID  budesonide, 0.5 mg, BID  chlorhexidine, 15 mL, Q12H RACHEL  vitamin B-12, 1,000 mcg, Daily  Diclofenac Sodium, 2 g, 4x Daily  enoxaparin, 30 mg, Daily  levalbuterol, 1.25 mg, BID  levothyroxine, 25 mcg, Early Morning  lidocaine, 1 patch, Daily  LORazepam, 2 mg, Once  menthol-methyl salicylate, , TID  multivitamin-minerals, 1 tablet, Daily  ofloxacin, 1 drop, 4x Daily  pantoprazole, 40 mg, Early Morning  polyethylene glycol, 17 g, Daily  predniSONE, 2.5 mg, Daily With Breakfast  PreserVision AREDS 2, 1 capsule, Daily  propranolol, 40 mg, Q12H RACHEL  senna-docusate sodium, 1 tablet, BID  sodium chloride, 2 g, TID With Meals  Sodium Zirconium Cyclosilicate, 10 g, Daily  torsemide, 5 mg, Daily      albuterol, 2 puff, Q6H PRN  ipratropium-albuterol, 3 mL, Q6H PRN  magnesium hydroxide, 30 mL, Daily PRN  melatonin, 3 mg, HS PRN  ondansetron, 4 mg, Q6H PRN  phenol, 1 spray, Q2H PRN  sodium chloride, 2 spray, Q1H PRN       Continuous              CBC    Recent Labs     10/18/24  0445 10/18/24  0642 10/19/24  0454   WBC 14.00*  --  8.60   HGB 11.6 11.2* 10.3*   HCT 35.9 33* 31.8*     --  205     BMP    Recent Labs     10/18/24  2015 10/18/24  2358   SODIUM 124* 124*   K 4.3 4.3   CL 79* 81*   CO2 43* 41*   AGAP 2* 2*   BUN 15 14   CREATININE 0.35* 0.34*   CALCIUM 7.8* 7.5*       Coags    Recent Labs     10/18/24  0746   INR 0.92   PTT 27        Additional Electrolytes  Recent Labs     10/18/24  0642 10/18/24  0724 10/19/24  0454   MG  --  1.8*  --    PHOS  --  3.2  --    CAIONIZED 1.14  --  1.04*          Blood Gas    No recent results  Recent  Labs     10/18/24  0724   PHVEN 7.375   [id number] 64.7*   PO2VEN 155.6*   [id number] 37.0*   BEVEN 9.5   [id number] 94.3*    LFTs  No recent results    Infectious  Recent Labs     10/17/24  0636 10/18/24  0724   PROCALCITONI 0.15 0.13     Glucose  Recent Labs     10/18/24  1306 10/18/24  1557 10/18/24  2015 10/18/24  2358   GLUC 87 95 92 89

## 2025-02-19 NOTE — ASSESSMENT & PLAN NOTE
PMR has responded well to prednisone and he has successfully reduced to 5 mg/d without recurrence of PMR symptoms.     He has had no giant cell arteritis symptoms and labs are now back to normal    Will continue prednisone and plan taper 1 mg/d each month   Plan follow up 3 month with lab CBC, BMP, CRP, sedimentation rate

## 2025-02-19 NOTE — PROGRESS NOTES
Subjective:       Patient ID: Farhan Stallworth is a 74 y.o. male.    Chief Complaint: Disease Management    HPI    Semi-retired;   Referred by Chung Carpio primary OA of hands    Has had back issues with injections  Hx artificial R hip  Hx artificial L knee  RUE bicipital tendon repair   Hx Celebrex but not since developed heart disease requiring blood thinners (Eliquis, ASA)  Takes tylenol for pain  Saw me 2021 and started salsalate 500 twice daily for a while     Plays golf  Exercises 2 hours / day      The patient location is: [place]  The chief complaint leading to consultation is: PMR management     Visit type: audiovisual    Face to Face time with patient: 12  25  minutes of total time spent on the encounter, which includes face to face time and non-face to face time preparing to see the patient (eg, review of tests), Obtaining and/or reviewing separately obtained history, Documenting clinical information in the electronic or other health record, Independently interpreting results (not separately reported) and communicating results to the patient/family/caregiver, or Care coordination (not separately reported).     Each patient to whom he or she provides medical services by telemedicine is:  (1) informed of the relationship between the physician and patient and the respective role of any other health care provider with respect to management of the patient; and (2) notified that he or she may decline to receive medical services by telemedicine and may withdraw from such care at any time.    Notes:    PMR relapsed Dec 2024; restarted prednisone 15 mg/d  After 3 weeks pain resolved; now down to 5 mg/d after tapering by 2.5 i3xpeak    Went skiing   No giant cell arteritis symptoms       Review of Systems   Constitutional:  Negative for fever and unexpected weight change.   HENT:  Negative for mouth sores and trouble swallowing.    Eyes:  Negative for redness.   Respiratory:   Negative for cough and shortness of breath.    Cardiovascular:  Negative for chest pain.   Gastrointestinal:  Negative for constipation and diarrhea.   Genitourinary:  Negative for genital sores.   Skin:  Negative for rash.   Neurological:  Negative for headaches.   Hematological:  Does not bruise/bleed easily.           2/17/2025     8:22 AM   Rapid3 Question Responses and Scores   MDHAQ Score 0   Psychologic Score 1.1   Pain Score 0.5   When you awakened in the morning OVER THE LAST WEEK, did you feel stiff? Yes   If Yes, please indicate the number of hours until you are as limber as you will be for the day 1   Fatigue Score 3   Global Health Score 1   RAPID3 Score 0.5      Objective:   There were no vitals taken for this visit.     Physical Exam      Assessment:       1. Polymyalgia rheumatica    2. On corticosteroid therapy            Plan:       Problem List Items Addressed This Visit          Active Problems    Polymyalgia rheumatica - Primary    PMR has responded well to prednisone and he has successfully reduced to 5 mg/d without recurrence of PMR symptoms.     He has had no giant cell arteritis symptoms and labs are now back to normal    Will continue prednisone and plan taper 1 mg/d each month   Plan follow up 3 month with lab CBC, BMP, CRP, sedimentation rate          Relevant Medications    predniSONE (DELTASONE) 1 MG tablet    On corticosteroid therapy    So far has had no significant prednisone complications; since he has been able to taper to 5 mg/d the risk of corticosteroid complications is low going forward    BMD on DXA was very good and he will not be on >7.5 mg/d prednisone so no anti-resorptive is indicated         Relevant Orders    Basic Metabolic Panel

## 2025-03-11 ENCOUNTER — TELEPHONE (OUTPATIENT)
Dept: CARDIOLOGY | Facility: CLINIC | Age: 75
End: 2025-03-11
Payer: MEDICARE

## 2025-03-11 ENCOUNTER — PATIENT MESSAGE (OUTPATIENT)
Dept: CARDIOLOGY | Facility: CLINIC | Age: 75
End: 2025-03-11
Payer: MEDICARE

## 2025-03-11 NOTE — TELEPHONE ENCOUNTER
Surgical Clearance    Location:[facility]  Procedure:Radiofrequency Ablation Lumbar Medical Branch Nerves,Bilateral, L3-4, L5-S1  Bloodthinners:ASA  Implants:Cage,Patella, Stent and Joint Replacement  [phone]  [phone]

## 2025-03-15 DIAGNOSIS — I48.91 NEW ONSET ATRIAL FIBRILLATION: ICD-10-CM

## 2025-03-15 DIAGNOSIS — I48.0 PAROXYSMAL ATRIAL FIBRILLATION: ICD-10-CM

## 2025-03-17 RX ORDER — APIXABAN 5 MG/1
5 TABLET, FILM COATED ORAL 2 TIMES DAILY
Qty: 180 TABLET | Refills: 0 | Status: SHIPPED | OUTPATIENT
Start: 2025-03-17

## 2025-03-17 RX ORDER — METOPROLOL SUCCINATE 25 MG/1
25 TABLET, EXTENDED RELEASE ORAL
Qty: 90 TABLET | Refills: 0 | Status: SHIPPED | OUTPATIENT
Start: 2025-03-17

## 2025-05-16 ENCOUNTER — LAB VISIT (OUTPATIENT)
Dept: LAB | Facility: HOSPITAL | Age: 75
End: 2025-05-16
Attending: INTERNAL MEDICINE
Payer: MEDICARE

## 2025-05-16 DIAGNOSIS — M35.3 POLYMYALGIA RHEUMATICA: ICD-10-CM

## 2025-05-16 DIAGNOSIS — Z79.52 ON CORTICOSTEROID THERAPY: ICD-10-CM

## 2025-05-16 LAB
ANION GAP (OHS): 7 MMOL/L (ref 8–16)
BUN SERPL-MCNC: 19 MG/DL (ref 8–23)
CALCIUM SERPL-MCNC: 9 MG/DL (ref 8.7–10.5)
CHLORIDE SERPL-SCNC: 104 MMOL/L (ref 95–110)
CO2 SERPL-SCNC: 27 MMOL/L (ref 23–29)
CREAT SERPL-MCNC: 1 MG/DL (ref 0.5–1.4)
CRP SERPL-MCNC: 3.8 MG/L
ERYTHROCYTE [DISTWIDTH] IN BLOOD BY AUTOMATED COUNT: 13.2 % (ref 11.5–14.5)
ERYTHROCYTE [SEDIMENTATION RATE] IN BLOOD BY PHOTOMETRIC METHOD: <2 MM/HR
GFR SERPLBLD CREATININE-BSD FMLA CKD-EPI: >60 ML/MIN/1.73/M2
GLUCOSE SERPL-MCNC: 120 MG/DL (ref 70–110)
HCT VFR BLD AUTO: 42.8 % (ref 40–54)
HGB BLD-MCNC: 14.2 GM/DL (ref 14–18)
MCH RBC QN AUTO: 30.2 PG (ref 27–31)
MCHC RBC AUTO-ENTMCNC: 33.2 G/DL (ref 32–36)
MCV RBC AUTO: 91 FL (ref 82–98)
PLATELET # BLD AUTO: 165 K/UL (ref 150–450)
PMV BLD AUTO: 10.6 FL (ref 9.2–12.9)
POTASSIUM SERPL-SCNC: 4.4 MMOL/L (ref 3.5–5.1)
RBC # BLD AUTO: 4.7 M/UL (ref 4.6–6.2)
SODIUM SERPL-SCNC: 138 MMOL/L (ref 136–145)
WBC # BLD AUTO: 4.92 K/UL (ref 3.9–12.7)

## 2025-05-16 PROCEDURE — 80048 BASIC METABOLIC PNL TOTAL CA: CPT

## 2025-05-16 PROCEDURE — 36415 COLL VENOUS BLD VENIPUNCTURE: CPT | Mod: PO

## 2025-05-16 PROCEDURE — 85027 COMPLETE CBC AUTOMATED: CPT

## 2025-05-16 PROCEDURE — 86140 C-REACTIVE PROTEIN: CPT

## 2025-05-16 PROCEDURE — 85652 RBC SED RATE AUTOMATED: CPT

## 2025-05-20 ENCOUNTER — OFFICE VISIT (OUTPATIENT)
Dept: RHEUMATOLOGY | Facility: CLINIC | Age: 75
End: 2025-05-20
Payer: MEDICARE

## 2025-05-20 VITALS
SYSTOLIC BLOOD PRESSURE: 136 MMHG | HEART RATE: 56 BPM | WEIGHT: 218.25 LBS | BODY MASS INDEX: 33.08 KG/M2 | DIASTOLIC BLOOD PRESSURE: 84 MMHG | HEIGHT: 68 IN

## 2025-05-20 DIAGNOSIS — G47.25 JET LAG SYNDROME: ICD-10-CM

## 2025-05-20 DIAGNOSIS — M19.042 PRIMARY OSTEOARTHRITIS OF BOTH HANDS: ICD-10-CM

## 2025-05-20 DIAGNOSIS — M19.041 PRIMARY OSTEOARTHRITIS OF BOTH HANDS: ICD-10-CM

## 2025-05-20 DIAGNOSIS — M35.3 POLYMYALGIA RHEUMATICA: Primary | ICD-10-CM

## 2025-05-20 DIAGNOSIS — Z79.52 ON CORTICOSTEROID THERAPY: ICD-10-CM

## 2025-05-20 PROCEDURE — 1159F MED LIST DOCD IN RCRD: CPT | Mod: CPTII,S$GLB,, | Performed by: INTERNAL MEDICINE

## 2025-05-20 PROCEDURE — 1125F AMNT PAIN NOTED PAIN PRSNT: CPT | Mod: CPTII,S$GLB,, | Performed by: INTERNAL MEDICINE

## 2025-05-20 PROCEDURE — 3008F BODY MASS INDEX DOCD: CPT | Mod: CPTII,S$GLB,, | Performed by: INTERNAL MEDICINE

## 2025-05-20 PROCEDURE — 3075F SYST BP GE 130 - 139MM HG: CPT | Mod: CPTII,S$GLB,, | Performed by: INTERNAL MEDICINE

## 2025-05-20 PROCEDURE — 99214 OFFICE O/P EST MOD 30 MIN: CPT | Mod: S$GLB,,, | Performed by: INTERNAL MEDICINE

## 2025-05-20 PROCEDURE — 1160F RVW MEDS BY RX/DR IN RCRD: CPT | Mod: CPTII,S$GLB,, | Performed by: INTERNAL MEDICINE

## 2025-05-20 PROCEDURE — 3288F FALL RISK ASSESSMENT DOCD: CPT | Mod: CPTII,S$GLB,, | Performed by: INTERNAL MEDICINE

## 2025-05-20 PROCEDURE — 3079F DIAST BP 80-89 MM HG: CPT | Mod: CPTII,S$GLB,, | Performed by: INTERNAL MEDICINE

## 2025-05-20 PROCEDURE — 99999 PR PBB SHADOW E&M-EST. PATIENT-LVL IV: CPT | Mod: PBBFAC,,, | Performed by: INTERNAL MEDICINE

## 2025-05-20 PROCEDURE — 1101F PT FALLS ASSESS-DOCD LE1/YR: CPT | Mod: CPTII,S$GLB,, | Performed by: INTERNAL MEDICINE

## 2025-05-20 RX ORDER — ZOLPIDEM TARTRATE 5 MG/1
5 TABLET ORAL NIGHTLY PRN
Qty: 8 TABLET | Refills: 0 | Status: SHIPPED | OUTPATIENT
Start: 2025-05-20

## 2025-05-20 ASSESSMENT — ROUTINE ASSESSMENT OF PATIENT INDEX DATA (RAPID3)
TOTAL RAPID3 SCORE: 1.33
PSYCHOLOGICAL DISTRESS SCORE: 1.1
PATIENT GLOBAL ASSESSMENT SCORE: 2
FATIGUE SCORE: 0
MDHAQ FUNCTION SCORE: 0.3
WHEN YOU AWAKENED IN THE MORNING OVER THE LAST WEEK, PLEASE INDICATE THE AMOUNT OF TIME IT TAKES UNTIL YOU ARE AS LIMBER AS YOU WILL BE FOR THE DAY: 1
PAIN SCORE: 1
AM STIFFNESS SCORE: 1, YES

## 2025-05-20 NOTE — PROGRESS NOTES
5/13/2025     8:11 AM   Rapid3 Question Responses and Scores   MDHAQ Score 0.3   Psychologic Score 1.1   Pain Score 1   When you awakened in the morning OVER THE LAST WEEK, did you feel stiff? Yes   If Yes, please indicate the number of hours until you are as limber as you will be for the day 1   Fatigue Score 0   Global Health Score 2   RAPID3 Score 1.33        Answers submitted by the patient for this visit:  Rheumatology Questionnaire (Submitted on 5/13/2025)  fever: No  eye redness: No  mouth sores: No  headaches: No  shortness of breath: No  chest pain: No  trouble swallowing: No  diarrhea: No  constipation: No  unexpected weight change: No  genital sore: No  During the last 3 days, have you had a skin rash?: No  Bruises or bleeds easily: No  cough: No

## 2025-05-20 NOTE — PROGRESS NOTES
"Subjective:       Patient ID: Farhan Stallworth is a 75 y.o. male.    Chief Complaint: Disease Management    HPI    Semi-retired;   Referred by Chung Carpio primary OA of hands    Has had back issues with injections  Hx artificial R hip  Hx artificial L knee  RUE bicipital tendon repair   Hx Celebrex but not since developed heart disease requiring blood thinners (Eliquis, ASA)  Takes tylenol for pain  Saw me 2021 and started salsalate 500 twice daily for a while     Plays golf  Exercises 2 hours / day     Has tapered down to 2 mg/d prednisone; reducing 1 month   Sore back of R hip sometimes  Morning stiffness <30 min     No giant cell arteritis symptoms reported     Review of Systems   Constitutional:  Negative for fever and unexpected weight change.   HENT:  Negative for mouth sores and trouble swallowing.    Eyes:  Negative for redness.   Respiratory:  Negative for cough and shortness of breath.    Cardiovascular:  Negative for chest pain.   Gastrointestinal:  Negative for constipation and diarrhea.   Genitourinary:  Negative for genital sores.   Skin:  Negative for rash.   Neurological:  Negative for headaches.   Hematological:  Does not bruise/bleed easily.         Objective:   /84 (BP Location: Right arm, Patient Position: Sitting)   Pulse (!) 56   Ht 5' 8" (1.727 m)   Wt 99 kg (218 lb 4.1 oz)   BMI 33.19 kg/m²      Physical Exam      Osteoarthritis hands    Lab Results   Component Value Date    SEDRATE <2 05/16/2025      Assessment:       1. Polymyalgia rheumatica    2. On corticosteroid therapy    3. Jet lag syndrome    4. Primary osteoarthritis of both hands            Plan:       Problem List Items Addressed This Visit          Active Problems    Polymyalgia rheumatica - Primary    He has not had recurrent symptoms since tapering prednisone; is down to 2 mg/d and on track to discontinue this summer    Discussed tapering and discontinuing, as well as emergency " resumption of higher dose of 10 to 15 if symptoms recur while on river cruise in [place] this summer    Return to clinic me 4 month with sedimentation rate, CRP          On corticosteroid therapy    Now <5 mg/d and has tolerated with minimal adverse effects          Primary osteoarthritis of both hands    Has osteoarthritis which has not been symptomatic          Other Visit Diagnoses         Jet lag syndrome        Relevant Medications    zolpidem (AMBIEN) 5 MG Tab

## 2025-05-20 NOTE — PATIENT INSTRUCTIONS
Continue prednisone 2 mg/d until after cruise  Reduce to 1 mg/d through month of July, then stop  Return To see me virtual visit with sedimentation rate and CRP in Sept

## 2025-05-20 NOTE — ASSESSMENT & PLAN NOTE
He has not had recurrent symptoms since tapering prednisone; is down to 2 mg/d and on track to discontinue this summer    Discussed tapering and discontinuing, as well as emergency resumption of higher dose of 10 to 15 if symptoms recur while on river cruise in [place] this summer    Return to clinic me 4 month with sedimentation rate, CRP

## 2025-05-22 DIAGNOSIS — M35.3 POLYMYALGIA RHEUMATICA: ICD-10-CM

## 2025-05-22 RX ORDER — PREDNISONE 1 MG/1
5 TABLET ORAL DAILY
Qty: 120 TABLET | Refills: 1 | Status: SHIPPED | OUTPATIENT
Start: 2025-05-22

## 2025-05-22 NOTE — TELEPHONE ENCOUNTER
Medication refill requested for prednisone.  Last office visit on 5/20/25.  Labs done on 5/20/25.  Order pended.

## 2025-05-28 DIAGNOSIS — E78.00 HYPERCHOLESTEREMIA: Chronic | ICD-10-CM

## 2025-05-28 DIAGNOSIS — Z95.5 STENTED CORONARY ARTERY: ICD-10-CM

## 2025-05-28 DIAGNOSIS — I48.91 NEW ONSET ATRIAL FIBRILLATION: ICD-10-CM

## 2025-05-28 RX ORDER — METOPROLOL SUCCINATE 25 MG/1
25 TABLET, EXTENDED RELEASE ORAL
Qty: 90 TABLET | Refills: 1 | Status: SHIPPED | OUTPATIENT
Start: 2025-05-28

## 2025-05-28 RX ORDER — ROSUVASTATIN CALCIUM 5 MG/1
5 TABLET, COATED ORAL
Qty: 90 TABLET | Refills: 0 | Status: SHIPPED | OUTPATIENT
Start: 2025-05-28

## 2025-05-28 NOTE — TELEPHONE ENCOUNTER
Care Due:                  Date            Visit Type   Department     Provider  --------------------------------------------------------------------------------                                EP -                              PRIMARY      [facility] FAMILY  Last Visit: 11-      CARE ([facility])   MEDICINE       Samuel Laura  Next Visit: None Scheduled  None         None Found                                                            Last  Test          Frequency    Reason                     Performed    Due Date  --------------------------------------------------------------------------------    Lipid Panel.  12 months..  rosuvastatin.............  04-   04-    [facility] Embedded Care Due Messages. Reference number: [id number].   5/28/2025 2:45:54 AM CDT

## 2025-05-28 NOTE — TELEPHONE ENCOUNTER
Refill Routing Note   Medication(s) are not appropriate for processing by [facility] for the following reason(s):        Required labs outdated    [facility] action(s):  Defer        Medication Therapy Plan: FLOS      Appointments  past 12m or future 3m with PCP    Date Provider   Last Visit   11/18/2024 Samuel Laura MD   Next Visit   Visit date not found Samuel Laura MD   ED visits in past 90 days: 0        Note composed:10:12 AM 05/28/2025

## 2025-06-12 DIAGNOSIS — G47.25 JET LAG SYNDROME: ICD-10-CM

## 2025-06-12 RX ORDER — ZOLPIDEM TARTRATE 5 MG/1
5 TABLET ORAL NIGHTLY PRN
Qty: 8 TABLET | Refills: 0 | Status: SHIPPED | OUTPATIENT
Start: 2025-06-12

## 2025-06-20 ENCOUNTER — PATIENT MESSAGE (OUTPATIENT)
Dept: CARDIOLOGY | Facility: CLINIC | Age: 75
End: 2025-06-20
Payer: MEDICARE

## 2025-06-20 DIAGNOSIS — Z12.5 ENCOUNTER FOR SCREENING PROSTATE SPECIFIC ANTIGEN (PSA) MEASUREMENT: Primary | ICD-10-CM

## 2025-07-18 ENCOUNTER — LAB VISIT (OUTPATIENT)
Dept: LAB | Facility: HOSPITAL | Age: 75
End: 2025-07-18
Attending: INTERNAL MEDICINE
Payer: MEDICARE

## 2025-07-18 DIAGNOSIS — E78.00 HYPERCHOLESTEREMIA: Chronic | ICD-10-CM

## 2025-07-18 DIAGNOSIS — I25.10 CORONARY ARTERY DISEASE INVOLVING NATIVE CORONARY ARTERY OF NATIVE HEART WITHOUT ANGINA PECTORIS: Chronic | ICD-10-CM

## 2025-07-18 DIAGNOSIS — I10 ESSENTIAL HYPERTENSION: ICD-10-CM

## 2025-07-18 DIAGNOSIS — Z12.5 ENCOUNTER FOR SCREENING PROSTATE SPECIFIC ANTIGEN (PSA) MEASUREMENT: ICD-10-CM

## 2025-07-18 DIAGNOSIS — Z79.01 LONG TERM (CURRENT) USE OF ANTICOAGULANTS: Chronic | ICD-10-CM

## 2025-07-18 LAB
ALBUMIN SERPL BCP-MCNC: 4.3 G/DL (ref 3.5–5.2)
ALP SERPL-CCNC: 83 UNIT/L (ref 40–150)
ALT SERPL W/O P-5'-P-CCNC: 22 UNIT/L (ref 10–44)
ANION GAP (OHS): 7 MMOL/L (ref 8–16)
AST SERPL-CCNC: 27 UNIT/L (ref 11–45)
BILIRUB SERPL-MCNC: 0.6 MG/DL (ref 0.1–1)
BUN SERPL-MCNC: 23 MG/DL (ref 8–23)
CALCIUM SERPL-MCNC: 8.8 MG/DL (ref 8.7–10.5)
CHLORIDE SERPL-SCNC: 109 MMOL/L (ref 95–110)
CHOLEST SERPL-MCNC: 124 MG/DL (ref 120–199)
CHOLEST/HDLC SERPL: 3.3 {RATIO} (ref 2–5)
CO2 SERPL-SCNC: 24 MMOL/L (ref 23–29)
CREAT SERPL-MCNC: 1.2 MG/DL (ref 0.5–1.4)
GFR SERPLBLD CREATININE-BSD FMLA CKD-EPI: >60 ML/MIN/1.73/M2
GLUCOSE SERPL-MCNC: 108 MG/DL (ref 70–110)
HDLC SERPL-MCNC: 38 MG/DL (ref 40–75)
HDLC SERPL: 30.6 % (ref 20–50)
HGB BLD-MCNC: 14.5 GM/DL (ref 14–18)
LDLC SERPL CALC-MCNC: 73.8 MG/DL (ref 63–159)
NONHDLC SERPL-MCNC: 86 MG/DL
POTASSIUM SERPL-SCNC: 4.8 MMOL/L (ref 3.5–5.1)
PROT SERPL-MCNC: 7.1 GM/DL (ref 6–8.4)
PSA SERPL-MCNC: 0.51 NG/ML
SODIUM SERPL-SCNC: 140 MMOL/L (ref 136–145)
TRIGL SERPL-MCNC: 61 MG/DL (ref 30–150)

## 2025-07-18 PROCEDURE — 80053 COMPREHEN METABOLIC PANEL: CPT

## 2025-07-18 PROCEDURE — 36415 COLL VENOUS BLD VENIPUNCTURE: CPT | Mod: PO

## 2025-07-18 PROCEDURE — 84153 ASSAY OF PSA TOTAL: CPT

## 2025-07-18 PROCEDURE — 85018 HEMOGLOBIN: CPT

## 2025-07-18 PROCEDURE — 80061 LIPID PANEL: CPT

## 2025-07-20 NOTE — PROGRESS NOTES
Subjective:    Patient ID:  Farhan Stallworth is a 75 y.o. male who presents for Follow-up (6 month) and Coronary Artery Disease        HPI  DISCUSSED LABS CMP HEMOGLOBIN PSA NORMAL PSA REQUESTED BY PATIENT, LDL 74 UP FROM 55, HDL 38 DOWN FROM 46, TRIGLYCERIDE 61 UP, WAS NOT DIETING WAS ON A CRUISE, DOING WELL, SEE ROS    Past Medical History:   Diagnosis Date    Arthritis     hip    Atrial fibrillation 12/2018    Coronary artery disease     cardiac stents    Difficult intubation     Hearing difficulty     Hypertension     Low serum testosterone level     Lumbar disc disease     REEYS (obstructive sleep apnea)     has CPAP, better relief of symptoms with nasal pillow.     REYES (obstructive sleep apnea)      Past Surgical History:   Procedure Laterality Date    BICEPS TENDON REPAIR Right     CARDIOVERSION N/A 01/03/2019    Procedure: CARDIOVERSION;  Surgeon: Nanci Ahuja MD;  Location: [facility];  Service: Cardiology;  Laterality: N/A;    CARPAL TUNNEL RELEASE      2005, bilateral    CLAVICLE SURGERY      COLONOSCOPY N/A 02/16/2022    Procedure: COLONOSCOPY;  Surgeon: Gualberto Somers MD;  Location: [facility] ENDO;  Service: Endoscopy;  Laterality: N/A;    CORONARY ANGIOGRAPHY N/A 04/09/2019    Procedure: ANGIOGRAM, CORONARY ARTERY;  Surgeon: Nanci Ahuja MD;  Location: [facility] CATH;  Service: Cardiology;  Laterality: N/A;    EPIDURAL BLOCK INJECTION  2010    FRACTURE SURGERY  1966    HIP SURGERY      INSERTION, NEUROSTIMULATOR, HYPOGLOSSAL Right 07/21/2022    Procedure: INSERTION,NEUROSTIMULATOR,HYPOGLOSSAL;  Surgeon: Matthias Zamorano MD;  Location: [facility] OR;  Service: ENT;  Laterality: Right;    INTERNAL NEUROLYSIS USING OPERATING MICROSCOPE Left 12/06/2019    Procedure: Cooled radiofrequency ablation of the genicular nerve branches to the left knee;  Surgeon: Jayy Driscoll MD;  Location: [facility] OR;  Service: Orthopedics;  Laterality: Left;    JOINT REPLACEMENT Right 11/2017    hip    JOINT REPLACEMENT Left  2020    knee    KNEE ARTHROSCOPY W/ MENISCAL REPAIR      left    LEFT HEART CATHETERIZATION Left 2019    Procedure: Left heart cath;  Surgeon: Nanci Ahuja MD;  Location: [facility] CATH;  Service: Cardiology;  Laterality: Left;    LEFT HEART CATHETERIZATION Left 2021    Procedure: Left heart cath;  Surgeon: Nanci Ahuja MD;  [facility];  Service: Cardiology;  Laterality: Left;    ligament reattachment Right 2022    bicep tendon reattachment    LUMBAR FUSION          SLEEP ENDOSCOPY, DRUG-INDUCED Bilateral 2022    Procedure: SLEEP ENDOSCOPY,DRUG-INDUCED;  Surgeon: Matthias Zamorano MD;  Location: [facility] OR;  Service: ENT;  Laterality: Bilateral;    SPINE SURGERY  2010    TONSILLECTOMY       Family History   Problem Relation Name Age of Onset    Heart disease Mother Nadja Stallworth     Arthritis Mother Nadja Stallworth          at age 92    Cancer Father Srikanth Stallworth         lung cancer    Cancer Sister Charissa Angel         leukemia    Diabetes Neg Hx       Social History     Socioeconomic History    Marital status:    Tobacco Use    Smoking status: Never    Smokeless tobacco: Never   Substance and Sexual Activity    Alcohol use: Yes     Alcohol/week: 3.0 - 5.0 standard drinks of alcohol     Types: 1 - 2 Glasses of wine, 1 - 2 Cans of beer, 1 Shots of liquor per week     Comment: occasionally    Drug use: No    Sexual activity: Yes     Partners: Female     Birth control/protection: None     Comment: Have  Erectile Dysfunction     Social Drivers of Health     Financial Resource Strain: Low Risk  (2024)    Overall Financial Resource Strain (CARDIA)     Difficulty of Paying Living Expenses: Not hard at all   Food Insecurity: No Food Insecurity (2024)    Hunger Vital Sign     Worried About Running Out of Food in the Last Year: Never true     Ran Out of Food in the Last Year: Never true   Transportation Needs: No Transportation Needs (2024)    PRAPARE -  Transportation     Lack of Transportation (Medical): No     Lack of Transportation (Non-Medical): No   Physical Activity: Sufficiently Active (4/23/2024)    Exercise Vital Sign     Days of Exercise per Week: 7 days     Minutes of Exercise per Session: 60 min   Stress: No Stress Concern Present (4/23/2024)    [employer] [place] [employer] - Occupational Stress Questionnaire     Feeling of Stress : Only a little   Housing Stability: Low Risk  (12/10/2023)    Housing Stability Vital Sign     Unable to Pay for Housing in the Last Year: No     Number of Places Lived in the Last Year: 1     Unstable Housing in the Last Year: No       Review of patient's allergies indicates:  No Known Allergies  Current Medications[1]    Review of Systems   Constitutional: Negative for chills, diaphoresis, fever, malaise/fatigue, night sweats and weight gain.   HENT:  Negative for congestion and sore throat.    Eyes:  Negative for blurred vision and visual disturbance.   Cardiovascular:  Negative for chest pain, claudication, cyanosis, dyspnea on exertion, irregular heartbeat, leg swelling, near-syncope, orthopnea, palpitations, paroxysmal nocturnal dyspnea and syncope.   Respiratory:  Negative for cough, hemoptysis, shortness of breath and wheezing.    Hematologic/Lymphatic: Negative for adenopathy. Does not bruise/bleed easily.   Skin:  Negative for color change and rash.   Musculoskeletal:  Negative for back pain, falls and joint pain (SOME KNEE).   Gastrointestinal:  Negative for abdominal pain, change in bowel habit, dysphagia, jaundice, melena and nausea.   Genitourinary:  Negative for dysuria and flank pain.   Neurological:  Negative for brief paralysis, focal weakness, headaches, light-headedness, loss of balance, numbness and weakness.   Psychiatric/Behavioral:  Negative for altered mental status and depression.    Allergic/Immunologic: Negative for persistent infections.        Objective:      Vitals:    07/22/25 1027  "07/22/25 1044   BP: (!) 147/75 135/72   Pulse: (!) 50    Weight: 100.5 kg (221 lb 9 oz)    Height: 5' 8" (1.727 m)    PainSc: 0-No pain      Body mass index is 33.69 kg/m².    Physical Exam  Constitutional:       Appearance: He is well-developed. He is obese.   HENT:      Head: Normocephalic and atraumatic.   Eyes:      Extraocular Movements: Extraocular movements intact.      Conjunctiva/sclera: Conjunctivae normal.   Neck:      Vascular: Normal carotid pulses. No JVD.   Cardiovascular:      Rate and Rhythm: Normal rate and regular rhythm. No extrasystoles are present.     Pulses:           Carotid pulses are 2+ on the right side and 2+ on the left side.       Radial pulses are 2+ on the right side and 2+ on the left side.        Posterior tibial pulses are 2+ on the right side and 2+ on the left side.      Heart sounds: Murmur heard.      Systolic murmur is present with a grade of 1/6 at the lower left sternal border and apex.      No friction rub. No gallop.   Pulmonary:      Effort: Pulmonary effort is normal. No respiratory distress.      Breath sounds: Normal breath sounds. No rales.   Abdominal:      Palpations: Abdomen is soft. There is no hepatomegaly.      Tenderness: There is no abdominal tenderness.   Musculoskeletal:         General: Normal range of motion.      Cervical back: Neck supple.      Right lower leg: No edema.      Left lower leg: No edema.   Skin:     General: Skin is warm and dry.      Capillary Refill: Capillary refill takes less than 2 seconds.   Neurological:      General: No focal deficit present.      Mental Status: He is alert and oriented to person, place, and time.      Cranial Nerves: No cranial nerve deficit.   Psychiatric:         Mood and Affect: Mood normal.         Speech: Speech normal.         Behavior: Behavior normal.             ..    Chemistry        Component Value Date/Time     07/18/2025 0812     12/02/2024 1026     12/02/2024 1026    K 4.8 " 07/18/2025 0812    K 4.7 12/02/2024 1026    K 4.7 12/02/2024 1026     07/18/2025 0812     12/02/2024 1026     12/02/2024 1026    CO2 24 07/18/2025 0812    CO2 22 (L) 12/02/2024 1026    CO2 22 (L) 12/02/2024 1026    BUN 23 07/18/2025 0812    CREATININE 1.2 07/18/2025 0812     07/18/2025 0812     12/02/2024 1026     12/02/2024 1026        Component Value Date/Time    CALCIUM 8.8 07/18/2025 0812    CALCIUM 9.3 12/02/2024 1026    CALCIUM 9.3 12/02/2024 1026    ALKPHOS 83 07/18/2025 0812    ALKPHOS 95 12/02/2024 1026    ALKPHOS 95 12/02/2024 1026    AST 27 07/18/2025 0812    AST 23 12/02/2024 1026    AST 23 12/02/2024 1026    ALT 22 07/18/2025 0812    ALT 19 12/02/2024 1026    ALT 19 12/02/2024 1026    BILITOT 0.6 07/18/2025 0812    BILITOT 0.5 12/02/2024 1026    BILITOT 0.5 12/02/2024 1026    ESTGFRAFRICA >60.0 07/18/2022 0850    EGFRNONAA >60.0 07/18/2022 0850            ..  Lab Results   Component Value Date    CHOL 124 07/18/2025    CHOL 112 (L) 04/15/2024    CHOL 104 (L) 07/18/2022     Lab Results   Component Value Date    HDL 38 (L) 07/18/2025    HDL 46 04/15/2024    HDL 41 07/18/2022     Lab Results   Component Value Date    LDLCALC 73.8 07/18/2025    LDLCALC 55.4 (L) 04/15/2024    LDLCALC 51.6 (L) 07/18/2022     Lab Results   Component Value Date    TRIG 61 07/18/2025    TRIG 53 04/15/2024    TRIG 57 07/18/2022     Lab Results   Component Value Date    CHOLHDL 30.6 07/18/2025    CHOLHDL 41.1 04/15/2024    CHOLHDL 39.4 07/18/2022     ..  Lab Results   Component Value Date    WBC 4.92 05/16/2025    HGB 14.5 07/18/2025    HCT 42.8 05/16/2025    MCV 91 05/16/2025     05/16/2025       Test(s) Reviewed  I have reviewed the following in detail:  [] Stress test   [] Angiography   [] Echocardiogram   [x] Labs   [] Other:       Assessment:         ICD-10-CM ICD-9-CM   1. Essential hypertension  I10 401.9   2. PAF (paroxysmal atrial fibrillation)  I48.0 427.31   3. Coronary  artery disease involving native coronary artery of native heart without angina pectoris  I25.10 414.01   4. Carotid artery plaque, left  I65.22 433.10   5. Dyslipidemia (high LDL; low HDL)  E78.5 272.4   6. Non-rheumatic mitral regurgitation  I34.0 424.0   7. Long term (current) use of anticoagulants  Z79.01 V58.61   8. Obesity, Class I, BMI 30-34.9  E66.811 278.00     Problem List Items Addressed This Visit          Cardiac/Vascular    PAF (paroxysmal atrial fibrillation)    Non-rheumatic mitral regurgitation    Essential hypertension - Primary    Relevant Orders    Hypertension Digital Medicine (HDMP) Enrollment Order (Completed)    Dyslipidemia (high LDL; low HDL)    Coronary artery disease involving native coronary artery of native heart without angina pectoris    Carotid artery plaque, left       Hematology    Long term (current) use of anticoagulants       Endocrine    Obesity, Class I, BMI 30-34.9        Plan:     DIET, AND INCREASE ACTIVITY NEEDS BETTER CHOLESTEROL CONTROL DIGITAL HTN, RETURN TO CLINIC IN 6 MO    ALL CV CLINICALLY STABLE, NO ANGINA, NO HF, NO TIA, NO CLINICAL ARRHYTHMIA,CONTINUE CURRENT MEDS, EDUCATION, DIET, EXERCISE , WEIGHT LOSS        Essential hypertension  -     Hypertension Digital Medicine (HDMP) Enrollment Order    PAF (paroxysmal atrial fibrillation)    Coronary artery disease involving native coronary artery of native heart without angina pectoris    Carotid artery plaque, left    Dyslipidemia (high LDL; low HDL)    Non-rheumatic mitral regurgitation    Long term (current) use of anticoagulants    Obesity, Class I, BMI 30-34.9    RTC Low level/low impact aerobic exercise 5x's/wk. Heart healthy diet and risk factor modification.    See labs and med orders.    Aerobic exercise 5x's/wk. Heart healthy diet and risk factor modification.    See labs and med orders.             [1]   Current Outpatient Medications:     acetaminophen (TYLENOL) 500 MG tablet, , Disp: , Rfl:     aspirin  (ECOTRIN) 81 MG EC tablet, Take 81 mg by mouth once daily., Disp: , Rfl:     coenzyme Q10 100 mg capsule, Take 100 mg by mouth once daily. , Disp: , Rfl:     cyanocobalamin (VITAMIN B-12) 1000 MCG tablet, , Disp: , Rfl:     ELIQUIS 5 mg Tab, TAKE 1 TABLET TWICE DAILY, Disp: 180 tablet, Rfl: 0    glucosamine-chondroitin 500-400 mg tablet, Take 1 tablet by mouth Daily. Triflex, Disp: , Rfl:     metoprolol succinate (TOPROL-XL) 25 MG 24 hr tablet, TAKE 1 TABLET EVERY DAY, Disp: 90 tablet, Rfl: 1    MULTIVITS-MINERALS/FA/LYCOPENE (MEN'S DAILY MULTIVIT-MINERAL ORAL), Take 1 tablet by mouth once daily. , Disp: , Rfl:     nitroGLYCERIN (NITROSTAT) 0.4 MG SL tablet, Place 1 tablet (0.4 mg total) under the tongue every 5 (five) minutes as needed., Disp: 25 tablet, Rfl: 0    predniSONE (DELTASONE) 1 MG tablet, Take 5 tablets (5 mg total) by mouth once daily., Disp: 120 tablet, Rfl: 1    rosuvastatin (CRESTOR) 5 MG tablet, TAKE 1 TABLET EVERY DAY, Disp: 90 tablet, Rfl: 0    tadalafiL (CIALIS) 5 MG tablet, Take 1 tablet (5 mg total) by mouth Daily., Disp: 90 tablet, Rfl: 3    sildenafiL (VIAGRA) 100 MG tablet, Take 1 tablet (100 mg total) by mouth daily as needed for Erectile Dysfunction. (Patient not taking: Reported on 7/22/2025), Disp: 50 tablet, Rfl: 1    zolpidem (AMBIEN) 5 MG Tab, Take 1 tablet (5 mg total) by mouth nightly as needed (jet lab)., Disp: 8 tablet, Rfl: 0  No current facility-administered medications for this visit.    Facility-Administered Medications Ordered in Other Visits:     HYDROmorphone injection 0.5 mg, 0.5 mg, Intravenous, Q5 Min PRN, Volpi-Abadie, Jacqueline, MD, 0.5 mg at 07/21/22 1219    ondansetron injection 4 mg, 4 mg, Intravenous, Daily PRN, Volpi-Abadie, Jacqueline, MD    oxyCODONE-acetaminophen 5-325 mg per tablet 1 tablet, 1 tablet, Oral, Q3H PRN, Volpi-Abadie, Jacqueline, MD    sodium chloride 0.9% flush 3 mL, 3 mL, Intravenous, PRN, Volpi-Abadie, Jacqueline, MD

## 2025-07-22 ENCOUNTER — OFFICE VISIT (OUTPATIENT)
Dept: CARDIOLOGY | Facility: CLINIC | Age: 75
End: 2025-07-22
Payer: MEDICARE

## 2025-07-22 VITALS
WEIGHT: 221.56 LBS | HEIGHT: 68 IN | SYSTOLIC BLOOD PRESSURE: 135 MMHG | HEART RATE: 50 BPM | BODY MASS INDEX: 33.58 KG/M2 | DIASTOLIC BLOOD PRESSURE: 72 MMHG

## 2025-07-22 DIAGNOSIS — I25.10 CORONARY ARTERY DISEASE INVOLVING NATIVE CORONARY ARTERY OF NATIVE HEART WITHOUT ANGINA PECTORIS: Primary | Chronic | ICD-10-CM

## 2025-07-22 DIAGNOSIS — E78.5 DYSLIPIDEMIA (HIGH LDL; LOW HDL): ICD-10-CM

## 2025-07-22 DIAGNOSIS — I10 ESSENTIAL HYPERTENSION: Chronic | ICD-10-CM

## 2025-07-22 DIAGNOSIS — I48.0 PAF (PAROXYSMAL ATRIAL FIBRILLATION): Chronic | ICD-10-CM

## 2025-07-22 DIAGNOSIS — I34.0 NON-RHEUMATIC MITRAL REGURGITATION: Chronic | ICD-10-CM

## 2025-07-22 DIAGNOSIS — I65.22 CAROTID ARTERY PLAQUE, LEFT: Chronic | ICD-10-CM

## 2025-07-22 DIAGNOSIS — Z95.5 STENTED CORONARY ARTERY: Chronic | ICD-10-CM

## 2025-07-22 DIAGNOSIS — E66.811 OBESITY, CLASS I, BMI 30-34.9: Chronic | ICD-10-CM

## 2025-07-22 DIAGNOSIS — Z79.01 LONG TERM (CURRENT) USE OF ANTICOAGULANTS: ICD-10-CM

## 2025-07-22 DIAGNOSIS — E78.00 HYPERCHOLESTEREMIA: Chronic | ICD-10-CM

## 2025-07-22 PROCEDURE — 1159F MED LIST DOCD IN RCRD: CPT | Mod: CPTII,S$GLB,, | Performed by: INTERNAL MEDICINE

## 2025-07-22 PROCEDURE — 3075F SYST BP GE 130 - 139MM HG: CPT | Mod: CPTII,S$GLB,, | Performed by: INTERNAL MEDICINE

## 2025-07-22 PROCEDURE — 1126F AMNT PAIN NOTED NONE PRSNT: CPT | Mod: CPTII,S$GLB,, | Performed by: INTERNAL MEDICINE

## 2025-07-22 PROCEDURE — 3288F FALL RISK ASSESSMENT DOCD: CPT | Mod: CPTII,S$GLB,, | Performed by: INTERNAL MEDICINE

## 2025-07-22 PROCEDURE — 3008F BODY MASS INDEX DOCD: CPT | Mod: CPTII,S$GLB,, | Performed by: INTERNAL MEDICINE

## 2025-07-22 PROCEDURE — 3078F DIAST BP <80 MM HG: CPT | Mod: CPTII,S$GLB,, | Performed by: INTERNAL MEDICINE

## 2025-07-22 PROCEDURE — 99214 OFFICE O/P EST MOD 30 MIN: CPT | Mod: S$GLB,,, | Performed by: INTERNAL MEDICINE

## 2025-07-22 PROCEDURE — 1101F PT FALLS ASSESS-DOCD LE1/YR: CPT | Mod: CPTII,S$GLB,, | Performed by: INTERNAL MEDICINE

## 2025-07-22 PROCEDURE — 99999 PR PBB SHADOW E&M-EST. PATIENT-LVL III: CPT | Mod: PBBFAC,,, | Performed by: INTERNAL MEDICINE

## 2025-07-22 RX ORDER — ROSUVASTATIN CALCIUM 5 MG/1
5 TABLET, COATED ORAL NIGHTLY
Qty: 90 TABLET | Refills: 1 | Status: SHIPPED | OUTPATIENT
Start: 2025-07-22

## 2025-07-31 DIAGNOSIS — I48.0 PAROXYSMAL ATRIAL FIBRILLATION: ICD-10-CM

## 2025-08-01 RX ORDER — APIXABAN 5 MG/1
5 TABLET, FILM COATED ORAL 2 TIMES DAILY
Qty: 180 TABLET | Refills: 3 | Status: SHIPPED | OUTPATIENT
Start: 2025-08-01

## 2025-08-06 DIAGNOSIS — E78.00 HYPERCHOLESTEREMIA: Chronic | ICD-10-CM

## 2025-08-06 DIAGNOSIS — I48.91 NEW ONSET ATRIAL FIBRILLATION: ICD-10-CM

## 2025-08-06 DIAGNOSIS — Z95.5 STENTED CORONARY ARTERY: Chronic | ICD-10-CM

## 2025-08-07 RX ORDER — ROSUVASTATIN CALCIUM 5 MG/1
5 TABLET, COATED ORAL NIGHTLY
Qty: 90 TABLET | Refills: 3 | Status: SHIPPED | OUTPATIENT
Start: 2025-08-07

## 2025-08-07 RX ORDER — METOPROLOL SUCCINATE 25 MG/1
25 TABLET, EXTENDED RELEASE ORAL
Qty: 90 TABLET | Refills: 1 | Status: SHIPPED | OUTPATIENT
Start: 2025-08-07

## 2025-08-12 ENCOUNTER — PATIENT MESSAGE (OUTPATIENT)
Dept: RHEUMATOLOGY | Facility: CLINIC | Age: 75
End: 2025-08-12
Payer: MEDICARE

## 2025-08-22 ENCOUNTER — TELEPHONE (OUTPATIENT)
Dept: RHEUMATOLOGY | Facility: CLINIC | Age: 75
End: 2025-08-22
Payer: MEDICARE

## 2025-08-22 DIAGNOSIS — M35.3 PMR (POLYMYALGIA RHEUMATICA): Primary | ICD-10-CM

## (undated) DEVICE — SEE MEDLINE ITEM 157131

## (undated) DEVICE — SYR DISP LL 5CC

## (undated) DEVICE — SPONGE PATTY SURGICAL .5X3IN

## (undated) DEVICE — CUP MEDICINE STERILE 2OZ

## (undated) DEVICE — DRESSING TELFA STRL 4X3 LF

## (undated) DEVICE — SOL 0.9% NACL IRRI.IN STERIL

## (undated) DEVICE — GLOVE SURG ULTRA TOUCH 8

## (undated) DEVICE — MARKER SKIN STND TIP BLUE BARR

## (undated) DEVICE — GAUZE SPONGE 4X4 12PLY

## (undated) DEVICE — GLOVE PROTEXIS HYDROGEL SZ7

## (undated) DEVICE — KIT ANTIFOG

## (undated) DEVICE — SEE MEDLINE ITEM 157128

## (undated) DEVICE — PAD GROUNDING DISPER ELECTRODE

## (undated) DEVICE — SEE MEDLINE ITEM 152622

## (undated) DEVICE — NDL HYPO 27G X 1 1/2

## (undated) DEVICE — SOL IRR STRL WATER 500ML

## (undated) DEVICE — SEE MEDLINE ITEM 152678

## (undated) DEVICE — LABEL FOR UTILITY MARKER

## (undated) DEVICE — SPONGE GAUZE 16PLY 4X4

## (undated) DEVICE — NDL SPINAL 25GX3.5 SPINOCAN

## (undated) DEVICE — SEE MEDLINE ITEM 146292

## (undated) DEVICE — APPLICATOR CHLORAPREP CLR 10.5

## (undated) DEVICE — SYR 10CC LUER LOCK

## (undated) DEVICE — NDL 18GA X1 1/2 REG BEVEL

## (undated) DEVICE — TUBING SUC UNIV W/CONN 12FT